# Patient Record
Sex: FEMALE | Race: BLACK OR AFRICAN AMERICAN | Employment: OTHER
[De-identification: names, ages, dates, MRNs, and addresses within clinical notes are randomized per-mention and may not be internally consistent; named-entity substitution may affect disease eponyms.]

---

## 2017-01-01 ENCOUNTER — HOME CARE VISIT (OUTPATIENT)
Dept: SCHEDULING | Facility: HOME HEALTH | Age: 77
End: 2017-01-01
Payer: MEDICARE

## 2017-01-01 ENCOUNTER — HOME CARE VISIT (OUTPATIENT)
Dept: HOSPICE | Facility: HOSPICE | Age: 77
End: 2017-01-01
Payer: MEDICARE

## 2017-01-01 ENCOUNTER — DOCUMENTATION ONLY (OUTPATIENT)
Dept: SURGERY | Age: 77
End: 2017-01-01

## 2017-01-01 ENCOUNTER — PATIENT OUTREACH (OUTPATIENT)
Dept: CASE MANAGEMENT | Age: 77
End: 2017-01-01

## 2017-01-01 ENCOUNTER — APPOINTMENT (OUTPATIENT)
Dept: INFUSION THERAPY | Age: 77
End: 2017-01-01

## 2017-01-01 ENCOUNTER — TELEPHONE (OUTPATIENT)
Dept: ONCOLOGY | Age: 77
End: 2017-01-01

## 2017-01-01 ENCOUNTER — HOME CARE VISIT (OUTPATIENT)
Dept: HOME HEALTH SERVICES | Facility: HOME HEALTH | Age: 77
End: 2017-01-01

## 2017-01-01 ENCOUNTER — HOSPITAL ENCOUNTER (OUTPATIENT)
Dept: INFUSION THERAPY | Age: 77
Discharge: HOME | End: 2017-10-12
Payer: MEDICARE

## 2017-01-01 ENCOUNTER — ANESTHESIA EVENT (OUTPATIENT)
Dept: SURGERY | Age: 77
DRG: 335 | End: 2017-01-01
Payer: MEDICARE

## 2017-01-01 ENCOUNTER — DOCUMENTATION ONLY (OUTPATIENT)
Dept: ONCOLOGY | Age: 77
End: 2017-01-01

## 2017-01-01 ENCOUNTER — APPOINTMENT (OUTPATIENT)
Dept: CT IMAGING | Age: 77
End: 2017-01-01
Attending: EMERGENCY MEDICINE
Payer: MEDICARE

## 2017-01-01 ENCOUNTER — HOSPITAL ENCOUNTER (INPATIENT)
Age: 77
LOS: 2 days | Discharge: HOME HEALTH | DRG: 065 | End: 2017-05-31
Attending: EMERGENCY MEDICINE | Admitting: INTERNAL MEDICINE
Payer: MEDICARE

## 2017-01-01 ENCOUNTER — OFFICE VISIT (OUTPATIENT)
Dept: ENDOCRINOLOGY | Age: 77
End: 2017-01-01

## 2017-01-01 ENCOUNTER — HOSPITAL ENCOUNTER (OUTPATIENT)
Dept: INFUSION THERAPY | Age: 77
Discharge: HOME | End: 2017-08-24
Payer: MEDICARE

## 2017-01-01 ENCOUNTER — TELEPHONE (OUTPATIENT)
Dept: ENDOCRINOLOGY | Age: 77
End: 2017-01-01

## 2017-01-01 ENCOUNTER — HOSPITAL ENCOUNTER (INPATIENT)
Age: 77
LOS: 14 days | Discharge: NURSING, REHAB OR LONG-TERM CARE | DRG: 335 | End: 2017-09-27
Attending: INTERNAL MEDICINE | Admitting: INTERNAL MEDICINE
Payer: MEDICARE

## 2017-01-01 ENCOUNTER — APPOINTMENT (OUTPATIENT)
Dept: CT IMAGING | Age: 77
DRG: 683 | End: 2017-01-01
Attending: EMERGENCY MEDICINE
Payer: MEDICARE

## 2017-01-01 ENCOUNTER — HOME CARE VISIT (OUTPATIENT)
Dept: HOME HEALTH SERVICES | Facility: HOME HEALTH | Age: 77
End: 2017-01-01
Payer: MEDICARE

## 2017-01-01 ENCOUNTER — APPOINTMENT (OUTPATIENT)
Dept: GENERAL RADIOLOGY | Age: 77
DRG: 335 | End: 2017-01-01
Attending: SURGERY
Payer: MEDICARE

## 2017-01-01 ENCOUNTER — OFFICE VISIT (OUTPATIENT)
Dept: ONCOLOGY | Age: 77
End: 2017-01-01

## 2017-01-01 ENCOUNTER — HOSPITAL ENCOUNTER (EMERGENCY)
Age: 77
Discharge: HOME | End: 2017-07-02
Attending: EMERGENCY MEDICINE
Payer: MEDICARE

## 2017-01-01 ENCOUNTER — APPOINTMENT (OUTPATIENT)
Dept: INFUSION THERAPY | Age: 77
End: 2017-01-01
Payer: MEDICARE

## 2017-01-01 ENCOUNTER — HOSPITAL ENCOUNTER (OUTPATIENT)
Dept: LAB | Age: 77
Discharge: HOME | End: 2017-07-07
Payer: MEDICARE

## 2017-01-01 ENCOUNTER — HOSPITAL ENCOUNTER (INPATIENT)
Age: 77
LOS: 2 days | Discharge: HOSPICE | DRG: 683 | End: 2017-11-01
Attending: EMERGENCY MEDICINE | Admitting: INTERNAL MEDICINE
Payer: MEDICARE

## 2017-01-01 ENCOUNTER — HOSPITAL ENCOUNTER (OUTPATIENT)
Dept: INFUSION THERAPY | Age: 77
End: 2017-01-01
Payer: MEDICARE

## 2017-01-01 ENCOUNTER — APPOINTMENT (OUTPATIENT)
Dept: GENERAL RADIOLOGY | Age: 77
End: 2017-01-01
Attending: EMERGENCY MEDICINE
Payer: MEDICARE

## 2017-01-01 ENCOUNTER — HOSPITAL ENCOUNTER (OUTPATIENT)
Dept: INFUSION THERAPY | Age: 77
Discharge: HOME | End: 2017-09-01
Payer: MEDICARE

## 2017-01-01 ENCOUNTER — ANESTHESIA (OUTPATIENT)
Dept: SURGERY | Age: 77
DRG: 335 | End: 2017-01-01
Payer: MEDICARE

## 2017-01-01 ENCOUNTER — HOSPITAL ENCOUNTER (EMERGENCY)
Age: 77
Discharge: HOME | End: 2017-06-10
Attending: EMERGENCY MEDICINE
Payer: MEDICARE

## 2017-01-01 ENCOUNTER — APPOINTMENT (OUTPATIENT)
Dept: GENERAL RADIOLOGY | Age: 77
DRG: 683 | End: 2017-01-01
Attending: EMERGENCY MEDICINE
Payer: MEDICARE

## 2017-01-01 ENCOUNTER — OFFICE VISIT (OUTPATIENT)
Dept: SURGERY | Age: 77
End: 2017-01-01

## 2017-01-01 ENCOUNTER — HOSPITAL ENCOUNTER (INPATIENT)
Age: 77
LOS: 5 days | Discharge: HOSPICE | End: 2017-12-11
Attending: INTERNAL MEDICINE | Admitting: INTERNAL MEDICINE

## 2017-01-01 ENCOUNTER — APPOINTMENT (OUTPATIENT)
Dept: GENERAL RADIOLOGY | Age: 77
DRG: 335 | End: 2017-01-01
Attending: NURSE PRACTITIONER
Payer: MEDICARE

## 2017-01-01 ENCOUNTER — HOSPITAL ENCOUNTER (OUTPATIENT)
Dept: INFUSION THERAPY | Age: 77
Discharge: HOME | End: 2017-08-23
Payer: MEDICARE

## 2017-01-01 ENCOUNTER — HOSPITAL ENCOUNTER (OUTPATIENT)
Dept: INTERVENTIONAL RADIOLOGY/VASCULAR | Age: 77
Discharge: HOME | End: 2017-08-22
Attending: INTERNAL MEDICINE
Payer: MEDICARE

## 2017-01-01 ENCOUNTER — HOME HEALTH ADMISSION (OUTPATIENT)
Dept: HOME HEALTH SERVICES | Facility: HOME HEALTH | Age: 77
End: 2017-01-01
Payer: MEDICARE

## 2017-01-01 ENCOUNTER — APPOINTMENT (OUTPATIENT)
Dept: CT IMAGING | Age: 77
DRG: 335 | End: 2017-01-01
Attending: INTERNAL MEDICINE
Payer: MEDICARE

## 2017-01-01 ENCOUNTER — HOSPITAL ENCOUNTER (OUTPATIENT)
Dept: INFUSION THERAPY | Age: 77
Discharge: HOME | End: 2017-08-30
Payer: MEDICARE

## 2017-01-01 ENCOUNTER — APPOINTMENT (OUTPATIENT)
Dept: GENERAL RADIOLOGY | Age: 77
DRG: 335 | End: 2017-01-01
Attending: HOSPITALIST
Payer: MEDICARE

## 2017-01-01 ENCOUNTER — APPOINTMENT (OUTPATIENT)
Dept: CT IMAGING | Age: 77
DRG: 065 | End: 2017-01-01
Attending: EMERGENCY MEDICINE
Payer: MEDICARE

## 2017-01-01 ENCOUNTER — APPOINTMENT (OUTPATIENT)
Dept: GENERAL RADIOLOGY | Age: 77
DRG: 065 | End: 2017-01-01
Attending: EMERGENCY MEDICINE
Payer: MEDICARE

## 2017-01-01 ENCOUNTER — HOSPICE ADMISSION (OUTPATIENT)
Dept: HOSPICE | Facility: HOSPICE | Age: 77
End: 2017-01-01
Payer: MEDICARE

## 2017-01-01 ENCOUNTER — NURSE NAVIGATOR (OUTPATIENT)
Dept: PALLATIVE CARE | Age: 77
End: 2017-01-01

## 2017-01-01 ENCOUNTER — APPOINTMENT (OUTPATIENT)
Dept: CT IMAGING | Age: 77
DRG: 065 | End: 2017-01-01
Attending: INTERNAL MEDICINE
Payer: MEDICARE

## 2017-01-01 ENCOUNTER — TELEPHONE (OUTPATIENT)
Dept: SURGERY | Age: 77
End: 2017-01-01

## 2017-01-01 ENCOUNTER — HOSPITAL ENCOUNTER (OUTPATIENT)
Dept: NUCLEAR MEDICINE | Age: 77
Discharge: HOME | End: 2017-07-13
Attending: INTERNAL MEDICINE
Payer: MEDICARE

## 2017-01-01 ENCOUNTER — HOSPITAL ENCOUNTER (OUTPATIENT)
Dept: CT IMAGING | Age: 77
Discharge: HOME | End: 2017-07-17
Attending: INTERNAL MEDICINE
Payer: MEDICARE

## 2017-01-01 ENCOUNTER — HOSPITAL ENCOUNTER (OUTPATIENT)
Dept: INFUSION THERAPY | Age: 77
Discharge: HOME | End: 2017-08-25
Payer: MEDICARE

## 2017-01-01 ENCOUNTER — HOSPITAL ENCOUNTER (OUTPATIENT)
Dept: CT IMAGING | Age: 77
Discharge: HOME | End: 2017-03-14
Attending: INTERNAL MEDICINE
Payer: MEDICARE

## 2017-01-01 ENCOUNTER — HOSPITAL ENCOUNTER (EMERGENCY)
Age: 77
Discharge: HOME | End: 2017-01-26
Attending: EMERGENCY MEDICINE
Payer: MEDICARE

## 2017-01-01 ENCOUNTER — APPOINTMENT (OUTPATIENT)
Dept: GENERAL RADIOLOGY | Age: 77
DRG: 065 | End: 2017-01-01
Attending: INTERNAL MEDICINE
Payer: MEDICARE

## 2017-01-01 VITALS — DIASTOLIC BLOOD PRESSURE: 70 MMHG | RESPIRATION RATE: 16 BRPM | HEART RATE: 76 BPM | SYSTOLIC BLOOD PRESSURE: 110 MMHG

## 2017-01-01 VITALS
RESPIRATION RATE: 20 BRPM | DIASTOLIC BLOOD PRESSURE: 58 MMHG | OXYGEN SATURATION: 99 % | HEART RATE: 63 BPM | SYSTOLIC BLOOD PRESSURE: 110 MMHG

## 2017-01-01 VITALS
RESPIRATION RATE: 20 BRPM | HEART RATE: 64 BPM | SYSTOLIC BLOOD PRESSURE: 138 MMHG | DIASTOLIC BLOOD PRESSURE: 62 MMHG | OXYGEN SATURATION: 99 %

## 2017-01-01 VITALS
SYSTOLIC BLOOD PRESSURE: 140 MMHG | OXYGEN SATURATION: 98 % | RESPIRATION RATE: 18 BRPM | TEMPERATURE: 98.2 F | DIASTOLIC BLOOD PRESSURE: 80 MMHG | HEART RATE: 86 BPM

## 2017-01-01 VITALS
OXYGEN SATURATION: 98 % | RESPIRATION RATE: 16 BRPM | HEART RATE: 67 BPM | TEMPERATURE: 98.6 F | DIASTOLIC BLOOD PRESSURE: 65 MMHG | SYSTOLIC BLOOD PRESSURE: 126 MMHG

## 2017-01-01 VITALS — RESPIRATION RATE: 16 BRPM | HEART RATE: 78 BPM | SYSTOLIC BLOOD PRESSURE: 102 MMHG | DIASTOLIC BLOOD PRESSURE: 50 MMHG

## 2017-01-01 VITALS
DIASTOLIC BLOOD PRESSURE: 67 MMHG | SYSTOLIC BLOOD PRESSURE: 173 MMHG | OXYGEN SATURATION: 99 % | BODY MASS INDEX: 31.86 KG/M2 | WEIGHT: 203 LBS | RESPIRATION RATE: 16 BRPM | TEMPERATURE: 98.2 F | HEIGHT: 67 IN | HEART RATE: 77 BPM

## 2017-01-01 VITALS
SYSTOLIC BLOOD PRESSURE: 128 MMHG | TEMPERATURE: 97.8 F | HEART RATE: 61 BPM | OXYGEN SATURATION: 95 % | DIASTOLIC BLOOD PRESSURE: 63 MMHG | RESPIRATION RATE: 16 BRPM

## 2017-01-01 VITALS
TEMPERATURE: 97.7 F | HEART RATE: 85 BPM | DIASTOLIC BLOOD PRESSURE: 76 MMHG | RESPIRATION RATE: 18 BRPM | SYSTOLIC BLOOD PRESSURE: 135 MMHG

## 2017-01-01 VITALS
SYSTOLIC BLOOD PRESSURE: 119 MMHG | HEART RATE: 93 BPM | TEMPERATURE: 97.9 F | DIASTOLIC BLOOD PRESSURE: 61 MMHG | RESPIRATION RATE: 18 BRPM | OXYGEN SATURATION: 97 %

## 2017-01-01 VITALS
OXYGEN SATURATION: 97 % | HEART RATE: 65 BPM | TEMPERATURE: 98.5 F | DIASTOLIC BLOOD PRESSURE: 74 MMHG | RESPIRATION RATE: 16 BRPM | SYSTOLIC BLOOD PRESSURE: 142 MMHG

## 2017-01-01 VITALS
SYSTOLIC BLOOD PRESSURE: 131 MMHG | RESPIRATION RATE: 16 BRPM | OXYGEN SATURATION: 98 % | DIASTOLIC BLOOD PRESSURE: 72 MMHG | TEMPERATURE: 98.5 F | HEART RATE: 76 BPM

## 2017-01-01 VITALS
OXYGEN SATURATION: 100 % | BODY MASS INDEX: 29.87 KG/M2 | TEMPERATURE: 98.2 F | SYSTOLIC BLOOD PRESSURE: 153 MMHG | HEIGHT: 66 IN | OXYGEN SATURATION: 97 % | RESPIRATION RATE: 18 BRPM | DIASTOLIC BLOOD PRESSURE: 80 MMHG | SYSTOLIC BLOOD PRESSURE: 134 MMHG | BODY MASS INDEX: 31.98 KG/M2 | WEIGHT: 199 LBS | HEART RATE: 92 BPM | DIASTOLIC BLOOD PRESSURE: 70 MMHG | HEART RATE: 60 BPM | TEMPERATURE: 98.8 F | RESPIRATION RATE: 18 BRPM | HEIGHT: 66 IN

## 2017-01-01 VITALS
OXYGEN SATURATION: 98 % | WEIGHT: 216 LBS | HEART RATE: 65 BPM | BODY MASS INDEX: 33.9 KG/M2 | RESPIRATION RATE: 18 BRPM | HEIGHT: 67 IN | DIASTOLIC BLOOD PRESSURE: 88 MMHG | TEMPERATURE: 98.9 F | SYSTOLIC BLOOD PRESSURE: 164 MMHG

## 2017-01-01 VITALS
HEART RATE: 75 BPM | DIASTOLIC BLOOD PRESSURE: 78 MMHG | HEIGHT: 67 IN | SYSTOLIC BLOOD PRESSURE: 146 MMHG | BODY MASS INDEX: 33.18 KG/M2 | WEIGHT: 211.4 LBS

## 2017-01-01 VITALS
HEIGHT: 71 IN | HEART RATE: 62 BPM | WEIGHT: 188 LBS | SYSTOLIC BLOOD PRESSURE: 98 MMHG | OXYGEN SATURATION: 97 % | RESPIRATION RATE: 12 BRPM | DIASTOLIC BLOOD PRESSURE: 64 MMHG | BODY MASS INDEX: 26.32 KG/M2

## 2017-01-01 VITALS
DIASTOLIC BLOOD PRESSURE: 83 MMHG | TEMPERATURE: 98.6 F | BODY MASS INDEX: 29.03 KG/M2 | SYSTOLIC BLOOD PRESSURE: 154 MMHG | HEIGHT: 67 IN | HEART RATE: 95 BPM | OXYGEN SATURATION: 93 % | WEIGHT: 185 LBS | RESPIRATION RATE: 18 BRPM

## 2017-01-01 VITALS
TEMPERATURE: 98.6 F | RESPIRATION RATE: 16 BRPM | OXYGEN SATURATION: 98 % | HEART RATE: 60 BPM | SYSTOLIC BLOOD PRESSURE: 131 MMHG | DIASTOLIC BLOOD PRESSURE: 68 MMHG

## 2017-01-01 VITALS
HEIGHT: 67 IN | DIASTOLIC BLOOD PRESSURE: 89 MMHG | OXYGEN SATURATION: 95 % | HEART RATE: 68 BPM | TEMPERATURE: 98.2 F | SYSTOLIC BLOOD PRESSURE: 168 MMHG | BODY MASS INDEX: 31.55 KG/M2 | RESPIRATION RATE: 18 BRPM | WEIGHT: 201 LBS

## 2017-01-01 VITALS
RESPIRATION RATE: 20 BRPM | DIASTOLIC BLOOD PRESSURE: 66 MMHG | WEIGHT: 194.44 LBS | BODY MASS INDEX: 30.52 KG/M2 | HEART RATE: 60 BPM | OXYGEN SATURATION: 100 % | TEMPERATURE: 98.2 F | SYSTOLIC BLOOD PRESSURE: 136 MMHG | HEIGHT: 67 IN

## 2017-01-01 VITALS — RESPIRATION RATE: 18 BRPM | DIASTOLIC BLOOD PRESSURE: 60 MMHG | SYSTOLIC BLOOD PRESSURE: 116 MMHG | HEART RATE: 76 BPM

## 2017-01-01 VITALS
WEIGHT: 211 LBS | SYSTOLIC BLOOD PRESSURE: 148 MMHG | DIASTOLIC BLOOD PRESSURE: 59 MMHG | BODY MASS INDEX: 33.12 KG/M2 | HEIGHT: 67 IN | HEART RATE: 78 BPM

## 2017-01-01 VITALS
DIASTOLIC BLOOD PRESSURE: 74 MMHG | RESPIRATION RATE: 18 BRPM | OXYGEN SATURATION: 95 % | SYSTOLIC BLOOD PRESSURE: 130 MMHG | TEMPERATURE: 98.3 F | HEART RATE: 70 BPM

## 2017-01-01 VITALS
OXYGEN SATURATION: 92 % | RESPIRATION RATE: 18 BRPM | HEART RATE: 88 BPM | DIASTOLIC BLOOD PRESSURE: 60 MMHG | SYSTOLIC BLOOD PRESSURE: 114 MMHG

## 2017-01-01 VITALS
SYSTOLIC BLOOD PRESSURE: 108 MMHG | BODY MASS INDEX: 30.82 KG/M2 | WEIGHT: 191.8 LBS | OXYGEN SATURATION: 96 % | TEMPERATURE: 98.4 F | DIASTOLIC BLOOD PRESSURE: 54 MMHG | HEIGHT: 66 IN | HEART RATE: 72 BPM | RESPIRATION RATE: 16 BRPM

## 2017-01-01 VITALS — HEART RATE: 68 BPM | SYSTOLIC BLOOD PRESSURE: 100 MMHG | RESPIRATION RATE: 18 BRPM | DIASTOLIC BLOOD PRESSURE: 58 MMHG

## 2017-01-01 VITALS
HEIGHT: 67 IN | RESPIRATION RATE: 18 BRPM | SYSTOLIC BLOOD PRESSURE: 139 MMHG | BODY MASS INDEX: 33.77 KG/M2 | OXYGEN SATURATION: 99 % | TEMPERATURE: 99.2 F | HEART RATE: 69 BPM | DIASTOLIC BLOOD PRESSURE: 66 MMHG | WEIGHT: 215.17 LBS

## 2017-01-01 VITALS
HEART RATE: 73 BPM | OXYGEN SATURATION: 98 % | RESPIRATION RATE: 14 BRPM | SYSTOLIC BLOOD PRESSURE: 136 MMHG | DIASTOLIC BLOOD PRESSURE: 77 MMHG

## 2017-01-01 VITALS
SYSTOLIC BLOOD PRESSURE: 141 MMHG | OXYGEN SATURATION: 98 % | RESPIRATION RATE: 16 BRPM | HEART RATE: 78 BPM | TEMPERATURE: 98.6 F | DIASTOLIC BLOOD PRESSURE: 78 MMHG

## 2017-01-01 VITALS
OXYGEN SATURATION: 100 % | WEIGHT: 203.71 LBS | HEIGHT: 67 IN | RESPIRATION RATE: 16 BRPM | TEMPERATURE: 98.3 F | HEART RATE: 60 BPM | SYSTOLIC BLOOD PRESSURE: 121 MMHG | BODY MASS INDEX: 31.97 KG/M2 | DIASTOLIC BLOOD PRESSURE: 64 MMHG

## 2017-01-01 VITALS
SYSTOLIC BLOOD PRESSURE: 125 MMHG | HEART RATE: 68 BPM | DIASTOLIC BLOOD PRESSURE: 84 MMHG | OXYGEN SATURATION: 99 % | WEIGHT: 191 LBS | HEIGHT: 66 IN | RESPIRATION RATE: 20 BRPM | BODY MASS INDEX: 30.7 KG/M2

## 2017-01-01 VITALS
TEMPERATURE: 97.6 F | RESPIRATION RATE: 16 BRPM | DIASTOLIC BLOOD PRESSURE: 68 MMHG | HEART RATE: 60 BPM | SYSTOLIC BLOOD PRESSURE: 144 MMHG | OXYGEN SATURATION: 93 %

## 2017-01-01 VITALS
TEMPERATURE: 98.3 F | OXYGEN SATURATION: 95 % | HEART RATE: 66 BPM | HEIGHT: 71 IN | BODY MASS INDEX: 26.32 KG/M2 | DIASTOLIC BLOOD PRESSURE: 64 MMHG | SYSTOLIC BLOOD PRESSURE: 118 MMHG | RESPIRATION RATE: 18 BRPM | WEIGHT: 188 LBS

## 2017-01-01 VITALS
HEART RATE: 88 BPM | DIASTOLIC BLOOD PRESSURE: 64 MMHG | SYSTOLIC BLOOD PRESSURE: 110 MMHG | TEMPERATURE: 98.7 F | RESPIRATION RATE: 18 BRPM

## 2017-01-01 VITALS
HEART RATE: 66 BPM | RESPIRATION RATE: 18 BRPM | DIASTOLIC BLOOD PRESSURE: 60 MMHG | SYSTOLIC BLOOD PRESSURE: 110 MMHG | OXYGEN SATURATION: 99 %

## 2017-01-01 VITALS
OXYGEN SATURATION: 98 % | RESPIRATION RATE: 16 BRPM | HEART RATE: 65 BPM | SYSTOLIC BLOOD PRESSURE: 130 MMHG | DIASTOLIC BLOOD PRESSURE: 64 MMHG

## 2017-01-01 VITALS
OXYGEN SATURATION: 96 % | RESPIRATION RATE: 16 BRPM | TEMPERATURE: 98.8 F | DIASTOLIC BLOOD PRESSURE: 58 MMHG | HEART RATE: 64 BPM | SYSTOLIC BLOOD PRESSURE: 110 MMHG

## 2017-01-01 VITALS
OXYGEN SATURATION: 97 % | RESPIRATION RATE: 18 BRPM | SYSTOLIC BLOOD PRESSURE: 120 MMHG | HEART RATE: 87 BPM | TEMPERATURE: 97.2 F | DIASTOLIC BLOOD PRESSURE: 70 MMHG

## 2017-01-01 VITALS
OXYGEN SATURATION: 98 % | DIASTOLIC BLOOD PRESSURE: 50 MMHG | HEART RATE: 84 BPM | SYSTOLIC BLOOD PRESSURE: 90 MMHG | RESPIRATION RATE: 12 BRPM

## 2017-01-01 VITALS
WEIGHT: 204 LBS | TEMPERATURE: 99 F | SYSTOLIC BLOOD PRESSURE: 187 MMHG | RESPIRATION RATE: 18 BRPM | DIASTOLIC BLOOD PRESSURE: 75 MMHG | HEIGHT: 67 IN | HEART RATE: 70 BPM | OXYGEN SATURATION: 96 % | BODY MASS INDEX: 32.02 KG/M2

## 2017-01-01 VITALS
RESPIRATION RATE: 16 BRPM | DIASTOLIC BLOOD PRESSURE: 78 MMHG | OXYGEN SATURATION: 95 % | SYSTOLIC BLOOD PRESSURE: 138 MMHG | HEART RATE: 78 BPM | TEMPERATURE: 98.4 F

## 2017-01-01 VITALS
TEMPERATURE: 98.5 F | SYSTOLIC BLOOD PRESSURE: 128 MMHG | OXYGEN SATURATION: 92 % | HEART RATE: 85 BPM | DIASTOLIC BLOOD PRESSURE: 73 MMHG | RESPIRATION RATE: 16 BRPM

## 2017-01-01 VITALS
TEMPERATURE: 97.9 F | HEIGHT: 66 IN | HEART RATE: 89 BPM | SYSTOLIC BLOOD PRESSURE: 105 MMHG | BODY MASS INDEX: 30.22 KG/M2 | OXYGEN SATURATION: 97 % | DIASTOLIC BLOOD PRESSURE: 69 MMHG | WEIGHT: 188 LBS

## 2017-01-01 VITALS
SYSTOLIC BLOOD PRESSURE: 135 MMHG | HEART RATE: 76 BPM | TEMPERATURE: 98.5 F | OXYGEN SATURATION: 97 % | DIASTOLIC BLOOD PRESSURE: 68 MMHG | RESPIRATION RATE: 18 BRPM

## 2017-01-01 VITALS
SYSTOLIC BLOOD PRESSURE: 160 MMHG | TEMPERATURE: 98 F | RESPIRATION RATE: 18 BRPM | WEIGHT: 188.4 LBS | HEIGHT: 66 IN | BODY MASS INDEX: 30.28 KG/M2 | HEART RATE: 67 BPM | OXYGEN SATURATION: 100 % | DIASTOLIC BLOOD PRESSURE: 81 MMHG

## 2017-01-01 VITALS
RESPIRATION RATE: 16 BRPM | TEMPERATURE: 98.2 F | HEART RATE: 73 BPM | OXYGEN SATURATION: 98 % | SYSTOLIC BLOOD PRESSURE: 138 MMHG | DIASTOLIC BLOOD PRESSURE: 65 MMHG

## 2017-01-01 VITALS
SYSTOLIC BLOOD PRESSURE: 137 MMHG | DIASTOLIC BLOOD PRESSURE: 68 MMHG | OXYGEN SATURATION: 96 % | HEART RATE: 60 BPM | RESPIRATION RATE: 16 BRPM | TEMPERATURE: 99.6 F

## 2017-01-01 VITALS
OXYGEN SATURATION: 99 % | DIASTOLIC BLOOD PRESSURE: 75 MMHG | SYSTOLIC BLOOD PRESSURE: 147 MMHG | TEMPERATURE: 99.3 F | HEART RATE: 60 BPM | RESPIRATION RATE: 20 BRPM

## 2017-01-01 VITALS — SYSTOLIC BLOOD PRESSURE: 110 MMHG | DIASTOLIC BLOOD PRESSURE: 74 MMHG | HEART RATE: 68 BPM | RESPIRATION RATE: 18 BRPM

## 2017-01-01 VITALS
SYSTOLIC BLOOD PRESSURE: 130 MMHG | DIASTOLIC BLOOD PRESSURE: 73 MMHG | HEIGHT: 67 IN | TEMPERATURE: 99 F | RESPIRATION RATE: 18 BRPM | WEIGHT: 185.6 LBS | HEART RATE: 81 BPM | BODY MASS INDEX: 29.13 KG/M2

## 2017-01-01 VITALS — DIASTOLIC BLOOD PRESSURE: 58 MMHG | SYSTOLIC BLOOD PRESSURE: 120 MMHG | HEART RATE: 76 BPM | RESPIRATION RATE: 18 BRPM

## 2017-01-01 VITALS
OXYGEN SATURATION: 98 % | TEMPERATURE: 98.4 F | HEART RATE: 88 BPM | DIASTOLIC BLOOD PRESSURE: 74 MMHG | RESPIRATION RATE: 20 BRPM | SYSTOLIC BLOOD PRESSURE: 124 MMHG

## 2017-01-01 VITALS
SYSTOLIC BLOOD PRESSURE: 142 MMHG | TEMPERATURE: 98.9 F | DIASTOLIC BLOOD PRESSURE: 77 MMHG | OXYGEN SATURATION: 96 % | RESPIRATION RATE: 20 BRPM | HEART RATE: 60 BPM

## 2017-01-01 VITALS
HEART RATE: 86 BPM | TEMPERATURE: 98.7 F | OXYGEN SATURATION: 98 % | WEIGHT: 201 LBS | SYSTOLIC BLOOD PRESSURE: 148 MMHG | RESPIRATION RATE: 16 BRPM | DIASTOLIC BLOOD PRESSURE: 68 MMHG | BODY MASS INDEX: 31.48 KG/M2

## 2017-01-01 VITALS
RESPIRATION RATE: 20 BRPM | SYSTOLIC BLOOD PRESSURE: 103 MMHG | DIASTOLIC BLOOD PRESSURE: 58 MMHG | OXYGEN SATURATION: 96 % | TEMPERATURE: 98.7 F | HEART RATE: 60 BPM

## 2017-01-01 VITALS
RESPIRATION RATE: 18 BRPM | TEMPERATURE: 97.6 F | SYSTOLIC BLOOD PRESSURE: 165 MMHG | OXYGEN SATURATION: 99 % | HEART RATE: 60 BPM | DIASTOLIC BLOOD PRESSURE: 85 MMHG

## 2017-01-01 VITALS
SYSTOLIC BLOOD PRESSURE: 136 MMHG | OXYGEN SATURATION: 95 % | RESPIRATION RATE: 20 BRPM | HEART RATE: 55 BPM | DIASTOLIC BLOOD PRESSURE: 68 MMHG

## 2017-01-01 VITALS
OXYGEN SATURATION: 97 % | TEMPERATURE: 97.9 F | BODY MASS INDEX: 30.23 KG/M2 | SYSTOLIC BLOOD PRESSURE: 140 MMHG | RESPIRATION RATE: 18 BRPM | HEART RATE: 80 BPM | WEIGHT: 193 LBS | DIASTOLIC BLOOD PRESSURE: 88 MMHG

## 2017-01-01 VITALS
DIASTOLIC BLOOD PRESSURE: 62 MMHG | HEART RATE: 95 BPM | OXYGEN SATURATION: 96 % | RESPIRATION RATE: 16 BRPM | TEMPERATURE: 97.6 F | SYSTOLIC BLOOD PRESSURE: 110 MMHG

## 2017-01-01 VITALS
HEART RATE: 69 BPM | WEIGHT: 181 LBS | BODY MASS INDEX: 28.41 KG/M2 | OXYGEN SATURATION: 98 % | TEMPERATURE: 97.7 F | HEIGHT: 67 IN | SYSTOLIC BLOOD PRESSURE: 116 MMHG | RESPIRATION RATE: 20 BRPM | DIASTOLIC BLOOD PRESSURE: 64 MMHG

## 2017-01-01 VITALS
HEART RATE: 89 BPM | RESPIRATION RATE: 18 BRPM | DIASTOLIC BLOOD PRESSURE: 69 MMHG | OXYGEN SATURATION: 100 % | SYSTOLIC BLOOD PRESSURE: 129 MMHG | TEMPERATURE: 98.5 F

## 2017-01-01 VITALS
TEMPERATURE: 98.5 F | DIASTOLIC BLOOD PRESSURE: 75 MMHG | HEART RATE: 74 BPM | RESPIRATION RATE: 12 BRPM | BODY MASS INDEX: 34.84 KG/M2 | SYSTOLIC BLOOD PRESSURE: 158 MMHG | HEIGHT: 67 IN | WEIGHT: 222 LBS | OXYGEN SATURATION: 98 %

## 2017-01-01 VITALS
TEMPERATURE: 97.9 F | HEART RATE: 68 BPM | RESPIRATION RATE: 7 BRPM | SYSTOLIC BLOOD PRESSURE: 118 MMHG | OXYGEN SATURATION: 93 % | DIASTOLIC BLOOD PRESSURE: 45 MMHG

## 2017-01-01 VITALS
DIASTOLIC BLOOD PRESSURE: 62 MMHG | TEMPERATURE: 97.1 F | HEIGHT: 67 IN | SYSTOLIC BLOOD PRESSURE: 119 MMHG | WEIGHT: 185 LBS | HEART RATE: 88 BPM | RESPIRATION RATE: 18 BRPM | BODY MASS INDEX: 29.03 KG/M2 | OXYGEN SATURATION: 98 %

## 2017-01-01 VITALS
RESPIRATION RATE: 16 BRPM | DIASTOLIC BLOOD PRESSURE: 62 MMHG | OXYGEN SATURATION: 96 % | HEART RATE: 60 BPM | TEMPERATURE: 98.7 F | SYSTOLIC BLOOD PRESSURE: 136 MMHG

## 2017-01-01 VITALS
SYSTOLIC BLOOD PRESSURE: 159 MMHG | DIASTOLIC BLOOD PRESSURE: 88 MMHG | HEART RATE: 79 BPM | RESPIRATION RATE: 14 BRPM | OXYGEN SATURATION: 97 %

## 2017-01-01 DIAGNOSIS — R10.84 GENERALIZED ABDOMINAL PAIN: ICD-10-CM

## 2017-01-01 DIAGNOSIS — C50.919 METASTATIC BREAST CANCER (HCC): Primary | ICD-10-CM

## 2017-01-01 DIAGNOSIS — Z79.4 TYPE 2 DIABETES MELLITUS WITH DIABETIC POLYNEUROPATHY, WITH LONG-TERM CURRENT USE OF INSULIN (HCC): ICD-10-CM

## 2017-01-01 DIAGNOSIS — C90.00 MULTIPLE MYELOMA, REMISSION STATUS UNSPECIFIED (HCC): ICD-10-CM

## 2017-01-01 DIAGNOSIS — E55.9 VITAMIN D DEFICIENCY: ICD-10-CM

## 2017-01-01 DIAGNOSIS — I10 ESSENTIAL HYPERTENSION: ICD-10-CM

## 2017-01-01 DIAGNOSIS — E04.2 MULTINODULAR GOITER (NONTOXIC): ICD-10-CM

## 2017-01-01 DIAGNOSIS — C50.919 MALIGNANT NEOPLASM OF FEMALE BREAST, UNSPECIFIED LATERALITY, UNSPECIFIED SITE OF BREAST: Primary | ICD-10-CM

## 2017-01-01 DIAGNOSIS — R10.10 PAIN OF UPPER ABDOMEN: ICD-10-CM

## 2017-01-01 DIAGNOSIS — R53.1 LEFT-SIDED WEAKNESS: ICD-10-CM

## 2017-01-01 DIAGNOSIS — R63.0 DECREASED APPETITE: ICD-10-CM

## 2017-01-01 DIAGNOSIS — D64.9 ANEMIA, UNSPECIFIED TYPE: ICD-10-CM

## 2017-01-01 DIAGNOSIS — Z79.4 TYPE 2 DIABETES MELLITUS WITH DIABETIC POLYNEUROPATHY, WITH LONG-TERM CURRENT USE OF INSULIN (HCC): Primary | ICD-10-CM

## 2017-01-01 DIAGNOSIS — I65.23 STENOSIS OF BOTH INTERNAL CAROTID ARTERIES: ICD-10-CM

## 2017-01-01 DIAGNOSIS — R63.4 WEIGHT LOSS: ICD-10-CM

## 2017-01-01 DIAGNOSIS — C50.211 MALIGNANT NEOPLASM OF UPPER-INNER QUADRANT OF RIGHT FEMALE BREAST (HCC): ICD-10-CM

## 2017-01-01 DIAGNOSIS — D64.9 ANEMIA, NORMOCYTIC NORMOCHROMIC: ICD-10-CM

## 2017-01-01 DIAGNOSIS — R10.84 ABDOMINAL PAIN, GENERALIZED: Primary | ICD-10-CM

## 2017-01-01 DIAGNOSIS — R10.9 ABDOMINAL PAIN: ICD-10-CM

## 2017-01-01 DIAGNOSIS — G89.3 PAIN DUE TO MALIGNANT NEOPLASM METASTATIC TO BONE (HCC): ICD-10-CM

## 2017-01-01 DIAGNOSIS — M89.8X9 MALIGNANT BONE PAIN: ICD-10-CM

## 2017-01-01 DIAGNOSIS — D61.9 ANEMIA DUE TO BONE MARROW FAILURE, UNSPECIFIED BONE MARROW FAILURE TYPE (HCC): ICD-10-CM

## 2017-01-01 DIAGNOSIS — G89.3 CANCER RELATED PAIN: ICD-10-CM

## 2017-01-01 DIAGNOSIS — K59.00 CONSTIPATION, UNSPECIFIED CONSTIPATION TYPE: ICD-10-CM

## 2017-01-01 DIAGNOSIS — E46 PROTEIN CALORIE MALNUTRITION (HCC): ICD-10-CM

## 2017-01-01 DIAGNOSIS — C90.00 MULTIPLE MYELOMA, REMISSION STATUS UNSPECIFIED (HCC): Primary | ICD-10-CM

## 2017-01-01 DIAGNOSIS — G89.3 MALIGNANT BONE PAIN: ICD-10-CM

## 2017-01-01 DIAGNOSIS — Z95.0 PACEMAKER: ICD-10-CM

## 2017-01-01 DIAGNOSIS — E11.42 TYPE 2 DIABETES MELLITUS WITH DIABETIC POLYNEUROPATHY, WITH LONG-TERM CURRENT USE OF INSULIN (HCC): Primary | ICD-10-CM

## 2017-01-01 DIAGNOSIS — E89.89 LYMPHEDEMA OF UPPER EXTREMITY FOLLOWING LYMPHADENECTOMY: ICD-10-CM

## 2017-01-01 DIAGNOSIS — G45.1 TRANSIENT ISCHEMIC ATTACK INVOLVING RIGHT INTERNAL CAROTID ARTERY: ICD-10-CM

## 2017-01-01 DIAGNOSIS — E87.1 HYPOSMOLALITY AND/OR HYPONATREMIA: ICD-10-CM

## 2017-01-01 DIAGNOSIS — R51.9 NONINTRACTABLE HEADACHE, UNSPECIFIED CHRONICITY PATTERN, UNSPECIFIED HEADACHE TYPE: Primary | ICD-10-CM

## 2017-01-01 DIAGNOSIS — I89.0 LYMPHEDEMA OF UPPER EXTREMITY FOLLOWING LYMPHADENECTOMY: ICD-10-CM

## 2017-01-01 DIAGNOSIS — K57.90 DIVERTICULOSIS OF INTESTINE WITHOUT BLEEDING, UNSPECIFIED INTESTINAL TRACT LOCATION: ICD-10-CM

## 2017-01-01 DIAGNOSIS — R60.0 LOCALIZED EDEMA: ICD-10-CM

## 2017-01-01 DIAGNOSIS — Z09 POSTOPERATIVE EXAMINATION: Primary | ICD-10-CM

## 2017-01-01 DIAGNOSIS — C79.51 PAIN DUE TO MALIGNANT NEOPLASM METASTATIC TO BONE (HCC): ICD-10-CM

## 2017-01-01 DIAGNOSIS — G43.A0 CYCLICAL VOMITING WITH NAUSEA, INTRACTABILITY OF VOMITING NOT SPECIFIED: ICD-10-CM

## 2017-01-01 DIAGNOSIS — Z90.11 S/P RIGHT MASTECTOMY: ICD-10-CM

## 2017-01-01 DIAGNOSIS — E78.00 HYPERCHOLESTEROLEMIA: ICD-10-CM

## 2017-01-01 DIAGNOSIS — R42 DIZZINESS: ICD-10-CM

## 2017-01-01 DIAGNOSIS — C50.919 METASTATIC BREAST CANCER (HCC): ICD-10-CM

## 2017-01-01 DIAGNOSIS — N28.9 ACUTE RENAL INSUFFICIENCY: ICD-10-CM

## 2017-01-01 DIAGNOSIS — D64.9 CHRONIC ANEMIA: ICD-10-CM

## 2017-01-01 DIAGNOSIS — C50.211 MALIGNANT NEOPLASM OF UPPER-INNER QUADRANT OF RIGHT FEMALE BREAST (HCC): Primary | ICD-10-CM

## 2017-01-01 DIAGNOSIS — N17.9 AKI (ACUTE KIDNEY INJURY) (HCC): Primary | ICD-10-CM

## 2017-01-01 DIAGNOSIS — R07.9 CHEST PAIN, UNSPECIFIED TYPE: ICD-10-CM

## 2017-01-01 DIAGNOSIS — R53.0 NEOPLASTIC MALIGNANT RELATED FATIGUE: ICD-10-CM

## 2017-01-01 DIAGNOSIS — J06.9 ACUTE UPPER RESPIRATORY INFECTION: Primary | ICD-10-CM

## 2017-01-01 DIAGNOSIS — E11.42 TYPE 2 DIABETES MELLITUS WITH DIABETIC POLYNEUROPATHY, WITH LONG-TERM CURRENT USE OF INSULIN (HCC): ICD-10-CM

## 2017-01-01 DIAGNOSIS — E86.0 DEHYDRATION: ICD-10-CM

## 2017-01-01 DIAGNOSIS — I63.9 CEREBROVASCULAR ACCIDENT (CVA), UNSPECIFIED MECHANISM (HCC): ICD-10-CM

## 2017-01-01 LAB
ABO + RH BLD: NORMAL
ALBUMIN 24H MFR UR ELPH: 77.1 %
ALBUMIN SERPL BCP-MCNC: 2.2 G/DL (ref 3.5–5)
ALBUMIN SERPL BCP-MCNC: 2.6 G/DL (ref 3.5–5)
ALBUMIN SERPL BCP-MCNC: 2.6 G/DL (ref 3.5–5)
ALBUMIN SERPL BCP-MCNC: 3 G/DL (ref 3.5–5)
ALBUMIN SERPL ELPH-MCNC: 2.4 G/DL (ref 2.9–4.4)
ALBUMIN SERPL-MCNC: 1.7 G/DL (ref 3.5–5)
ALBUMIN SERPL-MCNC: 1.8 G/DL (ref 3.5–5)
ALBUMIN SERPL-MCNC: 1.9 G/DL (ref 3.5–5)
ALBUMIN SERPL-MCNC: 2 G/DL (ref 3.5–5)
ALBUMIN SERPL-MCNC: 2 G/DL (ref 3.5–5)
ALBUMIN SERPL-MCNC: 2.1 G/DL (ref 3.5–5)
ALBUMIN SERPL-MCNC: 2.2 G/DL (ref 3.5–5)
ALBUMIN SERPL-MCNC: 2.2 G/DL (ref 3.5–5)
ALBUMIN SERPL-MCNC: 2.5 G/DL (ref 3.5–5)
ALBUMIN SERPL-MCNC: 2.7 G/DL (ref 3.5–5)
ALBUMIN SERPL-MCNC: 3.1 G/DL (ref 3.5–5)
ALBUMIN/CREAT UR: 334.9 MG/G CREAT (ref 0–30)
ALBUMIN/GLOB SERPL: 0.3 {RATIO} (ref 1.1–2.2)
ALBUMIN/GLOB SERPL: 0.4 {RATIO} (ref 1.1–2.2)
ALBUMIN/GLOB SERPL: 0.5 {RATIO} (ref 1.1–2.2)
ALBUMIN/GLOB SERPL: 0.6 {RATIO} (ref 0.7–1.7)
ALBUMIN/GLOB SERPL: 0.6 {RATIO} (ref 1.1–2.2)
ALBUMIN/GLOB SERPL: 0.7 {RATIO} (ref 1.1–2.2)
ALP SERPL-CCNC: 120 U/L (ref 45–117)
ALP SERPL-CCNC: 150 U/L (ref 45–117)
ALP SERPL-CCNC: 167 U/L (ref 45–117)
ALP SERPL-CCNC: 178 U/L (ref 45–117)
ALP SERPL-CCNC: 182 U/L (ref 45–117)
ALP SERPL-CCNC: 201 U/L (ref 45–117)
ALP SERPL-CCNC: 216 U/L (ref 45–117)
ALP SERPL-CCNC: 251 U/L (ref 45–117)
ALP SERPL-CCNC: 264 U/L (ref 45–117)
ALP SERPL-CCNC: 293 U/L (ref 45–117)
ALP SERPL-CCNC: 414 U/L (ref 45–117)
ALP SERPL-CCNC: 438 U/L (ref 45–117)
ALP SERPL-CCNC: 509 U/L (ref 45–117)
ALPHA1 GLOB 24H MFR UR ELPH: 0.8 %
ALPHA1 GLOB SERPL ELPH-MCNC: 0.4 G/DL (ref 0–0.4)
ALPHA2 GLOB 24H MFR UR ELPH: 7.6 %
ALPHA2 GLOB SERPL ELPH-MCNC: 1 G/DL (ref 0.4–1)
ALT SERPL-CCNC: 11 U/L (ref 12–78)
ALT SERPL-CCNC: 12 U/L (ref 12–78)
ALT SERPL-CCNC: 12 U/L (ref 12–78)
ALT SERPL-CCNC: 14 U/L (ref 12–78)
ALT SERPL-CCNC: 16 U/L (ref 12–78)
ALT SERPL-CCNC: 17 U/L (ref 12–78)
ALT SERPL-CCNC: 17 U/L (ref 12–78)
ALT SERPL-CCNC: 18 U/L (ref 12–78)
ALT SERPL-CCNC: 18 U/L (ref 12–78)
ALT SERPL-CCNC: 19 U/L (ref 12–78)
ALT SERPL-CCNC: 25 U/L (ref 12–78)
ALT SERPL-CCNC: 7 U/L (ref 12–78)
ALT SERPL-CCNC: 7 U/L (ref 12–78)
AMORPH CRY URNS QL MICRO: ABNORMAL
AMORPH CRY URNS QL MICRO: ABNORMAL
ANION GAP BLD CALC-SCNC: 11 MMOL/L (ref 5–15)
ANION GAP BLD CALC-SCNC: 5 MMOL/L (ref 5–15)
ANION GAP BLD CALC-SCNC: 7 MMOL/L (ref 5–15)
ANION GAP BLD CALC-SCNC: 8 MMOL/L (ref 5–15)
ANION GAP BLD CALC-SCNC: 8 MMOL/L (ref 5–15)
ANION GAP SERPL CALC-SCNC: 10 MMOL/L (ref 5–15)
ANION GAP SERPL CALC-SCNC: 11 MMOL/L (ref 5–15)
ANION GAP SERPL CALC-SCNC: 12 MMOL/L (ref 5–15)
ANION GAP SERPL CALC-SCNC: 13 MMOL/L (ref 5–15)
ANION GAP SERPL CALC-SCNC: 8 MMOL/L (ref 5–15)
ANION GAP SERPL CALC-SCNC: 9 MMOL/L (ref 5–15)
APPEARANCE UR: ABNORMAL
APPEARANCE UR: CLEAR
AST SERPL W P-5'-P-CCNC: 22 U/L (ref 15–37)
AST SERPL W P-5'-P-CCNC: 23 U/L (ref 15–37)
AST SERPL W P-5'-P-CCNC: 32 U/L (ref 15–37)
AST SERPL W P-5'-P-CCNC: 40 U/L (ref 15–37)
AST SERPL-CCNC: 11 U/L (ref 15–37)
AST SERPL-CCNC: 11 U/L (ref 15–37)
AST SERPL-CCNC: 152 U/L (ref 15–37)
AST SERPL-CCNC: 156 U/L (ref 15–37)
AST SERPL-CCNC: 17 U/L (ref 15–37)
AST SERPL-CCNC: 217 U/L (ref 15–37)
AST SERPL-CCNC: 22 U/L (ref 15–37)
AST SERPL-CCNC: 23 U/L (ref 15–37)
AST SERPL-CCNC: 30 U/L (ref 15–37)
ATRIAL RATE: 60 BPM
ATRIAL RATE: 68 BPM
ATRIAL RATE: 68 BPM
ATRIAL RATE: 87 BPM
B-GLOBULIN MFR UR ELPH: 8.1 %
B-GLOBULIN SERPL ELPH-MCNC: 1.1 G/DL (ref 0.7–1.3)
B2 MICROGLOB SERPL-MCNC: 3.3 MG/L (ref 0.6–2.4)
BACTERIA SPEC CULT: NORMAL
BACTERIA URNS QL MICRO: ABNORMAL /HPF
BACTERIA URNS QL MICRO: NEGATIVE /HPF
BASO+EOS+MONOS # BLD AUTO: 0.5 K/UL (ref 0.2–1.2)
BASO+EOS+MONOS # BLD AUTO: 7 % (ref 3.2–16.9)
BASOPHILS # BLD AUTO: 0 K/UL (ref 0–0.1)
BASOPHILS # BLD: 0 % (ref 0–1)
BASOPHILS # BLD: 0 K/UL
BASOPHILS # BLD: 0 K/UL (ref 0–0.1)
BASOPHILS # BLD: 0.1 K/UL
BASOPHILS NFR BLD: 0 %
BASOPHILS NFR BLD: 0 % (ref 0–1)
BASOPHILS NFR BLD: 1 %
BILIRUB DIRECT SERPL-MCNC: 0.3 MG/DL (ref 0–0.2)
BILIRUB SERPL-MCNC: 0.2 MG/DL (ref 0.2–1)
BILIRUB SERPL-MCNC: 0.2 MG/DL (ref 0.2–1)
BILIRUB SERPL-MCNC: 0.3 MG/DL (ref 0.2–1)
BILIRUB SERPL-MCNC: 0.4 MG/DL (ref 0.2–1)
BILIRUB SERPL-MCNC: 0.5 MG/DL (ref 0.2–1)
BILIRUB SERPL-MCNC: 0.5 MG/DL (ref 0.2–1)
BILIRUB SERPL-MCNC: 0.6 MG/DL (ref 0.2–1)
BILIRUB SERPL-MCNC: 0.6 MG/DL (ref 0.2–1)
BILIRUB SERPL-MCNC: 1 MG/DL (ref 0.2–1)
BILIRUB UR QL CFM: NEGATIVE
BILIRUB UR QL CFM: NEGATIVE
BILIRUB UR QL: NEGATIVE
BLD PROD TYP BPU: NORMAL
BLOOD GROUP ANTIBODIES SERPL: NORMAL
BNP SERPL-MCNC: 209 PG/ML (ref 0–450)
BPU ID: NORMAL
BUN SERPL-MCNC: 11 MG/DL (ref 6–20)
BUN SERPL-MCNC: 12 MG/DL (ref 6–20)
BUN SERPL-MCNC: 14 MG/DL (ref 6–20)
BUN SERPL-MCNC: 15 MG/DL (ref 6–20)
BUN SERPL-MCNC: 16 MG/DL (ref 6–20)
BUN SERPL-MCNC: 16 MG/DL (ref 6–20)
BUN SERPL-MCNC: 17 MG/DL (ref 6–20)
BUN SERPL-MCNC: 19 MG/DL (ref 6–20)
BUN SERPL-MCNC: 21 MG/DL (ref 6–20)
BUN SERPL-MCNC: 21 MG/DL (ref 6–20)
BUN SERPL-MCNC: 23 MG/DL (ref 6–20)
BUN SERPL-MCNC: 23 MG/DL (ref 6–20)
BUN SERPL-MCNC: 25 MG/DL (ref 6–20)
BUN SERPL-MCNC: 32 MG/DL (ref 6–20)
BUN SERPL-MCNC: 36 MG/DL (ref 6–20)
BUN SERPL-MCNC: 36 MG/DL (ref 6–20)
BUN SERPL-MCNC: 37 MG/DL (ref 6–20)
BUN SERPL-MCNC: 40 MG/DL (ref 6–20)
BUN SERPL-MCNC: 43 MG/DL (ref 6–20)
BUN SERPL-MCNC: 45 MG/DL (ref 6–20)
BUN SERPL-MCNC: 46 MG/DL (ref 6–20)
BUN SERPL-MCNC: 48 MG/DL (ref 6–20)
BUN SERPL-MCNC: 52 MG/DL (ref 6–20)
BUN SERPL-MCNC: 52 MG/DL (ref 6–20)
BUN SERPL-MCNC: 54 MG/DL (ref 6–20)
BUN SERPL-MCNC: 56 MG/DL (ref 6–20)
BUN SERPL-MCNC: 57 MG/DL (ref 6–20)
BUN SERPL-MCNC: 58 MG/DL (ref 6–20)
BUN SERPL-MCNC: 60 MG/DL (ref 6–20)
BUN SERPL-MCNC: 62 MG/DL (ref 6–20)
BUN/CREAT SERPL: 10 (ref 12–20)
BUN/CREAT SERPL: 12 (ref 12–20)
BUN/CREAT SERPL: 13 (ref 12–20)
BUN/CREAT SERPL: 14 (ref 12–20)
BUN/CREAT SERPL: 14 (ref 12–20)
BUN/CREAT SERPL: 15 (ref 12–20)
BUN/CREAT SERPL: 16 (ref 12–20)
BUN/CREAT SERPL: 17 (ref 12–20)
BUN/CREAT SERPL: 18 (ref 12–20)
BUN/CREAT SERPL: 18 (ref 12–20)
BUN/CREAT SERPL: 19 (ref 12–20)
BUN/CREAT SERPL: 20 (ref 12–20)
BUN/CREAT SERPL: 22 (ref 12–20)
BUN/CREAT SERPL: 23 (ref 12–20)
BUN/CREAT SERPL: 24 (ref 12–20)
BUN/CREAT SERPL: 25 (ref 12–20)
C DIFF TOX GENS STL QL NAA+PROBE: NEGATIVE
C JEJUNI+C COLI AG STL QL: NEGATIVE
CALCIUM SERPL-MCNC: 6.2 MG/DL (ref 8.5–10.1)
CALCIUM SERPL-MCNC: 6.6 MG/DL (ref 8.5–10.1)
CALCIUM SERPL-MCNC: 6.7 MG/DL (ref 8.5–10.1)
CALCIUM SERPL-MCNC: 7 MG/DL (ref 8.5–10.1)
CALCIUM SERPL-MCNC: 7.2 MG/DL (ref 8.5–10.1)
CALCIUM SERPL-MCNC: 7.3 MG/DL (ref 8.5–10.1)
CALCIUM SERPL-MCNC: 7.4 MG/DL (ref 8.5–10.1)
CALCIUM SERPL-MCNC: 7.5 MG/DL (ref 8.5–10.1)
CALCIUM SERPL-MCNC: 7.6 MG/DL (ref 8.5–10.1)
CALCIUM SERPL-MCNC: 7.6 MG/DL (ref 8.5–10.1)
CALCIUM SERPL-MCNC: 7.7 MG/DL (ref 8.5–10.1)
CALCIUM SERPL-MCNC: 7.7 MG/DL (ref 8.5–10.1)
CALCIUM SERPL-MCNC: 8 MG/DL (ref 8.5–10.1)
CALCIUM SERPL-MCNC: 8 MG/DL (ref 8.5–10.1)
CALCIUM SERPL-MCNC: 8.1 MG/DL (ref 8.5–10.1)
CALCIUM SERPL-MCNC: 8.3 MG/DL (ref 8.5–10.1)
CALCIUM SERPL-MCNC: 8.3 MG/DL (ref 8.5–10.1)
CALCIUM SERPL-MCNC: 8.4 MG/DL (ref 8.5–10.1)
CALCIUM SERPL-MCNC: 8.5 MG/DL (ref 8.5–10.1)
CALCIUM SERPL-MCNC: 8.5 MG/DL (ref 8.5–10.1)
CALCIUM SERPL-MCNC: 8.6 MG/DL (ref 8.5–10.1)
CALCIUM SERPL-MCNC: 8.7 MG/DL (ref 8.5–10.1)
CALCIUM SERPL-MCNC: 8.7 MG/DL (ref 8.5–10.1)
CALCIUM SERPL-MCNC: 9 MG/DL (ref 8.5–10.1)
CALCIUM SERPL-MCNC: 9.1 MG/DL (ref 8.5–10.1)
CALCIUM SERPL-MCNC: 9.1 MG/DL (ref 8.5–10.1)
CALCIUM SERPL-MCNC: 9.2 MG/DL (ref 8.5–10.1)
CALCULATED P AXIS, ECG09: 31 DEGREES
CALCULATED P AXIS, ECG09: 77 DEGREES
CALCULATED P AXIS, ECG09: 80 DEGREES
CALCULATED R AXIS, ECG10: -61 DEGREES
CALCULATED R AXIS, ECG10: -61 DEGREES
CALCULATED R AXIS, ECG10: -67 DEGREES
CALCULATED R AXIS, ECG10: -70 DEGREES
CALCULATED T AXIS, ECG11: 105 DEGREES
CALCULATED T AXIS, ECG11: 108 DEGREES
CC UR VC: NORMAL
CHLORIDE SERPL-SCNC: 100 MMOL/L (ref 97–108)
CHLORIDE SERPL-SCNC: 101 MMOL/L (ref 97–108)
CHLORIDE SERPL-SCNC: 102 MMOL/L (ref 97–108)
CHLORIDE SERPL-SCNC: 103 MMOL/L (ref 97–108)
CHLORIDE SERPL-SCNC: 104 MMOL/L (ref 97–108)
CHLORIDE SERPL-SCNC: 105 MMOL/L (ref 97–108)
CHLORIDE SERPL-SCNC: 106 MMOL/L (ref 97–108)
CHLORIDE SERPL-SCNC: 106 MMOL/L (ref 97–108)
CHLORIDE SERPL-SCNC: 107 MMOL/L (ref 97–108)
CHLORIDE SERPL-SCNC: 107 MMOL/L (ref 97–108)
CHLORIDE SERPL-SCNC: 111 MMOL/L (ref 97–108)
CHLORIDE SERPL-SCNC: 114 MMOL/L (ref 97–108)
CHLORIDE SERPL-SCNC: 91 MMOL/L (ref 97–108)
CHLORIDE SERPL-SCNC: 96 MMOL/L (ref 97–108)
CHLORIDE SERPL-SCNC: 98 MMOL/L (ref 97–108)
CHLORIDE SERPL-SCNC: 98 MMOL/L (ref 97–108)
CHLORIDE SERPL-SCNC: 99 MMOL/L (ref 97–108)
CHOLEST SERPL-MCNC: 157 MG/DL
CK MB CFR SERPL CALC: 1.4 % (ref 0–2.5)
CK MB CFR SERPL CALC: 2.2 % (ref 0–2.5)
CK MB SERPL-MCNC: 1.1 NG/ML (ref 5–25)
CK MB SERPL-MCNC: 2.3 NG/ML (ref 5–25)
CK SERPL-CCNC: 168 U/L (ref 26–192)
CK SERPL-CCNC: 50 U/L (ref 26–192)
CK SERPL-CCNC: 70 U/L (ref 26–192)
CO2 SERPL-SCNC: 14 MMOL/L (ref 21–32)
CO2 SERPL-SCNC: 16 MMOL/L (ref 21–32)
CO2 SERPL-SCNC: 17 MMOL/L (ref 21–32)
CO2 SERPL-SCNC: 18 MMOL/L (ref 21–32)
CO2 SERPL-SCNC: 20 MMOL/L (ref 21–32)
CO2 SERPL-SCNC: 21 MMOL/L (ref 21–32)
CO2 SERPL-SCNC: 22 MMOL/L (ref 21–32)
CO2 SERPL-SCNC: 23 MMOL/L (ref 21–32)
CO2 SERPL-SCNC: 24 MMOL/L (ref 21–32)
CO2 SERPL-SCNC: 24 MMOL/L (ref 21–32)
CO2 SERPL-SCNC: 25 MMOL/L (ref 21–32)
CO2 SERPL-SCNC: 26 MMOL/L (ref 21–32)
CO2 SERPL-SCNC: 26 MMOL/L (ref 21–32)
CO2 SERPL-SCNC: 27 MMOL/L (ref 21–32)
CO2 SERPL-SCNC: 28 MMOL/L (ref 21–32)
CO2 SERPL-SCNC: 28 MMOL/L (ref 21–32)
CO2 SERPL-SCNC: 29 MMOL/L (ref 21–32)
COLOR UR: ABNORMAL
CREAT SERPL-MCNC: 0.85 MG/DL (ref 0.55–1.02)
CREAT SERPL-MCNC: 0.94 MG/DL (ref 0.55–1.02)
CREAT SERPL-MCNC: 0.97 MG/DL (ref 0.55–1.02)
CREAT SERPL-MCNC: 0.98 MG/DL (ref 0.55–1.02)
CREAT SERPL-MCNC: 1.09 MG/DL (ref 0.55–1.02)
CREAT SERPL-MCNC: 1.13 MG/DL (ref 0.55–1.02)
CREAT SERPL-MCNC: 1.17 MG/DL (ref 0.55–1.02)
CREAT SERPL-MCNC: 1.26 MG/DL (ref 0.55–1.02)
CREAT SERPL-MCNC: 1.31 MG/DL (ref 0.55–1.02)
CREAT SERPL-MCNC: 1.41 MG/DL (ref 0.55–1.02)
CREAT SERPL-MCNC: 1.46 MG/DL (ref 0.55–1.02)
CREAT SERPL-MCNC: 1.47 MG/DL (ref 0.55–1.02)
CREAT SERPL-MCNC: 1.47 MG/DL (ref 0.55–1.02)
CREAT SERPL-MCNC: 1.48 MG/DL (ref 0.55–1.02)
CREAT SERPL-MCNC: 1.49 MG/DL (ref 0.55–1.02)
CREAT SERPL-MCNC: 1.52 MG/DL (ref 0.55–1.02)
CREAT SERPL-MCNC: 1.57 MG/DL (ref 0.55–1.02)
CREAT SERPL-MCNC: 1.79 MG/DL (ref 0.55–1.02)
CREAT SERPL-MCNC: 1.79 MG/DL (ref 0.55–1.02)
CREAT SERPL-MCNC: 1.9 MG/DL (ref 0.55–1.02)
CREAT SERPL-MCNC: 2.29 MG/DL (ref 0.55–1.02)
CREAT SERPL-MCNC: 2.48 MG/DL (ref 0.55–1.02)
CREAT SERPL-MCNC: 2.5 MG/DL (ref 0.55–1.02)
CREAT SERPL-MCNC: 2.57 MG/DL (ref 0.55–1.02)
CREAT SERPL-MCNC: 2.57 MG/DL (ref 0.55–1.02)
CREAT SERPL-MCNC: 2.61 MG/DL (ref 0.55–1.02)
CREAT SERPL-MCNC: 2.68 MG/DL (ref 0.55–1.02)
CREAT SERPL-MCNC: 2.69 MG/DL (ref 0.55–1.02)
CREAT SERPL-MCNC: 2.9 MG/DL (ref 0.55–1.02)
CREAT SERPL-MCNC: 3 MG/DL (ref 0.55–1.02)
CREAT UR-MCNC: 46.9 MG/DL
CREAT UR-MCNC: 61.3 MG/DL
CREAT UR-MCNC: 73.6 MG/DL
CROSSMATCH RESULT,%XM: NORMAL
DIAGNOSIS, 93000: NORMAL
DIFFERENTIAL METHOD BLD: ABNORMAL
E COLI SXT1+2 STL IA: NEGATIVE
EOSINOPHIL # BLD: 0 K/UL
EOSINOPHIL # BLD: 0 K/UL (ref 0–0.4)
EOSINOPHIL # BLD: 0.1 K/UL
EOSINOPHIL # BLD: 0.1 K/UL (ref 0–0.4)
EOSINOPHIL NFR BLD: 0 %
EOSINOPHIL NFR BLD: 0 % (ref 0–7)
EOSINOPHIL NFR BLD: 1 %
EOSINOPHIL NFR BLD: 1 % (ref 0–7)
EOSINOPHIL NFR BLD: 2 %
EOSINOPHIL NFR BLD: 2 % (ref 0–7)
EOSINOPHIL NFR BLD: 2 % (ref 0–7)
EOSINOPHIL NFR BLD: 3 % (ref 0–7)
EPITH CASTS URNS QL MICRO: ABNORMAL /LPF
ERYTHROCYTE [DISTWIDTH] IN BLOOD BY AUTOMATED COUNT: 15.2 % (ref 11.5–14.5)
ERYTHROCYTE [DISTWIDTH] IN BLOOD BY AUTOMATED COUNT: 15.5 % (ref 11.5–14.5)
ERYTHROCYTE [DISTWIDTH] IN BLOOD BY AUTOMATED COUNT: 15.5 % (ref 11.5–14.5)
ERYTHROCYTE [DISTWIDTH] IN BLOOD BY AUTOMATED COUNT: 16.6 % (ref 11.5–14.5)
ERYTHROCYTE [DISTWIDTH] IN BLOOD BY AUTOMATED COUNT: 17 % (ref 11.5–14.5)
ERYTHROCYTE [DISTWIDTH] IN BLOOD BY AUTOMATED COUNT: 17.2 % (ref 11.5–14.5)
ERYTHROCYTE [DISTWIDTH] IN BLOOD BY AUTOMATED COUNT: 17.2 % (ref 11.5–14.5)
ERYTHROCYTE [DISTWIDTH] IN BLOOD BY AUTOMATED COUNT: 18.6 % (ref 11.5–14.5)
ERYTHROCYTE [DISTWIDTH] IN BLOOD BY AUTOMATED COUNT: 18.9 % (ref 11.5–14.5)
ERYTHROCYTE [DISTWIDTH] IN BLOOD BY AUTOMATED COUNT: 18.9 % (ref 11.5–14.5)
ERYTHROCYTE [DISTWIDTH] IN BLOOD BY AUTOMATED COUNT: 19.7 % (ref 11.5–14.5)
ERYTHROCYTE [DISTWIDTH] IN BLOOD BY AUTOMATED COUNT: 19.9 % (ref 11.8–15.8)
ERYTHROCYTE [DISTWIDTH] IN BLOOD BY AUTOMATED COUNT: 20.3 % (ref 11.5–14.5)
ERYTHROCYTE [DISTWIDTH] IN BLOOD BY AUTOMATED COUNT: 20.3 % (ref 11.5–14.5)
ERYTHROCYTE [DISTWIDTH] IN BLOOD BY AUTOMATED COUNT: 20.6 % (ref 11.5–14.5)
ERYTHROCYTE [DISTWIDTH] IN BLOOD BY AUTOMATED COUNT: 20.7 % (ref 11.5–14.5)
ERYTHROCYTE [DISTWIDTH] IN BLOOD BY AUTOMATED COUNT: 21.1 % (ref 11.5–14.5)
ERYTHROCYTE [DISTWIDTH] IN BLOOD BY AUTOMATED COUNT: 21.3 % (ref 11.5–14.5)
ERYTHROCYTE [DISTWIDTH] IN BLOOD BY AUTOMATED COUNT: 21.4 % (ref 11.5–14.5)
ERYTHROCYTE [DISTWIDTH] IN BLOOD BY AUTOMATED COUNT: 21.4 % (ref 11.5–14.5)
ERYTHROCYTE [DISTWIDTH] IN BLOOD BY AUTOMATED COUNT: 21.6 % (ref 11.5–14.5)
ERYTHROCYTE [DISTWIDTH] IN BLOOD BY AUTOMATED COUNT: 21.6 % (ref 11.5–14.5)
EST. AVERAGE GLUCOSE BLD GHB EST-MCNC: 177 MG/DL
GAMMA GLOB 24H MFR UR ELPH: 6.3 %
GAMMA GLOB SERPL ELPH-MCNC: 1.1 G/DL (ref 0.4–1.8)
GLOBULIN SER CALC-MCNC: 3.7 G/DL (ref 2.2–3.9)
GLOBULIN SER CALC-MCNC: 3.7 G/DL (ref 2–4)
GLOBULIN SER CALC-MCNC: 4.3 G/DL (ref 2–4)
GLOBULIN SER CALC-MCNC: 4.4 G/DL (ref 2–4)
GLOBULIN SER CALC-MCNC: 4.6 G/DL (ref 2–4)
GLOBULIN SER CALC-MCNC: 4.9 G/DL (ref 2–4)
GLOBULIN SER CALC-MCNC: 5.1 G/DL (ref 2–4)
GLOBULIN SER CALC-MCNC: 5.1 G/DL (ref 2–4)
GLOBULIN SER CALC-MCNC: 5.3 G/DL (ref 2–4)
GLOBULIN SER CALC-MCNC: 5.3 G/DL (ref 2–4)
GLOBULIN SER CALC-MCNC: 5.5 G/DL (ref 2–4)
GLUCOSE BLD STRIP.AUTO-MCNC: 100 MG/DL (ref 65–100)
GLUCOSE BLD STRIP.AUTO-MCNC: 104 MG/DL (ref 65–100)
GLUCOSE BLD STRIP.AUTO-MCNC: 104 MG/DL (ref 65–100)
GLUCOSE BLD STRIP.AUTO-MCNC: 106 MG/DL (ref 65–100)
GLUCOSE BLD STRIP.AUTO-MCNC: 108 MG/DL (ref 65–100)
GLUCOSE BLD STRIP.AUTO-MCNC: 110 MG/DL (ref 65–100)
GLUCOSE BLD STRIP.AUTO-MCNC: 110 MG/DL (ref 65–100)
GLUCOSE BLD STRIP.AUTO-MCNC: 112 MG/DL (ref 65–100)
GLUCOSE BLD STRIP.AUTO-MCNC: 112 MG/DL (ref 65–100)
GLUCOSE BLD STRIP.AUTO-MCNC: 113 MG/DL (ref 65–100)
GLUCOSE BLD STRIP.AUTO-MCNC: 114 MG/DL (ref 65–100)
GLUCOSE BLD STRIP.AUTO-MCNC: 114 MG/DL (ref 65–100)
GLUCOSE BLD STRIP.AUTO-MCNC: 115 MG/DL (ref 65–100)
GLUCOSE BLD STRIP.AUTO-MCNC: 116 MG/DL (ref 65–100)
GLUCOSE BLD STRIP.AUTO-MCNC: 118 MG/DL (ref 65–100)
GLUCOSE BLD STRIP.AUTO-MCNC: 119 MG/DL (ref 65–100)
GLUCOSE BLD STRIP.AUTO-MCNC: 121 MG/DL (ref 65–100)
GLUCOSE BLD STRIP.AUTO-MCNC: 121 MG/DL (ref 65–100)
GLUCOSE BLD STRIP.AUTO-MCNC: 122 MG/DL (ref 65–100)
GLUCOSE BLD STRIP.AUTO-MCNC: 123 MG/DL (ref 65–100)
GLUCOSE BLD STRIP.AUTO-MCNC: 125 MG/DL (ref 65–100)
GLUCOSE BLD STRIP.AUTO-MCNC: 126 MG/DL (ref 65–100)
GLUCOSE BLD STRIP.AUTO-MCNC: 128 MG/DL (ref 65–100)
GLUCOSE BLD STRIP.AUTO-MCNC: 129 MG/DL (ref 65–100)
GLUCOSE BLD STRIP.AUTO-MCNC: 131 MG/DL (ref 65–100)
GLUCOSE BLD STRIP.AUTO-MCNC: 135 MG/DL (ref 65–100)
GLUCOSE BLD STRIP.AUTO-MCNC: 137 MG/DL (ref 65–100)
GLUCOSE BLD STRIP.AUTO-MCNC: 138 MG/DL (ref 65–100)
GLUCOSE BLD STRIP.AUTO-MCNC: 140 MG/DL (ref 65–100)
GLUCOSE BLD STRIP.AUTO-MCNC: 144 MG/DL (ref 65–100)
GLUCOSE BLD STRIP.AUTO-MCNC: 146 MG/DL (ref 65–100)
GLUCOSE BLD STRIP.AUTO-MCNC: 150 MG/DL (ref 65–100)
GLUCOSE BLD STRIP.AUTO-MCNC: 152 MG/DL (ref 65–100)
GLUCOSE BLD STRIP.AUTO-MCNC: 154 MG/DL (ref 65–100)
GLUCOSE BLD STRIP.AUTO-MCNC: 172 MG/DL (ref 65–100)
GLUCOSE BLD STRIP.AUTO-MCNC: 191 MG/DL (ref 65–100)
GLUCOSE BLD STRIP.AUTO-MCNC: 192 MG/DL (ref 65–100)
GLUCOSE BLD STRIP.AUTO-MCNC: 242 MG/DL (ref 65–100)
GLUCOSE BLD STRIP.AUTO-MCNC: 244 MG/DL (ref 65–100)
GLUCOSE BLD STRIP.AUTO-MCNC: 56 MG/DL (ref 65–100)
GLUCOSE BLD STRIP.AUTO-MCNC: 66 MG/DL (ref 65–100)
GLUCOSE BLD STRIP.AUTO-MCNC: 72 MG/DL (ref 65–100)
GLUCOSE BLD STRIP.AUTO-MCNC: 75 MG/DL (ref 65–100)
GLUCOSE BLD STRIP.AUTO-MCNC: 77 MG/DL (ref 65–100)
GLUCOSE BLD STRIP.AUTO-MCNC: 77 MG/DL (ref 65–100)
GLUCOSE BLD STRIP.AUTO-MCNC: 78 MG/DL (ref 65–100)
GLUCOSE BLD STRIP.AUTO-MCNC: 78 MG/DL (ref 65–100)
GLUCOSE BLD STRIP.AUTO-MCNC: 79 MG/DL (ref 65–100)
GLUCOSE BLD STRIP.AUTO-MCNC: 81 MG/DL (ref 65–100)
GLUCOSE BLD STRIP.AUTO-MCNC: 82 MG/DL (ref 65–100)
GLUCOSE BLD STRIP.AUTO-MCNC: 84 MG/DL (ref 65–100)
GLUCOSE BLD STRIP.AUTO-MCNC: 84 MG/DL (ref 65–100)
GLUCOSE BLD STRIP.AUTO-MCNC: 85 MG/DL (ref 65–100)
GLUCOSE BLD STRIP.AUTO-MCNC: 86 MG/DL (ref 65–100)
GLUCOSE BLD STRIP.AUTO-MCNC: 87 MG/DL (ref 65–100)
GLUCOSE BLD STRIP.AUTO-MCNC: 88 MG/DL (ref 65–100)
GLUCOSE BLD STRIP.AUTO-MCNC: 90 MG/DL (ref 65–100)
GLUCOSE BLD STRIP.AUTO-MCNC: 92 MG/DL (ref 65–100)
GLUCOSE BLD STRIP.AUTO-MCNC: 93 MG/DL (ref 65–100)
GLUCOSE BLD STRIP.AUTO-MCNC: 93 MG/DL (ref 65–100)
GLUCOSE BLD STRIP.AUTO-MCNC: 94 MG/DL (ref 65–100)
GLUCOSE BLD STRIP.AUTO-MCNC: 95 MG/DL (ref 65–100)
GLUCOSE BLD STRIP.AUTO-MCNC: 96 MG/DL (ref 65–100)
GLUCOSE BLD STRIP.AUTO-MCNC: 97 MG/DL (ref 65–100)
GLUCOSE BLD STRIP.AUTO-MCNC: 98 MG/DL (ref 65–100)
GLUCOSE BLD STRIP.AUTO-MCNC: 99 MG/DL (ref 65–100)
GLUCOSE SERPL-MCNC: 102 MG/DL (ref 65–100)
GLUCOSE SERPL-MCNC: 103 MG/DL (ref 65–100)
GLUCOSE SERPL-MCNC: 107 MG/DL (ref 65–100)
GLUCOSE SERPL-MCNC: 107 MG/DL (ref 65–100)
GLUCOSE SERPL-MCNC: 109 MG/DL (ref 65–100)
GLUCOSE SERPL-MCNC: 110 MG/DL (ref 65–100)
GLUCOSE SERPL-MCNC: 111 MG/DL (ref 65–100)
GLUCOSE SERPL-MCNC: 125 MG/DL (ref 65–100)
GLUCOSE SERPL-MCNC: 128 MG/DL (ref 65–100)
GLUCOSE SERPL-MCNC: 132 MG/DL (ref 65–100)
GLUCOSE SERPL-MCNC: 133 MG/DL (ref 65–100)
GLUCOSE SERPL-MCNC: 135 MG/DL (ref 65–100)
GLUCOSE SERPL-MCNC: 136 MG/DL (ref 65–100)
GLUCOSE SERPL-MCNC: 140 MG/DL (ref 65–100)
GLUCOSE SERPL-MCNC: 141 MG/DL (ref 65–100)
GLUCOSE SERPL-MCNC: 142 MG/DL (ref 65–100)
GLUCOSE SERPL-MCNC: 158 MG/DL (ref 65–100)
GLUCOSE SERPL-MCNC: 158 MG/DL (ref 65–100)
GLUCOSE SERPL-MCNC: 159 MG/DL (ref 65–100)
GLUCOSE SERPL-MCNC: 170 MG/DL (ref 65–100)
GLUCOSE SERPL-MCNC: 71 MG/DL (ref 65–100)
GLUCOSE SERPL-MCNC: 72 MG/DL (ref 65–100)
GLUCOSE SERPL-MCNC: 73 MG/DL (ref 65–100)
GLUCOSE SERPL-MCNC: 78 MG/DL (ref 65–100)
GLUCOSE SERPL-MCNC: 78 MG/DL (ref 65–100)
GLUCOSE SERPL-MCNC: 82 MG/DL (ref 65–100)
GLUCOSE SERPL-MCNC: 83 MG/DL (ref 65–100)
GLUCOSE SERPL-MCNC: 86 MG/DL (ref 65–100)
GLUCOSE SERPL-MCNC: 87 MG/DL (ref 65–100)
GLUCOSE SERPL-MCNC: 95 MG/DL (ref 65–100)
GLUCOSE UR STRIP.AUTO-MCNC: NEGATIVE MG/DL
HBA1C MFR BLD HPLC: 7.3 %
HBA1C MFR BLD HPLC: 7.3 %
HBA1C MFR BLD: 7.8 % (ref 4.2–6.3)
HCT VFR BLD AUTO: 19.5 % (ref 35–47)
HCT VFR BLD AUTO: 20.3 % (ref 35–47)
HCT VFR BLD AUTO: 20.9 % (ref 35–47)
HCT VFR BLD AUTO: 20.9 % (ref 35–47)
HCT VFR BLD AUTO: 21.4 % (ref 35–47)
HCT VFR BLD AUTO: 21.5 % (ref 35–47)
HCT VFR BLD AUTO: 21.5 % (ref 35–47)
HCT VFR BLD AUTO: 21.9 % (ref 35–47)
HCT VFR BLD AUTO: 22.3 % (ref 35–47)
HCT VFR BLD AUTO: 22.6 % (ref 35–47)
HCT VFR BLD AUTO: 23.2 % (ref 35–47)
HCT VFR BLD AUTO: 23.2 % (ref 35–47)
HCT VFR BLD AUTO: 23.3 % (ref 35–47)
HCT VFR BLD AUTO: 23.5 % (ref 35–47)
HCT VFR BLD AUTO: 23.7 % (ref 35–47)
HCT VFR BLD AUTO: 24.1 % (ref 35–47)
HCT VFR BLD AUTO: 24.3 % (ref 35–47)
HCT VFR BLD AUTO: 24.3 % (ref 35–47)
HCT VFR BLD AUTO: 26 % (ref 35–47)
HCT VFR BLD AUTO: 27.3 % (ref 35–47)
HCT VFR BLD AUTO: 27.7 % (ref 35–47)
HCT VFR BLD AUTO: 28.3 % (ref 35–47)
HCT VFR BLD AUTO: 28.8 % (ref 35–47)
HDLC SERPL-MCNC: 42 MG/DL
HDLC SERPL: 3.7 {RATIO} (ref 0–5)
HGB BLD-MCNC: 6.7 G/DL (ref 11.5–16)
HGB BLD-MCNC: 6.8 G/DL (ref 11.5–16)
HGB BLD-MCNC: 6.8 G/DL (ref 11.5–16)
HGB BLD-MCNC: 7 G/DL (ref 11.5–16)
HGB BLD-MCNC: 7.1 G/DL (ref 11.5–16)
HGB BLD-MCNC: 7.3 G/DL (ref 11.5–16)
HGB BLD-MCNC: 7.6 G/DL (ref 11.5–16)
HGB BLD-MCNC: 7.6 G/DL (ref 11.5–16)
HGB BLD-MCNC: 7.7 G/DL (ref 11.5–16)
HGB BLD-MCNC: 7.9 G/DL (ref 11.5–16)
HGB BLD-MCNC: 8 G/DL (ref 11.5–16)
HGB BLD-MCNC: 8.1 G/DL (ref 11.5–16)
HGB BLD-MCNC: 8.1 G/DL (ref 11.5–16)
HGB BLD-MCNC: 8.2 G/DL (ref 11.5–16)
HGB BLD-MCNC: 8.7 G/DL (ref 11.5–16)
HGB BLD-MCNC: 9.5 G/DL (ref 11.5–16)
HGB BLD-MCNC: 9.5 G/DL (ref 11.5–16)
HGB BLD-MCNC: 9.7 G/DL (ref 11.5–16)
HGB BLD-MCNC: 9.9 G/DL (ref 11.5–16)
HGB UR QL STRIP: ABNORMAL
HGB UR QL STRIP: ABNORMAL
HGB UR QL STRIP: NEGATIVE
HYALINE CASTS URNS QL MICRO: ABNORMAL /LPF (ref 0–5)
IGA SERPL-MCNC: 188 MG/DL (ref 64–422)
IGG SERPL-MCNC: 1093 MG/DL (ref 700–1600)
IGM SERPL-MCNC: 126 MG/DL (ref 26–217)
INR PPP: 1.1 (ref 0.9–1.1)
INTERPRETATION UR IFE-IMP: NORMAL
KAPPA LC FREE SER-MCNC: 42.7 MG/L (ref 3.3–19.4)
KAPPA LC FREE/LAMBDA FREE SER: 1.33 {RATIO} (ref 0.26–1.65)
KETONES UR QL STRIP.AUTO: ABNORMAL MG/DL
KETONES UR QL STRIP.AUTO: NEGATIVE MG/DL
LACTATE SERPL-SCNC: 1 MMOL/L (ref 0.4–2)
LACTATE SERPL-SCNC: 1.3 MMOL/L (ref 0.4–2)
LACTATE SERPL-SCNC: 1.6 MMOL/L (ref 0.4–2)
LACTATE SERPL-SCNC: 2.3 MMOL/L (ref 0.4–2)
LAMBDA LC FREE SERPL-MCNC: 32.2 MG/L (ref 5.7–26.3)
LDLC SERPL CALC-MCNC: 90.6 MG/DL (ref 0–100)
LEUKOCYTE ESTERASE UR QL STRIP.AUTO: ABNORMAL
LEUKOCYTE ESTERASE UR QL STRIP.AUTO: ABNORMAL
LEUKOCYTE ESTERASE UR QL STRIP.AUTO: NEGATIVE
LIPASE SERPL-CCNC: 101 U/L (ref 73–393)
LIPASE SERPL-CCNC: 109 U/L (ref 73–393)
LIPASE SERPL-CCNC: 110 U/L (ref 73–393)
LIPASE SERPL-CCNC: 142 U/L (ref 73–393)
LIPASE SERPL-CCNC: 59 U/L (ref 73–393)
LIPASE SERPL-CCNC: 94 U/L (ref 73–393)
LIPID PROFILE,FLP: NORMAL
LYMPHOCYTES # BLD AUTO: 13 % (ref 12–49)
LYMPHOCYTES # BLD AUTO: 19 % (ref 12–49)
LYMPHOCYTES # BLD AUTO: 20 % (ref 12–49)
LYMPHOCYTES # BLD AUTO: 20 % (ref 12–49)
LYMPHOCYTES # BLD AUTO: 23 % (ref 12–49)
LYMPHOCYTES # BLD: 0.3 K/UL
LYMPHOCYTES # BLD: 0.5 K/UL
LYMPHOCYTES # BLD: 0.5 K/UL
LYMPHOCYTES # BLD: 0.6 K/UL
LYMPHOCYTES # BLD: 0.6 K/UL (ref 0.8–3.5)
LYMPHOCYTES # BLD: 0.6 K/UL (ref 0.8–3.5)
LYMPHOCYTES # BLD: 0.7 K/UL
LYMPHOCYTES # BLD: 0.7 K/UL (ref 0.8–3.5)
LYMPHOCYTES # BLD: 0.8 K/UL
LYMPHOCYTES # BLD: 0.8 K/UL (ref 0.8–3.5)
LYMPHOCYTES # BLD: 0.9 K/UL
LYMPHOCYTES # BLD: 0.9 K/UL
LYMPHOCYTES # BLD: 0.9 K/UL (ref 0.8–3.5)
LYMPHOCYTES # BLD: 1 K/UL
LYMPHOCYTES # BLD: 1 K/UL
LYMPHOCYTES # BLD: 1 K/UL (ref 0.8–3.5)
LYMPHOCYTES # BLD: 1 K/UL (ref 0.8–3.5)
LYMPHOCYTES # BLD: 1.2 K/UL
LYMPHOCYTES NFR BLD: 11 %
LYMPHOCYTES NFR BLD: 12 %
LYMPHOCYTES NFR BLD: 13 %
LYMPHOCYTES NFR BLD: 14 %
LYMPHOCYTES NFR BLD: 16 %
LYMPHOCYTES NFR BLD: 16 % (ref 12–49)
LYMPHOCYTES NFR BLD: 19 % (ref 12–49)
LYMPHOCYTES NFR BLD: 20 %
LYMPHOCYTES NFR BLD: 22 %
LYMPHOCYTES NFR BLD: 24 % (ref 12–49)
LYMPHOCYTES NFR BLD: 32 %
LYMPHOCYTES NFR BLD: 38 %
LYMPHOCYTES NFR BLD: 6 %
LYMPHOCYTES NFR BLD: 8 %
LYMPHOCYTES NFR BLD: 9 % (ref 12–49)
M PROTEIN 24H MFR UR ELPH: NORMAL %
M PROTEIN SERPL ELPH-MCNC: ABNORMAL G/DL
MAGNESIUM SERPL-MCNC: 1.3 MG/DL (ref 1.6–2.4)
MAGNESIUM SERPL-MCNC: 1.6 MG/DL (ref 1.6–2.4)
MAGNESIUM SERPL-MCNC: 1.9 MG/DL (ref 1.6–2.4)
MAGNESIUM SERPL-MCNC: 2.1 MG/DL (ref 1.6–2.4)
MCH RBC QN AUTO: 24.2 PG (ref 26–34)
MCH RBC QN AUTO: 24.8 PG (ref 26–34)
MCH RBC QN AUTO: 25 PG (ref 26–34)
MCH RBC QN AUTO: 25.8 PG (ref 26–34)
MCH RBC QN AUTO: 25.9 PG (ref 26–34)
MCH RBC QN AUTO: 25.9 PG (ref 26–34)
MCH RBC QN AUTO: 26 PG (ref 26–34)
MCH RBC QN AUTO: 26 PG (ref 26–34)
MCH RBC QN AUTO: 26.1 PG (ref 26–34)
MCH RBC QN AUTO: 26.1 PG (ref 26–34)
MCH RBC QN AUTO: 26.2 PG (ref 26–34)
MCH RBC QN AUTO: 26.2 PG (ref 26–34)
MCH RBC QN AUTO: 26.3 PG (ref 26–34)
MCH RBC QN AUTO: 26.3 PG (ref 26–34)
MCH RBC QN AUTO: 26.4 PG (ref 26–34)
MCH RBC QN AUTO: 26.9 PG (ref 26–34)
MCH RBC QN AUTO: 27 PG (ref 26–34)
MCH RBC QN AUTO: 27.7 PG (ref 26–34)
MCH RBC QN AUTO: 28.1 PG (ref 26–34)
MCHC RBC AUTO-ENTMCNC: 32.5 G/DL (ref 30–36.5)
MCHC RBC AUTO-ENTMCNC: 32.6 G/DL (ref 30–36.5)
MCHC RBC AUTO-ENTMCNC: 33 G/DL (ref 30–36.5)
MCHC RBC AUTO-ENTMCNC: 33.2 G/DL (ref 30–36.5)
MCHC RBC AUTO-ENTMCNC: 33.3 G/DL (ref 30–36.5)
MCHC RBC AUTO-ENTMCNC: 33.5 G/DL (ref 30–36.5)
MCHC RBC AUTO-ENTMCNC: 33.5 G/DL (ref 30–36.5)
MCHC RBC AUTO-ENTMCNC: 33.6 G/DL (ref 30–36.5)
MCHC RBC AUTO-ENTMCNC: 33.6 G/DL (ref 30–36.5)
MCHC RBC AUTO-ENTMCNC: 34 G/DL (ref 30–36.5)
MCHC RBC AUTO-ENTMCNC: 34 G/DL (ref 30–36.5)
MCHC RBC AUTO-ENTMCNC: 34.3 G/DL (ref 30–36.5)
MCHC RBC AUTO-ENTMCNC: 34.3 G/DL (ref 30–36.5)
MCHC RBC AUTO-ENTMCNC: 34.4 G/DL (ref 30–36.5)
MCHC RBC AUTO-ENTMCNC: 34.4 G/DL (ref 30–36.5)
MCHC RBC AUTO-ENTMCNC: 34.5 G/DL (ref 30–36.5)
MCHC RBC AUTO-ENTMCNC: 34.8 G/DL (ref 30–36.5)
MCHC RBC AUTO-ENTMCNC: 35.3 G/DL (ref 30–36.5)
MCHC RBC AUTO-ENTMCNC: 36 G/DL (ref 30–36.5)
MCV RBC AUTO: 72.5 FL (ref 80–99)
MCV RBC AUTO: 72.8 FL (ref 80–99)
MCV RBC AUTO: 73.1 FL (ref 80–99)
MCV RBC AUTO: 73.3 FL (ref 80–99)
MCV RBC AUTO: 74.2 FL (ref 80–99)
MCV RBC AUTO: 74.6 FL (ref 80–99)
MCV RBC AUTO: 75.2 FL (ref 80–99)
MCV RBC AUTO: 75.3 FL (ref 80–99)
MCV RBC AUTO: 76.1 FL (ref 80–99)
MCV RBC AUTO: 77.5 FL (ref 80–99)
MCV RBC AUTO: 77.6 FL (ref 80–99)
MCV RBC AUTO: 78 FL (ref 80–99)
MCV RBC AUTO: 78.2 FL (ref 80–99)
MCV RBC AUTO: 78.3 FL (ref 80–99)
MCV RBC AUTO: 78.6 FL (ref 80–99)
MCV RBC AUTO: 78.9 FL (ref 80–99)
MCV RBC AUTO: 79.3 FL (ref 80–99)
MCV RBC AUTO: 79.5 FL (ref 80–99)
MCV RBC AUTO: 79.9 FL (ref 80–99)
MCV RBC AUTO: 80.6 FL (ref 80–99)
MCV RBC AUTO: 80.8 FL (ref 80–99)
MCV RBC AUTO: 82.6 FL (ref 80–99)
METAMYELOCYTES NFR BLD MANUAL: 1 %
METAMYELOCYTES NFR BLD MANUAL: 2 %
MICROALBUMIN UR-MCNC: 246.5 UG/ML
MONOCYTES # BLD: 0.1 K/UL
MONOCYTES # BLD: 0.2 K/UL (ref 0–1)
MONOCYTES # BLD: 0.2 K/UL (ref 0–1)
MONOCYTES # BLD: 0.3 K/UL
MONOCYTES # BLD: 0.3 K/UL
MONOCYTES # BLD: 0.3 K/UL (ref 0–1)
MONOCYTES # BLD: 0.3 K/UL (ref 0–1)
MONOCYTES # BLD: 0.4 K/UL
MONOCYTES # BLD: 0.4 K/UL
MONOCYTES # BLD: 0.4 K/UL (ref 0–1)
MONOCYTES # BLD: 0.5 K/UL
MONOCYTES # BLD: 0.5 K/UL
MONOCYTES # BLD: 0.5 K/UL (ref 0–1)
MONOCYTES # BLD: 0.6 K/UL
MONOCYTES # BLD: 0.6 K/UL (ref 0–1)
MONOCYTES # BLD: 0.6 K/UL (ref 0–1)
MONOCYTES # BLD: 0.7 K/UL
MONOCYTES # BLD: 0.7 K/UL
MONOCYTES # BLD: 0.8 K/UL
MONOCYTES NFR BLD AUTO: 10 % (ref 5–13)
MONOCYTES NFR BLD AUTO: 11 % (ref 5–13)
MONOCYTES NFR BLD AUTO: 4 % (ref 5–13)
MONOCYTES NFR BLD AUTO: 6 % (ref 5–13)
MONOCYTES NFR BLD AUTO: 7 % (ref 5–13)
MONOCYTES NFR BLD: 10 %
MONOCYTES NFR BLD: 11 %
MONOCYTES NFR BLD: 11 % (ref 5–13)
MONOCYTES NFR BLD: 13 %
MONOCYTES NFR BLD: 14 % (ref 5–13)
MONOCYTES NFR BLD: 15 %
MONOCYTES NFR BLD: 4 %
MONOCYTES NFR BLD: 5 %
MONOCYTES NFR BLD: 5 %
MONOCYTES NFR BLD: 7 %
MONOCYTES NFR BLD: 8 %
MONOCYTES NFR BLD: 8 % (ref 5–13)
MUCOUS THREADS URNS QL MICRO: ABNORMAL /LPF
MYELOCYTES NFR BLD MANUAL: 1 %
MYELOCYTES NFR BLD MANUAL: 2 %
NEUTS BAND NFR BLD MANUAL: 1 %
NEUTS BAND NFR BLD MANUAL: 1 %
NEUTS BAND NFR BLD MANUAL: 3 %
NEUTS BAND NFR BLD MANUAL: 3 %
NEUTS BAND NFR BLD MANUAL: 4 %
NEUTS BAND NFR BLD MANUAL: 5 %
NEUTS BAND NFR BLD MANUAL: 6 %
NEUTS BAND NFR BLD MANUAL: 8 %
NEUTS BAND NFR BLD MANUAL: 9 %
NEUTS SEG # BLD: 1.1 K/UL
NEUTS SEG # BLD: 1.4 K/UL
NEUTS SEG # BLD: 2.2 K/UL (ref 1.8–8)
NEUTS SEG # BLD: 2.7 K/UL (ref 1.8–8)
NEUTS SEG # BLD: 3 K/UL (ref 1.8–8)
NEUTS SEG # BLD: 3 K/UL (ref 1.8–8)
NEUTS SEG # BLD: 3.3 K/UL
NEUTS SEG # BLD: 3.3 K/UL (ref 1.8–8)
NEUTS SEG # BLD: 3.4 K/UL
NEUTS SEG # BLD: 3.4 K/UL (ref 1.8–8)
NEUTS SEG # BLD: 3.6 K/UL
NEUTS SEG # BLD: 3.9 K/UL (ref 1.8–8)
NEUTS SEG # BLD: 3.9 K/UL (ref 1.8–8)
NEUTS SEG # BLD: 4 K/UL
NEUTS SEG # BLD: 4.3 K/UL
NEUTS SEG # BLD: 4.3 K/UL
NEUTS SEG # BLD: 4.4 K/UL
NEUTS SEG # BLD: 4.4 K/UL
NEUTS SEG # BLD: 4.6 K/UL
NEUTS SEG # BLD: 4.9 K/UL
NEUTS SEG # BLD: 4.9 K/UL
NEUTS SEG # BLD: 5.8 K/UL (ref 1.8–8)
NEUTS SEG NFR BLD AUTO: 65 % (ref 32–75)
NEUTS SEG NFR BLD AUTO: 67 % (ref 32–75)
NEUTS SEG NFR BLD AUTO: 71 % (ref 32–75)
NEUTS SEG NFR BLD AUTO: 73 % (ref 32–75)
NEUTS SEG NFR BLD AUTO: 83 % (ref 32–75)
NEUTS SEG NFR BLD: 43 %
NEUTS SEG NFR BLD: 58 %
NEUTS SEG NFR BLD: 62 %
NEUTS SEG NFR BLD: 64 %
NEUTS SEG NFR BLD: 66 %
NEUTS SEG NFR BLD: 66 % (ref 32–75)
NEUTS SEG NFR BLD: 67 % (ref 32–75)
NEUTS SEG NFR BLD: 68 %
NEUTS SEG NFR BLD: 68 %
NEUTS SEG NFR BLD: 72 % (ref 32–75)
NEUTS SEG NFR BLD: 73 %
NEUTS SEG NFR BLD: 73 %
NEUTS SEG NFR BLD: 79 %
NEUTS SEG NFR BLD: 79 %
NEUTS SEG NFR BLD: 81 %
NEUTS SEG NFR BLD: 82 %
NEUTS SEG NFR BLD: 84 % (ref 32–75)
NITRITE UR QL STRIP.AUTO: NEGATIVE
NOTE, 149533: NORMAL
OSMOLALITY UR: 406 MOSM/KG H2O
OTHER,OTHU: ABNORMAL
P-R INTERVAL, ECG05: 182 MS
P-R INTERVAL, ECG05: 190 MS
P-R INTERVAL, ECG05: 192 MS
P-R INTERVAL, ECG05: 198 MS
PH UR STRIP: 5 [PH] (ref 5–8)
PH UR STRIP: 6.5 [PH] (ref 5–8)
PHOSPHATE SERPL-MCNC: 2.3 MG/DL (ref 2.6–4.7)
PHOSPHATE SERPL-MCNC: 2.5 MG/DL (ref 2.6–4.7)
PHOSPHATE SERPL-MCNC: 2.5 MG/DL (ref 2.6–4.7)
PHOSPHATE SERPL-MCNC: 2.7 MG/DL (ref 2.6–4.7)
PHOSPHATE SERPL-MCNC: 2.9 MG/DL (ref 2.6–4.7)
PHOSPHATE SERPL-MCNC: 4.2 MG/DL (ref 2.6–4.7)
PHOSPHATE SERPL-MCNC: 4.4 MG/DL (ref 2.6–4.7)
PHOSPHATE SERPL-MCNC: 4.8 MG/DL (ref 2.6–4.7)
PHOSPHATE SERPL-MCNC: 5.6 MG/DL (ref 2.6–4.7)
PLATELET # BLD AUTO: 123 K/UL (ref 150–400)
PLATELET # BLD AUTO: 135 K/UL (ref 150–400)
PLATELET # BLD AUTO: 147 K/UL (ref 150–400)
PLATELET # BLD AUTO: 148 K/UL (ref 150–400)
PLATELET # BLD AUTO: 150 K/UL (ref 150–400)
PLATELET # BLD AUTO: 151 K/UL (ref 150–400)
PLATELET # BLD AUTO: 157 K/UL (ref 150–400)
PLATELET # BLD AUTO: 158 K/UL (ref 150–400)
PLATELET # BLD AUTO: 162 K/UL (ref 150–400)
PLATELET # BLD AUTO: 162 K/UL (ref 150–400)
PLATELET # BLD AUTO: 175 K/UL (ref 150–400)
PLATELET # BLD AUTO: 177 K/UL (ref 150–400)
PLATELET # BLD AUTO: 183 K/UL (ref 150–400)
PLATELET # BLD AUTO: 186 K/UL (ref 150–400)
PLATELET # BLD AUTO: 205 K/UL (ref 150–400)
PLATELET # BLD AUTO: 214 K/UL (ref 150–400)
PLATELET # BLD AUTO: 218 K/UL (ref 150–400)
PLATELET # BLD AUTO: 222 K/UL (ref 150–400)
PLATELET # BLD AUTO: 233 K/UL (ref 150–400)
PLATELET # BLD AUTO: 240 K/UL (ref 150–400)
PLATELET # BLD AUTO: 259 K/UL (ref 150–400)
PLATELET # BLD AUTO: 95 K/UL (ref 150–400)
PLATELET COMMENTS,PCOM: ABNORMAL
PLEASE NOTE, 011150: ABNORMAL
POTASSIUM SERPL-SCNC: 3.5 MMOL/L (ref 3.5–5.1)
POTASSIUM SERPL-SCNC: 3.6 MMOL/L (ref 3.5–5.1)
POTASSIUM SERPL-SCNC: 3.7 MMOL/L (ref 3.5–5.1)
POTASSIUM SERPL-SCNC: 3.8 MMOL/L (ref 3.5–5.1)
POTASSIUM SERPL-SCNC: 3.9 MMOL/L (ref 3.5–5.1)
POTASSIUM SERPL-SCNC: 3.9 MMOL/L (ref 3.5–5.1)
POTASSIUM SERPL-SCNC: 4 MMOL/L (ref 3.5–5.1)
POTASSIUM SERPL-SCNC: 4.1 MMOL/L (ref 3.5–5.1)
POTASSIUM SERPL-SCNC: 4.2 MMOL/L (ref 3.5–5.1)
POTASSIUM SERPL-SCNC: 4.2 MMOL/L (ref 3.5–5.1)
POTASSIUM SERPL-SCNC: 4.3 MMOL/L (ref 3.5–5.1)
POTASSIUM SERPL-SCNC: 4.3 MMOL/L (ref 3.5–5.1)
POTASSIUM SERPL-SCNC: 4.4 MMOL/L (ref 3.5–5.1)
POTASSIUM SERPL-SCNC: 4.5 MMOL/L (ref 3.5–5.1)
POTASSIUM SERPL-SCNC: 4.7 MMOL/L (ref 3.5–5.1)
POTASSIUM SERPL-SCNC: 4.7 MMOL/L (ref 3.5–5.1)
POTASSIUM SERPL-SCNC: 4.9 MMOL/L (ref 3.5–5.1)
POTASSIUM SERPL-SCNC: 4.9 MMOL/L (ref 3.5–5.1)
POTASSIUM SERPL-SCNC: 5 MMOL/L (ref 3.5–5.1)
POTASSIUM SERPL-SCNC: 5.1 MMOL/L (ref 3.5–5.1)
POTASSIUM SERPL-SCNC: 5.1 MMOL/L (ref 3.5–5.1)
PROT PATTERN SERPL IFE-IMP: NORMAL
PROT SERPL-MCNC: 6.1 G/DL (ref 6.4–8.2)
PROT SERPL-MCNC: 6.1 G/DL (ref 6–8.5)
PROT SERPL-MCNC: 6.2 G/DL (ref 6.4–8.2)
PROT SERPL-MCNC: 6.5 G/DL (ref 6.4–8.2)
PROT SERPL-MCNC: 6.6 G/DL (ref 6.4–8.2)
PROT SERPL-MCNC: 6.9 G/DL (ref 6.4–8.2)
PROT SERPL-MCNC: 7 G/DL (ref 6.4–8.2)
PROT SERPL-MCNC: 7.1 G/DL (ref 6.4–8.2)
PROT SERPL-MCNC: 7.1 G/DL (ref 6.4–8.2)
PROT SERPL-MCNC: 7.3 G/DL (ref 6.4–8.2)
PROT SERPL-MCNC: 7.4 G/DL (ref 6.4–8.2)
PROT SERPL-MCNC: 7.4 G/DL (ref 6.4–8.2)
PROT SERPL-MCNC: 7.9 G/DL (ref 6.4–8.2)
PROT SERPL-MCNC: 7.9 G/DL (ref 6.4–8.2)
PROT UR STRIP-MCNC: 100 MG/DL
PROT UR STRIP-MCNC: 100 MG/DL
PROT UR STRIP-MCNC: ABNORMAL MG/DL
PROT UR STRIP-MCNC: NEGATIVE MG/DL
PROT UR-MCNC: 36.3 MG/DL
PROTHROMBIN TIME: 10.8 SEC (ref 9–11.1)
Q-T INTERVAL, ECG07: 418 MS
Q-T INTERVAL, ECG07: 424 MS
Q-T INTERVAL, ECG07: 444 MS
Q-T INTERVAL, ECG07: 470 MS
QRS DURATION, ECG06: 184 MS
QRS DURATION, ECG06: 192 MS
QRS DURATION, ECG06: 192 MS
QRS DURATION, ECG06: 196 MS
QTC CALCULATION (BEZET), ECG08: 444 MS
QTC CALCULATION (BEZET), ECG08: 450 MS
QTC CALCULATION (BEZET), ECG08: 499 MS
QTC CALCULATION (BEZET), ECG08: 502 MS
RBC # BLD AUTO: 2.52 M/UL (ref 3.8–5.2)
RBC # BLD AUTO: 2.53 M/UL (ref 3.8–5.2)
RBC # BLD AUTO: 2.63 M/UL (ref 3.8–5.2)
RBC # BLD AUTO: 2.68 M/UL (ref 3.8–5.2)
RBC # BLD AUTO: 2.71 M/UL (ref 3.8–5.2)
RBC # BLD AUTO: 2.8 M/UL (ref 3.8–5.2)
RBC # BLD AUTO: 2.83 M/UL (ref 3.8–5.2)
RBC # BLD AUTO: 2.94 M/UL (ref 3.8–5.2)
RBC # BLD AUTO: 2.95 M/UL (ref 3.8–5.2)
RBC # BLD AUTO: 3 M/UL (ref 3.8–5.2)
RBC # BLD AUTO: 3.04 M/UL (ref 3.8–5.2)
RBC # BLD AUTO: 3.05 M/UL (ref 3.8–5.2)
RBC # BLD AUTO: 3.08 M/UL (ref 3.8–5.2)
RBC # BLD AUTO: 3.09 M/UL (ref 3.8–5.2)
RBC # BLD AUTO: 3.11 M/UL (ref 3.8–5.2)
RBC # BLD AUTO: 3.11 M/UL (ref 3.8–5.2)
RBC # BLD AUTO: 3.18 M/UL (ref 3.8–5.2)
RBC # BLD AUTO: 3.35 M/UL (ref 3.8–5.2)
RBC # BLD AUTO: 3.43 M/UL (ref 3.8–5.2)
RBC # BLD AUTO: 3.68 M/UL (ref 3.8–5.2)
RBC # BLD AUTO: 3.76 M/UL (ref 3.8–5.2)
RBC # BLD AUTO: 3.83 M/UL (ref 3.8–5.2)
RBC #/AREA URNS HPF: ABNORMAL /HPF (ref 0–5)
RBC MORPH BLD: ABNORMAL
SERVICE CMNT-IMP: ABNORMAL
SERVICE CMNT-IMP: NORMAL
SODIUM SERPL-SCNC: 124 MMOL/L (ref 136–145)
SODIUM SERPL-SCNC: 129 MMOL/L (ref 136–145)
SODIUM SERPL-SCNC: 129 MMOL/L (ref 136–145)
SODIUM SERPL-SCNC: 131 MMOL/L (ref 136–145)
SODIUM SERPL-SCNC: 132 MMOL/L (ref 136–145)
SODIUM SERPL-SCNC: 132 MMOL/L (ref 136–145)
SODIUM SERPL-SCNC: 133 MMOL/L (ref 136–145)
SODIUM SERPL-SCNC: 133 MMOL/L (ref 136–145)
SODIUM SERPL-SCNC: 134 MMOL/L (ref 136–145)
SODIUM SERPL-SCNC: 135 MMOL/L (ref 136–145)
SODIUM SERPL-SCNC: 135 MMOL/L (ref 136–145)
SODIUM SERPL-SCNC: 136 MMOL/L (ref 136–145)
SODIUM SERPL-SCNC: 137 MMOL/L (ref 136–145)
SODIUM SERPL-SCNC: 138 MMOL/L (ref 136–145)
SODIUM SERPL-SCNC: 139 MMOL/L (ref 136–145)
SODIUM SERPL-SCNC: 141 MMOL/L (ref 136–145)
SODIUM SERPL-SCNC: 142 MMOL/L (ref 136–145)
SODIUM SERPL-SCNC: 142 MMOL/L (ref 136–145)
SODIUM SERPL-SCNC: 143 MMOL/L (ref 136–145)
SODIUM UR-SCNC: 27 MMOL/L
SODIUM UR-SCNC: 58 MMOL/L
SP GR UR REFRACTOMETRY: 1.01 (ref 1–1.03)
SP GR UR REFRACTOMETRY: 1.02 (ref 1–1.03)
SPECIMEN EXP DATE BLD: NORMAL
STATUS OF UNIT,%ST: NORMAL
TOTAL CELLS COUNTED BLD: 50
TRIGL SERPL-MCNC: 122 MG/DL (ref ?–150)
TROPONIN I SERPL-MCNC: <0.04 NG/ML
TSH SERPL DL<=0.05 MIU/L-ACNC: 1.03 UIU/ML (ref 0.36–3.74)
UA: UC IF INDICATED,UAUC: ABNORMAL
UNIT DIVISION, %UDIV: 0
UROBILINOGEN UR QL STRIP.AUTO: 0.2 EU/DL (ref 0.2–1)
UROBILINOGEN UR QL STRIP.AUTO: 1 EU/DL (ref 0.2–1)
VENTRICULAR RATE, ECG03: 60 BPM
VENTRICULAR RATE, ECG03: 68 BPM
VENTRICULAR RATE, ECG03: 68 BPM
VENTRICULAR RATE, ECG03: 87 BPM
VLDLC SERPL CALC-MCNC: 24.4 MG/DL
WBC # BLD AUTO: 2 K/UL (ref 3.6–11)
WBC # BLD AUTO: 2.8 K/UL (ref 3.6–11)
WBC # BLD AUTO: 3.1 K/UL (ref 3.6–11)
WBC # BLD AUTO: 4.2 K/UL (ref 3.6–11)
WBC # BLD AUTO: 4.4 K/UL (ref 3.6–11)
WBC # BLD AUTO: 4.5 K/UL (ref 3.6–11)
WBC # BLD AUTO: 4.7 K/UL (ref 3.6–11)
WBC # BLD AUTO: 4.8 K/UL (ref 3.6–11)
WBC # BLD AUTO: 5 K/UL (ref 3.6–11)
WBC # BLD AUTO: 5.2 K/UL (ref 3.6–11)
WBC # BLD AUTO: 5.3 K/UL (ref 3.6–11)
WBC # BLD AUTO: 5.5 K/UL (ref 3.6–11)
WBC # BLD AUTO: 5.6 K/UL (ref 3.6–11)
WBC # BLD AUTO: 5.7 K/UL (ref 3.6–11)
WBC # BLD AUTO: 5.8 K/UL (ref 3.6–11)
WBC # BLD AUTO: 6 K/UL (ref 3.6–11)
WBC # BLD AUTO: 6.2 K/UL (ref 3.6–11)
WBC # BLD AUTO: 6.9 K/UL (ref 3.6–11)
WBC MORPH BLD: ABNORMAL
WBC URNS QL MICRO: ABNORMAL /HPF (ref 0–4)

## 2017-01-01 PROCEDURE — 71020 XR CHEST PA LAT: CPT

## 2017-01-01 PROCEDURE — G0152 HHCP-SERV OF OT,EA 15 MIN: HCPCS

## 2017-01-01 PROCEDURE — 74011000250 HC RX REV CODE- 250: Performed by: INTERNAL MEDICINE

## 2017-01-01 PROCEDURE — 77030002933 HC SUT MCRYL J&J -A: Performed by: SURGERY

## 2017-01-01 PROCEDURE — 3331090001 HH PPS REVENUE CREDIT

## 2017-01-01 PROCEDURE — 87086 URINE CULTURE/COLONY COUNT: CPT | Performed by: INTERNAL MEDICINE

## 2017-01-01 PROCEDURE — HOSPICE MEDICATION HC HH HOSPICE MEDICATION

## 2017-01-01 PROCEDURE — 3331090002 HH PPS REVENUE DEBIT

## 2017-01-01 PROCEDURE — 80069 RENAL FUNCTION PANEL: CPT | Performed by: SURGERY

## 2017-01-01 PROCEDURE — 87493 C DIFF AMPLIFIED PROBE: CPT | Performed by: SURGERY

## 2017-01-01 PROCEDURE — 77030012965 HC NDL HUBR BBMI -A

## 2017-01-01 PROCEDURE — 74011250636 HC RX REV CODE- 250/636: Performed by: INTERNAL MEDICINE

## 2017-01-01 PROCEDURE — 0651 HSPC ROUTINE HOME CARE

## 2017-01-01 PROCEDURE — 74011000258 HC RX REV CODE- 258

## 2017-01-01 PROCEDURE — 74011250637 HC RX REV CODE- 250/637: Performed by: NURSE PRACTITIONER

## 2017-01-01 PROCEDURE — P9016 RBC LEUKOCYTES REDUCED: HCPCS | Performed by: INTERNAL MEDICINE

## 2017-01-01 PROCEDURE — 0655 HSPC INPATIENT RESPITE

## 2017-01-01 PROCEDURE — 85025 COMPLETE CBC W/AUTO DIFF WBC: CPT | Performed by: INTERNAL MEDICINE

## 2017-01-01 PROCEDURE — G0156 HHCP-SVS OF AIDE,EA 15 MIN: HCPCS

## 2017-01-01 PROCEDURE — 85025 COMPLETE CBC W/AUTO DIFF WBC: CPT | Performed by: SURGERY

## 2017-01-01 PROCEDURE — 74011250636 HC RX REV CODE- 250/636: Performed by: NURSE PRACTITIONER

## 2017-01-01 PROCEDURE — 84300 ASSAY OF URINE SODIUM: CPT | Performed by: INTERNAL MEDICINE

## 2017-01-01 PROCEDURE — 77030002966 HC SUT PDS J&J -A: Performed by: SURGERY

## 2017-01-01 PROCEDURE — G0300 HHS/HOSPICE OF LPN EA 15 MIN: HCPCS

## 2017-01-01 PROCEDURE — 65270000029 HC RM PRIVATE

## 2017-01-01 PROCEDURE — 83735 ASSAY OF MAGNESIUM: CPT | Performed by: NURSE PRACTITIONER

## 2017-01-01 PROCEDURE — 97535 SELF CARE MNGMENT TRAINING: CPT

## 2017-01-01 PROCEDURE — 74011250636 HC RX REV CODE- 250/636: Performed by: SURGERY

## 2017-01-01 PROCEDURE — 74011000250 HC RX REV CODE- 250

## 2017-01-01 PROCEDURE — 93005 ELECTROCARDIOGRAM TRACING: CPT

## 2017-01-01 PROCEDURE — 80053 COMPREHEN METABOLIC PANEL: CPT | Performed by: INTERNAL MEDICINE

## 2017-01-01 PROCEDURE — 36430 TRANSFUSION BLD/BLD COMPNT: CPT

## 2017-01-01 PROCEDURE — 74011250636 HC RX REV CODE- 250/636: Performed by: RADIOLOGY

## 2017-01-01 PROCEDURE — 96374 THER/PROPH/DIAG INJ IV PUSH: CPT

## 2017-01-01 PROCEDURE — 36415 COLL VENOUS BLD VENIPUNCTURE: CPT | Performed by: RADIOLOGY

## 2017-01-01 PROCEDURE — 84484 ASSAY OF TROPONIN QUANT: CPT | Performed by: EMERGENCY MEDICINE

## 2017-01-01 PROCEDURE — 87045 FECES CULTURE AEROBIC BACT: CPT | Performed by: INTERNAL MEDICINE

## 2017-01-01 PROCEDURE — P9045 ALBUMIN (HUMAN), 5%, 250 ML: HCPCS

## 2017-01-01 PROCEDURE — 96375 TX/PRO/DX INJ NEW DRUG ADDON: CPT

## 2017-01-01 PROCEDURE — 36415 COLL VENOUS BLD VENIPUNCTURE: CPT | Performed by: INTERNAL MEDICINE

## 2017-01-01 PROCEDURE — T4543 ADULT DISP BRIEF/DIAP ABV XL: HCPCS

## 2017-01-01 PROCEDURE — A4927 NON-STERILE GLOVES: HCPCS

## 2017-01-01 PROCEDURE — 36415 COLL VENOUS BLD VENIPUNCTURE: CPT | Performed by: SURGERY

## 2017-01-01 PROCEDURE — 88311 DECALCIFY TISSUE: CPT | Performed by: RADIOLOGY

## 2017-01-01 PROCEDURE — 74011636320 HC RX REV CODE- 636/320: Performed by: INTERNAL MEDICINE

## 2017-01-01 PROCEDURE — 80048 BASIC METABOLIC PNL TOTAL CA: CPT | Performed by: INTERNAL MEDICINE

## 2017-01-01 PROCEDURE — 93306 TTE W/DOPPLER COMPLETE: CPT

## 2017-01-01 PROCEDURE — 74020 XR ABD FLAT/ ERECT: CPT

## 2017-01-01 PROCEDURE — G0153 HHCP-SVS OF S/L PATH,EA 15MN: HCPCS

## 2017-01-01 PROCEDURE — 74011000250 HC RX REV CODE- 250: Performed by: HOSPITALIST

## 2017-01-01 PROCEDURE — 65270000015 HC RM PRIVATE ONCOLOGY

## 2017-01-01 PROCEDURE — 74011250636 HC RX REV CODE- 250/636

## 2017-01-01 PROCEDURE — 81001 URINALYSIS AUTO W/SCOPE: CPT | Performed by: EMERGENCY MEDICINE

## 2017-01-01 PROCEDURE — 77030018836 HC SOL IRR NACL ICUM -A: Performed by: SURGERY

## 2017-01-01 PROCEDURE — 51798 US URINE CAPACITY MEASURE: CPT

## 2017-01-01 PROCEDURE — 36591 DRAW BLOOD OFF VENOUS DEVICE: CPT

## 2017-01-01 PROCEDURE — G0151 HHCP-SERV OF PT,EA 15 MIN: HCPCS

## 2017-01-01 PROCEDURE — 36561 INSERT TUNNELED CV CATH: CPT

## 2017-01-01 PROCEDURE — HHS10554 SHAMPOO/BODY WASH 8 OZ ALOE VESTA

## 2017-01-01 PROCEDURE — 97116 GAIT TRAINING THERAPY: CPT

## 2017-01-01 PROCEDURE — 82962 GLUCOSE BLOOD TEST: CPT

## 2017-01-01 PROCEDURE — 83735 ASSAY OF MAGNESIUM: CPT | Performed by: EMERGENCY MEDICINE

## 2017-01-01 PROCEDURE — 76450000000

## 2017-01-01 PROCEDURE — 83935 ASSAY OF URINE OSMOLALITY: CPT | Performed by: INTERNAL MEDICINE

## 2017-01-01 PROCEDURE — 77030026438 HC STYL ET INTUB CARD -A: Performed by: NURSE ANESTHETIST, CERTIFIED REGISTERED

## 2017-01-01 PROCEDURE — 97166 OT EVAL MOD COMPLEX 45 MIN: CPT

## 2017-01-01 PROCEDURE — 80053 COMPREHEN METABOLIC PANEL: CPT | Performed by: EMERGENCY MEDICINE

## 2017-01-01 PROCEDURE — 74011250636 HC RX REV CODE- 250/636: Performed by: EMERGENCY MEDICINE

## 2017-01-01 PROCEDURE — T4541 LARGE DISPOSABLE UNDERPAD: HCPCS

## 2017-01-01 PROCEDURE — 36415 COLL VENOUS BLD VENIPUNCTURE: CPT | Performed by: EMERGENCY MEDICINE

## 2017-01-01 PROCEDURE — 74011000250 HC RX REV CODE- 250: Performed by: NURSE PRACTITIONER

## 2017-01-01 PROCEDURE — 96413 CHEMO IV INFUSION 1 HR: CPT

## 2017-01-01 PROCEDURE — 83605 ASSAY OF LACTIC ACID: CPT | Performed by: EMERGENCY MEDICINE

## 2017-01-01 PROCEDURE — 74011250636 HC RX REV CODE- 250/636: Performed by: ANESTHESIOLOGY

## 2017-01-01 PROCEDURE — A4520 INCONTINENCE GARMENT ANYTYPE: HCPCS

## 2017-01-01 PROCEDURE — 0D9670Z DRAINAGE OF STOMACH WITH DRAINAGE DEVICE, VIA NATURAL OR ARTIFICIAL OPENING: ICD-10-PCS | Performed by: SURGERY

## 2017-01-01 PROCEDURE — 77030008467 HC STPLR SKN COVD -B: Performed by: SURGERY

## 2017-01-01 PROCEDURE — 74011000250 HC RX REV CODE- 250: Performed by: SURGERY

## 2017-01-01 PROCEDURE — G8979 MOBILITY GOAL STATUS: HCPCS | Performed by: PHYSICAL THERAPIST

## 2017-01-01 PROCEDURE — 83690 ASSAY OF LIPASE: CPT | Performed by: EMERGENCY MEDICINE

## 2017-01-01 PROCEDURE — 74011000255 HC RX REV CODE- 255: Performed by: INTERNAL MEDICINE

## 2017-01-01 PROCEDURE — 92610 EVALUATE SWALLOWING FUNCTION: CPT

## 2017-01-01 PROCEDURE — 77030003029 HC SUT VCRL J&J -B: Performed by: SURGERY

## 2017-01-01 PROCEDURE — 81001 URINALYSIS AUTO W/SCOPE: CPT | Performed by: HOSPITALIST

## 2017-01-01 PROCEDURE — 74011636637 HC RX REV CODE- 636/637: Performed by: SURGERY

## 2017-01-01 PROCEDURE — G0299 HHS/HOSPICE OF RN EA 15 MIN: HCPCS

## 2017-01-01 PROCEDURE — 70450 CT HEAD/BRAIN W/O DYE: CPT

## 2017-01-01 PROCEDURE — 74011250637 HC RX REV CODE- 250/637: Performed by: SURGERY

## 2017-01-01 PROCEDURE — 74011636637 HC RX REV CODE- 636/637: Performed by: INTERNAL MEDICINE

## 2017-01-01 PROCEDURE — 85025 COMPLETE CBC W/AUTO DIFF WBC: CPT | Performed by: NURSE PRACTITIONER

## 2017-01-01 PROCEDURE — 84165 PROTEIN E-PHORESIS SERUM: CPT

## 2017-01-01 PROCEDURE — 84443 ASSAY THYROID STIM HORMONE: CPT | Performed by: INTERNAL MEDICINE

## 2017-01-01 PROCEDURE — 80048 BASIC METABOLIC PNL TOTAL CA: CPT | Performed by: EMERGENCY MEDICINE

## 2017-01-01 PROCEDURE — 83883 ASSAY NEPHELOMETRY NOT SPEC: CPT

## 2017-01-01 PROCEDURE — 86900 BLOOD TYPING SEROLOGIC ABO: CPT | Performed by: INTERNAL MEDICINE

## 2017-01-01 PROCEDURE — 97162 PT EVAL MOD COMPLEX 30 MIN: CPT

## 2017-01-01 PROCEDURE — G8979 MOBILITY GOAL STATUS: HCPCS

## 2017-01-01 PROCEDURE — 96360 HYDRATION IV INFUSION INIT: CPT

## 2017-01-01 PROCEDURE — 86920 COMPATIBILITY TEST SPIN: CPT | Performed by: INTERNAL MEDICINE

## 2017-01-01 PROCEDURE — 74177 CT ABD & PELVIS W/CONTRAST: CPT

## 2017-01-01 PROCEDURE — 74011636320 HC RX REV CODE- 636/320: Performed by: EMERGENCY MEDICINE

## 2017-01-01 PROCEDURE — 77030013079 HC BLNKT BAIR HGGR 3M -A: Performed by: ANESTHESIOLOGY

## 2017-01-01 PROCEDURE — 71010 XR CHEST PORT: CPT

## 2017-01-01 PROCEDURE — 85025 COMPLETE CBC W/AUTO DIFF WBC: CPT | Performed by: EMERGENCY MEDICINE

## 2017-01-01 PROCEDURE — 86923 COMPATIBILITY TEST ELECTRIC: CPT | Performed by: EMERGENCY MEDICINE

## 2017-01-01 PROCEDURE — 74176 CT ABD & PELVIS W/O CONTRAST: CPT

## 2017-01-01 PROCEDURE — 83735 ASSAY OF MAGNESIUM: CPT | Performed by: SURGERY

## 2017-01-01 PROCEDURE — 77030011267 HC ELECTRD BLD COVD -A: Performed by: SURGERY

## 2017-01-01 PROCEDURE — 80053 COMPREHEN METABOLIC PANEL: CPT | Performed by: HOSPITALIST

## 2017-01-01 PROCEDURE — 97530 THERAPEUTIC ACTIVITIES: CPT | Performed by: PHYSICAL THERAPIST

## 2017-01-01 PROCEDURE — 30233N1 TRANSFUSION OF NONAUTOLOGOUS RED BLOOD CELLS INTO PERIPHERAL VEIN, PERCUTANEOUS APPROACH: ICD-10-PCS | Performed by: EMERGENCY MEDICINE

## 2017-01-01 PROCEDURE — 81001 URINALYSIS AUTO W/SCOPE: CPT | Performed by: INTERNAL MEDICINE

## 2017-01-01 PROCEDURE — 74011250636 HC RX REV CODE- 250/636: Performed by: HOSPITALIST

## 2017-01-01 PROCEDURE — 86900 BLOOD TYPING SEROLOGIC ABO: CPT | Performed by: NURSE PRACTITIONER

## 2017-01-01 PROCEDURE — 74020 XR ABD (AP AND ERECT OR DECUB): CPT

## 2017-01-01 PROCEDURE — 96361 HYDRATE IV INFUSION ADD-ON: CPT

## 2017-01-01 PROCEDURE — 77030013169 SET IV BLD ICUM -A

## 2017-01-01 PROCEDURE — 88305 TISSUE EXAM BY PATHOLOGIST: CPT | Performed by: RADIOLOGY

## 2017-01-01 PROCEDURE — 85610 PROTHROMBIN TIME: CPT | Performed by: EMERGENCY MEDICINE

## 2017-01-01 PROCEDURE — 88360 TUMOR IMMUNOHISTOCHEM/MANUAL: CPT | Performed by: RADIOLOGY

## 2017-01-01 PROCEDURE — 83880 ASSAY OF NATRIURETIC PEPTIDE: CPT | Performed by: EMERGENCY MEDICINE

## 2017-01-01 PROCEDURE — 80061 LIPID PANEL: CPT | Performed by: EMERGENCY MEDICINE

## 2017-01-01 PROCEDURE — 82784 ASSAY IGA/IGD/IGG/IGM EACH: CPT

## 2017-01-01 PROCEDURE — 80076 HEPATIC FUNCTION PANEL: CPT | Performed by: INTERNAL MEDICINE

## 2017-01-01 PROCEDURE — G0155 HHCP-SVS OF CSW,EA 15 MIN: HCPCS

## 2017-01-01 PROCEDURE — 80053 COMPREHEN METABOLIC PANEL: CPT | Performed by: NURSE PRACTITIONER

## 2017-01-01 PROCEDURE — 36415 COLL VENOUS BLD VENIPUNCTURE: CPT | Performed by: NURSE PRACTITIONER

## 2017-01-01 PROCEDURE — 74011250637 HC RX REV CODE- 250/637: Performed by: INTERNAL MEDICINE

## 2017-01-01 PROCEDURE — 51701 INSERT BLADDER CATHETER: CPT

## 2017-01-01 PROCEDURE — 77030034850: Performed by: SURGERY

## 2017-01-01 PROCEDURE — 83690 ASSAY OF LIPASE: CPT | Performed by: HOSPITALIST

## 2017-01-01 PROCEDURE — 77030013140 HC MSK NEB VYRM -A

## 2017-01-01 PROCEDURE — P9016 RBC LEUKOCYTES REDUCED: HCPCS | Performed by: EMERGENCY MEDICINE

## 2017-01-01 PROCEDURE — 77030031139 HC SUT VCRL2 J&J -A: Performed by: SURGERY

## 2017-01-01 PROCEDURE — 30233N1 TRANSFUSION OF NONAUTOLOGOUS RED BLOOD CELLS INTO PERIPHERAL VEIN, PERCUTANEOUS APPROACH: ICD-10-PCS | Performed by: SURGERY

## 2017-01-01 PROCEDURE — 99284 EMERGENCY DEPT VISIT MOD MDM: CPT

## 2017-01-01 PROCEDURE — 77030008771 HC TU NG SALEM SUMP -A: Performed by: SURGERY

## 2017-01-01 PROCEDURE — 3331090003 HH PPS REVENUE ADJ

## 2017-01-01 PROCEDURE — 74000 XR ABD PORT  1 V: CPT

## 2017-01-01 PROCEDURE — 83605 ASSAY OF LACTIC ACID: CPT | Performed by: HOSPITALIST

## 2017-01-01 PROCEDURE — 74011250637 HC RX REV CODE- 250/637: Performed by: PSYCHIATRY & NEUROLOGY

## 2017-01-01 PROCEDURE — 77030002996 HC SUT SLK J&J -A: Performed by: SURGERY

## 2017-01-01 PROCEDURE — 82550 ASSAY OF CK (CPK): CPT | Performed by: EMERGENCY MEDICINE

## 2017-01-01 PROCEDURE — 97161 PT EVAL LOW COMPLEX 20 MIN: CPT | Performed by: PHYSICAL THERAPIST

## 2017-01-01 PROCEDURE — 82232 ASSAY OF BETA-2 PROTEIN: CPT

## 2017-01-01 PROCEDURE — 77030020061 HC IV BLD WRMR ADMIN SET 3M -B: Performed by: ANESTHESIOLOGY

## 2017-01-01 PROCEDURE — 87077 CULTURE AEROBIC IDENTIFY: CPT | Performed by: INTERNAL MEDICINE

## 2017-01-01 PROCEDURE — 77030018836 HC SOL IRR NACL ICUM -A

## 2017-01-01 PROCEDURE — 74011000258 HC RX REV CODE- 258: Performed by: INTERNAL MEDICINE

## 2017-01-01 PROCEDURE — 74011250637 HC RX REV CODE- 250/637: Performed by: HOSPITALIST

## 2017-01-01 PROCEDURE — 65660000000 HC RM CCU STEPDOWN

## 2017-01-01 PROCEDURE — 77030032490 HC SLV COMPR SCD KNE COVD -B: Performed by: SURGERY

## 2017-01-01 PROCEDURE — 74011000250 HC RX REV CODE- 250: Performed by: RADIOLOGY

## 2017-01-01 PROCEDURE — 74011250637 HC RX REV CODE- 250/637: Performed by: EMERGENCY MEDICINE

## 2017-01-01 PROCEDURE — 77030003028 HC SUT VCRL J&J -A: Performed by: SURGERY

## 2017-01-01 PROCEDURE — 74011000258 HC RX REV CODE- 258: Performed by: SURGERY

## 2017-01-01 PROCEDURE — 97530 THERAPEUTIC ACTIVITIES: CPT

## 2017-01-01 PROCEDURE — 97110 THERAPEUTIC EXERCISES: CPT

## 2017-01-01 PROCEDURE — G8978 MOBILITY CURRENT STATUS: HCPCS

## 2017-01-01 PROCEDURE — 88365 INSITU HYBRIDIZATION (FISH): CPT | Performed by: RADIOLOGY

## 2017-01-01 PROCEDURE — 77030034628 HC LIGASURE LAP SEAL DIV MD COVD -F: Performed by: SURGERY

## 2017-01-01 PROCEDURE — 88342 IMHCHEM/IMCYTCHM 1ST ANTB: CPT | Performed by: RADIOLOGY

## 2017-01-01 PROCEDURE — 96376 TX/PRO/DX INJ SAME DRUG ADON: CPT

## 2017-01-01 PROCEDURE — 99285 EMERGENCY DEPT VISIT HI MDM: CPT

## 2017-01-01 PROCEDURE — T4528 ADULT SIZE PULL-ON XL: HCPCS

## 2017-01-01 PROCEDURE — 36415 COLL VENOUS BLD VENIPUNCTURE: CPT | Performed by: HOSPITALIST

## 2017-01-01 PROCEDURE — 0DNL0ZZ RELEASE TRANSVERSE COLON, OPEN APPROACH: ICD-10-PCS | Performed by: SURGERY

## 2017-01-01 PROCEDURE — 74011000250 HC RX REV CODE- 250: Performed by: EMERGENCY MEDICINE

## 2017-01-01 PROCEDURE — 38221 DX BONE MARROW BIOPSIES: CPT

## 2017-01-01 PROCEDURE — 76010000131 HC OR TIME 2 TO 2.5 HR: Performed by: SURGERY

## 2017-01-01 PROCEDURE — C1892 INTRO/SHEATH,FIXED,PEEL-AWAY: HCPCS

## 2017-01-01 PROCEDURE — 92521 EVALUATION OF SPEECH FLUENCY: CPT

## 2017-01-01 PROCEDURE — 76060000035 HC ANESTHESIA 2 TO 2.5 HR: Performed by: SURGERY

## 2017-01-01 PROCEDURE — 94640 AIRWAY INHALATION TREATMENT: CPT

## 2017-01-01 PROCEDURE — 85018 HEMOGLOBIN: CPT | Performed by: SURGERY

## 2017-01-01 PROCEDURE — 86900 BLOOD TYPING SEROLOGIC ABO: CPT | Performed by: EMERGENCY MEDICINE

## 2017-01-01 PROCEDURE — 77030010507 HC ADH SKN DERMBND J&J -B

## 2017-01-01 PROCEDURE — G8978 MOBILITY CURRENT STATUS: HCPCS | Performed by: PHYSICAL THERAPIST

## 2017-01-01 PROCEDURE — 78264 GASTRIC EMPTYING IMG STUDY: CPT

## 2017-01-01 PROCEDURE — 82570 ASSAY OF URINE CREATININE: CPT | Performed by: INTERNAL MEDICINE

## 2017-01-01 PROCEDURE — 76210000006 HC OR PH I REC 0.5 TO 1 HR: Performed by: SURGERY

## 2017-01-01 PROCEDURE — C1788 PORT, INDWELLING, IMP: HCPCS

## 2017-01-01 PROCEDURE — 77030018781

## 2017-01-01 PROCEDURE — 400013 HH SOC

## 2017-01-01 PROCEDURE — 3336500001 HSPC ELECTION

## 2017-01-01 PROCEDURE — 86923 COMPATIBILITY TEST ELECTRIC: CPT | Performed by: INTERNAL MEDICINE

## 2017-01-01 PROCEDURE — 84156 ASSAY OF PROTEIN URINE: CPT

## 2017-01-01 PROCEDURE — 96417 CHEMO IV INFUS EACH ADDL SEQ: CPT

## 2017-01-01 PROCEDURE — 94761 N-INVAS EAR/PLS OXIMETRY MLT: CPT

## 2017-01-01 PROCEDURE — 97165 OT EVAL LOW COMPLEX 30 MIN: CPT

## 2017-01-01 PROCEDURE — A4245 ALCOHOL WIPES PER BOX: HCPCS

## 2017-01-01 PROCEDURE — 82550 ASSAY OF CK (CPK): CPT | Performed by: INTERNAL MEDICINE

## 2017-01-01 PROCEDURE — 97167 OT EVAL HIGH COMPLEX 60 MIN: CPT

## 2017-01-01 PROCEDURE — 77030011943

## 2017-01-01 PROCEDURE — 87086 URINE CULTURE/COLONY COUNT: CPT | Performed by: HOSPITALIST

## 2017-01-01 PROCEDURE — 85097 BONE MARROW INTERPRETATION: CPT | Performed by: RADIOLOGY

## 2017-01-01 PROCEDURE — 97116 GAIT TRAINING THERAPY: CPT | Performed by: PHYSICAL THERAPIST

## 2017-01-01 PROCEDURE — 88237 TISSUE CULTURE BONE MARROW: CPT | Performed by: RADIOLOGY

## 2017-01-01 PROCEDURE — 99283 EMERGENCY DEPT VISIT LOW MDM: CPT

## 2017-01-01 PROCEDURE — 77030008771 HC TU NG SALEM SUMP -A

## 2017-01-01 PROCEDURE — 80069 RENAL FUNCTION PANEL: CPT | Performed by: INTERNAL MEDICINE

## 2017-01-01 PROCEDURE — 96365 THER/PROPH/DIAG IV INF INIT: CPT

## 2017-01-01 PROCEDURE — 85025 COMPLETE CBC W/AUTO DIFF WBC: CPT | Performed by: HOSPITALIST

## 2017-01-01 PROCEDURE — 85025 COMPLETE CBC W/AUTO DIFF WBC: CPT | Performed by: RADIOLOGY

## 2017-01-01 PROCEDURE — G8980 MOBILITY D/C STATUS: HCPCS | Performed by: PHYSICAL THERAPIST

## 2017-01-01 PROCEDURE — 77030019702 HC WRP THER MENM -C: Performed by: SURGERY

## 2017-01-01 PROCEDURE — 97535 SELF CARE MNGMENT TRAINING: CPT | Performed by: OCCUPATIONAL THERAPIST

## 2017-01-01 PROCEDURE — 77030011640 HC PAD GRND REM COVD -A: Performed by: SURGERY

## 2017-01-01 PROCEDURE — 97165 OT EVAL LOW COMPLEX 30 MIN: CPT | Performed by: OCCUPATIONAL THERAPIST

## 2017-01-01 PROCEDURE — 77030031139 HC SUT VCRL2 J&J -A

## 2017-01-01 PROCEDURE — 93880 EXTRACRANIAL BILAT STUDY: CPT

## 2017-01-01 PROCEDURE — 77030029131 HC ADMN ST IV BLD N DEHP ICUM -B

## 2017-01-01 PROCEDURE — 77030008684 HC TU ET CUF COVD -B: Performed by: NURSE ANESTHETIST, CERTIFIED REGISTERED

## 2017-01-01 PROCEDURE — 88341 IMHCHEM/IMCYTCHM EA ADD ANTB: CPT | Performed by: RADIOLOGY

## 2017-01-01 PROCEDURE — C1765 ADHESION BARRIER: HCPCS | Performed by: SURGERY

## 2017-01-01 PROCEDURE — 88264 CHROMOSOME ANALYSIS 20-25: CPT | Performed by: RADIOLOGY

## 2017-01-01 PROCEDURE — 88313 SPECIAL STAINS GROUP 2: CPT | Performed by: RADIOLOGY

## 2017-01-01 PROCEDURE — 36415 COLL VENOUS BLD VENIPUNCTURE: CPT

## 2017-01-01 PROCEDURE — 77030028872 HC BN BIOP NDL ON CNTRL TY TELE -C

## 2017-01-01 PROCEDURE — 0DN80ZZ RELEASE SMALL INTESTINE, OPEN APPROACH: ICD-10-PCS | Performed by: SURGERY

## 2017-01-01 PROCEDURE — 74011000258 HC RX REV CODE- 258: Performed by: EMERGENCY MEDICINE

## 2017-01-01 PROCEDURE — 83036 HEMOGLOBIN GLYCOSYLATED A1C: CPT | Performed by: INTERNAL MEDICINE

## 2017-01-01 PROCEDURE — 77010033678 HC OXYGEN DAILY

## 2017-01-01 PROCEDURE — 97161 PT EVAL LOW COMPLEX 20 MIN: CPT

## 2017-01-01 PROCEDURE — 74011250636 HC RX REV CODE- 250/636: Performed by: FAMILY MEDICINE

## 2017-01-01 RX ORDER — METOCLOPRAMIDE HYDROCHLORIDE 5 MG/ML
5 INJECTION INTRAMUSCULAR; INTRAVENOUS EVERY 6 HOURS
Status: DISCONTINUED | OUTPATIENT
Start: 2017-01-01 | End: 2017-01-01

## 2017-01-01 RX ORDER — INSULIN GLARGINE 100 [IU]/ML
INJECTION, SOLUTION SUBCUTANEOUS
Qty: 1 VIAL | Refills: 0 | Status: SHIPPED | COMMUNITY
Start: 2017-01-01 | End: 2017-01-01 | Stop reason: SDUPTHER

## 2017-01-01 RX ORDER — SODIUM,POTASSIUM PHOSPHATES 280-250MG
2 POWDER IN PACKET (EA) ORAL 4 TIMES DAILY
Status: COMPLETED | OUTPATIENT
Start: 2017-01-01 | End: 2017-01-01

## 2017-01-01 RX ORDER — MORPHINE SULFATE 15 MG/1
15 TABLET, FILM COATED, EXTENDED RELEASE ORAL EVERY 12 HOURS
Qty: 60 TAB | Refills: 0 | Status: SHIPPED | OUTPATIENT
Start: 2017-01-01 | End: 2017-01-01 | Stop reason: SDUPTHER

## 2017-01-01 RX ORDER — SODIUM CHLORIDE 0.9 % (FLUSH) 0.9 %
5-10 SYRINGE (ML) INJECTION AS NEEDED
Status: DISCONTINUED | OUTPATIENT
Start: 2017-01-01 | End: 2017-01-01 | Stop reason: HOSPADM

## 2017-01-01 RX ORDER — ASPIRIN 325 MG
325 TABLET ORAL DAILY
Status: DISCONTINUED | OUTPATIENT
Start: 2017-01-01 | End: 2017-01-01 | Stop reason: HOSPADM

## 2017-01-01 RX ORDER — ONDANSETRON 2 MG/ML
4 INJECTION INTRAMUSCULAR; INTRAVENOUS
Status: DISCONTINUED | OUTPATIENT
Start: 2017-01-01 | End: 2017-01-01 | Stop reason: HOSPADM

## 2017-01-01 RX ORDER — SODIUM CHLORIDE 0.9 % (FLUSH) 0.9 %
10 SYRINGE (ML) INJECTION
Status: COMPLETED | OUTPATIENT
Start: 2017-01-01 | End: 2017-01-01

## 2017-01-01 RX ORDER — POLYETHYLENE GLYCOL 3350 17 G/17G
17 POWDER, FOR SOLUTION ORAL DAILY
Status: DISCONTINUED | OUTPATIENT
Start: 2017-01-01 | End: 2017-01-01

## 2017-01-01 RX ORDER — PANTOPRAZOLE SODIUM 40 MG/1
40 TABLET, DELAYED RELEASE ORAL EVERY 12 HOURS
Status: DISCONTINUED | OUTPATIENT
Start: 2017-01-01 | End: 2017-01-01

## 2017-01-01 RX ORDER — HEPARIN SODIUM 200 [USP'U]/100ML
200 INJECTION, SOLUTION INTRAVENOUS ONCE
Status: DISPENSED | OUTPATIENT
Start: 2017-01-01 | End: 2017-01-01

## 2017-01-01 RX ORDER — HYDROMORPHONE HYDROCHLORIDE 2 MG/ML
0.2 INJECTION, SOLUTION INTRAMUSCULAR; INTRAVENOUS; SUBCUTANEOUS
Status: DISCONTINUED | OUTPATIENT
Start: 2017-01-01 | End: 2017-01-01

## 2017-01-01 RX ORDER — ATORVASTATIN CALCIUM 40 MG/1
80 TABLET, FILM COATED ORAL DAILY
Status: DISCONTINUED | OUTPATIENT
Start: 2017-01-01 | End: 2017-01-01 | Stop reason: HOSPADM

## 2017-01-01 RX ORDER — SODIUM CHLORIDE 0.9 % (FLUSH) 0.9 %
10-40 SYRINGE (ML) INJECTION AS NEEDED
Status: DISCONTINUED | OUTPATIENT
Start: 2017-01-01 | End: 2017-01-01 | Stop reason: HOSPADM

## 2017-01-01 RX ORDER — HEPARIN 100 UNIT/ML
500 SYRINGE INTRAVENOUS AS NEEDED
Status: ACTIVE | OUTPATIENT
Start: 2017-01-01 | End: 2017-01-01

## 2017-01-01 RX ORDER — HYDROCODONE BITARTRATE AND ACETAMINOPHEN 5; 325 MG/1; MG/1
1 TABLET ORAL
Status: DISCONTINUED | OUTPATIENT
Start: 2017-01-01 | End: 2017-01-01 | Stop reason: HOSPADM

## 2017-01-01 RX ORDER — ENOXAPARIN SODIUM 100 MG/ML
40 INJECTION SUBCUTANEOUS DAILY
Status: DISCONTINUED | OUTPATIENT
Start: 2017-01-01 | End: 2017-01-01

## 2017-01-01 RX ORDER — ONDANSETRON 2 MG/ML
4 INJECTION INTRAMUSCULAR; INTRAVENOUS
Status: DISCONTINUED | OUTPATIENT
Start: 2017-01-01 | End: 2017-01-01 | Stop reason: SDUPTHER

## 2017-01-01 RX ORDER — HEPARIN 100 UNIT/ML
500 SYRINGE INTRAVENOUS ONCE
Status: COMPLETED | OUTPATIENT
Start: 2017-01-01 | End: 2017-01-01

## 2017-01-01 RX ORDER — SUCRALFATE 1 G/1
1 TABLET ORAL 4 TIMES DAILY
COMMUNITY
End: 2017-01-01

## 2017-01-01 RX ORDER — BUPIVACAINE HYDROCHLORIDE AND EPINEPHRINE 5; 5 MG/ML; UG/ML
INJECTION, SOLUTION EPIDURAL; INTRACAUDAL; PERINEURAL AS NEEDED
Status: DISCONTINUED | OUTPATIENT
Start: 2017-01-01 | End: 2017-01-01 | Stop reason: HOSPADM

## 2017-01-01 RX ORDER — SYRINGE AND NEEDLE,INSULIN,1ML 31GX15/64"
1 SYRINGE, EMPTY DISPOSABLE MISCELLANEOUS 2 TIMES DAILY
Qty: 100 PEN NEEDLE | Refills: 5 | Status: SHIPPED | OUTPATIENT
Start: 2017-01-01 | End: 2017-01-01

## 2017-01-01 RX ORDER — NALOXONE HYDROCHLORIDE 0.4 MG/ML
0.4 INJECTION, SOLUTION INTRAMUSCULAR; INTRAVENOUS; SUBCUTANEOUS AS NEEDED
Status: DISCONTINUED | OUTPATIENT
Start: 2017-01-01 | End: 2017-01-01 | Stop reason: HOSPADM

## 2017-01-01 RX ORDER — DICYCLOMINE HYDROCHLORIDE 20 MG/1
20 TABLET ORAL
Qty: 20 TAB | Refills: 0 | Status: SHIPPED | OUTPATIENT
Start: 2017-01-01 | End: 2017-01-01

## 2017-01-01 RX ORDER — DICYCLOMINE HYDROCHLORIDE 20 MG/1
20 TABLET ORAL
Status: DISCONTINUED | OUTPATIENT
Start: 2017-01-01 | End: 2017-01-01

## 2017-01-01 RX ORDER — SODIUM CHLORIDE 9 MG/ML
10 INJECTION INTRAMUSCULAR; INTRAVENOUS; SUBCUTANEOUS AS NEEDED
Status: ACTIVE | OUTPATIENT
Start: 2017-01-01 | End: 2017-01-01

## 2017-01-01 RX ORDER — HYDROXYZINE 25 MG/1
25 TABLET, FILM COATED ORAL
Status: DISCONTINUED | OUTPATIENT
Start: 2017-01-01 | End: 2017-01-01

## 2017-01-01 RX ORDER — FAMOTIDINE 20 MG/1
20 TABLET, FILM COATED ORAL DAILY
Status: DISCONTINUED | OUTPATIENT
Start: 2017-01-01 | End: 2017-01-01 | Stop reason: HOSPADM

## 2017-01-01 RX ORDER — SODIUM CHLORIDE 9 MG/ML
INJECTION INTRAMUSCULAR; INTRAVENOUS; SUBCUTANEOUS
Status: COMPLETED
Start: 2017-01-01 | End: 2017-01-01

## 2017-01-01 RX ORDER — HYDROMORPHONE HYDROCHLORIDE 1 MG/ML
0.5 INJECTION, SOLUTION INTRAMUSCULAR; INTRAVENOUS; SUBCUTANEOUS
Status: COMPLETED | OUTPATIENT
Start: 2017-01-01 | End: 2017-01-01

## 2017-01-01 RX ORDER — ACETAMINOPHEN 325 MG/1
650 TABLET ORAL
Status: COMPLETED | OUTPATIENT
Start: 2017-01-01 | End: 2017-01-01

## 2017-01-01 RX ORDER — AMLODIPINE BESYLATE 5 MG/1
10 TABLET ORAL DAILY
Status: DISCONTINUED | OUTPATIENT
Start: 2017-01-01 | End: 2017-01-01

## 2017-01-01 RX ORDER — ISOSORBIDE MONONITRATE 30 MG/1
60 TABLET, EXTENDED RELEASE ORAL DAILY
Status: DISCONTINUED | OUTPATIENT
Start: 2017-01-01 | End: 2017-01-01 | Stop reason: HOSPADM

## 2017-01-01 RX ORDER — PHENYLEPHRINE HCL IN 0.9% NACL 0.4MG/10ML
SYRINGE (ML) INTRAVENOUS AS NEEDED
Status: DISCONTINUED | OUTPATIENT
Start: 2017-01-01 | End: 2017-01-01 | Stop reason: HOSPADM

## 2017-01-01 RX ORDER — ALBUMIN HUMAN 50 G/1000ML
SOLUTION INTRAVENOUS AS NEEDED
Status: DISCONTINUED | OUTPATIENT
Start: 2017-01-01 | End: 2017-01-01 | Stop reason: HOSPADM

## 2017-01-01 RX ORDER — ONDANSETRON 4 MG/1
4 TABLET, ORALLY DISINTEGRATING ORAL
Status: DISCONTINUED | OUTPATIENT
Start: 2017-01-01 | End: 2017-01-01

## 2017-01-01 RX ORDER — SODIUM CHLORIDE 0.9 % (FLUSH) 0.9 %
10-40 SYRINGE (ML) INJECTION AS NEEDED
Status: ACTIVE | OUTPATIENT
Start: 2017-01-01 | End: 2017-01-01

## 2017-01-01 RX ORDER — CEFAZOLIN SODIUM IN 0.9 % NACL 2 G/100 ML
PLASTIC BAG, INJECTION (ML) INTRAVENOUS AS NEEDED
Status: DISCONTINUED | OUTPATIENT
Start: 2017-01-01 | End: 2017-01-01 | Stop reason: HOSPADM

## 2017-01-01 RX ORDER — ONDANSETRON 2 MG/ML
8 INJECTION INTRAMUSCULAR; INTRAVENOUS ONCE
Status: COMPLETED | OUTPATIENT
Start: 2017-01-01 | End: 2017-01-01

## 2017-01-01 RX ORDER — METRONIDAZOLE 500 MG/100ML
INJECTION, SOLUTION INTRAVENOUS AS NEEDED
Status: DISCONTINUED | OUTPATIENT
Start: 2017-01-01 | End: 2017-01-01 | Stop reason: HOSPADM

## 2017-01-01 RX ORDER — ENOXAPARIN SODIUM 100 MG/ML
30 INJECTION SUBCUTANEOUS DAILY
Status: DISCONTINUED | OUTPATIENT
Start: 2017-01-01 | End: 2017-01-01 | Stop reason: HOSPADM

## 2017-01-01 RX ORDER — INSULIN GLARGINE 100 [IU]/ML
5 INJECTION, SOLUTION SUBCUTANEOUS
Status: DISCONTINUED | OUTPATIENT
Start: 2017-01-01 | End: 2017-01-01

## 2017-01-01 RX ORDER — HEPARIN 100 UNIT/ML
SYRINGE INTRAVENOUS
Status: DISCONTINUED
Start: 2017-01-01 | End: 2017-01-01 | Stop reason: HOSPADM

## 2017-01-01 RX ORDER — MORPHINE SULFATE 20 MG/ML
20 SOLUTION ORAL
Status: DISCONTINUED | OUTPATIENT
Start: 2017-01-01 | End: 2017-01-01 | Stop reason: HOSPADM

## 2017-01-01 RX ORDER — FENTANYL CITRATE 50 UG/ML
25 INJECTION, SOLUTION INTRAMUSCULAR; INTRAVENOUS
Status: DISCONTINUED | OUTPATIENT
Start: 2017-01-01 | End: 2017-01-01 | Stop reason: HOSPADM

## 2017-01-01 RX ORDER — DEXAMETHASONE SODIUM PHOSPHATE 4 MG/ML
4 INJECTION, SOLUTION INTRA-ARTICULAR; INTRALESIONAL; INTRAMUSCULAR; INTRAVENOUS; SOFT TISSUE ONCE
Status: COMPLETED | OUTPATIENT
Start: 2017-01-01 | End: 2017-01-01

## 2017-01-01 RX ORDER — POLYETHYLENE GLYCOL 3350 17 G/17G
17 POWDER, FOR SOLUTION ORAL DAILY
Status: DISCONTINUED | OUTPATIENT
Start: 2017-01-01 | End: 2017-01-01 | Stop reason: HOSPADM

## 2017-01-01 RX ORDER — SODIUM CHLORIDE 0.9 % (FLUSH) 0.9 %
SYRINGE (ML) INJECTION
Status: COMPLETED
Start: 2017-01-01 | End: 2017-01-01

## 2017-01-01 RX ORDER — ACETAMINOPHEN 10 MG/ML
INJECTION, SOLUTION INTRAVENOUS AS NEEDED
Status: DISCONTINUED | OUTPATIENT
Start: 2017-01-01 | End: 2017-01-01 | Stop reason: HOSPADM

## 2017-01-01 RX ORDER — MORPHINE SULFATE 2 MG/ML
4 INJECTION, SOLUTION INTRAMUSCULAR; INTRAVENOUS
Status: DISCONTINUED | OUTPATIENT
Start: 2017-01-01 | End: 2017-01-01

## 2017-01-01 RX ORDER — GLIPIZIDE 5 MG/1
2.5 TABLET ORAL 2 TIMES DAILY
Qty: 30 TAB | Refills: 3 | Status: SHIPPED | OUTPATIENT
Start: 2017-01-01 | End: 2017-01-01

## 2017-01-01 RX ORDER — SODIUM CHLORIDE 9 MG/ML
250 INJECTION, SOLUTION INTRAVENOUS AS NEEDED
Status: DISCONTINUED | OUTPATIENT
Start: 2017-01-01 | End: 2017-01-01 | Stop reason: HOSPADM

## 2017-01-01 RX ORDER — POLYETHYLENE GLYCOL 3350 17 G/17G
17 POWDER, FOR SOLUTION ORAL EVERY 12 HOURS
Status: DISCONTINUED | OUTPATIENT
Start: 2017-01-01 | End: 2017-01-01

## 2017-01-01 RX ORDER — MAGNESIUM SULFATE 100 %
4 CRYSTALS MISCELLANEOUS AS NEEDED
Status: DISCONTINUED | OUTPATIENT
Start: 2017-01-01 | End: 2017-01-01

## 2017-01-01 RX ORDER — MORPHINE SULFATE 4 MG/ML
4 INJECTION, SOLUTION INTRAMUSCULAR; INTRAVENOUS
Status: COMPLETED | OUTPATIENT
Start: 2017-01-01 | End: 2017-01-01

## 2017-01-01 RX ORDER — POLYETHYLENE GLYCOL 3350 17 G/17G
17 POWDER, FOR SOLUTION ORAL 2 TIMES DAILY
Status: DISCONTINUED | OUTPATIENT
Start: 2017-01-01 | End: 2017-01-01 | Stop reason: HOSPADM

## 2017-01-01 RX ORDER — INSULIN GLARGINE 100 [IU]/ML
18 INJECTION, SOLUTION SUBCUTANEOUS
Status: DISCONTINUED | OUTPATIENT
Start: 2017-01-01 | End: 2017-01-01

## 2017-01-01 RX ORDER — BUMETANIDE 2 MG/1
2 TABLET ORAL DAILY
Status: DISCONTINUED | OUTPATIENT
Start: 2017-01-01 | End: 2017-01-01

## 2017-01-01 RX ORDER — INSULIN GLARGINE 100 [IU]/ML
18 INJECTION, SOLUTION SUBCUTANEOUS 2 TIMES DAILY
Status: DISCONTINUED | OUTPATIENT
Start: 2017-01-01 | End: 2017-01-01

## 2017-01-01 RX ORDER — LORAZEPAM 2 MG/ML
0.5 CONCENTRATE ORAL
Status: DISCONTINUED | OUTPATIENT
Start: 2017-01-01 | End: 2017-01-01

## 2017-01-01 RX ORDER — SODIUM CHLORIDE 9 MG/ML
250 INJECTION, SOLUTION INTRAVENOUS AS NEEDED
Status: DISCONTINUED | OUTPATIENT
Start: 2017-01-01 | End: 2017-01-01

## 2017-01-01 RX ORDER — ENOXAPARIN SODIUM 100 MG/ML
40 INJECTION SUBCUTANEOUS DAILY
Status: DISCONTINUED | OUTPATIENT
Start: 2017-01-01 | End: 2017-01-01 | Stop reason: HOSPADM

## 2017-01-01 RX ORDER — SODIUM CHLORIDE 9 MG/ML
25 INJECTION, SOLUTION INTRAVENOUS CONTINUOUS
Status: DISCONTINUED | OUTPATIENT
Start: 2017-01-01 | End: 2017-01-01 | Stop reason: HOSPADM

## 2017-01-01 RX ORDER — BARIUM SULFATE 20 MG/ML
900 SUSPENSION ORAL
Status: COMPLETED | OUTPATIENT
Start: 2017-01-01 | End: 2017-01-01

## 2017-01-01 RX ORDER — ENOXAPARIN SODIUM 100 MG/ML
30 INJECTION SUBCUTANEOUS EVERY 24 HOURS
Status: DISCONTINUED | OUTPATIENT
Start: 2017-01-01 | End: 2017-01-01 | Stop reason: CLARIF

## 2017-01-01 RX ORDER — LORAZEPAM 2 MG/ML
1 CONCENTRATE ORAL
Status: DISCONTINUED | OUTPATIENT
Start: 2017-01-01 | End: 2017-01-01 | Stop reason: HOSPADM

## 2017-01-01 RX ORDER — MORPHINE SULFATE 15 MG/1
15 TABLET, FILM COATED, EXTENDED RELEASE ORAL EVERY 12 HOURS
Qty: 14 TAB | Refills: 0 | Status: SHIPPED | OUTPATIENT
Start: 2017-01-01

## 2017-01-01 RX ORDER — MORPHINE SULFATE 2 MG/ML
6 INJECTION, SOLUTION INTRAMUSCULAR; INTRAVENOUS
Status: COMPLETED | OUTPATIENT
Start: 2017-01-01 | End: 2017-01-01

## 2017-01-01 RX ORDER — MAGNESIUM SULFATE 100 %
4 CRYSTALS MISCELLANEOUS AS NEEDED
Status: DISCONTINUED | OUTPATIENT
Start: 2017-01-01 | End: 2017-01-01 | Stop reason: HOSPADM

## 2017-01-01 RX ORDER — PANTOPRAZOLE SODIUM 40 MG/1
40 TABLET, DELAYED RELEASE ORAL
Status: DISCONTINUED | OUTPATIENT
Start: 2017-01-01 | End: 2017-01-01 | Stop reason: HOSPADM

## 2017-01-01 RX ORDER — HYDROMORPHONE HYDROCHLORIDE 1 MG/ML
0.2 INJECTION, SOLUTION INTRAMUSCULAR; INTRAVENOUS; SUBCUTANEOUS
Status: DISCONTINUED | OUTPATIENT
Start: 2017-01-01 | End: 2017-01-01 | Stop reason: HOSPADM

## 2017-01-01 RX ORDER — INSULIN LISPRO 100 [IU]/ML
INJECTION, SOLUTION INTRAVENOUS; SUBCUTANEOUS
Status: DISCONTINUED | OUTPATIENT
Start: 2017-01-01 | End: 2017-01-01

## 2017-01-01 RX ORDER — IPRATROPIUM BROMIDE AND ALBUTEROL SULFATE 2.5; .5 MG/3ML; MG/3ML
3 SOLUTION RESPIRATORY (INHALATION) ONCE
Status: COMPLETED | OUTPATIENT
Start: 2017-01-01 | End: 2017-01-01

## 2017-01-01 RX ORDER — METRONIDAZOLE 500 MG/100ML
500 INJECTION, SOLUTION INTRAVENOUS EVERY 8 HOURS
Status: DISCONTINUED | OUTPATIENT
Start: 2017-01-01 | End: 2017-01-01

## 2017-01-01 RX ORDER — HYDROXYZINE 25 MG/1
25 TABLET, FILM COATED ORAL
COMMUNITY
End: 2017-01-01

## 2017-01-01 RX ORDER — GUAIFENESIN 100 MG/5ML
81 LIQUID (ML) ORAL DAILY
Status: DISCONTINUED | OUTPATIENT
Start: 2017-01-01 | End: 2017-01-01

## 2017-01-01 RX ORDER — HYDROMORPHONE HYDROCHLORIDE 2 MG/ML
0.5 INJECTION, SOLUTION INTRAMUSCULAR; INTRAVENOUS; SUBCUTANEOUS
Status: DISCONTINUED | OUTPATIENT
Start: 2017-01-01 | End: 2017-01-01

## 2017-01-01 RX ORDER — SODIUM CHLORIDE 0.9 % (FLUSH) 0.9 %
5-10 SYRINGE (ML) INJECTION EVERY 8 HOURS
Status: DISCONTINUED | OUTPATIENT
Start: 2017-01-01 | End: 2017-01-01 | Stop reason: HOSPADM

## 2017-01-01 RX ORDER — PROCHLORPERAZINE MALEATE 5 MG
10 TABLET ORAL
Status: DISCONTINUED | OUTPATIENT
Start: 2017-01-01 | End: 2017-01-01

## 2017-01-01 RX ORDER — GLYCOPYRROLATE 0.2 MG/ML
INJECTION INTRAMUSCULAR; INTRAVENOUS AS NEEDED
Status: DISCONTINUED | OUTPATIENT
Start: 2017-01-01 | End: 2017-01-01 | Stop reason: HOSPADM

## 2017-01-01 RX ORDER — BENAZEPRIL HYDROCHLORIDE 10 MG/1
40 TABLET ORAL DAILY
Status: DISCONTINUED | OUTPATIENT
Start: 2017-01-01 | End: 2017-01-01 | Stop reason: HOSPADM

## 2017-01-01 RX ORDER — ACETAMINOPHEN 10 MG/ML
1000 INJECTION, SOLUTION INTRAVENOUS EVERY 6 HOURS
Status: COMPLETED | OUTPATIENT
Start: 2017-01-01 | End: 2017-01-01

## 2017-01-01 RX ORDER — ONDANSETRON 4 MG/1
4 TABLET, FILM COATED ORAL
Qty: 20 TAB | Refills: 0 | Status: SHIPPED | OUTPATIENT
Start: 2017-01-01

## 2017-01-01 RX ORDER — MORPHINE SULFATE 20 MG/ML
15 SOLUTION ORAL EVERY 4 HOURS
Status: DISCONTINUED | OUTPATIENT
Start: 2017-01-01 | End: 2017-01-01

## 2017-01-01 RX ORDER — MORPHINE SULFATE 15 MG/1
15 TABLET ORAL
Qty: 60 TAB | Refills: 0 | Status: SHIPPED | OUTPATIENT
Start: 2017-01-01 | End: 2017-01-01

## 2017-01-01 RX ORDER — LEVOFLOXACIN 500 MG/1
500 TABLET, FILM COATED ORAL DAILY
Qty: 7 TAB | Refills: 0 | Status: SHIPPED | OUTPATIENT
Start: 2017-01-01 | End: 2017-01-01 | Stop reason: ALTCHOICE

## 2017-01-01 RX ORDER — MORPHINE SULFATE 20 MG/ML
10 SOLUTION ORAL
Status: DISCONTINUED | OUTPATIENT
Start: 2017-01-01 | End: 2017-01-01 | Stop reason: HOSPADM

## 2017-01-01 RX ORDER — FAMOTIDINE 10 MG/ML
20 INJECTION INTRAVENOUS DAILY
Status: DISCONTINUED | OUTPATIENT
Start: 2017-01-01 | End: 2017-01-01 | Stop reason: DRUGHIGH

## 2017-01-01 RX ORDER — DEXTROSE 50 % IN WATER (D50W) INTRAVENOUS SYRINGE
12.5-25 AS NEEDED
Status: DISCONTINUED | OUTPATIENT
Start: 2017-01-01 | End: 2017-01-01 | Stop reason: HOSPADM

## 2017-01-01 RX ORDER — HALOPERIDOL 2 MG/ML
0.5 SOLUTION ORAL
Status: DISCONTINUED | OUTPATIENT
Start: 2017-01-01 | End: 2017-01-01 | Stop reason: HOSPADM

## 2017-01-01 RX ORDER — HYDROMORPHONE HYDROCHLORIDE 2 MG/ML
0.5 INJECTION, SOLUTION INTRAMUSCULAR; INTRAVENOUS; SUBCUTANEOUS EVERY 4 HOURS
Status: DISCONTINUED | OUTPATIENT
Start: 2017-01-01 | End: 2017-01-01

## 2017-01-01 RX ORDER — SODIUM CHLORIDE 9 MG/ML
15 INJECTION, SOLUTION INTRAVENOUS CONTINUOUS
Status: DISCONTINUED | OUTPATIENT
Start: 2017-01-01 | End: 2017-01-01

## 2017-01-01 RX ORDER — LIDOCAINE HYDROCHLORIDE 20 MG/ML
INJECTION, SOLUTION EPIDURAL; INFILTRATION; INTRACAUDAL; PERINEURAL AS NEEDED
Status: DISCONTINUED | OUTPATIENT
Start: 2017-01-01 | End: 2017-01-01 | Stop reason: HOSPADM

## 2017-01-01 RX ORDER — SODIUM CHLORIDE 9 MG/ML
50 INJECTION, SOLUTION INTRAVENOUS
Status: COMPLETED | OUTPATIENT
Start: 2017-01-01 | End: 2017-01-01

## 2017-01-01 RX ORDER — MORPHINE SULFATE 15 MG/1
15 TABLET ORAL
Status: DISCONTINUED | OUTPATIENT
Start: 2017-01-01 | End: 2017-01-01

## 2017-01-01 RX ORDER — ROCURONIUM BROMIDE 10 MG/ML
INJECTION, SOLUTION INTRAVENOUS AS NEEDED
Status: DISCONTINUED | OUTPATIENT
Start: 2017-01-01 | End: 2017-01-01 | Stop reason: HOSPADM

## 2017-01-01 RX ORDER — LANOLIN ALCOHOL/MO/W.PET/CERES
500 CREAM (GRAM) TOPICAL DAILY
Status: DISCONTINUED | OUTPATIENT
Start: 2017-01-01 | End: 2017-01-01 | Stop reason: HOSPADM

## 2017-01-01 RX ORDER — METOCLOPRAMIDE HYDROCHLORIDE 5 MG/ML
5 INJECTION INTRAMUSCULAR; INTRAVENOUS
Status: DISCONTINUED | OUTPATIENT
Start: 2017-01-01 | End: 2017-01-01

## 2017-01-01 RX ORDER — MORPHINE SULFATE 2 MG/ML
2 INJECTION, SOLUTION INTRAMUSCULAR; INTRAVENOUS
Status: DISCONTINUED | OUTPATIENT
Start: 2017-01-01 | End: 2017-01-01

## 2017-01-01 RX ORDER — ONDANSETRON 2 MG/ML
INJECTION INTRAMUSCULAR; INTRAVENOUS AS NEEDED
Status: DISCONTINUED | OUTPATIENT
Start: 2017-01-01 | End: 2017-01-01 | Stop reason: HOSPADM

## 2017-01-01 RX ORDER — GLIPIZIDE 5 MG/1
2.5 TABLET ORAL 2 TIMES DAILY
Status: DISCONTINUED | OUTPATIENT
Start: 2017-01-01 | End: 2017-01-01

## 2017-01-01 RX ORDER — ACETAMINOPHEN 325 MG/1
650 TABLET ORAL
Status: DISCONTINUED | OUTPATIENT
Start: 2017-01-01 | End: 2017-01-01 | Stop reason: HOSPADM

## 2017-01-01 RX ORDER — HYDROCODONE BITARTRATE AND ACETAMINOPHEN 5; 325 MG/1; MG/1
1-2 TABLET ORAL
Status: DISCONTINUED | OUTPATIENT
Start: 2017-01-01 | End: 2017-01-01

## 2017-01-01 RX ORDER — ONDANSETRON 2 MG/ML
4 INJECTION INTRAMUSCULAR; INTRAVENOUS
Status: COMPLETED | OUTPATIENT
Start: 2017-01-01 | End: 2017-01-01

## 2017-01-01 RX ORDER — HYDRALAZINE HYDROCHLORIDE 20 MG/ML
10 INJECTION INTRAMUSCULAR; INTRAVENOUS
Status: DISCONTINUED | OUTPATIENT
Start: 2017-01-01 | End: 2017-01-01 | Stop reason: HOSPADM

## 2017-01-01 RX ORDER — MORPHINE SULFATE 15 MG/1
15 TABLET, FILM COATED, EXTENDED RELEASE ORAL EVERY 12 HOURS
Qty: 60 TAB | Refills: 0 | Status: SHIPPED | OUTPATIENT
Start: 2017-01-01 | End: 2017-01-01

## 2017-01-01 RX ORDER — SODIUM CHLORIDE 0.9 % (FLUSH) 0.9 %
5-10 SYRINGE (ML) INJECTION AS NEEDED
Status: ACTIVE | OUTPATIENT
Start: 2017-01-01 | End: 2017-01-01

## 2017-01-01 RX ORDER — DEXTROSE 50 % IN WATER (D50W) INTRAVENOUS SYRINGE
12.5-25 AS NEEDED
Status: DISCONTINUED | OUTPATIENT
Start: 2017-01-01 | End: 2017-01-01

## 2017-01-01 RX ORDER — ATORVASTATIN CALCIUM 40 MG/1
40 TABLET, FILM COATED ORAL DAILY
Status: DISCONTINUED | OUTPATIENT
Start: 2017-01-01 | End: 2017-01-01

## 2017-01-01 RX ORDER — IPRATROPIUM BROMIDE 42 UG/1
SPRAY, METERED NASAL
Refills: 0 | COMMUNITY
Start: 2017-01-01 | End: 2017-01-01

## 2017-01-01 RX ORDER — SODIUM CHLORIDE 9 MG/ML
125 INJECTION, SOLUTION INTRAVENOUS CONTINUOUS
Status: DISCONTINUED | OUTPATIENT
Start: 2017-01-01 | End: 2017-01-01 | Stop reason: HOSPADM

## 2017-01-01 RX ORDER — MORPHINE SULFATE 15 MG/1
15 TABLET, FILM COATED, EXTENDED RELEASE ORAL EVERY 12 HOURS
Status: DISCONTINUED | OUTPATIENT
Start: 2017-01-01 | End: 2017-01-01 | Stop reason: HOSPADM

## 2017-01-01 RX ORDER — MEGESTROL ACETATE 40 MG/ML
400 SUSPENSION ORAL DAILY
Status: DISCONTINUED | OUTPATIENT
Start: 2017-01-01 | End: 2017-01-01

## 2017-01-01 RX ORDER — AMLODIPINE BESYLATE 5 MG/1
10 TABLET ORAL DAILY
Status: DISCONTINUED | OUTPATIENT
Start: 2017-01-01 | End: 2017-01-01 | Stop reason: HOSPADM

## 2017-01-01 RX ORDER — ATORVASTATIN CALCIUM 40 MG/1
80 TABLET, FILM COATED ORAL DAILY
Status: DISCONTINUED | OUTPATIENT
Start: 2017-01-01 | End: 2017-01-01

## 2017-01-01 RX ORDER — BUMETANIDE 1 MG/1
2 TABLET ORAL DAILY
Status: DISCONTINUED | OUTPATIENT
Start: 2017-01-01 | End: 2017-01-01 | Stop reason: SDUPTHER

## 2017-01-01 RX ORDER — ACETAMINOPHEN 500 MG
1000 TABLET ORAL
Status: DISCONTINUED | OUTPATIENT
Start: 2017-01-01 | End: 2017-01-01

## 2017-01-01 RX ORDER — INSULIN LISPRO 100 [IU]/ML
INJECTION, SOLUTION INTRAVENOUS; SUBCUTANEOUS
Status: DISCONTINUED | OUTPATIENT
Start: 2017-01-01 | End: 2017-01-01 | Stop reason: HOSPADM

## 2017-01-01 RX ORDER — MIDAZOLAM HYDROCHLORIDE 1 MG/ML
5 INJECTION, SOLUTION INTRAMUSCULAR; INTRAVENOUS
Status: DISCONTINUED | OUTPATIENT
Start: 2017-01-01 | End: 2017-01-01 | Stop reason: HOSPADM

## 2017-01-01 RX ORDER — OMEPRAZOLE 20 MG/1
20 CAPSULE, DELAYED RELEASE ORAL DAILY
Status: DISCONTINUED | OUTPATIENT
Start: 2017-01-01 | End: 2017-01-01

## 2017-01-01 RX ORDER — MEGESTROL ACETATE 40 MG/ML
200 SUSPENSION ORAL DAILY
Status: CANCELLED | OUTPATIENT
Start: 2017-01-01

## 2017-01-01 RX ORDER — ERGOCALCIFEROL 1.25 MG/1
CAPSULE ORAL
Qty: 3 CAP | Refills: 2 | Status: SHIPPED | OUTPATIENT
Start: 2017-01-01 | End: 2017-01-01

## 2017-01-01 RX ORDER — LIDOCAINE HYDROCHLORIDE 20 MG/ML
20 INJECTION, SOLUTION INFILTRATION; PERINEURAL ONCE
Status: COMPLETED | OUTPATIENT
Start: 2017-01-01 | End: 2017-01-01

## 2017-01-01 RX ORDER — METOCLOPRAMIDE 10 MG/1
10 TABLET ORAL
Status: DISCONTINUED | OUTPATIENT
Start: 2017-01-01 | End: 2017-01-01

## 2017-01-01 RX ORDER — ASPIRIN 325 MG
325 TABLET ORAL ONCE
Status: COMPLETED | OUTPATIENT
Start: 2017-01-01 | End: 2017-01-01

## 2017-01-01 RX ORDER — HYDRALAZINE HYDROCHLORIDE 20 MG/ML
20 INJECTION INTRAMUSCULAR; INTRAVENOUS
Status: DISCONTINUED | OUTPATIENT
Start: 2017-01-01 | End: 2017-01-01 | Stop reason: HOSPADM

## 2017-01-01 RX ORDER — AMOXICILLIN 250 MG
2 CAPSULE ORAL
Status: DISCONTINUED | OUTPATIENT
Start: 2017-01-01 | End: 2017-01-01

## 2017-01-01 RX ORDER — LEVOFLOXACIN 5 MG/ML
750 INJECTION, SOLUTION INTRAVENOUS
Status: DISCONTINUED | OUTPATIENT
Start: 2017-01-01 | End: 2017-01-01

## 2017-01-01 RX ORDER — FENTANYL CITRATE 50 UG/ML
100 INJECTION, SOLUTION INTRAMUSCULAR; INTRAVENOUS
Status: DISCONTINUED | OUTPATIENT
Start: 2017-01-01 | End: 2017-01-01 | Stop reason: HOSPADM

## 2017-01-01 RX ORDER — BUMETANIDE 1 MG/1
1 TABLET ORAL DAILY
Status: CANCELLED | OUTPATIENT
Start: 2017-01-01

## 2017-01-01 RX ORDER — HYDROMORPHONE HCL IN 0.9% NACL 15 MG/30ML
PATIENT CONTROLLED ANALGESIA VIAL INTRAVENOUS CONTINUOUS
Status: DISCONTINUED | OUTPATIENT
Start: 2017-01-01 | End: 2017-01-01

## 2017-01-01 RX ORDER — MEGESTROL ACETATE 40 MG/ML
400 SUSPENSION ORAL DAILY
Status: DISCONTINUED | OUTPATIENT
Start: 2017-01-01 | End: 2017-01-01 | Stop reason: HOSPADM

## 2017-01-01 RX ORDER — DIPHENHYDRAMINE HYDROCHLORIDE 50 MG/ML
12.5 INJECTION, SOLUTION INTRAMUSCULAR; INTRAVENOUS AS NEEDED
Status: ACTIVE | OUTPATIENT
Start: 2017-01-01 | End: 2017-01-01

## 2017-01-01 RX ORDER — DEXTROSE MONOHYDRATE 100 MG/ML
125-250 INJECTION, SOLUTION INTRAVENOUS AS NEEDED
Status: DISCONTINUED | OUTPATIENT
Start: 2017-01-01 | End: 2017-01-01 | Stop reason: HOSPADM

## 2017-01-01 RX ORDER — ATORVASTATIN CALCIUM 40 MG/1
80 TABLET, FILM COATED ORAL
Status: DISCONTINUED | OUTPATIENT
Start: 2017-01-01 | End: 2017-01-01 | Stop reason: HOSPADM

## 2017-01-01 RX ORDER — SODIUM CHLORIDE 9 MG/ML
250 INJECTION, SOLUTION INTRAVENOUS AS NEEDED
Status: DISCONTINUED | OUTPATIENT
Start: 2017-01-01 | End: 2017-01-01 | Stop reason: ALTCHOICE

## 2017-01-01 RX ORDER — FENTANYL CITRATE 50 UG/ML
INJECTION, SOLUTION INTRAMUSCULAR; INTRAVENOUS AS NEEDED
Status: DISCONTINUED | OUTPATIENT
Start: 2017-01-01 | End: 2017-01-01 | Stop reason: HOSPADM

## 2017-01-01 RX ORDER — ACETAMINOPHEN 650 MG/1
650 SUPPOSITORY RECTAL
Status: DISCONTINUED | OUTPATIENT
Start: 2017-01-01 | End: 2017-01-01 | Stop reason: HOSPADM

## 2017-01-01 RX ORDER — BENZONATATE 200 MG/1
200 CAPSULE ORAL
COMMUNITY
End: 2017-01-01 | Stop reason: ALTCHOICE

## 2017-01-01 RX ORDER — MORPHINE SULFATE 15 MG/1
15 TABLET ORAL
Qty: 30 TAB | Refills: 0 | Status: SHIPPED | OUTPATIENT
Start: 2017-01-01

## 2017-01-01 RX ORDER — BUTALBITAL, ACETAMINOPHEN AND CAFFEINE 50; 325; 40 MG/1; MG/1; MG/1
1 TABLET ORAL
Status: DISCONTINUED | OUTPATIENT
Start: 2017-01-01 | End: 2017-01-01 | Stop reason: HOSPADM

## 2017-01-01 RX ORDER — SODIUM CHLORIDE 9 MG/ML
100 INJECTION, SOLUTION INTRAVENOUS CONTINUOUS
Status: DISCONTINUED | OUTPATIENT
Start: 2017-01-01 | End: 2017-01-01

## 2017-01-01 RX ORDER — SUCCINYLCHOLINE CHLORIDE 20 MG/ML
INJECTION INTRAMUSCULAR; INTRAVENOUS AS NEEDED
Status: DISCONTINUED | OUTPATIENT
Start: 2017-01-01 | End: 2017-01-01 | Stop reason: HOSPADM

## 2017-01-01 RX ORDER — ALBUTEROL SULFATE 0.83 MG/ML
2.5 SOLUTION RESPIRATORY (INHALATION)
Status: DISCONTINUED | OUTPATIENT
Start: 2017-01-01 | End: 2017-01-01 | Stop reason: HOSPADM

## 2017-01-01 RX ORDER — METOCLOPRAMIDE 5 MG/1
5 TABLET ORAL
COMMUNITY
End: 2017-01-01

## 2017-01-01 RX ORDER — HYDROMORPHONE HYDROCHLORIDE 2 MG/ML
0.2 INJECTION, SOLUTION INTRAMUSCULAR; INTRAVENOUS; SUBCUTANEOUS
Status: DISCONTINUED | OUTPATIENT
Start: 2017-01-01 | End: 2017-01-01 | Stop reason: SDUPTHER

## 2017-01-01 RX ORDER — ASPIRIN 81 MG/1
81 TABLET ORAL DAILY
Status: DISCONTINUED | OUTPATIENT
Start: 2017-01-01 | End: 2017-01-01 | Stop reason: HOSPADM

## 2017-01-01 RX ORDER — LOPERAMIDE HYDROCHLORIDE 2 MG/1
4 CAPSULE ORAL
Status: DISCONTINUED | OUTPATIENT
Start: 2017-01-01 | End: 2017-01-01 | Stop reason: HOSPADM

## 2017-01-01 RX ORDER — SODIUM CHLORIDE 0.9 % (FLUSH) 0.9 %
5 SYRINGE (ML) INJECTION EVERY 8 HOURS
Status: DISCONTINUED | OUTPATIENT
Start: 2017-01-01 | End: 2017-01-01 | Stop reason: HOSPADM

## 2017-01-01 RX ORDER — PROPOFOL 10 MG/ML
INJECTION, EMULSION INTRAVENOUS AS NEEDED
Status: DISCONTINUED | OUTPATIENT
Start: 2017-01-01 | End: 2017-01-01 | Stop reason: HOSPADM

## 2017-01-01 RX ORDER — BUMETANIDE 1 MG/1
1 TABLET ORAL DAILY
COMMUNITY
End: 2017-01-01

## 2017-01-01 RX ORDER — POLYETHYLENE GLYCOL 3350 17 G/17G
17 POWDER, FOR SOLUTION ORAL
COMMUNITY
Start: 2017-01-01 | End: 2017-01-01

## 2017-01-01 RX ORDER — SODIUM CHLORIDE, SODIUM LACTATE, POTASSIUM CHLORIDE, CALCIUM CHLORIDE 600; 310; 30; 20 MG/100ML; MG/100ML; MG/100ML; MG/100ML
25 INJECTION, SOLUTION INTRAVENOUS CONTINUOUS
Status: DISCONTINUED | OUTPATIENT
Start: 2017-01-01 | End: 2017-01-01 | Stop reason: HOSPADM

## 2017-01-01 RX ORDER — SODIUM CHLORIDE 9 MG/ML
10 INJECTION INTRAMUSCULAR; INTRAVENOUS; SUBCUTANEOUS AS NEEDED
Status: DISPENSED | OUTPATIENT
Start: 2017-01-01 | End: 2017-01-01

## 2017-01-01 RX ORDER — DEXTROSE 50 % IN WATER (D50W) INTRAVENOUS SYRINGE
12.5-25 AS NEEDED
Status: DISCONTINUED | OUTPATIENT
Start: 2017-01-01 | End: 2017-01-01 | Stop reason: SDUPTHER

## 2017-01-01 RX ORDER — HYDROXYZINE 25 MG/1
25 TABLET, FILM COATED ORAL
Status: DISCONTINUED | OUTPATIENT
Start: 2017-01-01 | End: 2017-01-01 | Stop reason: HOSPADM

## 2017-01-01 RX ORDER — LANOLIN ALCOHOL/MO/W.PET/CERES
500 CREAM (GRAM) TOPICAL DAILY
COMMUNITY
End: 2017-01-01

## 2017-01-01 RX ORDER — DIPHENOXYLATE HYDROCHLORIDE AND ATROPINE SULFATE 2.5; .025 MG/1; MG/1
1 TABLET ORAL
Status: DISCONTINUED | OUTPATIENT
Start: 2017-01-01 | End: 2017-01-01 | Stop reason: HOSPADM

## 2017-01-01 RX ORDER — SODIUM CHLORIDE 9 MG/ML
25 INJECTION, SOLUTION INTRAVENOUS CONTINUOUS
Status: DISPENSED | OUTPATIENT
Start: 2017-01-01 | End: 2017-01-01

## 2017-01-01 RX ORDER — ISOSORBIDE MONONITRATE 30 MG/1
60 TABLET, EXTENDED RELEASE ORAL
Status: DISCONTINUED | OUTPATIENT
Start: 2017-01-01 | End: 2017-01-01

## 2017-01-01 RX ORDER — HEPARIN SODIUM 5000 [USP'U]/ML
5000 INJECTION, SOLUTION INTRAVENOUS; SUBCUTANEOUS EVERY 8 HOURS
Status: DISCONTINUED | OUTPATIENT
Start: 2017-01-01 | End: 2017-01-01 | Stop reason: HOSPADM

## 2017-01-01 RX ORDER — ATORVASTATIN CALCIUM 80 MG/1
80 TABLET, FILM COATED ORAL DAILY
Qty: 30 TAB | Refills: 0 | Status: SHIPPED | OUTPATIENT
Start: 2017-01-01 | End: 2017-01-01

## 2017-01-01 RX ORDER — LIDOCAINE AND PRILOCAINE 25; 25 MG/G; MG/G
CREAM TOPICAL AS NEEDED
Qty: 30 G | Refills: 0 | Status: SHIPPED | OUTPATIENT
Start: 2017-01-01 | End: 2017-01-01

## 2017-01-01 RX ORDER — DIPHENHYDRAMINE HCL 25 MG
25 CAPSULE ORAL ONCE
Status: COMPLETED | OUTPATIENT
Start: 2017-01-01 | End: 2017-01-01

## 2017-01-01 RX ORDER — LEVOFLOXACIN 500 MG/1
500 TABLET, FILM COATED ORAL
Status: COMPLETED | OUTPATIENT
Start: 2017-01-01 | End: 2017-01-01

## 2017-01-01 RX ORDER — HYDROMORPHONE HYDROCHLORIDE 1 MG/ML
1 INJECTION, SOLUTION INTRAMUSCULAR; INTRAVENOUS; SUBCUTANEOUS
Status: DISCONTINUED | OUTPATIENT
Start: 2017-01-01 | End: 2017-01-01 | Stop reason: HOSPADM

## 2017-01-01 RX ORDER — NITROGLYCERIN 0.4 MG/1
0.4 TABLET SUBLINGUAL
Status: DISCONTINUED | OUTPATIENT
Start: 2017-01-01 | End: 2017-01-01 | Stop reason: HOSPADM

## 2017-01-01 RX ORDER — MORPHINE SULFATE 15 MG/1
30 TABLET, FILM COATED, EXTENDED RELEASE ORAL EVERY 12 HOURS
Status: DISCONTINUED | OUTPATIENT
Start: 2017-01-01 | End: 2017-01-01

## 2017-01-01 RX ORDER — MECLIZINE HCL 12.5 MG 12.5 MG/1
12.5 TABLET ORAL
Status: DISCONTINUED | OUTPATIENT
Start: 2017-01-01 | End: 2017-01-01 | Stop reason: HOSPADM

## 2017-01-01 RX ORDER — METOCLOPRAMIDE HYDROCHLORIDE 5 MG/ML
10 INJECTION INTRAMUSCULAR; INTRAVENOUS EVERY 6 HOURS
Status: DISCONTINUED | OUTPATIENT
Start: 2017-01-01 | End: 2017-01-01

## 2017-01-01 RX ORDER — MECLIZINE HCL 12.5 MG 12.5 MG/1
25 TABLET ORAL
Status: COMPLETED | OUTPATIENT
Start: 2017-01-01 | End: 2017-01-01

## 2017-01-01 RX ORDER — ALBUTEROL SULFATE 0.83 MG/ML
2.5 SOLUTION RESPIRATORY (INHALATION)
Status: DISCONTINUED | OUTPATIENT
Start: 2017-01-01 | End: 2017-01-01

## 2017-01-01 RX ORDER — DEXTROSE MONOHYDRATE 100 MG/ML
INJECTION, SOLUTION INTRAVENOUS
Status: COMPLETED
Start: 2017-01-01 | End: 2017-01-01

## 2017-01-01 RX ORDER — INSULIN GLARGINE 100 [IU]/ML
10 INJECTION, SOLUTION SUBCUTANEOUS
Status: DISCONTINUED | OUTPATIENT
Start: 2017-01-01 | End: 2017-01-01 | Stop reason: HOSPADM

## 2017-01-01 RX ORDER — POLYETHYLENE GLYCOL 3350 17 G/17G
17 POWDER, FOR SOLUTION ORAL 2 TIMES DAILY
Qty: 225 G | Refills: 2 | Status: SHIPPED | OUTPATIENT
Start: 2017-01-01 | End: 2017-01-01

## 2017-01-01 RX ORDER — HYDROMORPHONE HYDROCHLORIDE 2 MG/ML
INJECTION, SOLUTION INTRAMUSCULAR; INTRAVENOUS; SUBCUTANEOUS AS NEEDED
Status: DISCONTINUED | OUTPATIENT
Start: 2017-01-01 | End: 2017-01-01 | Stop reason: HOSPADM

## 2017-01-01 RX ORDER — MORPHINE SULFATE 15 MG/1
7.5 TABLET ORAL
Status: DISCONTINUED | OUTPATIENT
Start: 2017-01-01 | End: 2017-01-01

## 2017-01-01 RX ORDER — METOCLOPRAMIDE 10 MG/1
5 TABLET ORAL
Status: DISCONTINUED | OUTPATIENT
Start: 2017-01-01 | End: 2017-01-01 | Stop reason: HOSPADM

## 2017-01-01 RX ORDER — INSULIN GLARGINE 100 [IU]/ML
10 INJECTION, SOLUTION SUBCUTANEOUS
Status: DISCONTINUED | OUTPATIENT
Start: 2017-01-01 | End: 2017-01-01

## 2017-01-01 RX ORDER — ALBUTEROL SULFATE 90 UG/1
2 AEROSOL, METERED RESPIRATORY (INHALATION)
Status: DISCONTINUED | OUTPATIENT
Start: 2017-01-01 | End: 2017-01-01

## 2017-01-01 RX ORDER — HYDRALAZINE HYDROCHLORIDE 25 MG/1
25 TABLET, FILM COATED ORAL 3 TIMES DAILY
COMMUNITY
End: 2017-01-01

## 2017-01-01 RX ORDER — INSULIN GLARGINE 100 [IU]/ML
8 INJECTION, SOLUTION SUBCUTANEOUS
Status: DISCONTINUED | OUTPATIENT
Start: 2017-01-01 | End: 2017-01-01

## 2017-01-01 RX ORDER — ASPIRIN 325 MG
325 TABLET ORAL DAILY
Status: DISCONTINUED | OUTPATIENT
Start: 2017-01-01 | End: 2017-01-01

## 2017-01-01 RX ORDER — MORPHINE SULFATE 15 MG/1
15 TABLET ORAL
Qty: 40 TAB | Refills: 0 | Status: SHIPPED | OUTPATIENT
Start: 2017-01-01 | End: 2017-01-01

## 2017-01-01 RX ORDER — FACIAL-BODY WIPES
10 EACH TOPICAL DAILY PRN
Status: DISCONTINUED | OUTPATIENT
Start: 2017-01-01 | End: 2017-01-01 | Stop reason: HOSPADM

## 2017-01-01 RX ORDER — MORPHINE SULFATE 15 MG/1
15 TABLET, FILM COATED, EXTENDED RELEASE ORAL EVERY 12 HOURS
Qty: 14 TAB | Refills: 0 | Status: ON HOLD | OUTPATIENT
Start: 2017-01-01 | End: 2017-01-01

## 2017-01-01 RX ORDER — ACETAMINOPHEN 325 MG/1
650 TABLET ORAL ONCE
Status: COMPLETED | OUTPATIENT
Start: 2017-01-01 | End: 2017-01-01

## 2017-01-01 RX ORDER — DICLOFENAC SODIUM 10 MG/G
2 GEL TOPICAL
Status: DISCONTINUED | OUTPATIENT
Start: 2017-01-01 | End: 2017-01-01

## 2017-01-01 RX ORDER — HYDROCODONE BITARTRATE AND ACETAMINOPHEN 5; 325 MG/1; MG/1
1 TABLET ORAL
COMMUNITY
End: 2017-01-01

## 2017-01-01 RX ORDER — HEPARIN 100 UNIT/ML
500 SYRINGE INTRAVENOUS AS NEEDED
Status: DISCONTINUED | OUTPATIENT
Start: 2017-01-01 | End: 2017-01-01 | Stop reason: HOSPADM

## 2017-01-01 RX ORDER — MORPHINE SULFATE 15 MG/1
15 TABLET ORAL
Qty: 120 TAB | Refills: 0 | Status: ON HOLD | OUTPATIENT
Start: 2017-01-01 | End: 2017-01-01

## 2017-01-01 RX ORDER — MEGESTROL ACETATE 40 MG/ML
400 SUSPENSION ORAL DAILY
Qty: 300 ML | Refills: 2 | Status: SHIPPED | OUTPATIENT
Start: 2017-01-01

## 2017-01-01 RX ORDER — ALBUTEROL SULFATE 90 UG/1
2 AEROSOL, METERED RESPIRATORY (INHALATION)
COMMUNITY
Start: 2017-01-01 | End: 2017-01-01

## 2017-01-01 RX ORDER — INSULIN LISPRO 100 [IU]/ML
INJECTION, SOLUTION INTRAVENOUS; SUBCUTANEOUS
Status: DISCONTINUED | OUTPATIENT
Start: 2017-01-01 | End: 2017-01-01 | Stop reason: SDUPTHER

## 2017-01-01 RX ORDER — INSULIN GLARGINE 100 [IU]/ML
INJECTION, SOLUTION SUBCUTANEOUS
Qty: 20 ML | Refills: 6 | Status: SHIPPED | OUTPATIENT
Start: 2017-01-01 | End: 2017-01-01

## 2017-01-01 RX ORDER — HYDRALAZINE HYDROCHLORIDE 20 MG/ML
10 INJECTION INTRAMUSCULAR; INTRAVENOUS
Status: DISCONTINUED | OUTPATIENT
Start: 2017-01-01 | End: 2017-01-01

## 2017-01-01 RX ORDER — SCOLOPAMINE TRANSDERMAL SYSTEM 1 MG/1
1.5 PATCH, EXTENDED RELEASE TRANSDERMAL
Status: DISCONTINUED | OUTPATIENT
Start: 2017-01-01 | End: 2017-01-01 | Stop reason: HOSPADM

## 2017-01-01 RX ORDER — ACETAMINOPHEN 500 MG
1000 TABLET ORAL
COMMUNITY
Start: 2017-01-01 | End: 2017-01-01

## 2017-01-01 RX ORDER — BENZONATATE 200 MG/1
200 CAPSULE ORAL
Qty: 20 CAP | Refills: 0 | Status: SHIPPED | OUTPATIENT
Start: 2017-01-01 | End: 2017-01-01

## 2017-01-01 RX ORDER — ASPIRIN 300 MG/1
300 SUPPOSITORY RECTAL DAILY
Status: DISCONTINUED | OUTPATIENT
Start: 2017-01-01 | End: 2017-01-01

## 2017-01-01 RX ORDER — OXYCODONE AND ACETAMINOPHEN 5; 325 MG/1; MG/1
1 TABLET ORAL
Qty: 20 TAB | Refills: 0 | Status: SHIPPED | OUTPATIENT
Start: 2017-01-01 | End: 2017-01-01 | Stop reason: ALTCHOICE

## 2017-01-01 RX ORDER — HYDROCODONE BITARTRATE AND ACETAMINOPHEN 5; 325 MG/1; MG/1
1 TABLET ORAL
Qty: 12 TAB | Refills: 0 | Status: SHIPPED | OUTPATIENT
Start: 2017-01-01 | End: 2017-01-01 | Stop reason: ALTCHOICE

## 2017-01-01 RX ORDER — ASPIRIN 325 MG
325 TABLET ORAL DAILY
Qty: 30 TAB | Refills: 0 | Status: SHIPPED | OUTPATIENT
Start: 2017-01-01 | End: 2017-01-01

## 2017-01-01 RX ORDER — CEFAZOLIN SODIUM IN 0.9 % NACL 2 G/100 ML
2 PLASTIC BAG, INJECTION (ML) INTRAVENOUS ONCE
Status: COMPLETED | OUTPATIENT
Start: 2017-01-01 | End: 2017-01-01

## 2017-01-01 RX ORDER — NEOSTIGMINE METHYLSULFATE 1 MG/ML
INJECTION INTRAVENOUS AS NEEDED
Status: DISCONTINUED | OUTPATIENT
Start: 2017-01-01 | End: 2017-01-01 | Stop reason: HOSPADM

## 2017-01-01 RX ORDER — LIDOCAINE HYDROCHLORIDE AND EPINEPHRINE 10; 10 MG/ML; UG/ML
10 INJECTION, SOLUTION INFILTRATION; PERINEURAL ONCE
Status: COMPLETED | OUTPATIENT
Start: 2017-01-01 | End: 2017-01-01

## 2017-01-01 RX ORDER — MORPHINE SULFATE 15 MG/1
30 TABLET ORAL
Status: DISCONTINUED | OUTPATIENT
Start: 2017-01-01 | End: 2017-01-01 | Stop reason: HOSPADM

## 2017-01-01 RX ORDER — LIDOCAINE HYDROCHLORIDE 10 MG/ML
0.1 INJECTION, SOLUTION EPIDURAL; INFILTRATION; INTRACAUDAL; PERINEURAL AS NEEDED
Status: DISCONTINUED | OUTPATIENT
Start: 2017-01-01 | End: 2017-01-01 | Stop reason: HOSPADM

## 2017-01-01 RX ORDER — INSULIN LISPRO 100 [IU]/ML
INJECTION, SOLUTION INTRAVENOUS; SUBCUTANEOUS EVERY 6 HOURS
Status: DISCONTINUED | OUTPATIENT
Start: 2017-01-01 | End: 2017-01-01

## 2017-01-01 RX ORDER — HYDROCODONE BITARTRATE AND ACETAMINOPHEN 5; 325 MG/1; MG/1
1-2 TABLET ORAL
Qty: 30 TAB | Refills: 0 | Status: SHIPPED | OUTPATIENT
Start: 2017-01-01 | End: 2017-01-01 | Stop reason: CLARIF

## 2017-01-01 RX ADMIN — FAMOTIDINE 20 MG: 10 INJECTION, SOLUTION INTRAVENOUS at 10:42

## 2017-01-01 RX ADMIN — SODIUM CHLORIDE 125 ML/HR: 900 INJECTION, SOLUTION INTRAVENOUS at 16:21

## 2017-01-01 RX ADMIN — SODIUM CHLORIDE 10 MG: 9 INJECTION INTRAMUSCULAR; INTRAVENOUS; SUBCUTANEOUS at 04:54

## 2017-01-01 RX ADMIN — Medication 10 ML: at 15:49

## 2017-01-01 RX ADMIN — BISACODYL 10 MG: 10 SUPPOSITORY RECTAL at 22:35

## 2017-01-01 RX ADMIN — HYDROMORPHONE HYDROCHLORIDE 0.5 MG: 2 INJECTION, SOLUTION INTRAMUSCULAR; INTRAVENOUS; SUBCUTANEOUS at 09:52

## 2017-01-01 RX ADMIN — WATER: 1000 INJECTION, SOLUTION INTRAVENOUS at 13:42

## 2017-01-01 RX ADMIN — SODIUM BICARBONATE: 84 INJECTION, SOLUTION INTRAVENOUS at 05:00

## 2017-01-01 RX ADMIN — Medication 5 ML: at 18:39

## 2017-01-01 RX ADMIN — ENOXAPARIN SODIUM 40 MG: 40 INJECTION SUBCUTANEOUS at 10:39

## 2017-01-01 RX ADMIN — INSULIN LISPRO 2 UNITS: 100 INJECTION, SOLUTION INTRAVENOUS; SUBCUTANEOUS at 12:30

## 2017-01-01 RX ADMIN — HYDROMORPHONE HYDROCHLORIDE 0.5 MG: 2 INJECTION, SOLUTION INTRAMUSCULAR; INTRAVENOUS; SUBCUTANEOUS at 02:50

## 2017-01-01 RX ADMIN — SODIUM CHLORIDE 1000 ML: 900 INJECTION, SOLUTION INTRAVENOUS at 15:23

## 2017-01-01 RX ADMIN — MORPHINE SULFATE 7.5 MG: 15 TABLET ORAL at 20:04

## 2017-01-01 RX ADMIN — LIDOCAINE HYDROCHLORIDE 400 MG: 20 INJECTION, SOLUTION INFILTRATION; PERINEURAL at 10:22

## 2017-01-01 RX ADMIN — ATORVASTATIN CALCIUM 80 MG: 40 TABLET, FILM COATED ORAL at 08:30

## 2017-01-01 RX ADMIN — ENOXAPARIN SODIUM 40 MG: 40 INJECTION SUBCUTANEOUS at 08:31

## 2017-01-01 RX ADMIN — ENOXAPARIN SODIUM 30 MG: 30 INJECTION SUBCUTANEOUS at 09:40

## 2017-01-01 RX ADMIN — Medication 10 ML: at 14:29

## 2017-01-01 RX ADMIN — LORAZEPAM 0.5 MG: 2 SOLUTION, CONCENTRATE ORAL at 00:02

## 2017-01-01 RX ADMIN — SODIUM CHLORIDE 1000 ML: 900 INJECTION, SOLUTION INTRAVENOUS at 15:38

## 2017-01-01 RX ADMIN — Medication 80 MCG: at 21:52

## 2017-01-01 RX ADMIN — MORPHINE SULFATE 10 MG: 20 SOLUTION ORAL at 18:27

## 2017-01-01 RX ADMIN — ACETAMINOPHEN 1000 MG: 10 INJECTION, SOLUTION INTRAVENOUS at 09:27

## 2017-01-01 RX ADMIN — MEGESTROL ACETATE 400 MG: 40 SUSPENSION ORAL at 09:29

## 2017-01-01 RX ADMIN — Medication 20 ML: at 14:40

## 2017-01-01 RX ADMIN — SODIUM BICARBONATE: 84 INJECTION, SOLUTION INTRAVENOUS at 14:08

## 2017-01-01 RX ADMIN — MORPHINE SULFATE 2 MG: 2 INJECTION, SOLUTION INTRAMUSCULAR; INTRAVENOUS at 15:07

## 2017-01-01 RX ADMIN — MORPHINE SULFATE 7.5 MG: 15 TABLET ORAL at 18:39

## 2017-01-01 RX ADMIN — Medication 200 MCG: at 20:58

## 2017-01-01 RX ADMIN — ASPIRIN 81 MG: 81 TABLET, COATED ORAL at 09:16

## 2017-01-01 RX ADMIN — HEPARIN SODIUM 5000 UNITS: 5000 INJECTION, SOLUTION INTRAVENOUS; SUBCUTANEOUS at 06:49

## 2017-01-01 RX ADMIN — METOCLOPRAMIDE 5 MG: 10 TABLET ORAL at 10:37

## 2017-01-01 RX ADMIN — ONDANSETRON 8 MG: 2 INJECTION INTRAMUSCULAR; INTRAVENOUS at 13:24

## 2017-01-01 RX ADMIN — DEXTROSE MONOHYDRATE 125 ML: 10 INJECTION, SOLUTION INTRAVENOUS at 09:08

## 2017-01-01 RX ADMIN — MEGESTROL ACETATE 400 MG: 40 SUSPENSION ORAL at 09:17

## 2017-01-01 RX ADMIN — AMLODIPINE BESYLATE 10 MG: 5 TABLET ORAL at 08:30

## 2017-01-01 RX ADMIN — MORPHINE SULFATE 10 MG: 20 SOLUTION ORAL at 22:35

## 2017-01-01 RX ADMIN — ENOXAPARIN SODIUM 30 MG: 30 INJECTION SUBCUTANEOUS at 08:56

## 2017-01-01 RX ADMIN — METOCLOPRAMIDE 5 MG: 10 TABLET ORAL at 11:50

## 2017-01-01 RX ADMIN — GLYCOPYRROLATE 0.5 MG: 0.2 INJECTION INTRAMUSCULAR; INTRAVENOUS at 22:26

## 2017-01-01 RX ADMIN — MORPHINE SULFATE 15 MG: 20 SOLUTION ORAL at 04:18

## 2017-01-01 RX ADMIN — Medication 10 ML: at 09:20

## 2017-01-01 RX ADMIN — DIPHENHYDRAMINE HYDROCHLORIDE 25 MG: 25 CAPSULE ORAL at 08:34

## 2017-01-01 RX ADMIN — HYDROMORPHONE HYDROCHLORIDE 0.2 MG: 1 INJECTION, SOLUTION INTRAMUSCULAR; INTRAVENOUS; SUBCUTANEOUS at 23:15

## 2017-01-01 RX ADMIN — HYDROMORPHONE HYDROCHLORIDE 0.2 MG: 1 INJECTION, SOLUTION INTRAMUSCULAR; INTRAVENOUS; SUBCUTANEOUS at 23:30

## 2017-01-01 RX ADMIN — AMLODIPINE BESYLATE 10 MG: 5 TABLET ORAL at 09:39

## 2017-01-01 RX ADMIN — SODIUM CHLORIDE 250 ML: 900 INJECTION, SOLUTION INTRAVENOUS at 08:40

## 2017-01-01 RX ADMIN — MORPHINE SULFATE 10 MG: 20 SOLUTION ORAL at 14:28

## 2017-01-01 RX ADMIN — PACLITAXEL 200 MG: 100 INJECTION, POWDER, LYOPHILIZED, FOR SUSPENSION INTRAVENOUS at 13:10

## 2017-01-01 RX ADMIN — ENOXAPARIN SODIUM 30 MG: 30 INJECTION SUBCUTANEOUS at 09:57

## 2017-01-01 RX ADMIN — SODIUM PHOSPHATE, DIBASIC AND SODIUM PHOSPHATE, MONOBASIC 118 ML: 7; 19 ENEMA RECTAL at 15:12

## 2017-01-01 RX ADMIN — Medication 10 ML: at 14:03

## 2017-01-01 RX ADMIN — PROPOFOL 50 MG: 10 INJECTION, EMULSION INTRAVENOUS at 20:30

## 2017-01-01 RX ADMIN — HYDROMORPHONE HYDROCHLORIDE 0.5 MG: 2 INJECTION, SOLUTION INTRAMUSCULAR; INTRAVENOUS; SUBCUTANEOUS at 00:08

## 2017-01-01 RX ADMIN — PANTOPRAZOLE SODIUM 40 MG: 40 TABLET, DELAYED RELEASE ORAL at 11:30

## 2017-01-01 RX ADMIN — LOPERAMIDE HYDROCHLORIDE 4 MG: 2 CAPSULE ORAL at 20:35

## 2017-01-01 RX ADMIN — METOCLOPRAMIDE 10 MG: 5 INJECTION, SOLUTION INTRAMUSCULAR; INTRAVENOUS at 06:01

## 2017-01-01 RX ADMIN — GLUCAGON HYDROCHLORIDE 1 MG: 1 INJECTION, POWDER, FOR SOLUTION INTRAMUSCULAR; INTRAVENOUS; SUBCUTANEOUS at 18:05

## 2017-01-01 RX ADMIN — ENOXAPARIN SODIUM 30 MG: 30 INJECTION SUBCUTANEOUS at 09:17

## 2017-01-01 RX ADMIN — MORPHINE SULFATE 10 MG: 20 SOLUTION ORAL at 09:11

## 2017-01-01 RX ADMIN — Medication 6 MG: at 15:31

## 2017-01-01 RX ADMIN — Medication 500 UNITS: at 13:31

## 2017-01-01 RX ADMIN — ENOXAPARIN SODIUM 30 MG: 30 INJECTION SUBCUTANEOUS at 09:29

## 2017-01-01 RX ADMIN — MORPHINE SULFATE 10 MG: 20 SOLUTION ORAL at 18:05

## 2017-01-01 RX ADMIN — METOCLOPRAMIDE 5 MG: 10 TABLET ORAL at 17:05

## 2017-01-01 RX ADMIN — Medication 10 ML: at 06:49

## 2017-01-01 RX ADMIN — HYDROMORPHONE HYDROCHLORIDE 0.5 MG: 1 INJECTION, SOLUTION INTRAMUSCULAR; INTRAVENOUS; SUBCUTANEOUS at 17:17

## 2017-01-01 RX ADMIN — INSULIN GLARGINE 10 UNITS: 100 INJECTION, SOLUTION SUBCUTANEOUS at 08:28

## 2017-01-01 RX ADMIN — Medication 10 ML: at 08:40

## 2017-01-01 RX ADMIN — SODIUM CHLORIDE 10 MG: 9 INJECTION INTRAMUSCULAR; INTRAVENOUS; SUBCUTANEOUS at 05:54

## 2017-01-01 RX ADMIN — FAMOTIDINE 20 MG: 20 TABLET ORAL at 09:29

## 2017-01-01 RX ADMIN — HYDROMORPHONE HYDROCHLORIDE 0.5 MG: 2 INJECTION, SOLUTION INTRAMUSCULAR; INTRAVENOUS; SUBCUTANEOUS at 15:05

## 2017-01-01 RX ADMIN — ISOSORBIDE MONONITRATE 60 MG: 30 TABLET, EXTENDED RELEASE ORAL at 11:40

## 2017-01-01 RX ADMIN — Medication 10 ML: at 13:30

## 2017-01-01 RX ADMIN — METOCLOPRAMIDE HYDROCHLORIDE 10 MG: 10 TABLET ORAL at 21:26

## 2017-01-01 RX ADMIN — Medication 5 ML: at 06:46

## 2017-01-01 RX ADMIN — MORPHINE SULFATE 15 MG: 20 SOLUTION ORAL at 04:12

## 2017-01-01 RX ADMIN — ISOSORBIDE MONONITRATE 60 MG: 30 TABLET, EXTENDED RELEASE ORAL at 09:39

## 2017-01-01 RX ADMIN — NEOSTIGMINE METHYLSULFATE 3 MG: 1 INJECTION INTRAVENOUS at 22:26

## 2017-01-01 RX ADMIN — LORAZEPAM 0.5 MG: 2 SOLUTION, CONCENTRATE ORAL at 05:23

## 2017-01-01 RX ADMIN — FENTANYL CITRATE 50 MCG: 50 INJECTION, SOLUTION INTRAMUSCULAR; INTRAVENOUS at 12:02

## 2017-01-01 RX ADMIN — AMLODIPINE BESYLATE 10 MG: 5 TABLET ORAL at 09:15

## 2017-01-01 RX ADMIN — HYDROMORPHONE HYDROCHLORIDE 0.5 MG: 2 INJECTION, SOLUTION INTRAMUSCULAR; INTRAVENOUS; SUBCUTANEOUS at 21:04

## 2017-01-01 RX ADMIN — FAMOTIDINE 20 MG: 20 TABLET ORAL at 09:15

## 2017-01-01 RX ADMIN — MORPHINE SULFATE 15 MG: 15 TABLET, FILM COATED, EXTENDED RELEASE ORAL at 21:00

## 2017-01-01 RX ADMIN — ASPIRIN 81 MG: 81 TABLET, COATED ORAL at 09:51

## 2017-01-01 RX ADMIN — METOCLOPRAMIDE 5 MG: 5 INJECTION, SOLUTION INTRAMUSCULAR; INTRAVENOUS at 17:13

## 2017-01-01 RX ADMIN — LORAZEPAM 0.5 MG: 2 SOLUTION, CONCENTRATE ORAL at 07:54

## 2017-01-01 RX ADMIN — HYDROMORPHONE HYDROCHLORIDE 0.5 MG: 2 INJECTION, SOLUTION INTRAMUSCULAR; INTRAVENOUS; SUBCUTANEOUS at 02:14

## 2017-01-01 RX ADMIN — SODIUM CHLORIDE 100 ML: 900 INJECTION, SOLUTION INTRAVENOUS at 19:45

## 2017-01-01 RX ADMIN — Medication 5 ML: at 05:45

## 2017-01-01 RX ADMIN — MORPHINE SULFATE 15 MG: 15 TABLET, FILM COATED, EXTENDED RELEASE ORAL at 20:21

## 2017-01-01 RX ADMIN — MORPHINE SULFATE 20 MG: 20 SOLUTION ORAL at 15:29

## 2017-01-01 RX ADMIN — ISOSORBIDE MONONITRATE 60 MG: 30 TABLET, EXTENDED RELEASE ORAL at 10:45

## 2017-01-01 RX ADMIN — FAMOTIDINE 20 MG: 10 INJECTION, SOLUTION INTRAVENOUS at 09:06

## 2017-01-01 RX ADMIN — ONDANSETRON 4 MG: 2 INJECTION INTRAMUSCULAR; INTRAVENOUS at 10:00

## 2017-01-01 RX ADMIN — FAMOTIDINE 20 MG: 10 INJECTION, SOLUTION INTRAVENOUS at 08:58

## 2017-01-01 RX ADMIN — SODIUM CHLORIDE 10 MG: 9 INJECTION INTRAMUSCULAR; INTRAVENOUS; SUBCUTANEOUS at 22:00

## 2017-01-01 RX ADMIN — ACETAMINOPHEN 650 MG: 325 TABLET, FILM COATED ORAL at 19:03

## 2017-01-01 RX ADMIN — MORPHINE SULFATE 10 MG: 20 SOLUTION ORAL at 06:04

## 2017-01-01 RX ADMIN — ACETAMINOPHEN 1000 MG: 10 INJECTION, SOLUTION INTRAVENOUS at 16:58

## 2017-01-01 RX ADMIN — ONDANSETRON 4 MG: 2 INJECTION INTRAMUSCULAR; INTRAVENOUS at 22:22

## 2017-01-01 RX ADMIN — INSULIN LISPRO 2 UNITS: 100 INJECTION, SOLUTION INTRAVENOUS; SUBCUTANEOUS at 17:03

## 2017-01-01 RX ADMIN — LORAZEPAM 0.5 MG: 2 SOLUTION, CONCENTRATE ORAL at 06:04

## 2017-01-01 RX ADMIN — ONDANSETRON 4 MG: 2 INJECTION INTRAMUSCULAR; INTRAVENOUS at 14:08

## 2017-01-01 RX ADMIN — Medication 1 CAPSULE: at 17:14

## 2017-01-01 RX ADMIN — AMLODIPINE BESYLATE 10 MG: 5 TABLET ORAL at 09:49

## 2017-01-01 RX ADMIN — MORPHINE SULFATE 10 MG: 20 SOLUTION ORAL at 00:31

## 2017-01-01 RX ADMIN — SODIUM CHLORIDE, PRESERVATIVE FREE 300 UNITS: 5 INJECTION INTRAVENOUS at 10:22

## 2017-01-01 RX ADMIN — MORPHINE SULFATE 15 MG: 20 SOLUTION ORAL at 20:28

## 2017-01-01 RX ADMIN — PROCHLORPERAZINE MALEATE 10 MG: 5 TABLET, FILM COATED ORAL at 11:31

## 2017-01-01 RX ADMIN — GLIPIZIDE 2.5 MG: 5 TABLET ORAL at 11:30

## 2017-01-01 RX ADMIN — HYDROMORPHONE HYDROCHLORIDE 0.5 MG: 2 INJECTION, SOLUTION INTRAMUSCULAR; INTRAVENOUS; SUBCUTANEOUS at 22:13

## 2017-01-01 RX ADMIN — ONDANSETRON 4 MG: 2 INJECTION INTRAMUSCULAR; INTRAVENOUS at 03:14

## 2017-01-01 RX ADMIN — SODIUM CHLORIDE 50 ML/HR: 900 INJECTION, SOLUTION INTRAVENOUS at 23:10

## 2017-01-01 RX ADMIN — PANTOPRAZOLE SODIUM 40 MG: 40 TABLET, DELAYED RELEASE ORAL at 10:24

## 2017-01-01 RX ADMIN — METOCLOPRAMIDE 5 MG: 5 INJECTION, SOLUTION INTRAMUSCULAR; INTRAVENOUS at 12:56

## 2017-01-01 RX ADMIN — ASPIRIN 300 MG: 300 SUPPOSITORY RECTAL at 08:52

## 2017-01-01 RX ADMIN — CEFOXITIN 2 G: 2 INJECTION, POWDER, FOR SOLUTION INTRAVENOUS at 02:02

## 2017-01-01 RX ADMIN — ONDANSETRON 4 MG: 2 INJECTION INTRAMUSCULAR; INTRAVENOUS at 19:45

## 2017-01-01 RX ADMIN — HYDROMORPHONE HYDROCHLORIDE 0.5 MG: 2 INJECTION, SOLUTION INTRAMUSCULAR; INTRAVENOUS; SUBCUTANEOUS at 21:29

## 2017-01-01 RX ADMIN — METOCLOPRAMIDE 5 MG: 5 INJECTION, SOLUTION INTRAMUSCULAR; INTRAVENOUS at 00:08

## 2017-01-01 RX ADMIN — DICYCLOMINE HYDROCHLORIDE 20 MG: 20 TABLET ORAL at 15:41

## 2017-01-01 RX ADMIN — ONDANSETRON 4 MG: 2 INJECTION INTRAMUSCULAR; INTRAVENOUS at 22:32

## 2017-01-01 RX ADMIN — BENAZEPRIL HYDROCHLORIDE 40 MG: 10 TABLET, FILM COATED ORAL at 11:40

## 2017-01-01 RX ADMIN — ASPIRIN 325 MG ORAL TABLET 325 MG: 325 PILL ORAL at 11:30

## 2017-01-01 RX ADMIN — OMEPRAZOLE 20 MG: 20 CAPSULE, DELAYED RELEASE ORAL at 07:58

## 2017-01-01 RX ADMIN — WATER: 1000 INJECTION, SOLUTION INTRAVENOUS at 16:38

## 2017-01-01 RX ADMIN — MORPHINE SULFATE 10 MG: 20 SOLUTION ORAL at 05:23

## 2017-01-01 RX ADMIN — Medication 5 ML: at 21:18

## 2017-01-01 RX ADMIN — HYDROMORPHONE HYDROCHLORIDE 0.5 MG: 2 INJECTION, SOLUTION INTRAMUSCULAR; INTRAVENOUS; SUBCUTANEOUS at 04:00

## 2017-01-01 RX ADMIN — ONDANSETRON 4 MG: 2 INJECTION INTRAMUSCULAR; INTRAVENOUS at 13:17

## 2017-01-01 RX ADMIN — SODIUM CHLORIDE 1000 ML: 900 INJECTION, SOLUTION INTRAVENOUS at 15:10

## 2017-01-01 RX ADMIN — ONDANSETRON 4 MG: 2 INJECTION INTRAMUSCULAR; INTRAVENOUS at 20:22

## 2017-01-01 RX ADMIN — SODIUM CHLORIDE 50 ML/HR: 900 INJECTION, SOLUTION INTRAVENOUS at 23:31

## 2017-01-01 RX ADMIN — ENOXAPARIN SODIUM 30 MG: 30 INJECTION SUBCUTANEOUS at 11:29

## 2017-01-01 RX ADMIN — Medication 10 ML: at 14:21

## 2017-01-01 RX ADMIN — METOCLOPRAMIDE HYDROCHLORIDE 10 MG: 10 TABLET ORAL at 15:44

## 2017-01-01 RX ADMIN — BARIUM SULFATE 900 ML: 20 SUSPENSION ORAL at 10:10

## 2017-01-01 RX ADMIN — ROCURONIUM BROMIDE 10 MG: 10 INJECTION, SOLUTION INTRAVENOUS at 20:59

## 2017-01-01 RX ADMIN — POTASSIUM PHOSPHATE, MONOBASIC AND POTASSIUM PHOSPHATE, DIBASIC: 224; 236 INJECTION, SOLUTION INTRAVENOUS at 16:30

## 2017-01-01 RX ADMIN — Medication 40 MCG: at 20:53

## 2017-01-01 RX ADMIN — METOCLOPRAMIDE 10 MG: 5 INJECTION, SOLUTION INTRAMUSCULAR; INTRAVENOUS at 02:14

## 2017-01-01 RX ADMIN — FAMOTIDINE 20 MG: 10 INJECTION, SOLUTION INTRAVENOUS at 09:40

## 2017-01-01 RX ADMIN — HYDROMORPHONE HYDROCHLORIDE 0.5 MG: 2 INJECTION, SOLUTION INTRAMUSCULAR; INTRAVENOUS; SUBCUTANEOUS at 10:03

## 2017-01-01 RX ADMIN — HEPARIN SODIUM 5000 UNITS: 5000 INJECTION, SOLUTION INTRAVENOUS; SUBCUTANEOUS at 14:46

## 2017-01-01 RX ADMIN — ASPIRIN 81 MG CHEWABLE TABLET 81 MG: 81 TABLET CHEWABLE at 10:22

## 2017-01-01 RX ADMIN — HEPARIN SODIUM 5000 UNITS: 5000 INJECTION, SOLUTION INTRAVENOUS; SUBCUTANEOUS at 21:34

## 2017-01-01 RX ADMIN — LORAZEPAM 0.5 MG: 2 SOLUTION, CONCENTRATE ORAL at 18:32

## 2017-01-01 RX ADMIN — MORPHINE SULFATE 10 MG: 20 SOLUTION ORAL at 22:50

## 2017-01-01 RX ADMIN — HYDROMORPHONE HYDROCHLORIDE 0.5 MG: 2 INJECTION, SOLUTION INTRAMUSCULAR; INTRAVENOUS; SUBCUTANEOUS at 11:57

## 2017-01-01 RX ADMIN — AMLODIPINE BESYLATE 10 MG: 5 TABLET ORAL at 08:56

## 2017-01-01 RX ADMIN — LEVOFLOXACIN 750 MG: 5 INJECTION, SOLUTION INTRAVENOUS at 01:45

## 2017-01-01 RX ADMIN — ONDANSETRON HYDROCHLORIDE 4 MG: 2 INJECTION, SOLUTION INTRAMUSCULAR; INTRAVENOUS at 20:28

## 2017-01-01 RX ADMIN — Medication 30 ML: at 04:12

## 2017-01-01 RX ADMIN — ISOSORBIDE MONONITRATE 60 MG: 30 TABLET, EXTENDED RELEASE ORAL at 09:16

## 2017-01-01 RX ADMIN — MEGESTROL ACETATE 400 MG: 40 SUSPENSION ORAL at 08:49

## 2017-01-01 RX ADMIN — SODIUM CHLORIDE 125 ML/HR: 900 INJECTION, SOLUTION INTRAVENOUS at 10:46

## 2017-01-01 RX ADMIN — METOCLOPRAMIDE 5 MG: 10 TABLET ORAL at 05:12

## 2017-01-01 RX ADMIN — INSULIN LISPRO 2 UNITS: 100 INJECTION, SOLUTION INTRAVENOUS; SUBCUTANEOUS at 12:47

## 2017-01-01 RX ADMIN — ONDANSETRON 4 MG: 2 INJECTION INTRAMUSCULAR; INTRAVENOUS at 01:30

## 2017-01-01 RX ADMIN — PANTOPRAZOLE SODIUM 40 MG: 40 TABLET, DELAYED RELEASE ORAL at 17:05

## 2017-01-01 RX ADMIN — FAMOTIDINE 20 MG: 10 INJECTION, SOLUTION INTRAVENOUS at 08:52

## 2017-01-01 RX ADMIN — CEFOXITIN 2 G: 2 INJECTION, POWDER, FOR SOLUTION INTRAVENOUS at 12:12

## 2017-01-01 RX ADMIN — Medication 10 ML: at 11:30

## 2017-01-01 RX ADMIN — MORPHINE SULFATE 4 MG: 2 INJECTION, SOLUTION INTRAMUSCULAR; INTRAVENOUS at 09:23

## 2017-01-01 RX ADMIN — ENOXAPARIN SODIUM 30 MG: 30 INJECTION SUBCUTANEOUS at 10:42

## 2017-01-01 RX ADMIN — ONDANSETRON HYDROCHLORIDE 4 MG: 2 INJECTION, SOLUTION INTRAMUSCULAR; INTRAVENOUS at 15:31

## 2017-01-01 RX ADMIN — INSULIN LISPRO 2 UNITS: 100 INJECTION, SOLUTION INTRAVENOUS; SUBCUTANEOUS at 13:43

## 2017-01-01 RX ADMIN — ONDANSETRON 4 MG: 2 INJECTION INTRAMUSCULAR; INTRAVENOUS at 10:06

## 2017-01-01 RX ADMIN — MORPHINE SULFATE 15 MG: 20 SOLUTION ORAL at 15:39

## 2017-01-01 RX ADMIN — AMLODIPINE BESYLATE 10 MG: 5 TABLET ORAL at 10:45

## 2017-01-01 RX ADMIN — Medication 5 ML: at 15:02

## 2017-01-01 RX ADMIN — POTASSIUM & SODIUM PHOSPHATES POWDER PACK 280-160-250 MG 2 PACKET: 280-160-250 PACK at 10:42

## 2017-01-01 RX ADMIN — METOCLOPRAMIDE HYDROCHLORIDE 10 MG: 10 TABLET ORAL at 05:45

## 2017-01-01 RX ADMIN — Medication 10 ML: at 11:59

## 2017-01-01 RX ADMIN — ONDANSETRON 4 MG: 2 INJECTION INTRAMUSCULAR; INTRAVENOUS at 08:55

## 2017-01-01 RX ADMIN — MECLIZINE 25 MG: 12.5 TABLET ORAL at 19:02

## 2017-01-01 RX ADMIN — AMLODIPINE BESYLATE 10 MG: 5 TABLET ORAL at 08:49

## 2017-01-01 RX ADMIN — FENTANYL CITRATE 25 MCG: 50 INJECTION, SOLUTION INTRAMUSCULAR; INTRAVENOUS at 10:30

## 2017-01-01 RX ADMIN — HYDROMORPHONE HYDROCHLORIDE 0.5 MG: 2 INJECTION, SOLUTION INTRAMUSCULAR; INTRAVENOUS; SUBCUTANEOUS at 17:11

## 2017-01-01 RX ADMIN — ASPIRIN 325 MG: 325 TABLET ORAL at 08:49

## 2017-01-01 RX ADMIN — Medication 500 UNITS: at 15:49

## 2017-01-01 RX ADMIN — MORPHINE SULFATE 15 MG: 20 SOLUTION ORAL at 07:36

## 2017-01-01 RX ADMIN — ONDANSETRON 4 MG: 2 INJECTION INTRAMUSCULAR; INTRAVENOUS at 09:40

## 2017-01-01 RX ADMIN — LORAZEPAM 0.5 MG: 2 SOLUTION, CONCENTRATE ORAL at 13:10

## 2017-01-01 RX ADMIN — MORPHINE SULFATE 20 MG: 20 SOLUTION ORAL at 11:20

## 2017-01-01 RX ADMIN — MORPHINE SULFATE 4 MG: 2 INJECTION, SOLUTION INTRAMUSCULAR; INTRAVENOUS at 21:39

## 2017-01-01 RX ADMIN — Medication: at 07:46

## 2017-01-01 RX ADMIN — MORPHINE SULFATE 15 MG: 20 SOLUTION ORAL at 07:54

## 2017-01-01 RX ADMIN — MORPHINE SULFATE 4 MG: 2 INJECTION, SOLUTION INTRAMUSCULAR; INTRAVENOUS at 00:40

## 2017-01-01 RX ADMIN — ENOXAPARIN SODIUM 30 MG: 30 INJECTION SUBCUTANEOUS at 09:39

## 2017-01-01 RX ADMIN — SODIUM CHLORIDE 10 MG: 9 INJECTION INTRAMUSCULAR; INTRAVENOUS; SUBCUTANEOUS at 12:03

## 2017-01-01 RX ADMIN — Medication 5 ML: at 13:43

## 2017-01-01 RX ADMIN — SODIUM CHLORIDE 100 ML/HR: 900 INJECTION, SOLUTION INTRAVENOUS at 19:19

## 2017-01-01 RX ADMIN — MEGESTROL ACETATE 400 MG: 40 SUSPENSION ORAL at 11:12

## 2017-01-01 RX ADMIN — Medication 20 ML: at 11:30

## 2017-01-01 RX ADMIN — SODIUM CHLORIDE 10 ML: 9 INJECTION INTRAMUSCULAR; INTRAVENOUS; SUBCUTANEOUS at 08:52

## 2017-01-01 RX ADMIN — DIATRIZOATE MEGLUMINE AND DIATRIZOATE SODIUM 30 ML: 600; 100 SOLUTION ORAL; RECTAL at 17:17

## 2017-01-01 RX ADMIN — MORPHINE SULFATE 10 MG: 20 SOLUTION ORAL at 18:32

## 2017-01-01 RX ADMIN — LORAZEPAM 0.5 MG: 2 SOLUTION, CONCENTRATE ORAL at 23:37

## 2017-01-01 RX ADMIN — DEXAMETHASONE SODIUM PHOSPHATE 4 MG: 4 INJECTION, SOLUTION INTRAMUSCULAR; INTRAVENOUS at 12:36

## 2017-01-01 RX ADMIN — FAMOTIDINE 20 MG: 10 INJECTION, SOLUTION INTRAVENOUS at 09:51

## 2017-01-01 RX ADMIN — POLYETHYLENE GLYCOL 3350 17 G: 17 POWDER, FOR SOLUTION ORAL at 08:31

## 2017-01-01 RX ADMIN — MEGESTROL ACETATE 400 MG: 40 SUSPENSION ORAL at 09:30

## 2017-01-01 RX ADMIN — SODIUM CHLORIDE 50 ML/HR: 900 INJECTION, SOLUTION INTRAVENOUS at 14:03

## 2017-01-01 RX ADMIN — MORPHINE SULFATE 15 MG: 20 SOLUTION ORAL at 16:04

## 2017-01-01 RX ADMIN — MORPHINE SULFATE 15 MG: 15 TABLET, FILM COATED, EXTENDED RELEASE ORAL at 08:49

## 2017-01-01 RX ADMIN — Medication 5 ML: at 06:55

## 2017-01-01 RX ADMIN — LORAZEPAM 0.5 MG: 2 SOLUTION, CONCENTRATE ORAL at 00:05

## 2017-01-01 RX ADMIN — Medication: at 10:59

## 2017-01-01 RX ADMIN — Medication: at 17:00

## 2017-01-01 RX ADMIN — IOPAMIDOL 100 ML: 755 INJECTION, SOLUTION INTRAVENOUS at 14:03

## 2017-01-01 RX ADMIN — ONDANSETRON 4 MG: 2 INJECTION INTRAMUSCULAR; INTRAVENOUS at 01:00

## 2017-01-01 RX ADMIN — ROCURONIUM BROMIDE 10 MG: 10 INJECTION, SOLUTION INTRAVENOUS at 21:11

## 2017-01-01 RX ADMIN — FAMOTIDINE 20 MG: 10 INJECTION, SOLUTION INTRAVENOUS at 09:25

## 2017-01-01 RX ADMIN — SODIUM CHLORIDE 25 ML/HR: 900 INJECTION, SOLUTION INTRAVENOUS at 10:30

## 2017-01-01 RX ADMIN — DEXTROSE MONOHYDRATE 250 ML: 10 INJECTION, SOLUTION INTRAVENOUS at 23:07

## 2017-01-01 RX ADMIN — MORPHINE SULFATE 7.5 MG: 15 TABLET ORAL at 18:47

## 2017-01-01 RX ADMIN — Medication 2 G: at 20:57

## 2017-01-01 RX ADMIN — MORPHINE SULFATE 10 MG: 20 SOLUTION ORAL at 21:39

## 2017-01-01 RX ADMIN — ISOSORBIDE MONONITRATE 60 MG: 30 TABLET, EXTENDED RELEASE ORAL at 11:30

## 2017-01-01 RX ADMIN — LORAZEPAM 0.5 MG: 2 SOLUTION, CONCENTRATE ORAL at 08:29

## 2017-01-01 RX ADMIN — Medication: at 18:46

## 2017-01-01 RX ADMIN — ASPIRIN 325 MG ORAL TABLET 325 MG: 325 PILL ORAL at 08:29

## 2017-01-01 RX ADMIN — Medication 500 UNITS: at 16:22

## 2017-01-01 RX ADMIN — Medication 80 MCG: at 21:56

## 2017-01-01 RX ADMIN — METOCLOPRAMIDE 5 MG: 5 INJECTION, SOLUTION INTRAMUSCULAR; INTRAVENOUS at 11:57

## 2017-01-01 RX ADMIN — FAMOTIDINE 20 MG: 10 INJECTION, SOLUTION INTRAVENOUS at 09:26

## 2017-01-01 RX ADMIN — Medication 5 ML: at 20:36

## 2017-01-01 RX ADMIN — HYDROMORPHONE HYDROCHLORIDE 0.5 MG: 2 INJECTION, SOLUTION INTRAMUSCULAR; INTRAVENOUS; SUBCUTANEOUS at 22:36

## 2017-01-01 RX ADMIN — DIPHENOXYLATE HYDROCHLORIDE AND ATROPINE SULFATE 1 TABLET: 2.5; .025 TABLET ORAL at 00:55

## 2017-01-01 RX ADMIN — Medication 10 ML: at 21:23

## 2017-01-01 RX ADMIN — SODIUM BICARBONATE: 84 INJECTION, SOLUTION INTRAVENOUS at 16:25

## 2017-01-01 RX ADMIN — FAMOTIDINE 20 MG: 10 INJECTION, SOLUTION INTRAVENOUS at 09:39

## 2017-01-01 RX ADMIN — ONDANSETRON HYDROCHLORIDE 4 MG: 2 INJECTION, SOLUTION INTRAMUSCULAR; INTRAVENOUS at 17:17

## 2017-01-01 RX ADMIN — ROCURONIUM BROMIDE 10 MG: 10 INJECTION, SOLUTION INTRAVENOUS at 21:40

## 2017-01-01 RX ADMIN — IPRATROPIUM BROMIDE AND ALBUTEROL SULFATE 3 ML: .5; 3 SOLUTION RESPIRATORY (INHALATION) at 13:29

## 2017-01-01 RX ADMIN — ASPIRIN 81 MG: 81 TABLET, COATED ORAL at 10:45

## 2017-01-01 RX ADMIN — MORPHINE SULFATE 7.5 MG: 15 TABLET ORAL at 19:58

## 2017-01-01 RX ADMIN — ACETAMINOPHEN 1000 MG: 10 INJECTION, SOLUTION INTRAVENOUS at 20:46

## 2017-01-01 RX ADMIN — BENAZEPRIL HYDROCHLORIDE 40 MG: 10 TABLET, FILM COATED ORAL at 08:29

## 2017-01-01 RX ADMIN — MORPHINE SULFATE 7.5 MG: 15 TABLET ORAL at 22:49

## 2017-01-01 RX ADMIN — HYDROMORPHONE HYDROCHLORIDE 0.5 MG: 2 INJECTION, SOLUTION INTRAMUSCULAR; INTRAVENOUS; SUBCUTANEOUS at 06:22

## 2017-01-01 RX ADMIN — Medication 5 ML: at 21:14

## 2017-01-01 RX ADMIN — DEXTROSE MONOHYDRATE 125 ML: 10 INJECTION, SOLUTION INTRAVENOUS at 12:01

## 2017-01-01 RX ADMIN — CYANOCOBALAMIN TAB 500 MCG 500 MCG: 500 TAB at 08:49

## 2017-01-01 RX ADMIN — ATORVASTATIN CALCIUM 80 MG: 40 TABLET, FILM COATED ORAL at 22:50

## 2017-01-01 RX ADMIN — Medication 10 ML: at 06:19

## 2017-01-01 RX ADMIN — MORPHINE SULFATE 10 MG: 20 SOLUTION ORAL at 09:03

## 2017-01-01 RX ADMIN — MORPHINE SULFATE 7.5 MG: 15 TABLET ORAL at 03:43

## 2017-01-01 RX ADMIN — SODIUM CHLORIDE 10 ML: 9 INJECTION INTRAMUSCULAR; INTRAVENOUS; SUBCUTANEOUS at 14:21

## 2017-01-01 RX ADMIN — HYDROMORPHONE HYDROCHLORIDE 0.5 MG: 2 INJECTION, SOLUTION INTRAMUSCULAR; INTRAVENOUS; SUBCUTANEOUS at 16:35

## 2017-01-01 RX ADMIN — HYDROCODONE BITARTRATE AND ACETAMINOPHEN 1 TABLET: 5; 325 TABLET ORAL at 15:14

## 2017-01-01 RX ADMIN — ISOSORBIDE MONONITRATE 60 MG: 30 TABLET, EXTENDED RELEASE ORAL at 08:56

## 2017-01-01 RX ADMIN — HYDROMORPHONE HYDROCHLORIDE 0.5 MG: 1 INJECTION, SOLUTION INTRAMUSCULAR; INTRAVENOUS; SUBCUTANEOUS at 20:29

## 2017-01-01 RX ADMIN — MORPHINE SULFATE 10 MG: 20 SOLUTION ORAL at 21:20

## 2017-01-01 RX ADMIN — ENOXAPARIN SODIUM 30 MG: 30 INJECTION SUBCUTANEOUS at 09:16

## 2017-01-01 RX ADMIN — SODIUM CHLORIDE 1000 ML: 900 INJECTION, SOLUTION INTRAVENOUS at 11:10

## 2017-01-01 RX ADMIN — SODIUM CHLORIDE 100 ML/HR: 900 INJECTION, SOLUTION INTRAVENOUS at 21:42

## 2017-01-01 RX ADMIN — HALOPERIDOL LACTATE 0.5 MG: 2 SOLUTION, CONCENTRATE ORAL at 20:36

## 2017-01-01 RX ADMIN — ISOSORBIDE MONONITRATE 60 MG: 30 TABLET, EXTENDED RELEASE ORAL at 09:29

## 2017-01-01 RX ADMIN — LOPERAMIDE HYDROCHLORIDE 4 MG: 2 CAPSULE ORAL at 17:14

## 2017-01-01 RX ADMIN — PANTOPRAZOLE SODIUM 40 MG: 40 TABLET, DELAYED RELEASE ORAL at 05:12

## 2017-01-01 RX ADMIN — MORPHINE SULFATE 15 MG: 20 SOLUTION ORAL at 00:04

## 2017-01-01 RX ADMIN — MORPHINE SULFATE 10 MG: 20 SOLUTION ORAL at 11:40

## 2017-01-01 RX ADMIN — PACLITAXEL 200 MG: 100 INJECTION, POWDER, LYOPHILIZED, FOR SUSPENSION INTRAVENOUS at 13:52

## 2017-01-01 RX ADMIN — BUMETANIDE 2 MG: 2 TABLET ORAL at 07:57

## 2017-01-01 RX ADMIN — MEGESTROL ACETATE 400 MG: 40 SUSPENSION ORAL at 09:00

## 2017-01-01 RX ADMIN — SODIUM CHLORIDE 25 ML/HR: 900 INJECTION, SOLUTION INTRAVENOUS at 12:29

## 2017-01-01 RX ADMIN — METOCLOPRAMIDE 5 MG: 5 INJECTION, SOLUTION INTRAMUSCULAR; INTRAVENOUS at 17:11

## 2017-01-01 RX ADMIN — INSULIN LISPRO 3 UNITS: 100 INJECTION, SOLUTION INTRAVENOUS; SUBCUTANEOUS at 11:50

## 2017-01-01 RX ADMIN — ISOSORBIDE MONONITRATE 60 MG: 30 TABLET, EXTENDED RELEASE ORAL at 09:49

## 2017-01-01 RX ADMIN — Medication 120 MCG: at 20:55

## 2017-01-01 RX ADMIN — MORPHINE SULFATE 30 MG: 15 TABLET ORAL at 14:46

## 2017-01-01 RX ADMIN — METRONIDAZOLE 500 MG: 500 INJECTION, SOLUTION INTRAVENOUS at 20:57

## 2017-01-01 RX ADMIN — SODIUM CHLORIDE 100 ML/HR: 900 INJECTION, SOLUTION INTRAVENOUS at 04:01

## 2017-01-01 RX ADMIN — DIPHENOXYLATE HYDROCHLORIDE AND ATROPINE SULFATE 1 TABLET: 2.5; .025 TABLET ORAL at 17:14

## 2017-01-01 RX ADMIN — LORAZEPAM 0.5 MG: 2 SOLUTION, CONCENTRATE ORAL at 09:03

## 2017-01-01 RX ADMIN — SODIUM CHLORIDE 1000 ML: 900 INJECTION, SOLUTION INTRAVENOUS at 14:43

## 2017-01-01 RX ADMIN — ONDANSETRON 4 MG: 2 INJECTION INTRAMUSCULAR; INTRAVENOUS at 14:20

## 2017-01-01 RX ADMIN — Medication 10 ML: at 19:45

## 2017-01-01 RX ADMIN — HYDROMORPHONE HYDROCHLORIDE 0.2 MG: 2 INJECTION INTRAMUSCULAR; INTRAVENOUS; SUBCUTANEOUS at 16:21

## 2017-01-01 RX ADMIN — MIDAZOLAM HYDROCHLORIDE 2 MG: 1 INJECTION INTRAMUSCULAR; INTRAVENOUS at 11:58

## 2017-01-01 RX ADMIN — ONDANSETRON 4 MG: 2 INJECTION INTRAMUSCULAR; INTRAVENOUS at 10:43

## 2017-01-01 RX ADMIN — FAMOTIDINE 20 MG: 10 INJECTION, SOLUTION INTRAVENOUS at 09:57

## 2017-01-01 RX ADMIN — HYDROMORPHONE HYDROCHLORIDE 0.5 MG: 2 INJECTION, SOLUTION INTRAMUSCULAR; INTRAVENOUS; SUBCUTANEOUS at 12:56

## 2017-01-01 RX ADMIN — LORAZEPAM 0.5 MG: 2 SOLUTION, CONCENTRATE ORAL at 18:08

## 2017-01-01 RX ADMIN — MORPHINE SULFATE 2 MG: 2 INJECTION, SOLUTION INTRAMUSCULAR; INTRAVENOUS at 11:59

## 2017-01-01 RX ADMIN — MORPHINE SULFATE 15 MG: 20 SOLUTION ORAL at 12:43

## 2017-01-01 RX ADMIN — ISOSORBIDE MONONITRATE 60 MG: 30 TABLET, EXTENDED RELEASE ORAL at 08:30

## 2017-01-01 RX ADMIN — MORPHINE SULFATE 7.5 MG: 15 TABLET ORAL at 05:52

## 2017-01-01 RX ADMIN — MORPHINE SULFATE 15 MG: 20 SOLUTION ORAL at 00:06

## 2017-01-01 RX ADMIN — MEGESTROL ACETATE 400 MG: 40 SUSPENSION ORAL at 07:58

## 2017-01-01 RX ADMIN — Medication 10 ML: at 14:44

## 2017-01-01 RX ADMIN — BUMETANIDE 2 MG: 2 TABLET ORAL at 10:01

## 2017-01-01 RX ADMIN — MORPHINE SULFATE 7.5 MG: 15 TABLET ORAL at 21:14

## 2017-01-01 RX ADMIN — ALBUMIN HUMAN 250 ML: 50 SOLUTION INTRAVENOUS at 21:56

## 2017-01-01 RX ADMIN — MORPHINE SULFATE 10 MG: 20 SOLUTION ORAL at 12:30

## 2017-01-01 RX ADMIN — MORPHINE SULFATE 15 MG: 20 SOLUTION ORAL at 15:54

## 2017-01-01 RX ADMIN — ONDANSETRON 4 MG: 2 INJECTION INTRAMUSCULAR; INTRAVENOUS at 03:08

## 2017-01-01 RX ADMIN — HYDROMORPHONE HYDROCHLORIDE 0.6 MG: 2 INJECTION, SOLUTION INTRAMUSCULAR; INTRAVENOUS; SUBCUTANEOUS at 22:29

## 2017-01-01 RX ADMIN — PANTOPRAZOLE SODIUM 40 MG: 40 TABLET, DELAYED RELEASE ORAL at 08:49

## 2017-01-01 RX ADMIN — MORPHINE SULFATE 4 MG: 2 INJECTION, SOLUTION INTRAMUSCULAR; INTRAVENOUS at 17:34

## 2017-01-01 RX ADMIN — METRONIDAZOLE 500 MG: 500 INJECTION, SOLUTION INTRAVENOUS at 00:17

## 2017-01-01 RX ADMIN — SODIUM CHLORIDE 10 ML: 9 INJECTION INTRAMUSCULAR; INTRAVENOUS; SUBCUTANEOUS at 11:30

## 2017-01-01 RX ADMIN — Medication 5 ML: at 21:30

## 2017-01-01 RX ADMIN — ASPIRIN 300 MG: 300 SUPPOSITORY RECTAL at 11:28

## 2017-01-01 RX ADMIN — INSULIN LISPRO 2 UNITS: 100 INJECTION, SOLUTION INTRAVENOUS; SUBCUTANEOUS at 08:14

## 2017-01-01 RX ADMIN — IOPAMIDOL 100 ML: 755 INJECTION, SOLUTION INTRAVENOUS at 23:31

## 2017-01-01 RX ADMIN — ATORVASTATIN CALCIUM 40 MG: 40 TABLET, FILM COATED ORAL at 10:22

## 2017-01-01 RX ADMIN — ASPIRIN 81 MG: 81 TABLET, COATED ORAL at 09:39

## 2017-01-01 RX ADMIN — MORPHINE SULFATE 15 MG: 15 TABLET, FILM COATED, EXTENDED RELEASE ORAL at 10:25

## 2017-01-01 RX ADMIN — POTASSIUM & SODIUM PHOSPHATES POWDER PACK 280-160-250 MG 2 PACKET: 280-160-250 PACK at 15:07

## 2017-01-01 RX ADMIN — Medication 5 ML: at 22:54

## 2017-01-01 RX ADMIN — ACETAMINOPHEN 1000 MG: 10 INJECTION, SOLUTION INTRAVENOUS at 01:54

## 2017-01-01 RX ADMIN — AMLODIPINE BESYLATE 10 MG: 5 TABLET ORAL at 09:16

## 2017-01-01 RX ADMIN — MORPHINE SULFATE 2 MG: 2 INJECTION, SOLUTION INTRAMUSCULAR; INTRAVENOUS at 22:00

## 2017-01-01 RX ADMIN — Medication 5 ML: at 13:44

## 2017-01-01 RX ADMIN — Medication 20 ML: at 15:23

## 2017-01-01 RX ADMIN — Medication 1 CAPSULE: at 09:16

## 2017-01-01 RX ADMIN — HEPARIN SODIUM 5000 UNITS: 5000 INJECTION, SOLUTION INTRAVENOUS; SUBCUTANEOUS at 14:33

## 2017-01-01 RX ADMIN — Medication 80 MCG: at 21:44

## 2017-01-01 RX ADMIN — ACETAMINOPHEN 1000 MG: 10 INJECTION, SOLUTION INTRAVENOUS at 21:51

## 2017-01-01 RX ADMIN — HYDROMORPHONE HYDROCHLORIDE 0.5 MG: 2 INJECTION, SOLUTION INTRAMUSCULAR; INTRAVENOUS; SUBCUTANEOUS at 09:57

## 2017-01-01 RX ADMIN — ASPIRIN 325 MG ORAL TABLET 325 MG: 325 PILL ORAL at 22:03

## 2017-01-01 RX ADMIN — Medication 500 UNITS: at 14:41

## 2017-01-01 RX ADMIN — INSULIN GLARGINE 10 UNITS: 100 INJECTION, SOLUTION SUBCUTANEOUS at 19:18

## 2017-01-01 RX ADMIN — LEVOFLOXACIN 500 MG: 500 TABLET, FILM COATED ORAL at 15:37

## 2017-01-01 RX ADMIN — SODIUM BICARBONATE: 84 INJECTION, SOLUTION INTRAVENOUS at 02:48

## 2017-01-01 RX ADMIN — ACETAMINOPHEN 650 MG: 325 TABLET ORAL at 08:34

## 2017-01-01 RX ADMIN — Medication 5 ML: at 06:22

## 2017-01-01 RX ADMIN — LORAZEPAM 0.5 MG: 2 SOLUTION, CONCENTRATE ORAL at 07:36

## 2017-01-01 RX ADMIN — ENOXAPARIN SODIUM 30 MG: 30 INJECTION SUBCUTANEOUS at 09:52

## 2017-01-01 RX ADMIN — HYDROMORPHONE HYDROCHLORIDE 0.5 MG: 2 INJECTION, SOLUTION INTRAMUSCULAR; INTRAVENOUS; SUBCUTANEOUS at 05:54

## 2017-01-01 RX ADMIN — Medication 5 ML: at 05:30

## 2017-01-01 RX ADMIN — MIDAZOLAM HYDROCHLORIDE 1 MG: 1 INJECTION INTRAMUSCULAR; INTRAVENOUS at 10:31

## 2017-01-01 RX ADMIN — LORAZEPAM 0.5 MG: 2 SOLUTION, CONCENTRATE ORAL at 03:59

## 2017-01-01 RX ADMIN — MORPHINE SULFATE 4 MG: 2 INJECTION, SOLUTION INTRAMUSCULAR; INTRAVENOUS at 19:18

## 2017-01-01 RX ADMIN — Medication 5 ML: at 21:04

## 2017-01-01 RX ADMIN — LIDOCAINE HYDROCHLORIDE 60 MG: 20 INJECTION, SOLUTION EPIDURAL; INFILTRATION; INTRACAUDAL; PERINEURAL at 20:30

## 2017-01-01 RX ADMIN — HYDROMORPHONE HYDROCHLORIDE 0.5 MG: 2 INJECTION, SOLUTION INTRAMUSCULAR; INTRAVENOUS; SUBCUTANEOUS at 09:40

## 2017-01-01 RX ADMIN — SODIUM CHLORIDE 10 MG: 9 INJECTION INTRAMUSCULAR; INTRAVENOUS; SUBCUTANEOUS at 00:18

## 2017-01-01 RX ADMIN — DIPHENOXYLATE HYDROCHLORIDE AND ATROPINE SULFATE 1 TABLET: 2.5; .025 TABLET ORAL at 22:23

## 2017-01-01 RX ADMIN — SODIUM CHLORIDE, POTASSIUM CHLORIDE, SODIUM LACTATE AND CALCIUM CHLORIDE: 600; 310; 30; 20 INJECTION, SOLUTION INTRAVENOUS at 20:28

## 2017-01-01 RX ADMIN — Medication 10 ML: at 14:34

## 2017-01-01 RX ADMIN — Medication 10 ML: at 23:31

## 2017-01-01 RX ADMIN — MORPHINE SULFATE 15 MG: 20 SOLUTION ORAL at 20:22

## 2017-01-01 RX ADMIN — Medication 40 MCG: at 21:10

## 2017-01-01 RX ADMIN — HEPARIN SODIUM 5000 UNITS: 5000 INJECTION, SOLUTION INTRAVENOUS; SUBCUTANEOUS at 06:18

## 2017-01-01 RX ADMIN — ONDANSETRON 4 MG: 2 INJECTION INTRAMUSCULAR; INTRAVENOUS at 20:31

## 2017-01-01 RX ADMIN — LORAZEPAM 0.5 MG: 2 SOLUTION, CONCENTRATE ORAL at 21:39

## 2017-01-01 RX ADMIN — MORPHINE SULFATE 15 MG: 20 SOLUTION ORAL at 11:09

## 2017-01-01 RX ADMIN — DEXTROSE MONOHYDRATE 125 ML: 10 INJECTION, SOLUTION INTRAVENOUS at 06:53

## 2017-01-01 RX ADMIN — POLYETHYLENE GLYCOL 3350 17 G: 17 POWDER, FOR SOLUTION ORAL at 10:21

## 2017-01-01 RX ADMIN — LORAZEPAM 0.5 MG: 2 SOLUTION, CONCENTRATE ORAL at 22:45

## 2017-01-01 RX ADMIN — SODIUM CHLORIDE 1000 ML: 900 INJECTION, SOLUTION INTRAVENOUS at 18:25

## 2017-01-01 RX ADMIN — AMLODIPINE BESYLATE 10 MG: 5 TABLET ORAL at 09:29

## 2017-01-01 RX ADMIN — Medication 500 UNITS: at 14:21

## 2017-01-01 RX ADMIN — ONDANSETRON 4 MG: 2 INJECTION INTRAMUSCULAR; INTRAVENOUS at 16:56

## 2017-01-01 RX ADMIN — INSULIN LISPRO 3 UNITS: 100 INJECTION, SOLUTION INTRAVENOUS; SUBCUTANEOUS at 12:39

## 2017-01-01 RX ADMIN — ONDANSETRON 4 MG: 2 INJECTION INTRAMUSCULAR; INTRAVENOUS at 06:02

## 2017-01-01 RX ADMIN — ONDANSETRON 4 MG: 2 INJECTION INTRAMUSCULAR; INTRAVENOUS at 02:04

## 2017-01-01 RX ADMIN — MORPHINE SULFATE 15 MG: 20 SOLUTION ORAL at 20:27

## 2017-01-01 RX ADMIN — Medication 10 ML: at 16:22

## 2017-01-01 RX ADMIN — SODIUM CHLORIDE 25 ML/HR: 900 INJECTION, SOLUTION INTRAVENOUS at 13:24

## 2017-01-01 RX ADMIN — FAMOTIDINE 20 MG: 20 TABLET ORAL at 09:16

## 2017-01-01 RX ADMIN — MORPHINE SULFATE 7.5 MG: 15 TABLET ORAL at 09:22

## 2017-01-01 RX ADMIN — Medication 5 ML: at 05:56

## 2017-01-01 RX ADMIN — HYDROCODONE BITARTRATE AND ACETAMINOPHEN 1 TABLET: 5; 325 TABLET ORAL at 14:26

## 2017-01-01 RX ADMIN — ONDANSETRON 4 MG: 2 INJECTION INTRAMUSCULAR; INTRAVENOUS at 16:58

## 2017-01-01 RX ADMIN — MORPHINE SULFATE 30 MG: 15 TABLET, FILM COATED, EXTENDED RELEASE ORAL at 00:38

## 2017-01-01 RX ADMIN — FENTANYL CITRATE 50 MCG: 50 INJECTION, SOLUTION INTRAMUSCULAR; INTRAVENOUS at 21:41

## 2017-01-01 RX ADMIN — HYDROMORPHONE HYDROCHLORIDE 0.5 MG: 2 INJECTION, SOLUTION INTRAMUSCULAR; INTRAVENOUS; SUBCUTANEOUS at 20:21

## 2017-01-01 RX ADMIN — MORPHINE SULFATE 10 MG: 20 SOLUTION ORAL at 07:52

## 2017-01-01 RX ADMIN — Medication 5 ML: at 22:13

## 2017-01-01 RX ADMIN — Medication 10 ML: at 06:32

## 2017-01-01 RX ADMIN — Medication 40 MCG: at 20:49

## 2017-01-01 RX ADMIN — ROCURONIUM BROMIDE 10 MG: 10 INJECTION, SOLUTION INTRAVENOUS at 20:39

## 2017-01-01 RX ADMIN — INSULIN GLARGINE 10 UNITS: 100 INJECTION, SOLUTION SUBCUTANEOUS at 21:35

## 2017-01-01 RX ADMIN — ONDANSETRON 8 MG: 2 INJECTION INTRAMUSCULAR; INTRAVENOUS at 11:45

## 2017-01-01 RX ADMIN — SODIUM CHLORIDE 75 ML/HR: 900 INJECTION, SOLUTION INTRAVENOUS at 22:41

## 2017-01-01 RX ADMIN — POLYETHYLENE GLYCOL 3350 17 G: 17 POWDER, FOR SOLUTION ORAL at 21:24

## 2017-01-01 RX ADMIN — DEXTROSE MONOHYDRATE 125 ML: 10 INJECTION, SOLUTION INTRAVENOUS at 06:36

## 2017-01-01 RX ADMIN — Medication 5 ML: at 15:20

## 2017-01-01 RX ADMIN — Medication 10 ML: at 10:52

## 2017-01-01 RX ADMIN — CEFAZOLIN 2 G: 10 INJECTION, POWDER, FOR SOLUTION INTRAVENOUS; PARENTERAL at 10:41

## 2017-01-01 RX ADMIN — SUCCINYLCHOLINE CHLORIDE 120 MG: 20 INJECTION INTRAMUSCULAR; INTRAVENOUS at 20:30

## 2017-01-01 RX ADMIN — Medication 5 ML: at 21:25

## 2017-01-01 RX ADMIN — ONDANSETRON 4 MG: 2 INJECTION INTRAMUSCULAR; INTRAVENOUS at 05:32

## 2017-01-01 RX ADMIN — ASPIRIN 81 MG: 81 TABLET, COATED ORAL at 08:56

## 2017-01-01 RX ADMIN — Medication 120 MCG: at 21:43

## 2017-01-01 RX ADMIN — ENOXAPARIN SODIUM 30 MG: 30 INJECTION SUBCUTANEOUS at 09:26

## 2017-01-01 RX ADMIN — ISOSORBIDE MONONITRATE 60 MG: 30 TABLET, EXTENDED RELEASE ORAL at 09:15

## 2017-01-01 RX ADMIN — HALOPERIDOL LACTATE 0.5 MG: 2 SOLUTION, CONCENTRATE ORAL at 00:30

## 2017-01-01 RX ADMIN — MORPHINE SULFATE 30 MG: 15 TABLET ORAL at 04:26

## 2017-01-01 RX ADMIN — MORPHINE SULFATE 30 MG: 15 TABLET, FILM COATED, EXTENDED RELEASE ORAL at 21:55

## 2017-01-01 RX ADMIN — FENTANYL CITRATE 50 MCG: 50 INJECTION, SOLUTION INTRAMUSCULAR; INTRAVENOUS at 20:30

## 2017-01-01 RX ADMIN — ROCURONIUM BROMIDE 10 MG: 10 INJECTION, SOLUTION INTRAVENOUS at 20:51

## 2017-01-01 RX ADMIN — ENOXAPARIN SODIUM 40 MG: 40 INJECTION SUBCUTANEOUS at 11:31

## 2017-01-01 RX ADMIN — MORPHINE SULFATE 10 MG: 20 SOLUTION ORAL at 10:19

## 2017-01-01 RX ADMIN — LOPERAMIDE HYDROCHLORIDE 4 MG: 2 CAPSULE ORAL at 00:55

## 2017-01-01 RX ADMIN — FAMOTIDINE 20 MG: 20 TABLET ORAL at 08:56

## 2017-01-01 RX ADMIN — IOPAMIDOL 100 ML: 755 INJECTION, SOLUTION INTRAVENOUS at 19:45

## 2017-01-01 RX ADMIN — Medication 10 ML: at 00:00

## 2017-01-01 RX ADMIN — METOCLOPRAMIDE 10 MG: 5 INJECTION, SOLUTION INTRAMUSCULAR; INTRAVENOUS at 16:55

## 2017-01-01 RX ADMIN — MORPHINE SULFATE 15 MG: 20 SOLUTION ORAL at 04:00

## 2017-01-01 RX ADMIN — MORPHINE SULFATE 15 MG: 20 SOLUTION ORAL at 00:02

## 2017-01-01 RX ADMIN — HYDROMORPHONE HYDROCHLORIDE 0.2 MG: 2 INJECTION, SOLUTION INTRAMUSCULAR; INTRAVENOUS; SUBCUTANEOUS at 04:15

## 2017-01-01 RX ADMIN — Medication 10 ML: at 14:40

## 2017-01-01 RX ADMIN — AMLODIPINE BESYLATE 10 MG: 5 TABLET ORAL at 10:23

## 2017-01-01 RX ADMIN — ATORVASTATIN CALCIUM 80 MG: 40 TABLET, FILM COATED ORAL at 11:30

## 2017-01-01 RX ADMIN — Medication 5 ML: at 06:32

## 2017-01-01 RX ADMIN — MORPHINE SULFATE 7.5 MG: 15 TABLET ORAL at 13:44

## 2017-01-01 RX ADMIN — POLYETHYLENE GLYCOL 3350 17 G: 17 POWDER, FOR SOLUTION ORAL at 11:31

## 2017-01-01 RX ADMIN — LORAZEPAM 0.5 MG: 2 SOLUTION, CONCENTRATE ORAL at 01:13

## 2017-01-01 RX ADMIN — MORPHINE SULFATE 20 MG: 20 SOLUTION ORAL at 13:16

## 2017-01-01 RX ADMIN — METOCLOPRAMIDE 5 MG: 5 INJECTION, SOLUTION INTRAMUSCULAR; INTRAVENOUS at 07:08

## 2017-01-01 RX ADMIN — SODIUM CHLORIDE 500 ML: 900 INJECTION, SOLUTION INTRAVENOUS at 12:59

## 2017-01-01 RX ADMIN — Medication 10 ML: at 04:57

## 2017-01-01 RX ADMIN — ONDANSETRON 4 MG: 2 INJECTION INTRAMUSCULAR; INTRAVENOUS at 15:43

## 2017-01-01 RX ADMIN — Medication 10 ML: at 01:45

## 2017-01-01 RX ADMIN — LIDOCAINE HYDROCHLORIDE AND EPINEPHRINE 100 MG: 10; 10 INJECTION, SOLUTION INFILTRATION; PERINEURAL at 10:22

## 2017-01-01 RX ADMIN — WATER: 1000 INJECTION, SOLUTION INTRAVENOUS at 07:49

## 2017-01-01 RX ADMIN — Medication 4 MG: at 18:25

## 2017-01-01 RX ADMIN — WATER: 1000 INJECTION, SOLUTION INTRAVENOUS at 22:40

## 2017-01-01 RX ADMIN — MORPHINE SULFATE 4 MG: 2 INJECTION, SOLUTION INTRAMUSCULAR; INTRAVENOUS at 04:01

## 2017-01-01 RX ADMIN — SODIUM CHLORIDE 10 MG: 9 INJECTION INTRAMUSCULAR; INTRAVENOUS; SUBCUTANEOUS at 15:41

## 2017-01-01 RX ADMIN — Medication 5 ML: at 23:05

## 2017-01-01 RX ADMIN — MORPHINE SULFATE 2 MG: 2 INJECTION, SOLUTION INTRAMUSCULAR; INTRAVENOUS at 18:29

## 2017-01-01 RX ADMIN — OMEPRAZOLE 20 MG: 20 CAPSULE, DELAYED RELEASE ORAL at 10:01

## 2017-01-01 RX ADMIN — ENOXAPARIN SODIUM 30 MG: 30 INJECTION SUBCUTANEOUS at 08:58

## 2017-01-01 RX ADMIN — DIPHENOXYLATE HYDROCHLORIDE AND ATROPINE SULFATE 1 TABLET: 2.5; .025 TABLET ORAL at 09:29

## 2017-01-01 RX ADMIN — BARIUM SULFATE 900 ML: 21 SUSPENSION ORAL at 14:03

## 2017-01-01 RX ADMIN — ONDANSETRON 4 MG: 2 INJECTION INTRAMUSCULAR; INTRAVENOUS at 15:00

## 2017-01-01 RX ADMIN — MORPHINE SULFATE 4 MG: 4 INJECTION, SOLUTION INTRAMUSCULAR; INTRAVENOUS at 16:49

## 2017-01-01 RX ADMIN — ASPIRIN 81 MG: 81 TABLET, COATED ORAL at 09:29

## 2017-01-01 RX ADMIN — ONDANSETRON 4 MG: 2 INJECTION INTRAMUSCULAR; INTRAVENOUS at 15:11

## 2017-01-01 RX ADMIN — MORPHINE SULFATE 7.5 MG: 15 TABLET ORAL at 14:20

## 2017-01-01 RX ADMIN — ENOXAPARIN SODIUM 30 MG: 30 INJECTION SUBCUTANEOUS at 08:52

## 2017-01-01 RX ADMIN — Medication 500 UNITS: at 14:45

## 2017-01-01 RX ADMIN — ENOXAPARIN SODIUM 30 MG: 30 INJECTION SUBCUTANEOUS at 09:06

## 2017-01-01 RX ADMIN — MORPHINE SULFATE 7.5 MG: 15 TABLET ORAL at 09:40

## 2017-01-09 NOTE — PROGRESS NOTES
Faxed signed evaluation and complete decongestive physiotherapy and measure/fit/provide compression garments from [facility], fax # [phone]. Fax confirmation received. Patient has appt there 1/13/17. Will have this scanned into chart.

## 2017-01-13 NOTE — PROGRESS NOTES
Faxed signed plan of care for lymphedema to [facility], per request. Fax # [phone]. Fax confirmation received.

## 2017-01-26 NOTE — DISCHARGE INSTRUCTIONS
Chest Pain: Care Instructions  Your Care Instructions  There are many things that can cause chest pain. Some are not serious and will get better on their own in a few days. But some kinds of chest pain need more testing and treatment. Your doctor may have recommended a follow-up visit in the next 8 to 12 hours. If you are not getting better, you may need more tests or treatment. Even though your doctor has released you, you still need to watch for any problems. The doctor carefully checked you, but sometimes problems can develop later. If you have new symptoms or if your symptoms do not get better, get medical care right away. If you have worse or different chest pain or pressure that lasts more than 5 minutes or you passed out (lost consciousness), call 911 or seek other emergency help right away. A medical visit is only one step in your treatment. Even if you feel better, you still need to do what your doctor recommends, such as going to all suggested follow-up appointments and taking medicines exactly as directed. This will help you recover and help prevent future problems. How can you care for yourself at home? · Rest until you feel better. · Take your medicine exactly as prescribed. Call your doctor if you think you are having a problem with your medicine. · Do not drive after taking a prescription pain medicine. When should you call for help? Call 911 if:  · You passed out (lost consciousness). · You have severe difficulty breathing. · You have symptoms of a heart attack. These may include:  ¨ Chest pain or pressure, or a strange feeling in your chest.  ¨ Sweating. ¨ Shortness of breath. ¨ Nausea or vomiting. ¨ Pain, pressure, or a strange feeling in your back, neck, jaw, or upper belly or in one or both shoulders or arms. ¨ Lightheadedness or sudden weakness. ¨ A fast or irregular heartbeat.   After you call 911, the  may tell you to chew 1 adult-strength or 2 to 4 low-dose aspirin. Wait for an ambulance. Do not try to drive yourself. Call your doctor today if:  · You have any trouble breathing. · Your chest pain gets worse. · You are dizzy or lightheaded, or you feel like you may faint. · You are not getting better as expected. · You are having new or different chest pain. Where can you learn more? Go to http://sarkis-karina.info/. Enter A120 in the search box to learn more about \"Chest Pain: Care Instructions. \"  Current as of: May 27, 2016  Content Version: 11.1  © 2461-4621 [employer]. Care instructions adapted under license by [employer] (which disclaims liability or warranty for this information). If you have questions about a medical condition or this instruction, always ask your healthcare professional. [address] any warranty or liability for your use of this information. Upper Respiratory Infection (Cold): Care Instructions  Your Care Instructions    An upper respiratory infection, or URI, is an infection of the nose, sinuses, or throat. URIs are spread by coughs, sneezes, and direct contact. The common cold is the most frequent kind of URI. The flu and sinus infections are other kinds of URIs. Almost all URIs are caused by viruses. Antibiotics won't cure them. But you can treat most infections with home care. This may include drinking lots of fluids and taking over-the-counter pain medicine. You will probably feel better in 4 to 10 days. The doctor has checked you carefully, but problems can develop later. If you notice any problems or new symptoms, get medical treatment right away. Follow-up care is a key part of your treatment and safety. Be sure to make and go to all appointments, and call your doctor if you are having problems. It's also a good idea to know your test results and keep a list of the medicines you take. How can you care for yourself at home?   · To prevent dehydration, drink plenty of fluids, enough so that your urine is light yellow or clear like water. Choose water and other caffeine-free clear liquids until you feel better. If you have kidney, heart, or liver disease and have to limit fluids, talk with your doctor before you increase the amount of fluids you drink. · Take an over-the-counter pain medicine, such as acetaminophen (Tylenol), ibuprofen (Advil, Motrin), or naproxen (Aleve). Read and follow all instructions on the label. · Before you use cough and cold medicines, check the label. These medicines may not be safe for young children or for people with certain health problems. · Be careful when taking over-the-counter cold or flu medicines and Tylenol at the same time. Many of these medicines have acetaminophen, which is Tylenol. Read the labels to make sure that you are not taking more than the recommended dose. Too much acetaminophen (Tylenol) can be harmful. · Get plenty of rest.  · Do not smoke or allow others to smoke around you. If you need help quitting, talk to your doctor about stop-smoking programs and medicines. These can increase your chances of quitting for good. When should you call for help? Call 911 anytime you think you may need emergency care. For example, call if:  · You have severe trouble breathing. Call your doctor now or seek immediate medical care if:  · You seem to be getting much sicker. · You have new or worse trouble breathing. · You have a new or higher fever. · You have a new rash. Watch closely for changes in your health, and be sure to contact your doctor if:  · You have a new symptom, such as a sore throat, an earache, or sinus pain. · You cough more deeply or more often, especially if you notice more mucus or a change in the color of your mucus. · You do not get better as expected. Where can you learn more? Go to http://sarkis-karina.info/.   Enter V004 in the search box to learn more about \"Upper Respiratory Infection (Cold): Care Instructions. \"  Current as of: 2016  Content Version: 11.1  © 8811-3739 [employer]. Care instructions adapted under license by [employer] (which disclaims liability or warranty for this information). If you have questions about a medical condition or this instruction, always ask your healthcare professional. [address] any warranty or liability for your use of this information. [facility] Activation    Thank you for requesting access to [facility]. Please follow the instructions below to securely access and download your online medical record. [facility] allows you to send messages to your doctor, view your test results, renew your prescriptions, schedule appointments, and more. How Do I Sign Up? 1. In your internet browser, go to [email or web address]  2. Click on the First Time User? Click Here link in the Sign In box. You will be redirect to the New Member Sign Up page. 3. Enter your [facility] Access Code exactly as it appears below. You will not need to use this code after youve completed the sign-up process. If you do not sign up before the expiration date, you must request a new code. [facility] [facility] Access Code: [email or web address]  Expires: 3/22/2017  2:51 PM (This is the date your [employer] access code will )    4. Enter the last four digits of your Social Security Number (xxxx) and Date of Birth (mm/dd/yyyy) as indicated and click Submit. You will be taken to the next sign-up page. 5. Create a [facility] ID. This will be your [facility] login ID and cannot be changed, so think of one that is secure and easy to remember. 6. Create a [facility] password. You can change your password at any time. 7. Enter your Password Reset Question and Answer. This can be used at a later time if you forget your password. 8. Enter your e-mail address. You will receive e-mail notification when new information is available in [address]   9. Click Sign Up. You can now view and download portions of your medical record. 10. Click the Download Summary menu link to download a portable copy of your medical information. Additional Information    If you have questions, please visit the Frequently Asked Questions section of the [employer] website at [email or web address] Remember, [employer] is NOT to be used for urgent needs. For medical emergencies, dial 911.

## 2017-01-26 NOTE — ED NOTES
The patient was given discharge instructions and questions were answered. Patient is in no apparent distress at time of discharge. Ambulatory with steady gait.

## 2017-01-26 NOTE — ED PROVIDER NOTES
HPI Comments: Del Bishop, 65 y.o. Female with PMHx of bradycardia, DM, GERD, HTN, lumbar DJD, arthritis, stroke, breast cancer, CAD, MI and asthma presents ambulatory to ED [facility] ED with cc of sudden SOB today. Pt also c/o of cough productive of yellow sputum, chest tightness, and bilateral leg weakness. Pt reports fatigue and cold sweats for 1 month. Pt was seen by her PCP 6 days ago and diagnosed with bronchitis, she was given Zithromax and had an allergic reaction to the medication. She was treated at [facility] 3 days ago for the allergic reaction, taken off of the prescribed antibiotic and put on amoxicillin instead. Pt has not had any relief of symptoms with antibiotic usage. Pt reports hospital admission on 10/17/16 for CP. She followed up with cardiology and was told there was nothing remarkable and was told it was chest wall pain. She recently had her pacemaker checked with no abnormal findings. Pt has not been diagnosed with a heart murmur before. Pt reports prior PE. Pt's breast cancer is currently in remission. She denies any fevers, nausea, vomiting, diarrhea, LOC, abdominal pain, CP, ARNOLD or dysuria. PCP: More Zepeda MD  Cardiologist: Bowen Conti MD    Social history significant for: - Tobacco, - EtOH, - Illicit Drug Use  PSHx: appendectomy, cholecystectomy, cataract removal, hysterectomy, cardiac cath, right breast lumpectomy, right knee arthroscopy, left shoulder arthroscopy, pacemaker placement, right mastectomy. There are no other complaints, changes, or physical findings at this time. Written by MYRTLE Crawford, as dictated by Augie Aguillon MD.      The history is provided by the patient. No  was used.         Past Medical History:   Diagnosis Date    Arrhythmia      bradycardia in 30's /pacemaker inserted    Arthritis      RT KNEE    Asthma Dx age 75    Bradycardia 2009     Cardiology saw her during hospital stay; Now off coreg; Sees Dr. Dania Vuong    Breast cancer [facility])      Rt mastectomy 2/19/15    CAD (coronary artery disease)      Dr Juan Deleon Diabetes [facility])      Now seeing Dr. Benoit Martines Diverticulitis     DJD (degenerative joint disease), lumbar     GERD (gastroesophageal reflux disease)     H. pylori infection 2008     Dr. Kisha Huertas attack [facility]) April 2006    Hypercholesterolemia     Hypertension     Morbid obesity ([address]     Neuropathy, arm 2009     Right; [facility] [id number] SEN (obstructive sleep apnea)      uses CPAP    Rectal bleeding 2009     Hospitalized; Had colonoscopy and EGD (ok), gastric emptying study (normal), doppler mesenteric arteries (ok)    Sciatica      Has seen Dr. Newt Gowers    Stroke [facility]) 2009 & 2012     no residual problems       Past Surgical History:   Procedure Laterality Date    Hx appendectomy  1979    Hx cholecystectomy  1979    Hx cataract removal  2007    Hx hysterectomy       fibroids    Hx heart catheterization       angioplasty    Hx breast lumpectomy  12/12/2013     RIGHT BREAST DUCTAL EXCISION performed by Enrico Martin MD at [facility] MAIN OR    Hx colonoscopy      Hx knee arthroscopy  1997     right    Hx shoulder arthroscopy  2004     left    Hx pacemaker      Hx mastectomy Right 2/19/2015     RIGHT BREAST MODIFIED RADICAL MASTECTOMY RIGHT SENTINEL NODE BIOPSY, RIGHT PORT A CATH INSERTION performed by Say Mccord MD at [facility] AMBULATORY OR         Family History:   Problem Relation Age of Onset    Stroke Father     Cancer Sister      breast cancer    Hypertension Sister     Cancer Mother      Unknown type    Cancer Brother      Colon    Hypertension Brother     Anesth Problems Neg Hx        Social History     Social History    Marital status:      Spouse name: N/A    Number of children: N/A    Years of education: N/A     Occupational History    Not on file.      Social History Main Topics    Smoking status: Never Smoker    Smokeless tobacco: Never Used    Alcohol use No    Drug use: No    Sexual activity: No     Other Topics Concern    Not on file     Social History Narrative    Lives in [place] with oldest daughter and great-grandson 8. Occupation retired, worked at [employer] as a , Hobbies, plays with grandkids, likes [employer] and sings in the choir         ALLERGIES: Codeine; Duratuss [phenylephrine-guaifenesin]; Lisinopril-hydrochlorothiazide; Motrin [ibuprofen]; and Tape [adhesive]    Review of Systems   Constitutional: Positive for diaphoresis (cold sweats). Negative for appetite change, chills, fatigue and fever. HENT: Negative. Negative for congestion, rhinorrhea, sinus pressure and sore throat. Eyes: Negative. Respiratory: Positive for cough, chest tightness and shortness of breath. Negative for choking and wheezing. Cardiovascular: Negative. Negative for chest pain, palpitations and leg swelling. Gastrointestinal: Negative for abdominal pain, constipation, diarrhea, nausea and vomiting. Endocrine: Negative. Genitourinary: Negative. Negative for difficulty urinating, dysuria, flank pain and urgency. Musculoskeletal: Negative. Skin: Negative. Neurological: Positive for weakness (bilateral lower extremities ). Negative for dizziness, speech difficulty, light-headedness, numbness and headaches. Psychiatric/Behavioral: Negative. All other systems reviewed and are negative. Patient Vitals for the past 12 hrs:   Temp Pulse Resp BP SpO2   01/26/17 1541 - - 18 139/66 -   01/26/17 1540 - - - - 99 %   01/26/17 1137 99.2 °F (37.3 °C) 69 18 161/83 98 %         Physical Exam   Constitutional: She is oriented to person, place, and time. She appears well-developed and well-nourished. No distress. HENT:   Head: Normocephalic and atraumatic. Nose: Nose normal.   Mouth/Throat: No oropharyngeal exudate.    Eyes: Conjunctivae are normal. Pupils are equal, round, and reactive to light. Right eye exhibits no discharge. Left eye exhibits no discharge. No scleral icterus. Neck: Normal range of motion. Neck supple. No JVD present. No thyromegaly present. Cardiovascular: Normal rate, regular rhythm and intact distal pulses. Exam reveals no gallop and no friction rub. Murmur heard. Pulmonary/Chest: Effort normal. No accessory muscle usage or stridor. No respiratory distress. She has no decreased breath sounds. She has wheezes. She has rhonchi. She has no rales. She exhibits no tenderness. Abdominal: Soft. Normal aorta and bowel sounds are normal. She exhibits no distension, no pulsatile midline mass and no mass. There is no hepatosplenomegaly. There is no tenderness. There is no rebound, no guarding and no CVA tenderness. No hernia. Musculoskeletal: Normal range of motion. She exhibits no edema, tenderness or deformity. Neurological: She is alert and oriented to person, place, and time. Skin: Skin is warm and dry. No rash noted. She is not diaphoretic. No erythema. No pallor. Nursing note and vitals reviewed. MDM  Number of Diagnoses or Management Options  Acute upper respiratory infection:   Chest pain, unspecified type:   Diagnosis management comments: DDX: URI, PNA, bronchitis, coronary syndrome, PE, CHF, chest wall pain, UTI, thyroid insufficiency    IP: Pt presents with URI symptoms x 2 weeks and was recently put on Zithromax which pt thought she had a reaction to and changed to amoxicillin. Also reports CP episodes with sweating. Review of records reveals extensive cardiac w/u that was discussed with cardiology. Suspect ongoing bronchitis with purulent sputum and will change to Levaquin. Will f/u with PCP and cardiology.           Amount and/or Complexity of Data Reviewed  Clinical lab tests: ordered and reviewed  Tests in the radiology section of CPT®: ordered and reviewed  Tests in the medicine section of CPT®: ordered and reviewed  Review and summarize past medical records: yes  Discuss the patient with other providers: yes (Cardiology )  Independent visualization of images, tracings, or specimens: yes    Risk of Complications, Morbidity, and/or Mortality  Presenting problems: moderate  Diagnostic procedures: moderate  Management options: moderate    Patient Progress  Patient progress: stable    ED Course       Procedures    EKG interpretation: (Preliminary) 14:19  Rhythm: atrial-paced rhythm with occasional sinus complexes; and irregular. Rate (approx.): 68; Axis: left axis deviation; AR interval: normal; QRS interval: normal ; ST/T wave: normal; Other findings: left ventricular hypertrophy with QRS widening, no change from previous EKG. Written by MYRTLE Ayalaibe, as dictated by Jj Vogel MD.        CONSULT NOTE:   2:56 PM  Jj Vogel MD spoke with Dr. Bryan Conn,   Specialty: cardiology  Discussed pt's hx, disposition, and available diagnostic and imaging results. Reviewed care plans. Consultant agrees with plans as outlined. Dr. Bryan Conn agreed with plan. He reviewed her medical records and does not think she is a cardiac issue. Written by MYRTLE Ayalaibe, as dictated by Jj Vogel MD.    LABORATORY TESTS:  Recent Results (from the past 12 hour(s))   CBC WITH AUTOMATED DIFF    Collection Time: 01/26/17  1:16 PM   Result Value Ref Range    WBC 4.8 3.6 - 11.0 K/uL    RBC 3.43 (L) 3.80 - 5.20 M/uL    HGB 9.5 (L) 11.5 - 16.0 g/dL    HCT 27.7 (L) 35.0 - 47.0 %    MCV 80.8 80.0 - 99.0 FL    MCH 27.7 26.0 - 34.0 PG    MCHC 34.3 30.0 - 36.5 g/dL    RDW 15.2 (H) 11.5 - 14.5 %    PLATELET 901 641 - 408 K/uL    NEUTROPHILS 71 32 - 75 %    LYMPHOCYTES 20 12 - 49 %    MONOCYTES 6 5 - 13 %    EOSINOPHILS 3 0 - 7 %    BASOPHILS 0 0 - 1 %    ABS. NEUTROPHILS 3.4 1.8 - 8.0 K/UL    ABS. LYMPHOCYTES 1.0 0.8 - 3.5 K/UL    ABS. MONOCYTES 0.3 0.0 - 1.0 K/UL    ABS. EOSINOPHILS 0.1 0.0 - 0.4 K/UL    ABS.  BASOPHILS 0.0 0.0 - 0.1 K/UL   METABOLIC PANEL, COMPREHENSIVE    Collection Time: 01/26/17  1:16 PM   Result Value Ref Range    Sodium 141 136 - 145 mmol/L    Potassium 3.7 3.5 - 5.1 mmol/L    Chloride 104 97 - 108 mmol/L    CO2 29 21 - 32 mmol/L    Anion gap 8 5 - 15 mmol/L    Glucose 111 (H) 65 - 100 mg/dL    BUN 19 6 - 20 MG/DL    Creatinine 1.13 (H) 0.55 - 1.02 MG/DL    BUN/Creatinine ratio 17 12 - 20      GFR est AA 56 (L) >60 ml/min/1.73m2    GFR est non-AA 47 (L) >60 ml/min/1.73m2    Calcium 8.4 (L) 8.5 - 10.1 MG/DL    Bilirubin, total 0.2 0.2 - 1.0 MG/DL    ALT 18 12 - 78 U/L    AST 22 15 - 37 U/L    Alk.  phosphatase 120 (H) 45 - 117 U/L    Protein, total 7.4 6.4 - 8.2 g/dL    Albumin 3.0 (L) 3.5 - 5.0 g/dL    Globulin 4.4 (H) 2.0 - 4.0 g/dL    A-G Ratio 0.7 (L) 1.1 - 2.2     LIPASE    Collection Time: 01/26/17  1:16 PM   Result Value Ref Range    Lipase 110 73 - 393 U/L   PRO-BNP    Collection Time: 01/26/17  1:16 PM   Result Value Ref Range    NT pro- 0 - 450 PG/ML   CK W/ CKMB & INDEX    Collection Time: 01/26/17  1:16 PM   Result Value Ref Range     26 - 192 U/L    CK - MB 2.3 <3.6 NG/ML    CK-MB Index 1.4 0 - 2.5     TROPONIN I    Collection Time: 01/26/17  1:16 PM   Result Value Ref Range    Troponin-I, Qt. <0.04 <0.05 ng/mL   URINALYSIS W/ REFLEX CULTURE    Collection Time: 01/26/17  1:16 PM   Result Value Ref Range    Color YELLOW/STRAW      Appearance CLEAR CLEAR      Specific gravity 1.008 1.003 - 1.030      pH (UA) 5.0 5.0 - 8.0      Protein TRACE (A) NEG mg/dL    Glucose NEGATIVE  NEG mg/dL    Ketone NEGATIVE  NEG mg/dL    Bilirubin NEGATIVE  NEG      Blood TRACE (A) NEG      Urobilinogen 0.2 0.2 - 1.0 EU/dL    Nitrites NEGATIVE  NEG      Leukocyte Esterase NEGATIVE  NEG      WBC 0-4 0 - 4 /hpf    RBC 0-5 0 - 5 /hpf    Epithelial cells MODERATE (A) FEW /lpf    Bacteria NEGATIVE  NEG /hpf    UA:UC IF INDICATED CULTURE NOT INDICATED BY UA RESULT CNI      Hyaline Cast 2-5 0 - 5 /lpf   MAGNESIUM Collection Time: 01/26/17  1:16 PM   Result Value Ref Range    Magnesium 1.6 1.6 - 2.4 mg/dL   EKG, 12 LEAD, INITIAL    Collection Time: 01/26/17  2:19 PM   Result Value Ref Range    Ventricular Rate 68 BPM    Atrial Rate 68 BPM    P-R Interval 190 ms    QRS Duration 196 ms    Q-T Interval 470 ms    QTC Calculation (Bezet) 499 ms    Calculated P Axis 80 degrees    Calculated R Axis -61 degrees    Calculated T Axis 105 degrees    Diagnosis       Atrial-paced rhythm with occasional sinus complexes  Left axis deviation  Left ventricular hypertrophy with QRS widening  Cannot rule out Septal infarct , age undetermined  Possible Lateral infarct , age undetermined  When compared with ECG of 15-SEP-2016 20:02,  Electronic atrial pacemaker has replaced Electronic ventricular pacemaker         IMAGING RESULTS:  XR CHEST PA LAT   Final Result   INDICATION: weakness     COMPARISON: September 20, 2015 and September 16, 2016.     FINDINGS:     Frontal and lateral views of the chest demonstrate a left subclavian pacemaker. Heart size upper limits of normal. The lungs are adequately expanded. There is  no edema, effusion, consolidation, or pneumothorax. The osseous structures are  unremarkable.     IMPRESSION  IMPRESSION:  No acute process       MEDICATIONS GIVEN:  Medications   sodium chloride (NS) flush 5-10 mL (not administered)   sodium chloride (NS) flush 5-10 mL (not administered)   albuterol-ipratropium (DUO-NEB) 2.5 MG-0.5 MG/3 ML (3 mL Nebulization Given 1/26/17 1329)   levoFLOXacin (LEVAQUIN) tablet 500 mg (500 mg Oral Given 1/26/17 1537)       IMPRESSION:  1. Acute upper respiratory infection    2. Chest pain, unspecified type        PLAN:  1. Discharge Medication List as of 1/26/2017  3:00 PM      START taking these medications    Details   levoFLOXacin (LEVAQUIN) 500 mg tablet Take 1 Tab by mouth daily. , Normal, Disp-7 Tab, R-0      benzonatate (TESSALON) 200 mg capsule Take 1 Cap by mouth three (3) times daily as needed for Cough for up to 7 days. , Normal, Disp-20 Cap, R-0         CONTINUE these medications which have NOT CHANGED    Details   ergocalciferol (ERGOCALCIFEROL) 50,000 unit capsule Take 1 Cap by mouth every thirty (30) days. , Normal, Disp-3 Cap, R-2      traMADol (ULTRAM) 50 mg tablet Take 50 mg by mouth every six (6) hours as needed for Pain., Historical Med      glipiZIDE (GLUCOTROL) 5 mg tablet Take 1 Tab by mouth daily as needed. Pt states Dr Nydia Carreon told her to take 1 po if BS >200, Normal, Disp-30 Tab, R-5      isosorbide mononitrate ER (IMDUR) 60 mg CR tablet Take  by mouth every morning., Historical Med, R-0      Blood Sugar Diagnostic, Disc (ASCENSIA BREEZE 2) strp Check your blood sugar twice daily. E11.42, Normal, Disp-100 Strip, R-6      insulin glargine (LANTUS) 100 unit/mL injection 15 units with breakfast and 15 units at bedtime. Indications: DIABETES MELLITUS, Normal, Disp-1 Vial, R-6      insulin syringe-needle U-100 (BD INSULIN SYRINGE ULTRA-FINE) 1/2 mL 31 gauge x 15/64\" syrg 1 Each by SubCUTAneous route two (2) times a day. Two shots daily. E11.42, Normal, Disp-100 Pen Needle, R-3      ondansetron (ZOFRAN ODT) 4 mg disintegrating tablet Take 1 Tab by mouth every eight (8) hours as needed for Nausea., Normal, Disp-60 Tab, R-2      aspirin 81 mg chewable tablet Take 81 mg by mouth daily. , Historical Med      albuterol (PROVENTIL VENTOLIN) 2.5 mg /3 mL (0.083 %) nebulizer solution 3 mL by Nebulization route every four (4) hours as needed for Wheezing., Print, Disp-24 Each, R-0      polyethylene glycol (MIRALAX) 17 gram/dose powder Take 17 g by mouth two (2) times a day. Continue while taking pain medication, Normal, Disp-225 g, R-2      MICROLET LANCET misc Historical Med, R-0      benazepril (LOTENSIN) 40 mg tablet Take 40 mg by mouth every morning., Historical Med      bumetanide (BUMEX) 1 mg tablet Take 1 mg by mouth daily. , Historical Med      atorvastatin (LIPITOR) 40 mg tablet Take 40 mg by mouth every morning. Indications: HYPERCHOLESTEROLEMIA, Historical Med      omeprazole (PRILOSEC) 20 mg capsule Take 40 mg by mouth two (2) times a day., Historical Med      nitroglycerin (NITROSTAT) 0.4 mg SL tablet by SubLINGual route every five (5) minutes as needed for Chest Pain., Historical Med      amlodipine (NORVASC) 10 mg tablet TAKE 1 TABLET BY MOUTH ONCE DAILY, Normal, Disp-30 Tab, R-10           2. Follow-up Information     Follow up With Details Comments [address] MD Evelio Call in 1 day  [place] [address]  [phone]      [facility] EMERGENCY DEPT  If symptoms worsen [address] [facility] [address] Pardeep Solo MD Call in 1 week If symptoms worsen 15[facility] [address] [facility]       [place] [phone]  [phone]          Return to ED if worse     Discharge Note:  4:21 PM  The pt is ready for discharge. The pt's signs, symptoms, diagnosis, and discharge instructions have been discussed and pt has conveyed their understanding. The pt is to follow up as recommended or return to ER should their symptoms worsen. Plan has been discussed and pt is in agreement. This note is prepared by [facility], acting as a Scribe for Meagan Jimenez MD.    Meagan Jimenez MD: The scribe's documentation has been prepared under my direction and personally reviewed by me in its entirety. I confirm that the notes above accurately reflects all work, treatment, procedures, and medical decision making performed by me.

## 2017-02-23 NOTE — TELEPHONE ENCOUNTER
----- Message from Gabriel Borja MD sent at 2/23/2017  1:49 PM EST -----  Regarding: RE: phone call  Yes Ma'am  ----- Message -----     From: Timur Jauregui LPN     Sent: 5/10/3734   1:40 PM       To: Gabriel Borja MD  Subject: FW: phone call                                   Lissa Plascencia to give 1 vial?   ----- Message -----     From: Alison Yu     Sent: 2/23/2017  12:42 PM       To: Timur Jauregui LPN  Subject: phone call                                       Patient called to ask for a vial of Lantus please. She can be reached at:   [phone]. Thanks [facility].

## 2017-03-08 NOTE — MR AVS SNAPSHOT
Visit Information Date & Time Provider Department Dept. Phone Encounter #  
 3/8/2017  9:30 AM America Alexandra MD [address] [facility] Oncology at [facility] [phone] [id number] Your Appointments 3/23/2017 11:10 AM  
Follow Up with Nelle Duverney, MD  
[facility]: f[facility]u    dm          3 month  
 [address] [id number] [address]  
  
    
 6/5/2017  9:30 AM  
Follow Up with Karoline Wallace MD  
[address] at [facility] Appt Note: 6 month follow up / cp $0 ct 7-13-16; 6 month follow up / cp $0 12/5/16 tt  
 [address] [facility] [address] [facility] [address]  
  
   
 [address] Upcoming Health Maintenance Date Due DTaP/Tdap/Td series (1 - Tdap) 1/15/1960 ZOSTER VACCINE AGE 60> 1/15/1999 OSTEOPOROSIS SCREENING (DEXA) 1/15/2004 Pneumococcal 65+ High/Highest Risk (1 of 2 - PCV13) 1/15/2004 MEDICARE YEARLY EXAM 1/15/2004 LIPID PANEL Q1 11/22/2011 INFLUENZA AGE 9 TO ADULT 8/1/2016 MICROALBUMIN Q1 6/20/2017 HEMOGLOBIN A1C Q6M 6/21/2017 EYE EXAM RETINAL OR DILATED Q1 6/24/2017 FOOT EXAM Q1 12/21/2017 GLAUCOMA SCREENING Q2Y 6/24/2018 Allergies as of 3/8/2017  Review Complete On: 3/8/2017 By: Christina Slade NP Severity Noted Reaction Type Reactions Codeine  10/16/2009    Itching Duratuss [Phenylephrine-guaifenesin]  10/16/2009    Nausea and Vomiting Lisinopril-hydrochlorothiazide  10/16/2009    Itching Motrin [Ibuprofen]  10/16/2009    Nausea and Vomiting With 800mg Tape [Adhesive]  12/10/2014    Other (comments) TEARS HER SKIN Current Immunizations  Reviewed on 3/8/2017 Name Date Influenza Vaccine 9/1/2014 Reviewed by Raiza Savage LPN on 0/0/0325 at  8:00 AM  
You Were Diagnosed With   
  
 Codes Comments Malignant neoplasm of upper-inner quadrant of right female breast ([facility] [address]    -  Primary ICD-10-CM: I89.236 ICD-9-CM: 174.2 Pain of upper abdomen     ICD-10-CM: R10.10 ICD-9-CM: 789.09 Vitals BP Pulse Temp Resp Height(growth percentile) Weight(growth percentile) 164/88 65 98.9 °F (37.2 °C) 18 5' 7\" (1.702 m) 216 lb (98 kg) SpO2 BMI OB Status Smoking Status 98% 33.83 kg/m2 Hysterectomy Never Smoker Vitals History BMI and BSA Data Body Mass Index Body Surface Area  
 33.83 kg/m 2 2.15 m 2 Preferred Pharmacy Pharmacy Name Phone [facility] [id number] [facility], [address] [place] [address] Your Updated Medication List  
  
   
This list is accurate as of: 3/8/17 10:15 AM.  Always use your most recent med list.  
  
  
  
  
 albuterol 2.5 mg /3 mL (0.083 %) nebulizer solution Commonly known as:  PROVENTIL VENTOLIN  
3 mL by Nebulization route every four (4) hours as needed for Wheezing. amLODIPine 10 mg tablet Commonly known as:  Reynoso Sin TAKE 1 TABLET BY MOUTH ONCE DAILY  
  
 aspirin 81 mg chewable tablet Take 81 mg by mouth daily. atorvastatin 40 mg tablet Commonly known as:  LIPITOR Take 40 mg by mouth every morning. Indications: HYPERCHOLESTEROLEMIA  
  
 benazepril 40 mg tablet Commonly known as:  LOTENSIN Take 40 mg by mouth every morning. benzonatate 200 mg capsule Commonly known as:  TESSALON Take 200 mg by mouth three (3) times daily as needed for Cough. Blood Sugar Diagnostic, Disc Strp Commonly known as:  Ascensia Breeze 2 Check your blood sugar twice daily. E11.42  
  
 bumetanide 1 mg tablet Commonly known as:  Severiano Bue Take 1 mg by mouth daily. ergocalciferol 50,000 unit capsule Commonly known as:  ERGOCALCIFEROL Take 1 Cap by mouth every thirty (30) days. glipiZIDE 5 mg tablet Commonly known as:  Caralyn Fortis Take 1 Tab by mouth daily as needed. Pt states Dr Anthony Macias told her to take 1 po if BS >200  
  
 hydrALAZINE 25 mg tablet Commonly known as:  APRESOLINE Take 25 mg by mouth three (3) times daily. insulin glargine 100 unit/mL injection Commonly known as:  LANTUS  
15 units with breakfast and 15 units at bedtime. Indications: Diabetes Mellitus  
  
 insulin syringe-needle U-100 1/2 mL 31 gauge x 15/64\" Syrg Commonly known as:  BD INSULIN SYRINGE ULTRA-FINE  
1 Each by SubCUTAneous route two (2) times a day. Two shots daily. E11.42  
  
 isosorbide mononitrate ER 60 mg CR tablet Commonly known as:  IMDUR Take  by mouth every morning. levoFLOXacin 500 mg tablet Commonly known as:  Thersia Bring Take 1 Tab by mouth daily. [facility] Generic drug:  Lancets NITROSTAT 0.4 mg SL tablet Generic drug:  nitroglycerin  
by SubLINGual route every five (5) minutes as needed for Chest Pain. omeprazole 20 mg capsule Commonly known as:  PRILOSEC Take 40 mg by mouth two (2) times a day. ondansetron 4 mg disintegrating tablet Commonly known as:  ZOFRAN ODT Take 1 Tab by mouth every eight (8) hours as needed for Nausea. polyethylene glycol 17 gram/dose powder Commonly known as:  Enid Clause Take 17 g by mouth two (2) times a day. Continue while taking pain medication  
  
 traMADol 50 mg tablet Commonly known as:  ULTRAM  
Take 50 mg by mouth every six (6) hours as needed for Pain. To-Do List   
 03/15/2017 Imaging:  CT ABD PELV W CONT Patient Instructions CT Scan of the Abdomen: About This Test 
What is it? A CT (computed tomography) scan uses X-rays to make detailed pictures of your body and structures inside your body.  A CT scan of the abdomen (belly) can give your doctor information about your liver, pancreas, kidneys, and other structures in your belly. During the test, you will lie on a table that is attached to the [employer]. The CT scanner is a large doughnut-shaped machine. Why is this test done? A CT scan of the belly can help find problems such as kidney stones, infected pouches in the colon (diverticulitis), and appendicitis. It also helps find tumors and abscesses. How can you prepare for the test? 
Talk to your doctor about all your health conditions before the test. For example, tell your doctor if: 
· You are or might be pregnant. · You are allergic to any medicines. · You have diabetes. · You take metformin. · You are breastfeeding. · You get nervous in confined spaces. You may need medicine to help you relax. · You have had an X-ray test using barium contrast material in the past 4 days. You may be asked to not eat any solid foods starting the night before your scan. What happens before the test? 
· You may have to take off jewelry. · You will take off all or most of your clothes and change into a gown. If you do leave some clothes on, make sure you take everything out of your pockets. · You may have contrast material (dye) put into your arm through a tube called an IV. Or, you may drink the contrast material or it may be put through a tube into your bladder or rectum. Contrast material helps doctors see specific organs, blood vessels, and most tumors. What happens during the test? 
· You will lie on a table that is attached to the CT scanner. · The table slides into the round opening of the scanner. The table will move during the scan. The scanner moves inside the doughnut-shaped casing around your body. · You will be asked to hold still during the scan. You may be asked to hold your breath for short periods.  
· You may be alone in the scanning room, but a technologist will be watching you through a window and talking with you during the test. 
What else should you know about the test? 
 · A CT scan does not hurt. · If a dye is used, you may feel a quick sting or pinch when the IV is started. The dye may make you feel warm and flushed and give you a metallic taste in your mouth. Some people feel sick to their stomach or get a headache. · If you breastfeed and are concerned about whether the dye used in this test is safe, talk to your doctor. Most experts believe that very little dye passes into breast milk and even less is passed on to the baby. But if you prefer, you can store some of your breast milk ahead of time and use it for a day or two after the test. 
· The dose of radiation from a CT scanner may be higher than that from other X-ray tests. If you are concerned about the radiation risk, talk to your doctor. How long does the test take? · The test will take about 30 to 60 minutes. Most of this time is spent getting ready for the scan. The actual test only takes a few minutes. What happens after the test? 
· You will probably be able to go home right away. · You can go back to your usual activities right away. · Drink plenty of fluids for 24 hours after the test if dye was used, unless your doctor tells you not to. When should you call for help? Watch closely for changes in your health, and be sure to contact your doctor if you have any problems. Follow-up care is a key part of your treatment and safety. Be sure to make and go to all appointments, and call your doctor if you are having problems. It's also a good idea to keep a list of the medicines you take. Ask your doctor when you can expect to have your test results. Where can you learn more? Go to http://sarkis-karina.info/. Enter M722 in the search box to learn more about \"CT Scan of the Abdomen: About This Test.\" Current as of: February 19, 2016 Content Version: 11.1 © 8247-6326 [employer].  Care instructions adapted under license by [facility] [address] (which disclaims liability or warranty for this information). If you have questions about a medical condition or this instruction, always ask your healthcare professional. [address] any warranty or liability for your use of this information. CT Scan of the Abdomen: About This Test 
What is it? A CT (computed tomography) scan uses X-rays to make detailed pictures of your body and structures inside your body. A CT scan of the abdomen (belly) can give your doctor information about your liver, pancreas, kidneys, and other structures in your belly. During the test, you will lie on a table that is attached to the [employer]. The CT scanner is a large doughnut-shaped machine. Why is this test done? A CT scan of the belly can help find problems such as kidney stones, infected pouches in the colon (diverticulitis), and appendicitis. It also helps find tumors and abscesses. How can you prepare for the test? 
Talk to your doctor about all your health conditions before the test. For example, tell your doctor if: 
· You are or might be pregnant. · You are allergic to any medicines. · You have diabetes. · You take metformin. · You are breastfeeding. · You get nervous in confined spaces. You may need medicine to help you relax. · You have had an X-ray test using barium contrast material in the past 4 days. You may be asked to not eat any solid foods starting the night before your scan. What happens before the test? 
· You may have to take off jewelry. · You will take off all or most of your clothes and change into a gown. If you do leave some clothes on, make sure you take everything out of your pockets. · You may have contrast material (dye) put into your arm through a tube called an IV. Or, you may drink the contrast material or it may be put through a tube into your bladder or rectum.  Contrast material helps doctors see specific organs, blood vessels, and most tumors. What happens during the test? 
· You will lie on a table that is attached to the CT scanner. · The table slides into the round opening of the scanner. The table will move during the scan. The scanner moves inside the doughnut-shaped casing around your body. · You will be asked to hold still during the scan. You may be asked to hold your breath for short periods. · You may be alone in the scanning room, but a technologist will be watching you through a window and talking with you during the test. 
What else should you know about the test? 
· A CT scan does not hurt. · If a dye is used, you may feel a quick sting or pinch when the IV is started. The dye may make you feel warm and flushed and give you a metallic taste in your mouth. Some people feel sick to their stomach or get a headache. · If you breastfeed and are concerned about whether the dye used in this test is safe, talk to your doctor. Most experts believe that very little dye passes into breast milk and even less is passed on to the baby. But if you prefer, you can store some of your breast milk ahead of time and use it for a day or two after the test. 
· The dose of radiation from a CT scanner may be higher than that from other X-ray tests. If you are concerned about the radiation risk, talk to your doctor. How long does the test take? · The test will take about 30 to 60 minutes. Most of this time is spent getting ready for the scan. The actual test only takes a few minutes. What happens after the test? 
· You will probably be able to go home right away. · You can go back to your usual activities right away. · Drink plenty of fluids for 24 hours after the test if dye was used, unless your doctor tells you not to. When should you call for help? Watch closely for changes in your health, and be sure to contact your doctor if you have any problems. Follow-up care is a key part of your treatment and safety. Be sure to make and go to all appointments, and call your doctor if you are having problems. It's also a good idea to keep a list of the medicines you take. Ask your doctor when you can expect to have your test results. Where can you learn more? Go to http://sarkis-karina.info/. Enter X916 in the search box to learn more about \"CT Scan of the Abdomen: About This Test.\" Current as of: February 19, 2016 Content Version: 11.1 © 4290-8730 [employer]. Care instructions adapted under license by [employer] (which disclaims liability or warranty for this information). If you have questions about a medical condition or this instruction, always ask your healthcare professional. [address] any warranty or liability for your use of this information. Introducing [facility]! [employer] introduces Manymoon patient portal. Now you can access parts of your medical record, email your doctor's office, and request medication refills online. 1. In your internet browser, go to [email or web address] 2. Click on the First Time User? Click Here link in the Sign In box. You will see the New Member Sign Up page. 3. Enter your Manymoon Access Code exactly as it appears below. You will not need to use this code after youve completed the sign-up process. If you do not sign up before the expiration date, you must request a new code. · Manymoon Access Code: [email or web address] Expires: 3/22/2017  2:51 PM 
 
4. Enter the last four digits of your Social Security Number (xxxx) and Date of Birth (mm/dd/yyyy) as indicated and click Submit. You will be taken to the next sign-up page. 5. Create a Manymoon ID. This will be your Manymoon login ID and cannot be changed, so think of one that is secure and easy to remember. 6. Create a [employer] password. You can change your password at any time. 7. Enter your Password Reset Question and Answer. This can be used at a later time if you forget your password. 8. Enter your e-mail address. You will receive e-mail notification when new information is available in [address] 9. Click Sign Up. You can now view and download portions of your medical record. 10. Click the Download Summary menu link to download a portable copy of your medical information. If you have questions, please visit the Frequently Asked Questions section of the [employer] website. Remember, Rackup is NOT to be used for urgent needs. For medical emergencies, dial 911. Now available from your iPhone and Android! Please provide this summary of care documentation to your next provider. Your primary care clinician is listed as Rashmi Vieira. If you have any questions after today's visit, please call [phone].

## 2017-03-08 NOTE — PATIENT INSTRUCTIONS
CT Scan of the Abdomen: About This Test  What is it? A CT (computed tomography) scan uses X-rays to make detailed pictures of your body and structures inside your body. A CT scan of the abdomen (belly) can give your doctor information about your liver, pancreas, kidneys, and other structures in your belly. During the test, you will lie on a table that is attached to the [employer]. The CT scanner is a large doughnut-shaped machine. Why is this test done? A CT scan of the belly can help find problems such as kidney stones, infected pouches in the colon (diverticulitis), and appendicitis. It also helps find tumors and abscesses. How can you prepare for the test?  Talk to your doctor about all your health conditions before the test. For example, tell your doctor if:  · You are or might be pregnant. · You are allergic to any medicines. · You have diabetes. · You take metformin. · You are breastfeeding. · You get nervous in confined spaces. You may need medicine to help you relax. · You have had an X-ray test using barium contrast material in the past 4 days. You may be asked to not eat any solid foods starting the night before your scan. What happens before the test?  · You may have to take off jewelry. · You will take off all or most of your clothes and change into a gown. If you do leave some clothes on, make sure you take everything out of your pockets. · You may have contrast material (dye) put into your arm through a tube called an IV. Or, you may drink the contrast material or it may be put through a tube into your bladder or rectum. Contrast material helps doctors see specific organs, blood vessels, and most tumors. What happens during the test?  · You will lie on a table that is attached to the CT scanner. · The table slides into the round opening of the scanner. The table will move during the scan. The scanner moves inside the doughnut-shaped casing around your body.   · You will be asked to hold still during the scan. You may be asked to hold your breath for short periods. · You may be alone in the scanning room, but a technologist will be watching you through a window and talking with you during the test.  What else should you know about the test?  · A CT scan does not hurt. · If a dye is used, you may feel a quick sting or pinch when the IV is started. The dye may make you feel warm and flushed and give you a metallic taste in your mouth. Some people feel sick to their stomach or get a headache. · If you breastfeed and are concerned about whether the dye used in this test is safe, talk to your doctor. Most experts believe that very little dye passes into breast milk and even less is passed on to the baby. But if you prefer, you can store some of your breast milk ahead of time and use it for a day or two after the test.  · The dose of radiation from a CT scanner may be higher than that from other X-ray tests. If you are concerned about the radiation risk, talk to your doctor. How long does the test take? · The test will take about 30 to 60 minutes. Most of this time is spent getting ready for the scan. The actual test only takes a few minutes. What happens after the test?  · You will probably be able to go home right away. · You can go back to your usual activities right away. · Drink plenty of fluids for 24 hours after the test if dye was used, unless your doctor tells you not to. When should you call for help? Watch closely for changes in your health, and be sure to contact your doctor if you have any problems. Follow-up care is a key part of your treatment and safety. Be sure to make and go to all appointments, and call your doctor if you are having problems. It's also a good idea to keep a list of the medicines you take. Ask your doctor when you can expect to have your test results. Where can you learn more? Go to http://sarkis-karina.info/.   Enter A446 in the search box to learn more about \"CT Scan of the Abdomen: About This Test.\"  Current as of: February 19, 2016  Content Version: 11.1  © 2006-2016 "[employer]. Care instructions adapted under license by [employer] (which disclaims liability or warranty for this information). If you have questions about a medical condition or this instruction, always ask your healthcare professional. [address] any warranty or liability for your use of this information. CT Scan of the Abdomen: About This Test  What is it? A CT (computed tomography) scan uses X-rays to make detailed pictures of your body and structures inside your body. A CT scan of the abdomen (belly) can give your doctor information about your liver, pancreas, kidneys, and other structures in your belly. During the test, you will lie on a table that is attached to the [employer]. The CT scanner is a large doughnut-shaped machine. Why is this test done? A CT scan of the belly can help find problems such as kidney stones, infected pouches in the colon (diverticulitis), and appendicitis. It also helps find tumors and abscesses. How can you prepare for the test?  Talk to your doctor about all your health conditions before the test. For example, tell your doctor if:  · You are or might be pregnant. · You are allergic to any medicines. · You have diabetes. · You take metformin. · You are breastfeeding. · You get nervous in confined spaces. You may need medicine to help you relax. · You have had an X-ray test using barium contrast material in the past 4 days. You may be asked to not eat any solid foods starting the night before your scan. What happens before the test?  · You may have to take off jewelry. · You will take off all or most of your clothes and change into a gown. If you do leave some clothes on, make sure you take everything out of your pockets.   · You may have contrast material (dye) put into your arm through a tube called an IV. Or, you may drink the contrast material or it may be put through a tube into your bladder or rectum. Contrast material helps doctors see specific organs, blood vessels, and most tumors. What happens during the test?  · You will lie on a table that is attached to the CT scanner. · The table slides into the round opening of the scanner. The table will move during the scan. The scanner moves inside the doughnut-shaped casing around your body. · You will be asked to hold still during the scan. You may be asked to hold your breath for short periods. · You may be alone in the scanning room, but a technologist will be watching you through a window and talking with you during the test.  What else should you know about the test?  · A CT scan does not hurt. · If a dye is used, you may feel a quick sting or pinch when the IV is started. The dye may make you feel warm and flushed and give you a metallic taste in your mouth. Some people feel sick to their stomach or get a headache. · If you breastfeed and are concerned about whether the dye used in this test is safe, talk to your doctor. Most experts believe that very little dye passes into breast milk and even less is passed on to the baby. But if you prefer, you can store some of your breast milk ahead of time and use it for a day or two after the test.  · The dose of radiation from a CT scanner may be higher than that from other X-ray tests. If you are concerned about the radiation risk, talk to your doctor. How long does the test take? · The test will take about 30 to 60 minutes. Most of this time is spent getting ready for the scan. The actual test only takes a few minutes. What happens after the test?  · You will probably be able to go home right away. · You can go back to your usual activities right away. · Drink plenty of fluids for 24 hours after the test if dye was used, unless your doctor tells you not to.   When should you call for help? Watch closely for changes in your health, and be sure to contact your doctor if you have any problems. Follow-up care is a key part of your treatment and safety. Be sure to make and go to all appointments, and call your doctor if you are having problems. It's also a good idea to keep a list of the medicines you take. Ask your doctor when you can expect to have your test results. Where can you learn more? Go to http://sarkis-karina.info/. Enter F315 in the search box to learn more about \"CT Scan of the Abdomen: About This Test.\"  Current as of: February 19, 2016  Content Version: 11.1  © 4017-2543 [employer]. Care instructions adapted under license by [facility] (which disclaims liability or warranty for this information). If you have questions about a medical condition or this instruction, always ask your healthcare professional. [address] any warranty or liability for your use of this information.

## 2017-03-08 NOTE — TELEPHONE ENCOUNTER
PER VOROLIVIA from Dr Catie Fitch, refill Miralax,  17 grams by mouth twice daily, continue while taking pain meds. Refills x2.

## 2017-03-08 NOTE — PROGRESS NOTES
Follow up Note        Patient: Tashi Rebollar MRN: [id number]  SSN: [id number]    YOB: 1940  Age: 65 y.o. Sex: female        Diagnosis:     1. Right breast carcinoma:  pT3 N3a M0 (Stage IIIC) infiltrating ductal carcinoma, Tumor size 7.5 cm, LN 17/19 +ve with extracapsular extension in 2 nodes, grade 3, ki 67 35%, ER -ve, MN -ve, Her 2 -ve      Treatment:     1. Right mastectomy with axillary LN dissection on 01/19/2015  2. Adjuvant chemotherapy   Taxotere, Cytoxan, s/p 3 Cycles completed 4/29/15      Subjective:      Tashi Rebollar is a 65 y.o. female with a diagnosis of locally advanced right sided breast cancer. She underwent a right mastectomy with axillary LN dissection on 01/19/2015. She received three cycles of adjuvant chemotherapy with TC and stopped therapy due to side effects. She has completed radiation on 08/13/2015. Overall Ms. Lisa Arreola is doing well. She had a recent bone scan and mammogram which were negative. Ms. Lisa Arreola returns today in follow up for her breast cancer. She was hospitalized in January for the flu and continues to have a cough. During this time she did not receive her lymphedema therapy and her right arm started to hurt and swell again. She has started back with lymphedema therapy at [facility]. She is also complaining of asthma and abdominal pain.           Review of Systems:    Constitutional: fatigue  Eyes: negative  Ears, Nose, Mouth, Throat, and Face: negative  Respiratory: cough, asthma  Cardiovascular: negative  Gastrointestinal: abdominal pain, intermittent diarrhea and nausea  Integument/chest wall: negative  Hematologic/Lymphatic: right sleeve in place for lymphadema  Musculoskeletal: back pain and joint pain contributes this to her arthritis  Neurological: negative      Past Medical History:   Diagnosis Date    Arrhythmia     bradycardia in 30's /pacemaker inserted    Arthritis     RT KNEE    Asthma Dx age 75    Bradycardia 2009 Cardiology saw her during hospital stay; Now off coreg;  Sees Dr. Bin Tavarez    Breast cancer [facility])     Rt mastectomy 2/19/15    CAD (coronary artery disease)     Dr Kaveh Siwft Diabetes [facility])     Now seeing Dr. Andreas Moyer Diverticulitis     DJD (degenerative joint disease), lumbar     GERD (gastroesophageal reflux disease)     H. pylori infection 2008    Dr. Darwin Albright attack [facility]) April 2006    Hypercholesterolemia     Hypertension     Morbid obesity ([address]     Neuropathy, arm 2009    Right; Has had MRI and EMG at [address] [place] [address]    SEN (obstructive sleep apnea)     uses CPAP    Rectal bleeding 2009    Hospitalized;  Had colonoscopy and EGD (ok), gastric emptying study (normal), doppler mesenteric arteries (ok)    Sciatica     Has seen Dr. Maddy Bejarano    Stroke [facility]) 2009 & 2012    no residual problems     Past Surgical History:   Procedure Laterality Date    HX APPENDECTOMY  1979    HX BREAST LUMPECTOMY  12/12/2013    RIGHT BREAST DUCTAL EXCISION performed by Gila Wheeler MD at [facility] MAIN OR    HX CATARACT REMOVAL  2007    HX CHOLECYSTECTOMY  1979    HX COLONOSCOPY      HX HEART CATHETERIZATION      angioplasty    HX HYSTERECTOMY      fibroids    HX KNEE ARTHROSCOPY  1997    right    HX MASTECTOMY Right 2/19/2015    RIGHT BREAST MODIFIED RADICAL MASTECTOMY RIGHT SENTINEL NODE BIOPSY, RIGHT PORT A CATH INSERTION performed by Steff Palacios MD at [facility]    HX PACEMAKER      HX SHOULDER ARTHROSCOPY  2004    left      Family History   Problem Relation Age of Onset    Stroke Father     Cancer Sister      breast cancer    Hypertension Sister     Cancer Mother      Unknown type    Cancer Brother      Colon    Hypertension Brother     Anesth Problems Neg Hx      Social History   Substance Use Topics    Smoking status: Never Smoker    Smokeless tobacco: Never Used    Alcohol use No      Prior to Admission medications    Medication Sig Start Date End Date Taking? Authorizing Provider   hydrALAZINE (APRESOLINE) 25 mg tablet Take 25 mg by mouth three (3) times daily. Yes Historical Provider   benzonatate (TESSALON) 200 mg capsule Take 200 mg by mouth three (3) times daily as needed for Cough. Yes Historical Provider   insulin glargine (LANTUS) 100 unit/mL injection 15 units with breakfast and 15 units at bedtime. Indications: Diabetes Mellitus 2/23/17  Yes Michelle Singer MD   levoFLOXacin (LEVAQUIN) 500 mg tablet Take 1 Tab by mouth daily. 1/26/17  Yes Jaguar Grigsby MD   ergocalciferol (ERGOCALCIFEROL) 50,000 unit capsule Take 1 Cap by mouth every thirty (30) days. 11/9/16  Yes Alejandro Mcleod NP   traMADol (ULTRAM) 50 mg tablet Take 50 mg by mouth every six (6) hours as needed for Pain. Yes Historical Provider   glipiZIDE (GLUCOTROL) 5 mg tablet Take 1 Tab by mouth daily as needed. Pt states Dr Claudine Wu told her to take 1 po if BS >200 7/28/16  Yes Michelle Singer MD   isosorbide mononitrate ER (IMDUR) 60 mg CR tablet Take  by mouth every morning. 6/6/16  Yes Historical Provider   Blood Sugar Diagnostic, Disc (ASCENSIA BREEZE 2) strp Check your blood sugar twice daily. E11.42 6/20/16  Yes Michelle Singer MD   insulin syringe-needle U-100 (BD INSULIN SYRINGE ULTRA-FINE) 1/2 mL 31 gauge x 15/64\" syrg 1 Each by SubCUTAneous route two (2) times a day. Two shots daily. E11.42 6/20/16  Yes Michelle Singer MD   ondansetron (ZOFRAN ODT) 4 mg disintegrating tablet Take 1 Tab by mouth every eight (8) hours as needed for Nausea. 6/1/16  Yes Denzel Aragon MD   aspirin 81 mg chewable tablet Take 81 mg by mouth daily. Yes Historical Provider   albuterol (PROVENTIL VENTOLIN) 2.5 mg /3 mL (0.083 %) nebulizer solution 3 mL by Nebulization route every four (4) hours as needed for Wheezing. 9/20/15  Yes Malorie Levine DO   polyethylene glycol (MIRALAX) 17 gram/dose powder Take 17 g by mouth two (2) times a day.  Continue while taking pain medication  Patient taking differently: Take 17 g by mouth daily. Continue while taking pain medication 5/26/15  Yes Harriet Mendosa MD   MICROLET LANCET misc  12/16/14  Yes Historical Provider   benazepril (LOTENSIN) 40 mg tablet Take 40 mg by mouth every morning. Yes Historical Provider   bumetanide (BUMEX) 1 mg tablet Take 1 mg by mouth daily. 12/23/14  Yes Todd Andersen MD   atorvastatin (LIPITOR) 40 mg tablet Take 40 mg by mouth every morning. Indications: HYPERCHOLESTEROLEMIA   Yes Phys MD Ganesh   omeprazole (PRILOSEC) 20 mg capsule Take 40 mg by mouth two (2) times a day. Yes Phys Other, MD   nitroglycerin (NITROSTAT) 0.4 mg SL tablet by SubLINGual route every five (5) minutes as needed for Chest Pain. Yes Todd Andersen, MD   amlodipine (NORVASC) 10 mg tablet TAKE 1 TABLET BY MOUTH ONCE DAILY 4/9/11  Yes Raffaele Pinon MD              Allergies   Allergen Reactions    Codeine Itching    Duratuss [Phenylephrine-Guaifenesin] Nausea and Vomiting    Lisinopril-Hydrochlorothiazide Itching    Motrin [Ibuprofen] Nausea and Vomiting     With 800mg    Tape [Adhesive] Other (comments)     TEARS HER SKIN           Objective:     Vitals:    03/08/17 0940   BP: 164/88   Pulse: 65   Resp: 18   Temp: 98.9 °F (37.2 °C)   SpO2: 98%   Weight: 216 lb (98 kg)   Height: 5' 7\" (1.702 m)          Physical Exam:    GENERAL: alert, cooperative, obese  EYE: negative  LYMPHATIC: negative  THROAT & NECK: normal and no erythema or exudates noted. LUNG: clear to auscultation bilaterally  HEART: regular rate and rhythm  ABDOMEN: soft, non-tender  EXTREMITIES: right arm edema - has sleeve in place  SKIN: negative  NEUROLOGIC: negative              Assessment:     1.  Right breast carcinoma:  pT3 N3a M0 (Stage IIIC) infiltrating ductal carcinoma, Tumor size 7.5 cm, LN 17/19 +ve with extracapsular extension in 2 nodes, grade 3, ki 67 35%, ER -ve, [place] -ve, Her 2 -ve    ECOG PS 0  Intent of therapy - curative       S/P right sided mastectomy with axillary LN dissection  Received adjuvant chemotherapy   Taxotere, Cytoxan, s/p 3 Cycles    Therapy was discontinued due to severe side effects. Completed radiation to the chest wall and axilla  In remission  Lymphedema - followed by lymphedema clinic at [address] [place] [address] (10/13/2016) - normal  Bone scan (10/13/2016) - normal      2. Pulmonary embolism    Patient discontinued Rivaroxaban  Taking aspirin 81 mg daily      3. Vit D deficiency    Continue Vit D monthly      4. Abdominal pain    > CT Abdomen/ pelvis with contrast        Plan:       > Continue Vit D  > mammogram in October 2017  > CT of the abdomen  > F/U in 6 months        Signed by: Freddy Sargent MD                     March 8, 2017           CC. Chitra King MD  CC.  Virginia Morris MD

## 2017-03-23 NOTE — TELEPHONE ENCOUNTER
Is this okay for the patient to come in for an appointment in May or is something sooner needed? Please let me know. Thank you.      Nathaly Zaidi

## 2017-03-23 NOTE — TELEPHONE ENCOUNTER
----- Message from Abena Franz sent at 3/22/2017  3:30 PM EDT -----  Regarding: Dr. Monna Favre  Pt had to reschedule her 3 month follow up set for 3.23.17. The next available appt was 5.5. 17. pt agreed to the new date as long as the doctor is ok with it. If the appointment needs to be sooner than 5.2017, please contact the pt to reschedule.  Her contact number is, [phone]

## 2017-03-24 NOTE — PROGRESS NOTES
Faxed signed plan of care to rehab service/[facility], fax # [phone]. Fax confirmation received.

## 2017-05-05 NOTE — PATIENT INSTRUCTIONS
1) For now continue the Lantus 18 units twice a day. 2) Start taking Glipizide 1/2 pill with breakfast every day. If your blood sugar is 200+ take a whole pill.

## 2017-05-05 NOTE — MR AVS SNAPSHOT
Visit Information Date & Time Provider Department Dept. Phone Encounter #  
 5/5/2017 11:50 AM Maggy Roche, [address] [facility] End[facility]ology [phone] [id number] Follow-up Instructions Return in about 6 months (around 11/5/2017). Your Appointments 5/5/2017 11:50 AM  
Follow Up with Maggy Roche MD  
[facility] Appt Note: f/u    dm          3 month; r/s  
 [address] [facility] [address] [phone] [address] [place] [address]  
  
    
 6/6/2017 10:00 AM  
Follow Up with Sarah Beth Stevenson NP [address] at [facility]) Appt Note: 6 month follow up / cp $0 ct 7-13-16; 6 month follow up / cp $0 12/5/16 tt; 6 month follow up / cp $0 12/5/16 tt / rescheduled and lmg for patient re new date and time ct 4-18-17 [address] [facility] [address] [facility] [address]  
[facility] [phone]  
  
    
 9/8/2017 10:00 AM  
Any with Faustina Rust MD  
[address] [facility]) Appt Note: 6 mth f/u  
 [address] [facility] [address] [id number]  
[address] [facility] 600 [facility] [address] Fabrice Benitez Upcoming Health Maintenance Date Due DTaP/Tdap/Td series (1 - Tdap) 1/15/1960 ZOSTER VACCINE AGE 60> 1/15/1999 OSTEOPOROSIS SCREENING (DEXA) 1/15/2004 Pneumococcal 65+ High/Highest Risk (1 of 2 - PCV13) 1/15/2004 MEDICARE YEARLY EXAM 1/15/2004 MICROALBUMIN Q1 6/20/2017 HEMOGLOBIN A1C Q6M 6/21/2017 EYE EXAM RETINAL OR DILATED Q1 6/24/2017 INFLUENZA AGE 9 TO ADULT 8/1/2017 FOOT EXAM Q1 12/21/2017 LIPID PANEL Q1 5/5/2018 GLAUCOMA SCREENING Q2Y 6/24/2018 Allergies as of 5/5/2017  Review Complete On: 5/5/2017 By: Maggy Roche MD  
  
 Severity Noted Reaction Type Reactions Codeine  10/16/2009    Itching Duratuss [Phenylephrine-guaifenesin]  10/16/2009    Nausea and Vomiting Lisinopril-hydrochlorothiazide  10/16/2009    Itching Motrin [Ibuprofen]  10/16/2009    Nausea and Vomiting With 800mg Tape [Adhesive]  12/10/2014    Other (comments) TEARS HER SKIN Current Immunizations  Reviewed on 3/8/2017 Name Date Influenza Vaccine 9/1/2014 Not reviewed this visit You Were Diagnosed With   
  
 Codes Comments Type 2 diabetes mellitus with diabetic polyneuropathy, with long-term current use of insulin (HCC)    -  Primary ICD-10-CM: E11.42, Z79.4 ICD-9-CM: 250.60, 357.2, V58.67 Multinodular goiter (nontoxic)     ICD-10-CM: B44.0 ICD-9-CM: 241.1 Essential hypertension     ICD-10-CM: I10 
ICD-9-CM: 401.9 Hypercholesterolemia     ICD-10-CM: E78.00 ICD-9-CM: 272.0 Vitals BP Pulse Height(growth percentile) Weight(growth percentile) BMI OB Status 146/78 75 5' 7\" (1.702 m) 211 lb 6.4 oz (95.9 kg) 33.11 kg/m2 Hysterectomy Smoking Status Never Smoker BMI and BSA Data Body Mass Index Body Surface Area  
 33.11 kg/m 2 2.13 m 2 Preferred Pharmacy Pharmacy Name Phone [id number] [facility] [address] [place] [address] Your Updated Medication List  
  
   
This list is accurate as of: 5/5/17 11:33 AM.  Always use your most recent med list.  
  
  
  
  
 albuterol 2.5 mg /3 mL (0.083 %) nebulizer solution Commonly known as:  PROVENTIL VENTOLIN  
3 mL by Nebulization route every four (4) hours as needed for Wheezing. amLODIPine 10 mg tablet Commonly known as:  Claudell Hesselbach TAKE 1 TABLET BY MOUTH ONCE DAILY  
  
 aspirin 81 mg chewable tablet Take 81 mg by mouth daily. atorvastatin 40 mg tablet Commonly known as:  LIPITOR Take 40 mg by mouth every morning. Indications: HYPERCHOLESTEROLEMIA benazepril 40 mg tablet Commonly known as:  LOTENSIN Take 40 mg by mouth every morning. Blood Sugar Diagnostic, Disc Strp Commonly known as:  Ascensia Breeze 2 Check your blood sugar twice daily. E11.42  
  
 bumetanide 1 mg tablet Commonly known as:  Synetta Santamaria Take 1 mg by mouth daily. ergocalciferol 50,000 unit capsule Commonly known as:  ERGOCALCIFEROL Take 1 Cap by mouth every thirty (30) days. glipiZIDE 5 mg tablet Commonly known as:  Cory President Take 0.5 Tabs by mouth two (2) times a day. Take 1/2 every day with breakfast. If your blood sugar is above 200 take a whole tablet. insulin glargine 100 unit/mL injection Commonly known as:  LANTUS  
15 units with breakfast and 15 units at bedtime. Indications: Diabetes Mellitus  
  
 insulin syringe-needle U-100 1/2 mL 31 gauge x 15/64\" Syrg Commonly known as:  BD INSULIN SYRINGE ULTRA-FINE  
1 Each by SubCUTAneous route two (2) times a day. Two shots daily. E11.42  
  
 ipratropium 0.06 % nasal spray Commonly known as:  ATROVENT  
instill 2 sprays into each nostril three times a day - IF NEEDED FOR RUNNING NOSE  
  
 isosorbide mononitrate ER 60 mg CR tablet Commonly known as:  IMDUR Take  by mouth every morning. [facility] Generic drug:  Lancets NITROSTAT 0.4 mg SL tablet Generic drug:  nitroglycerin  
by SubLINGual route every five (5) minutes as needed for Chest Pain. omeprazole 20 mg capsule Commonly known as:  PRILOSEC Take 40 mg by mouth two (2) times a day. ondansetron 4 mg disintegrating tablet Commonly known as:  ZOFRAN ODT Take 1 Tab by mouth every eight (8) hours as needed for Nausea. polyethylene glycol 17 gram/dose powder Commonly known as:  Seabron Cunning Take 17 g by mouth two (2) times a day. Continue while taking pain medication  
  
 traMADol 50 mg tablet Commonly known as:  ULTRAM  
Take 50 mg by mouth every six (6) hours as needed for Pain. Prescriptions Sent to Pharmacy Refills  
 glipiZIDE (GLUCOTROL) 5 mg tablet 3 Sig: Take 0.5 Tabs by mouth two (2) times a day. Take 1/2 every day with breakfast. If your blood sugar is above 200 take a whole tablet. Class: Normal  
 [facility]: [facility] [address] [place], [address] [facility] #: [phone] Route: Oral  
  
We Performed the Following AMB POC HEMOGLOBIN A1C [23627 CPT(R)] HM DIABETES FOOT EXAM [7 Custom] NM COLLECTION VENOUS BLOOD,VENIPUNCTURE [id number] CPT(R)] Follow-up Instructions Return in about 6 months (around 11/5/2017). To-Do List   
 06/19/2017 10:00 AM  
  Appointment with [facility] 1 at [facility] CT ([phone]) CONTRAST STUDY: 1. The patient should not eat solid food four hours before the appointment but should be encouraged to drink clear liquids. 2.  If you have to drink oral contrast, please pick it up any weekday prior to your appointment, if you cannot please check in 2 hrs before appt time. 3.  The patient will require IV access for contrast administration. 4.  The patient should not take Ibuprofen (Advil, Motrin, etc.) and Naproxen Sodium (Aleve, etc.)  on the day of the exam. Stopping non-steroidal anti-inflammatory agents (NSAIDs) like Ibuprofen decreases the risk of kidney damage from the x-ray contrast (dye). 5.  Bring any non [facility] facility films/images pertaining to the area of interest with you on the day of appointment. 6.  Bring current lab work if available (within last 90 days [facility]) ***If scheduled at [facility], iSTAT is not available, labs will need to be done before appointment*** 7. Check in at registration at least 30 minutes before appt time unless you were instructed to do otherwise. Patient Instructions 1) For now continue the Lantus 18 units twice a day. 2) Start taking Glipizide 1/2 pill with breakfast every day. If your blood sugar is 200+ take a whole pill. Introducing [facility]! Stephani Guillen introduces [employer] [employer] patient portal. Now you can access parts of your medical record, email your doctor's office, and request medication refills online. 1. In your internet browser, go to [email or web address] 2. Click on the First Time User? Click Here link in the Sign In box. You will see the New Member Sign Up page. 3. Enter your [facility] Access Code exactly as it appears below. You will not need to use this code after youve completed the sign-up process. If you do not sign up before the expiration date, you must request a new code. · [facility] Access Code: [email or web address] Expires: 8/3/2017 11:33 AM 
 
4. Enter the last four digits of your Social Security Number (xxxx) and Date of Birth (mm/dd/yyyy) as indicated and click Submit. You will be taken to the next sign-up page. 5. Create a [facility] ID. This will be your [facility] login ID and cannot be changed, so think of one that is secure and easy to remember. 6. Create a [facility] password. You can change your password at any time. 7. Enter your Password Reset Question and Answer. This can be used at a later time if you forget your password. 8. Enter your e-mail address. You will receive e-mail notification when new information is available in [address] 9. Click Sign Up. You can now view and download portions of your medical record. 10. Click the Download Summary menu link to download a portable copy of your medical information. If you have questions, please visit the Frequently Asked Questions section of the [employer] website. Remember, [employer] is NOT to be used for urgent needs. For medical emergencies, dial 911. Now available from your iPhone and Android! Please provide this summary of care documentation to your next provider. Your primary care clinician is listed as Cruzito Reaves. If you have any questions after today's visit, please call [phone].

## 2017-05-05 NOTE — PROGRESS NOTES
Chief Complaint   Patient presents with    Diabetes     pcp and pharmacy verified. Eye exam UTD   Records since last visit reviewed. History of Present Illness: Dakota Collins is a 65 y.o. female here for follow up of diabetes and thyroid nodules. At her last appointment in December 2016 her BGs were doing very well and her A1C was 6.8% on Lantus 18 units BID and PRN Glipizide for BG >200. Her A1C today was 7.3%. She brought her BG logs with her today. Her FBGs have been in the 's range and her HS BGs have been in the 160-180's. Pt is still taking Lantus 18 units BID and PRN Glipizide for BG >200. Pt notes she had flu in January 2017 and it stuck with her till early April. She was on prednisone and this caused her BGs to increase. She was having BGs in the 200's. She has started seeing a new pulmonologist at [facility] and he is adjusting her asthma medications. Pt has continued to have issues of lymphedema from her mastectomy and has begun to follow the Lymphedema clinic at [facility].  Her most recent mammogram and bone scan showed no evidence of recurrence or metastasis. She continues to follow with Dr. Shon Hinojosa for breast cancer. Pt eats two meals per day. She has breakfast around 9-11AM, yesterday she had an egg, two slices of turkey patton, apple sauce, two slices of toast and coffee (cream). This is her daily breakfast.    She has dinner around 7PM last night she did had a turkey sandwich with lettuce and tomato and water. She will have a banana or an apple in the afternoon if she does get hungry. Pt has a hx of CAD and CVA. Pt has a hx of retinopathy, and neuropathy, but no nephropathy. Last eye exam December 2016, no retinopathy, will request these records. She is followed by Dr. Torie Guzman of podiatry.     At her hospitalization in May 2015 she had a CTA that showed the PE, but it also showed a left thyroid nodule 1.3x1.7 cm. review of prior CT chest imaging shows that this is not a new finding but pt has never had a work up for a thyroid nodule in the past. Pt denies issues of dysphagia, dysphonia, or chocking sensations. We decided in July to wait till she had completed her therapy for breast cancer before starting the work up for the thyroid nodule. She had a thyroid US in December 2015, which showed multiple nodules, the two dominate nodules were 1.8cm and 2.6 cm. In March of 2016 she had FNA of both dominate nodules and they were benign. Her last thyroid US was in December 2016 and it was unchanged from the previous [address]. She denies issues of dysphagia, dysphonia or chocking sensations. Current Outpatient Prescriptions   Medication Sig    glipiZIDE (GLUCOTROL) 5 mg tablet Take 0.5 Tabs by mouth two (2) times a day. Take 1/2 every day with breakfast. If your blood sugar is above 200 take a whole tablet.  polyethylene glycol (MIRALAX) 17 gram/dose powder Take 17 g by mouth two (2) times a day. Continue while taking pain medication    insulin glargine (LANTUS) 100 unit/mL injection 15 units with breakfast and 15 units at bedtime. Indications: Diabetes Mellitus (Patient taking differently: 18 units with breakfast and 18 units at bedtime. Indications: Diabetes Mellitus)    ergocalciferol (ERGOCALCIFEROL) 50,000 unit capsule Take 1 Cap by mouth every thirty (30) days.  traMADol (ULTRAM) 50 mg tablet Take 50 mg by mouth every six (6) hours as needed for Pain.  isosorbide mononitrate ER (IMDUR) 60 mg CR tablet Take  by mouth every morning.  Blood Sugar Diagnostic, Disc (ASCENSIA BREEZE 2) strp Check your blood sugar twice daily. E11.42    insulin syringe-needle U-100 (BD INSULIN SYRINGE ULTRA-FINE) 1/2 mL 31 gauge x 15/64\" syrg 1 Each by SubCUTAneous route two (2) times a day. Two shots daily. E11.42    ondansetron (ZOFRAN ODT) 4 mg disintegrating tablet Take 1 Tab by mouth every eight (8) hours as needed for Nausea.     aspirin 81 mg chewable tablet Take 81 mg by mouth daily.  MICROLET LANCET misc     benazepril (LOTENSIN) 40 mg tablet Take 40 mg by mouth every morning.  bumetanide (BUMEX) 1 mg tablet Take 1 mg by mouth daily.  atorvastatin (LIPITOR) 40 mg tablet Take 40 mg by mouth every morning. Indications: HYPERCHOLESTEROLEMIA    omeprazole (PRILOSEC) 20 mg capsule Take 40 mg by mouth two (2) times a day.  nitroglycerin (NITROSTAT) 0.4 mg SL tablet by SubLINGual route every five (5) minutes as needed for Chest Pain.  amlodipine (NORVASC) 10 mg tablet TAKE 1 TABLET BY MOUTH ONCE DAILY    ipratropium (ATROVENT) 0.06 % nasal spray instill 2 sprays into each nostril three times a day - IF NEEDED FOR RUNNING NOSE    albuterol (PROVENTIL VENTOLIN) 2.5 mg /3 mL (0.083 %) nebulizer solution 3 mL by Nebulization route every four (4) hours as needed for Wheezing. No current facility-administered medications for this visit. Allergies   Allergen Reactions    Codeine Itching    Duratuss [Phenylephrine-Guaifenesin] Nausea and Vomiting    Lisinopril-Hydrochlorothiazide Itching    Motrin [Ibuprofen] Nausea and Vomiting     With 800mg    Tape [Adhesive] Other (comments)     TEARS HER SKIN     Review of Systems:  - Eyes: no blurry vision or double vision  - Cardiovascular: no chest pain  - Respiratory: no shortness of breath  - Musculoskeletal: no myalgias  - Neurological: no numbness/tingling in extremities    Physical Examination:  Blood pressure 146/78, pulse 75, height 5' 7\" (1.702 m), weight 211 lb 6.4 oz (95.9 kg).   - General: pleasant, no distress, good eye contact   - Neck: no carotid bruits  - Cardiovascular: regular, normal rate, nl s1 and s2, no m/r/g, 2+ DP pulses   - Respiratory: clear bilaterally        -     Abd: Non-tender, NABS  - Integumentary: 1+ edema, no foot ulcers, sensation to monofilament and vibration intact bilaterally  - Psychiatric: normal mood and affect    Data Reviewed:   Her A1C today was 7. 3%.    Assessment/Plan:   1) DM > Her A1C today was 7.3%. Pt has been on steroids, but her HS BGs have been high off the steroids as well. Sill continue the Lantus 18 units BID and have pt start Glipizide 2.5mg with breakfast (5mg if +). Pt to check her BG twice per day and mail her BG log to me in 3 weeks. With pt's hx of neuropathy she needs to have diabetic shoes and shoe inserts. 2) Thyroid nodule > Pt has no new or worsening symptoms of dysphagia. Her last Thyroid US in December 2016 was unchanged. 3) HTN > Her SBP was a little high today, pt is on an ACEI for renal protection. Will continue to monitor. 4) HLD > Pt is taking Atorvastatin 40mg daily. Pt voices understanding and agreement with the plan. Follow-up Disposition:  Return in about 3 months (around 8/5/2017).     Copy sent to:  Dr. Raya Valenzuela

## 2017-05-29 PROBLEM — R10.32 LLQ PAIN: Status: ACTIVE | Noted: 2017-01-01

## 2017-05-29 PROBLEM — R53.1 LEFT-SIDED WEAKNESS: Status: ACTIVE | Noted: 2017-01-01

## 2017-05-29 PROBLEM — I50.32 DIASTOLIC CHF, CHRONIC (HCC): Status: ACTIVE | Noted: 2017-01-01

## 2017-05-29 PROBLEM — Z95.0 PACEMAKER: Status: ACTIVE | Noted: 2017-01-01

## 2017-05-29 NOTE — IP AVS SNAPSHOT
[address] [facility] 
[phone] Patient: Dakota Collins MRN: [id number]  You are allergic to the following Allergen Reactions Codeine Itching Duratuss (Phenylephrine-Guaifenesin) Nausea and Vomiting Lisinopril-Hydrochlorothiazide Itching Motrin (Ibuprofen) Nausea and Vomiting With 800mg Tape (Adhesive) Other (comments) TEARS HER SKIN Recent Documentation Height Weight BMI OB Status Smoking Status 1.702 m 92.4 kg 31.9 kg/m2 Hysterectomy Never Smoker Emergency Contacts Name Discharge Info Relation Home Work Mobile Deedee Zavala DISCHARGE CAREGIVER [3] Daughter [21] [phone] Brian Felipe DISCHARGE CAREGIVER [3] Daughter [21] [phone] Deedee Weston  Child [2] [phone] About your hospitalization You were admitted on:  May 29, 2017 You last received care in the:  [facility] 3 NEUROSCIENCE TELEMETRY You were discharged on:  May 31, 2017 Unit phone number:  [phone] Why you were hospitalized Your primary diagnosis was:  Not on File Your diagnoses also included:  Left-Sided Weakness, Llq Pain, Type 2 Diabetes Mellitus With Diabetic Polyneuropathy, With Long-Term Current Use Of Insulin (Hcc), S/P Right Mastectomy, Gerd (Gastroesophageal Reflux Disease), Essential Hypertension, Cad (Coronary Artery Disease), Pacemaker, Diastolic Chf, Chronic (Hcc), Transient Ischemic Attack Involving Right Internal Carotid Artery, Stenosis Of Both Internal Carotid Arteries Providers Seen During Your Hospitalizations Provider Role Specialty Primary office phone Stanley Rodríguez MD Attending Provider Emergency Medicine [phone] Duke Garcia MD Attending Provider Internal Medicine [phone] Your Primary Care Physician (PCP) Primary Care Physician Office Phone Office Fax Patricia Collins [phone] Follow-up Information Follow up With Details Comments Contact Info Yevgeniy Velázquez MD On 6/8/2017 8:00am [address] [facility] [address] 
[phone] 656 [facility]  PT and OT services  [address] 
1st Floor [place] [address] 
[phone] Your Appointments Tuesday June 06, 2017 10:00 AM EDT Follow Up with Cher Razo NP [address] at "[employer] [facility] [address] [facility]) [address] [facility] [address]  
[phone] Monday June 19, 2017 10:00 AM EDT  
CT ABD W CONT with [address] 1 MRM RAD CT ([address]) [address] [facility]  
[phone] CONTRAST STUDY: 1. The patient should not eat solid food four hours before the appointment but should be encouraged to drink clear liquids. 2.  If you have to drink oral contrast, please pick it up any weekday prior to your appointment, if you cannot please check in 2 hrs before appt time. 3.  The patient will require IV access for contrast administration. 4.  The patient should not take Ibuprofen (Advil, Motrin, etc.) and Naproxen Sodium (Aleve, etc.)  on the day of the exam. Stopping non-steroidal anti-inflammatory agents (NSAIDs) like Ibuprofen decreases the risk of kidney damage from the x-ray contrast (dye). 5.  Bring any non [facility] facility films/images pertaining to the area of interest with you on the day of appointment. 6.  Bring current lab work if available (within last 90 days [facility]) ***If scheduled at [facility], iSTAT is not available, labs will need to be done before appointment*** 7. Check in at registration at least 30 minutes before appt time unless you were instructed to do otherwise.   
  
    
Patient should report to outpatient registration ([facility]) 30 minutes prior to the appointment time unless instructed otherwise. Current Discharge Medication List  
  
START taking these medications Dose & Instructions Dispensing Information Comments Morning Noon Evening Bedtime  
 aspirin 325 mg tablet Commonly known as:  ASPIRIN Replaces:  aspirin 81 mg chewable tablet Your last dose was: Your next dose is:    
   
   
 Dose:  325 mg Take 1 Tab by mouth daily. Quantity:  30 Tab Refills:  0 CONTINUE these medications which have CHANGED Dose & Instructions Dispensing Information Comments Morning Noon Evening Bedtime  
 atorvastatin 80 mg tablet Commonly known as:  LIPITOR What changed:   
- medication strength 
- how much to take - when to take this Your last dose was: Your next dose is:    
   
   
 Dose:  80 mg Take 1 Tab by mouth daily. Indications: hypercholesterolemia Quantity:  30 Tab Refills:  0  
     
   
   
   
  
 insulin glargine 100 unit/mL injection Commonly known as:  LANTUS What changed:   
- how much to take - when to take this 
- additional instructions Your last dose was: Your next dose is:    
   
   
 15 units with breakfast and 15 units at bedtime. Indications: Diabetes Mellitus Quantity:  1 Vial  
Refills:  0 CONTINUE these medications which have NOT CHANGED Dose & Instructions Dispensing Information Comments Morning Noon Evening Bedtime  
 albuterol 2.5 mg /3 mL (0.083 %) nebulizer solution Commonly known as:  PROVENTIL VENTOLIN Your last dose was: Your next dose is:    
   
   
 Dose:  2.5 mg  
3 mL by Nebulization route every four (4) hours as needed for Wheezing. Quantity:  24 Each Refills:  0  
     
   
   
   
  
 amLODIPine 10 mg tablet Commonly known as:  Jenni Arriola Your last dose was: Your next dose is: TAKE 1 TABLET BY MOUTH ONCE DAILY Quantity:  30 Tab Refills:  10 benazepril 40 mg tablet Commonly known as:  LOTENSIN Your last dose was: Your next dose is:    
   
   
 Dose:  40 mg Take 40 mg by mouth every morning. Refills:  0 Blood Sugar Diagnostic, Disc Strp Commonly known as:  Ascensia Breeze 2 Your last dose was: Your next dose is:    
   
   
 Check your blood sugar twice daily. E11.42 Quantity:  100 Strip Refills:  6  
     
   
   
   
  
 bumetanide 1 mg tablet Commonly known as:  Jovanna Kong Your last dose was: Your next dose is:    
   
   
 Dose:  1 mg Take 1 mg by mouth daily. Refills:  0  
     
   
   
   
  
 ergocalciferol 50,000 unit capsule Commonly known as:  ERGOCALCIFEROL Your last dose was: Your next dose is:    
   
   
 Dose:  02563 Units Take 1 Cap by mouth every thirty (30) days. Quantity:  3 Cap Refills:  2  
     
   
   
   
  
 glipiZIDE 5 mg tablet Commonly known as:  Alysia River Your last dose was: Your next dose is:    
   
   
 Dose:  2.5 mg Take 0.5 Tabs by mouth two (2) times a day. Take 1/2 every day with breakfast. If your blood sugar is above 200 take a whole tablet. Quantity:  30 Tab Refills:  3 HYDROcodone-acetaminophen 5-325 mg per tablet Commonly known as:  Kamla Mandril Your last dose was: Your next dose is:    
   
   
 Dose:  1 Tab Take 1 Tab by mouth every six (6) hours as needed for Pain. Refills:  0  
     
   
   
   
  
 hydrOXYzine HCl 25 mg tablet Commonly known as:  ATARAX Your last dose was: Your next dose is:    
   
   
 Dose:  25 mg Take 25 mg by mouth every eight (8) hours as needed for Itching. Refills:  0  
     
   
   
   
  
 insulin syringe-needle U-100 1/2 mL 31 gauge x 15/64\" Syrg Commonly known as:  BD INSULIN SYRINGE ULTRA-FINE  
   
 Your last dose was: Your next dose is:    
   
   
 Dose:  1 Each  
1 Each by SubCUTAneous route two (2) times a day. Two shots daily. E11.42 Quantity:  100 Pen Needle Refills:  3  
     
   
   
   
  
 ipratropium 0.06 % nasal spray Commonly known as:  ATROVENT Your last dose was: Your next dose is:    
   
   
 instill 2 sprays into each nostril three times a day - IF NEEDED FOR RUNNING NOSE Refills:  0  
     
   
   
   
  
 isosorbide mononitrate ER 60 mg CR tablet Commonly known as:  IMDUR Your last dose was: Your next dose is: Take  by mouth every morning. Refills:  0  
     
   
   
   
  
 metoclopramide HCl 5 mg tablet Commonly known as:  REGLAN Your last dose was: Your next dose is:    
   
   
 Dose:  5 mg Take 5 mg by mouth Before breakfast, lunch, and dinner. Refills:  0 [place] Generic drug:  Lancets Your last dose was: Your next dose is:    
   
   
  Refills:  0  
     
   
   
   
  
 NITROSTAT 0.4 mg SL tablet Generic drug:  nitroglycerin Your last dose was: Your next dose is:    
   
   
 by SubLINGual route every five (5) minutes as needed for Chest Pain. Refills:  0  
     
   
   
   
  
 omeprazole 20 mg capsule Commonly known as:  PRILOSEC Your last dose was: Your next dose is:    
   
   
 Dose:  40 mg Take 40 mg by mouth two (2) times a day. Refills:  0  
     
   
   
   
  
 ondansetron 4 mg disintegrating tablet Commonly known as:  ZOFRAN ODT Your last dose was: Your next dose is:    
   
   
 Dose:  4 mg Take 1 Tab by mouth every eight (8) hours as needed for Nausea. Quantity:  60 Tab Refills:  2  
     
   
   
   
  
 polyethylene glycol 17 gram/dose powder Commonly known as:  Yrn Khanna Your last dose was: Your next dose is:    
   
   
 Dose:  17 g Take 17 g by mouth two (2) times a day. Continue while taking pain medication Quantity:  225 g Refills:  2 STOP taking these medications   
 aspirin 81 mg chewable tablet Replaced by:  aspirin 325 mg tablet Where to Get Your Medications Information on where to get these meds will be given to you by the nurse or doctor. ! Ask your nurse or doctor about these medications  
  aspirin 325 mg tablet  
 atorvastatin 80 mg tablet Discharge Instructions None Discharge Orders None Introducing [facility] SERVICES! Cricket Yost introduces [employer] patient portal. Now you can access parts of your medical record, email your doctor's office, and request medication refills online. 1. In your internet browser, go to [email or web address] 2. Click on the First Time User? Click Here link in the Sign In box. You will see the New Member Sign Up page. 3. Enter your [employer] Access Code exactly as it appears below. You will not need to use this code after youve completed the sign-up process. If you do not sign up before the expiration date, you must request a new code. · [employer] Access Code: [email or web address] Expires: 8/3/2017 11:33 AM 
 
4. Enter the last four digits of your Social Security Number (xxxx) and Date of Birth (mm/dd/yyyy) as indicated and click Submit. You will be taken to the next sign-up page. 5. Create a [employer] ID. This will be your [employer] login ID and cannot be changed, so think of one that is secure and easy to remember. 6. Create a [employer] password. You can change your password at any time. 7. Enter your Password Reset Question and Answer. This can be used at a later time if you forget your password. 8. Enter your e-mail address. You will receive e-mail notification when new information is available in [address] 9. Click Sign Up. You can now view and download portions of your medical record. 10. Click the Download Summary menu link to download a portable copy of your medical information. If you have questions, please visit the Frequently Asked Questions section of the [employer] website. Remember, MyChart is NOT to be used for urgent needs. For medical emergencies, dial 911. Now available from your iPhone and Android! General Information Please provide this summary of care documentation to your next provider. Patient Signature:  ____________________________________________________________ Date:  ____________________________________________________________  
  
[facility] Provider Signature:  ____________________________________________________________ Date:  ____________________________________________________________

## 2017-05-29 NOTE — ED TRIAGE NOTES
Patient arrives to ED with daughter. Pt. states she had been resting and got up and went downstairs and got some water and became dizzy. She complains of right sided headache, abdominal pain, and chest pain.

## 2017-05-29 NOTE — ED NOTES
Bedside and Verbal shift change report given to Eugenio Reeves RN (oncoming nurse) by Chastity Issa RN (offgoing nurse). Report included the following information SBAR, Kardex, ED Summary, MAR and Accordion.

## 2017-05-29 NOTE — ED NOTES
Assisted patient to bedside commode. Urine sample collected at this time. Patient back in bed, on monitor x2, call bell within reach.

## 2017-05-29 NOTE — IP AVS SNAPSHOT
Current Discharge Medication List  
  
START taking these medications Dose & Instructions Dispensing Information Comments Morning Noon Evening Bedtime  
 aspirin 325 mg tablet Commonly known as:  ASPIRIN Replaces:  aspirin 81 mg chewable tablet Your last dose was: Your next dose is:    
   
   
 Dose:  325 mg Take 1 Tab by mouth daily. Quantity:  30 Tab Refills:  0 CONTINUE these medications which have CHANGED Dose & Instructions Dispensing Information Comments Morning Noon Evening Bedtime  
 atorvastatin 80 mg tablet Commonly known as:  LIPITOR What changed:   
- medication strength 
- how much to take - when to take this Your last dose was: Your next dose is:    
   
   
 Dose:  80 mg Take 1 Tab by mouth daily. Indications: hypercholesterolemia Quantity:  30 Tab Refills:  0  
     
   
   
   
  
 insulin glargine 100 unit/mL injection Commonly known as:  LANTUS What changed:   
- how much to take - when to take this 
- additional instructions Your last dose was: Your next dose is:    
   
   
 15 units with breakfast and 15 units at bedtime. Indications: Diabetes Mellitus Quantity:  1 Vial  
Refills:  0 CONTINUE these medications which have NOT CHANGED Dose & Instructions Dispensing Information Comments Morning Noon Evening Bedtime  
 albuterol 2.5 mg /3 mL (0.083 %) nebulizer solution Commonly known as:  PROVENTIL VENTOLIN Your last dose was: Your next dose is:    
   
   
 Dose:  2.5 mg  
3 mL by Nebulization route every four (4) hours as needed for Wheezing. Quantity:  24 Each Refills:  0  
     
   
   
   
  
 amLODIPine 10 mg tablet Commonly known as:  Edna Castillo Your last dose was: Your next dose is: TAKE 1 TABLET BY MOUTH ONCE DAILY Quantity:  30 Tab Refills:  10  
     
   
   
   
  
 benazepril 40 mg tablet Commonly known as:  LOTENSIN Your last dose was: Your next dose is:    
   
   
 Dose:  40 mg Take 40 mg by mouth every morning. Refills:  0 Blood Sugar Diagnostic, Disc Strp Commonly known as:  Silvestre Purvise 2 Your last dose was: Your next dose is:    
   
   
 Check your blood sugar twice daily. E11.42 Quantity:  100 Strip Refills:  6  
     
   
   
   
  
 bumetanide 1 mg tablet Commonly known as:  Tennis Pancake Your last dose was: Your next dose is:    
   
   
 Dose:  1 mg Take 1 mg by mouth daily. Refills:  0  
     
   
   
   
  
 ergocalciferol 50,000 unit capsule Commonly known as:  ERGOCALCIFEROL Your last dose was: Your next dose is:    
   
   
 Dose:  77886 Units Take 1 Cap by mouth every thirty (30) days. Quantity:  3 Cap Refills:  2  
     
   
   
   
  
 glipiZIDE 5 mg tablet Commonly known as:  Bon Cruz Your last dose was: Your next dose is:    
   
   
 Dose:  2.5 mg Take 0.5 Tabs by mouth two (2) times a day. Take 1/2 every day with breakfast. If your blood sugar is above 200 take a whole tablet. Quantity:  30 Tab Refills:  3 HYDROcodone-acetaminophen 5-325 mg per tablet Commonly known as:  Jerl Ards Your last dose was: Your next dose is:    
   
   
 Dose:  1 Tab Take 1 Tab by mouth every six (6) hours as needed for Pain. Refills:  0  
     
   
   
   
  
 hydrOXYzine HCl 25 mg tablet Commonly known as:  ATARAX Your last dose was: Your next dose is:    
   
   
 Dose:  25 mg Take 25 mg by mouth every eight (8) hours as needed for Itching. Refills:  0  
     
   
   
   
  
 insulin syringe-needle U-100 1/2 mL 31 gauge x 15/64\" Syrg Commonly known as:  BD INSULIN SYRINGE ULTRA-FINE Your last dose was: Your next dose is:    
   
   
 Dose:  1 Each 1 Each by SubCUTAneous route two (2) times a day. Two shots daily. E11.42 Quantity:  100 Pen Needle Refills:  3  
     
   
   
   
  
 ipratropium 0.06 % nasal spray Commonly known as:  ATROVENT Your last dose was: Your next dose is:    
   
   
 instill 2 sprays into each nostril three times a day - IF NEEDED FOR RUNNING NOSE Refills:  0  
     
   
   
   
  
 isosorbide mononitrate ER 60 mg CR tablet Commonly known as:  IMDUR Your last dose was: Your next dose is: Take  by mouth every morning. Refills:  0  
     
   
   
   
  
 metoclopramide HCl 5 mg tablet Commonly known as:  REGLAN Your last dose was: Your next dose is:    
   
   
 Dose:  5 mg Take 5 mg by mouth Before breakfast, lunch, and dinner. Refills:  0 [place] Generic drug:  Lancets Your last dose was: Your next dose is:    
   
   
  Refills:  0  
     
   
   
   
  
 NITROSTAT 0.4 mg SL tablet Generic drug:  nitroglycerin Your last dose was: Your next dose is:    
   
   
 by SubLINGual route every five (5) minutes as needed for Chest Pain. Refills:  0  
     
   
   
   
  
 omeprazole 20 mg capsule Commonly known as:  PRILOSEC Your last dose was: Your next dose is:    
   
   
 Dose:  40 mg Take 40 mg by mouth two (2) times a day. Refills:  0  
     
   
   
   
  
 ondansetron 4 mg disintegrating tablet Commonly known as:  ZOFRAN ODT Your last dose was: Your next dose is:    
   
   
 Dose:  4 mg Take 1 Tab by mouth every eight (8) hours as needed for Nausea. Quantity:  60 Tab Refills:  2  
     
   
   
   
  
 polyethylene glycol 17 gram/dose powder Commonly known as:  Marco Antonio Sharp Your last dose was: Your next dose is:    
   
   
 Dose:  17 g Take 17 g by mouth two (2) times a day. Continue while taking pain medication Quantity:  225 g Refills:  2 STOP taking these medications   
 aspirin 81 mg chewable tablet Replaced by:  aspirin 325 mg tablet Where to Get Your Medications Information on where to get these meds will be given to you by the nurse or doctor. ! Ask your nurse or doctor about these medications  
  aspirin 325 mg tablet  
 atorvastatin 80 mg tablet

## 2017-05-30 NOTE — PROGRESS NOTES
Pt is a 65 y.o  [place] female admitted with Left Sided Weakness. Pt was alert, oriented and in no distress resting in bed with her daughter and niece at the bedside. Demographic information verified and all is correct. Pt lives with her daughter Patricia Steele in a 2 story home with 3-4 steps to the entrance. Pt uses a cane at times. Prior to admission, pt was independent with her ADL's and IADL's. Pt doesn't drive and her family assists with transporting her. Pt had home health in the past after a mastectomy. Preferred pharmacy is [facility] [address]. Pt recently visited her PCP Dr. Bushra Smith and also saw him 3 mths ago. Pt's daughter can transport her home at discharge. CM will continue to follow pt for discharge planning needs. Care Management Interventions  PCP Verified by CM: Yes (Dr. Bushra Smith - saw PCP Friday)  Mode of Transport at Discharge: Other (see comment) (pt's daughters can transport by car)  Transition of Care Consult (CM Consult): Discharge Planning  Discharge Durable Medical Equipment: No (pt has a cane)  Physical Therapy Consult: Yes  Occupational Therapy Consult: Yes  Speech Therapy Consult: Yes  Current Support Network:  Other, Own Home (lives with her daughter in a 2 story home with 3-4 steps to the entrance)  Confirm Follow Up Transport: Family  Discharge Location  [facility] Pl[facility] [employer] [facility] [address] [place]

## 2017-05-30 NOTE — ED NOTES
Bedside shift change report given to [address] (oncoming nurse) by Roman Burrows RN (offgoing nurse). Report included the following information SBAR, Kardex, ED Summary, Intake/Output, MAR and Recent Results.

## 2017-05-30 NOTE — PROGRESS NOTES
Stroke Education provided to patient and the following topics were discussed    1. Patients personal risk factors for stroke are hypertension, hyperlipidemia, diabetes mellitus, HgA1C, obesity, sleep apnea screen and prior stroke    2. Warning signs of Stroke:        * Sudden numbness or weakness of the face, arm or leg, especially on one side of          The body            * Sudden confusion, trouble speaking or understanding        * Sudden trouble seeing in one or both eyes        * Sudden trouble walking, dizziness, loss of balance or coordination        * Sudden severe headache with no known cause      3. Importance of activation Emergency Medical Services ( 9-1-1 ) immediately if experience any warning signs of stroke. 4. Be sure and schedule a follow-up appointment with your primary care doctor or any specialists as instructed. 5. You must take medicine every day to treat your risk factors for stroke. Be sure to take your medicines exactly as your doctor tells you: no more, no less. Know what your medicines are for , what they do. Anti-thrombotics /anticoagulants can help prevent strokes. You are taking the following medicine(s): lovenox    6. Smoking and second-hand smoke greatly increase your risk of stroke, cardiovascular disease and death. Smoking history never    7. Information provided was Verbal Education    8. Documentation of teaching completed in Patient Education Activity and on Care Plan with teaching response noted?   yes

## 2017-05-30 NOTE — CONSULTS
NEUROLOGY CONSULTATION NOTE       DATE OF CONSULTATION: 5/30/2017    CONSULTED BY: Nolberto Daniel MD    Reason for Consult  I have been asked to see the patient in neurological consultation to render advice and opinion regarding stroke    HISTORY OF PRESENT ILLNESS  Pawan Mendoza is a 65 y.o. female who presents to the hospital because Yesterday morning, she woke up and went downstairs. She had her breakfast and vacuumed her room and then had sudden onset dizziness which she states was like she was falling backwards. After that she did have some slurred speech along with left upper extremity numbness. She did have a headache which was 8/10 in severity, throbbing and holocephalic. She states that her symptoms have improved but she still continues to have numbness in the left upper extremity. She has had strokes in 2009 and 2013. During those strokes as well, she had numbness on the left side of the face and left side of the body. She recovered quite well from that. She does have a pacemaker and hence we cannot get MRI of the brain.     ROS  A ten system review of constitutional, cardiovascular, respiratory, musculoskeletal, endocrine, skin, SHEENT, genitourinary, psychiatric and neurologic systems was obtained and is unremarkable except as stated in HPI     PMH  Past Medical History:   Diagnosis Date    Arrhythmia     bradycardia in 30's /pacemaker inserted    Arthritis     RT KNEE    Asthma Dx age 75    Bradycardia 2009    Cardiology saw her during hospital stay; Now off coreg;  Sees Dr. Scout Joshua    Breast cancer [facility])     Rt mastectomy 2/19/15    CAD (coronary artery disease)     Dr Blayne Tyler Diabetes [facility])     Now seeing Dr. Rosalia Breaux Diverticulitis     DJD (degenerative joint disease), lumbar     GERD (gastroesophageal reflux disease)     H. pylori infection 2008    Dr. Rishi Mercado attack [facility]) April 2006    Hypercholesterolemia     Hypertension     [facility]bid [facility] [address] [address]     Neuropathy, arm 2009    Right; Has had MRI and EMG at [id number] [place] [address]    SEN (obstructive sleep apnea)     uses CPAP    Rectal bleeding 2009    Hospitalized; Had colonoscopy and EGD (ok), gastric emptying study (normal), doppler mesenteric arteries (ok)    Sciatica     Has seen Dr. Hitesh Monae    Stroke [facility]) 2009 & 2012    no residual problems       FH  Family History   Problem Relation Age of Onset    Stroke Father     Cancer Sister      breast cancer    Hypertension Sister     Cancer Mother      Unknown type    Cancer Brother      Colon    Hypertension Brother     Anesth Problems Neg Hx        SH  Social History     Social History    Marital status:      Spouse name: N/A    Number of children: N/A    Years of education: N/A     Social History Main Topics    Smoking status: Never Smoker    Smokeless tobacco: Never Used    Alcohol use No    Drug use: No    Sexual activity: No     Other Topics Concern    None     Social History Narrative    Lives in [place] with oldest daughter and great-grandson 8.  Occupation retired, worked at [employer] as a , [employer], plays with [employer], likes [employer] and sings in the choir       ALLERGIES  Allergies   Allergen Reactions    Codeine Itching    Duratuss [Phenylephrine-Guaifenesin] Nausea and Vomiting    Lisinopril-Hydrochlorothiazide Itching    Motrin [Ibuprofen] Nausea and Vomiting     With 800mg    Tape [Adhesive] Other (comments)     TEARS HER SKIN       PHYSICAL EXAM  EXAMINATION:   Patient Vitals for the past 24 hrs:   Temp Pulse Resp BP SpO2   05/30/17 1414 98.5 °F (36.9 °C) 63 18 163/79 99 %   05/30/17 1247 - 77 - 157/66 97 %   05/30/17 1018 98 °F (36.7 °C) 75 - 165/68 100 %   05/30/17 0803 - 61 - 156/67 99 %   05/30/17 0700 - 65 - 158/79 98 %   05/30/17 0600 - 65 - 137/66 99 %   05/30/17 0511 - - - 143/55 -   05/30/17 0500 - 60 13 143/52 99 %   05/30/17 0448 - 71 18 152/70 100 %   05/30/17 0400 - 79 - 110/67 98 %   05/30/17 0245 - 65 - 153/68 100 %   05/30/17 0200 - 64 - (!) 135/94 96 %   05/30/17 0100 - 63 - 160/65 98 %   05/29/17 2300 - - - 160/65 98 %   05/29/17 2204 - - - 152/79 99 %   05/29/17 2054 - - - 165/79 100 %   05/29/17 2000 - - - 153/70 100 %   05/29/17 1915 - - - 132/86 100 %   05/29/17 1900 - - - 155/64 -   05/29/17 1735 - - - - 99 %   05/29/17 1734 - - - 147/70 -   05/29/17 1708 98.6 °F (37 °C) 90 14 (!) 165/101 99 %        General:   General appearance: Pt is in no acute distress   Distal pulses are preserved  Fundoscopic exam: attempted    Neurological Examination:   Mental Status:  AAO x3. Speech is fluent. Follows commands, has normal fund of knowledge, attention, short term recall, comprehension and insight. Cranial Nerves: Visual fields are full. PERRL, Extraocular movements are full. Facial sensation decreased on the left. Facial movement intact, symmetric. Hearing intact to conversation. Palate elevates symmetrically. Shoulder shrug symmetric. Tongue midline. Motor: Strength is 5/5 in all 4 ext. Normal tone. No atrophy. Sensation: Decreased pinprick sensation in the left upper extremity    Reflexes: DTRs 2+ throughout. Coordination/Cerebellar: Intact to finger-nose-finger     Gait: Deferred    Skin: No significant bruising or lacerations. LAB DATA REVIEWED:    Results for orders placed or performed during the hospital encounter of 05/29/17   CBC WITH AUTOMATED DIFF   Result Value Ref Range    WBC 4.2 3.6 - 11.0 K/uL    RBC 3.35 (L) 3.80 - 5.20 M/uL    HGB 8.7 (L) 11.5 - 16.0 g/dL    HCT 26.0 (L) 35.0 - 47.0 %    MCV 77.6 (L) 80.0 - 99.0 FL    MCH 26.0 26.0 - 34.0 PG    MCHC 33.5 30.0 - 36.5 g/dL    RDW 15.5 (H) 11.5 - 14.5 %    PLATELET 421 180 - 119 K/uL    NEUTROPHILS 65 32 - 75 %    LYMPHOCYTES 23 12 - 49 %    MONOCYTES 10 5 - 13 %    EOSINOPHILS 2 0 - 7 %    BASOPHILS 0 0 - 1 %    ABS. NEUTROPHILS 2.7 1.8 - 8.0 K/UL    ABS. LYMPHOCYTES 1.0 0.8 - 3.5 K/UL    ABS.  MONOCYTES 0.4 0.0 - 1.0 K/UL    ABS. EOSINOPHILS 0.1 0.0 - 0.4 K/UL    ABS. BASOPHILS 0.0 0.0 - 0.1 K/UL   METABOLIC PANEL, COMPREHENSIVE   Result Value Ref Range    Sodium 142 136 - 145 mmol/L    Potassium 4.0 3.5 - 5.1 mmol/L    Chloride 106 97 - 108 mmol/L    CO2 25 21 - 32 mmol/L    Anion gap 11 5 - 15 mmol/L    Glucose 125 (H) 65 - 100 mg/dL    BUN 16 6 - 20 MG/DL    Creatinine 1.09 (H) 0.55 - 1.02 MG/DL    BUN/Creatinine ratio 15 12 - 20      GFR est AA 59 (L) >60 ml/min/1.73m2    GFR est non-AA 49 (L) >60 ml/min/1.73m2    Calcium 9.1 8.5 - 10.1 MG/DL    Bilirubin, total 0.2 0.2 - 1.0 MG/DL    ALT (SGPT) 19 12 - 78 U/L    AST (SGOT) 32 15 - 37 U/L    Alk.  phosphatase 150 (H) 45 - 117 U/L    Protein, total 7.9 6.4 - 8.2 g/dL    Albumin 2.6 (L) 3.5 - 5.0 g/dL    Globulin 5.3 (H) 2.0 - 4.0 g/dL    A-G Ratio 0.5 (L) 1.1 - 2.2     TROPONIN I   Result Value Ref Range    Troponin-I, Qt. <0.04 <0.05 ng/mL   LIPASE   Result Value Ref Range    Lipase 101 73 - 393 U/L   LACTIC ACID, PLASMA   Result Value Ref Range    Lactic acid 1.3 0.4 - 2.0 MMOL/L   URINALYSIS W/ REFLEX CULTURE   Result Value Ref Range    Color YELLOW/STRAW      Appearance CLEAR CLEAR      Specific gravity 1.010 1.003 - 1.030      pH (UA) 5.0 5.0 - 8.0      Protein TRACE (A) NEG mg/dL    Glucose NEGATIVE  NEG mg/dL    Ketone NEGATIVE  NEG mg/dL    Bilirubin NEGATIVE  NEG      Blood SMALL (A) NEG      Urobilinogen 0.2 0.2 - 1.0 EU/dL    Nitrites NEGATIVE  NEG      Leukocyte Esterase NEGATIVE  NEG      WBC 0-4 0 - 4 /hpf    RBC 5-10 0 - 5 /hpf    Epithelial cells FEW FEW /lpf    Bacteria NEGATIVE  NEG /hpf    UA:UC IF INDICATED CULTURE NOT INDICATED BY UA RESULT CNI      Hyaline cast 2-5 0 - 5 /lpf   HEMOGLOBIN A1C WITH EAG   Result Value Ref Range    Hemoglobin A1c 7.8 (H) 4.2 - 6.3 %    Est. average glucose 647 mg/dL   METABOLIC PANEL, BASIC   Result Value Ref Range    Sodium 138 136 - 145 mmol/L    Potassium 3.8 3.5 - 5.1 mmol/L    Chloride 104 97 - 108 mmol/L CO2 27 21 - 32 mmol/L    Anion gap 7 5 - 15 mmol/L    Glucose 78 65 - 100 mg/dL    BUN 17 6 - 20 MG/DL    Creatinine 0.98 0.55 - 1.02 MG/DL    BUN/Creatinine ratio 17 12 - 20      GFR est AA >60 >60 ml/min/1.73m2    GFR est non-AA 55 (L) >60 ml/min/1.73m2    Calcium 8.6 8.5 - 26.5 MG/DL   METABOLIC PANEL, BASIC   Result Value Ref Range    Sodium 139 136 - 145 mmol/L    Potassium 3.7 3.5 - 5.1 mmol/L    Chloride 105 97 - 108 mmol/L    CO2 26 21 - 32 mmol/L    Anion gap 8 5 - 15 mmol/L    Glucose 142 (H) 65 - 100 mg/dL    BUN 14 6 - 20 MG/DL    Creatinine 0.97 0.55 - 1.02 MG/DL    BUN/Creatinine ratio 14 12 - 20      GFR est AA >60 >60 ml/min/1.73m2    GFR est non-AA 56 (L) >60 ml/min/1.73m2    Calcium 8.5 8.5 - 10.1 MG/DL   LIPID PANEL   Result Value Ref Range    LIPID PROFILE          Cholesterol, total 157 <200 MG/DL    Triglyceride 122 <150 MG/DL    HDL Cholesterol 42 MG/DL    LDL, calculated 90.6 0 - 100 MG/DL    VLDL, calculated 24.4 MG/DL    CHOL/HDL Ratio 3.7 0 - 5.0     GLUCOSE, POC   Result Value Ref Range    Glucose (POC) 129 (H) 65 - 100 mg/dL    Performed by Timi Martin    GLUCOSE, POC   Result Value Ref Range    Glucose (POC) 244 (H) 65 - 100 mg/dL    Performed by Maliha Ivory (PCT)    EKG, 12 LEAD, INITIAL   Result Value Ref Range    Ventricular Rate 87 BPM    Atrial Rate 87 BPM    P-R Interval 198 ms    QRS Duration 192 ms    Q-T Interval 418 ms    QTC Calculation (Bezet) 502 ms    Calculated P Axis 77 degrees    Calculated R Axis -61 degrees    Calculated T Axis 108 degrees    Diagnosis       Normal sinus rhythm  Left axis deviation  Electronic demand pacing  Confirmed by Nancie Bonner ([id number]) on 5/30/2017 8:24:56 AM          Imaging review:  See below.     Stroke workup  5/29/2017   Carotid ultrasound  Less than 50% stenosis of ICA    Transthoracic echo  Ejection fraction 55-60%    5/30/2017  LDL 90.6 and HbA1c 7.8    Stroke labs:  HgBA1c    Lab Results   Component Value Date/Time Hemoglobin A1c 7.8 05/30/2017 03:53 AM     LDL   Lab Results   Component Value Date/Time    LDL, calculated 90.6 05/30/2017 03:53 AM       HOME MEDS  Prior to Admission Medications   Prescriptions Last Dose Informant Patient Reported? Taking? Blood Sugar Diagnostic, Disc (ASCENSIA BREEZE 2) strp 5/29/2017 at Unknown time  No Yes   Sig: Check your blood sugar twice daily. E11.42   HYDROcodone-acetaminophen (NORCO) 5-325 mg per tablet 5/29/2017 at Unknown time  Yes Yes   Sig: Take 1 Tab by mouth every six (6) hours as needed for Pain. MICROLET LANCET misc 5/29/2017 at Unknown time  Yes Yes   albuterol (PROVENTIL VENTOLIN) 2.5 mg /3 mL (0.083 %) nebulizer solution Unknown at Unknown time  No No   Sig: 3 mL by Nebulization route every four (4) hours as needed for Wheezing. amlodipine (NORVASC) 10 mg tablet 5/29/2017 at Unknown time  No Yes   Sig: TAKE 1 TABLET BY MOUTH ONCE DAILY   aspirin 81 mg chewable tablet 5/29/2017 at Unknown time  Yes Yes   Sig: Take 81 mg by mouth daily. atorvastatin (LIPITOR) 40 mg tablet 5/29/2017 at Unknown time  Yes Yes   Sig: Take 40 mg by mouth every morning. Indications: HYPERCHOLESTEROLEMIA   benazepril (LOTENSIN) 40 mg tablet 5/29/2017 at Unknown time  Yes Yes   Sig: Take 40 mg by mouth every morning. bumetanide (BUMEX) 1 mg tablet 5/29/2017 at Unknown time  Yes Yes   Sig: Take 1 mg by mouth daily. ergocalciferol (ERGOCALCIFEROL) 50,000 unit capsule 4/29/2017 at Unknown time  No Yes   Sig: Take 1 Cap by mouth every thirty (30) days. glipiZIDE (GLUCOTROL) 5 mg tablet 5/29/2017 at Unknown time  No Yes   Sig: Take 0.5 Tabs by mouth two (2) times a day. Take 1/2 every day with breakfast. If your blood sugar is above 200 take a whole tablet. hydrOXYzine HCl (ATARAX) 25 mg tablet Unknown at Unknown time  Yes No   Sig: Take 25 mg by mouth every eight (8) hours as needed for Itching.    insulin glargine (LANTUS) 100 unit/mL injection 5/28/2017 at Unknown time  No Yes   Sig: 15 units with breakfast and 15 units at bedtime. Indications: Diabetes Mellitus   Patient taking differently: 22 Units Before breakfast and dinner. 18 units with breakfast and 18 units at bedtime. Indications: Diabetes Mellitus   insulin syringe-needle U-100 (BD INSULIN SYRINGE ULTRA-FINE) 1/2 mL 31 gauge x 15/64\" syrg   No No   Si Each by SubCUTAneous route two (2) times a day. Two shots daily. E11.42   ipratropium (ATROVENT) 0.06 % nasal spray 2017 at Unknown time  Yes Yes   Sig: instill 2 sprays into each nostril three times a day - IF NEEDED FOR RUNNING NOSE   isosorbide mononitrate ER (IMDUR) 60 mg CR tablet 2017 at Unknown time  Yes Yes   Sig: Take  by mouth every morning. metoclopramide HCl (REGLAN) 5 mg tablet 2017 at Unknown time  Yes Yes   Sig: Take 5 mg by mouth Before breakfast, lunch, and dinner. nitroglycerin (NITROSTAT) 0.4 mg SL tablet Unknown at Unknown time  Yes No   Sig: by SubLINGual route every five (5) minutes as needed for Chest Pain. omeprazole (PRILOSEC) 20 mg capsule 2017 at Unknown time  Yes Yes   Sig: Take 40 mg by mouth two (2) times a day. ondansetron (ZOFRAN ODT) 4 mg disintegrating tablet 2017 at Unknown time  No Yes   Sig: Take 1 Tab by mouth every eight (8) hours as needed for Nausea. polyethylene glycol (MIRALAX) 17 gram/dose powder 2017 at Unknown time  No Yes   Sig: Take 17 g by mouth two (2) times a day.  Continue while taking pain medication      Facility-Administered Medications: None       CURRENT MEDS  Current Facility-Administered Medications   Medication Dose Route Frequency    amLODIPine (NORVASC) tablet 10 mg  10 mg Oral DAILY    aspirin chewable tablet 81 mg  81 mg Oral DAILY    atorvastatin (LIPITOR) tablet 40 mg  40 mg Oral DAILY    benazepril (LOTENSIN) tablet 40 mg  40 mg Oral DAILY    insulin lispro (HUMALOG) injection   SubCUTAneous AC&HS    insulin glargine (LANTUS) injection 8 Units  8 Units SubCUTAneous ACB&D    isosorbide mononitrate ER (IMDUR) tablet 60 mg  60 mg Oral DAILY    metoclopramide HCl (REGLAN) tablet 5 mg  5 mg Oral TIDAC    pantoprazole (PROTONIX) tablet 40 mg  40 mg Oral ACB&D    polyethylene glycol (MIRALAX) packet 17 g  17 g Oral BID    sodium chloride (NS) flush 5-10 mL  5-10 mL IntraVENous Q8H    enoxaparin (LOVENOX) injection 40 mg  40 mg SubCUTAneous DAILY       IMPRESSION:  Tad Tarango is a 65 y.o. female who presents with new onset slurred speech and left face and left upper extremity numbness. This started yesterday. Symptoms are improving. She has history of stroke in the past.  She is presently taking aspirin 81 mg a day. Plan to increase it to 325 mg a day. Will complete a stroke workup. Other possibility is that her symptoms might be headache/migraine. 1.  Left facial numbness and left upper extremity numbness secondary to right hemispheric TIA  2. Hypertension  3. Hyperlipidemia  4. Diabetes    RECOMMENDATIONS:  -Cannot get MRI of the brain as the patient has pacemaker.  -Repeat CT scan of the head tomorrow  -Carotid ultrasound normal  - TTE-normal  - Telemetry  - BP goal is less than 140/90  - Stroke labs (HgbA1c, TSH, lipid panel). HbA1c 7.8. Goal HbA1c is less than 7  -Increase aspirin to 325 mg a day  -Increase atorvastatin 80 mg daily as LDL more than 70.  - ST/OT/PT eval  - Discussed healthy lifestyle changes, and modifiable risk factors for stroke with patient and family    Thank you very much for this consultation. We will follow up on the above studies and give further recommendations as indicated.       Kirsten Mayo MD  Neurologist

## 2017-05-30 NOTE — PROGRESS NOTES
Hospitalist Progress Note    NAME: Marbella Leal   :  1940   MRN:  [id number]   LOS:   1      Assessment / Plan:  Left facial and upper extremity numbness  Consider right hemispheric CVA  -increase aspirin to 325 mg per neurology  -repeat CT head tomorrow  -increase statin to 80 mg  -ST/OT/PT eval    LLQ pain x 3 weeks  -CT a/p without any evidence of diverticulitis, stopped abx  -consider GI follow up as outpatient, ?colonoscopy after episode of diverticulitis    GERD (gastroesophageal reflux disease)   Continue PPIs     CAD (coronary artery disease)  -asa     Hypertension  Chronic Diastolic Heart Failure  -Continue Norvasc, ARBs, nitrates  -Hold Bumex for now      H/o Breast Cancer  -Followed by Dr Naina Penn     Type 2 diabetes mellitus with diabetic polyneuropathy, with long-term current use of insulin   -Hold PO meds  -lantus 10 units BID for now  -SSI     Pacemaker in place      Code status: Full  Prophylaxis: Lovenox  Recommended Disposition: Home w/Family     Subjective:     Chief Complaint: numbness  Numbness slightly improving over left side  abd pain better        Review of Systems:  Symptom Y/N Comments  Symptom Y/N Comments   Fever/Chills    Chest Pain     Poor Appetite    Edema     Cough    Abdominal Pain     Sputum    Joint Pain     SOB/WELCH    Pruritis/Rash     Nausea/vomit    Tolerating PT/OT     Diarrhea    Tolerating Diet     Constipation    Other       Could NOT obtain due to:      Objective:     VITALS:   Last 24hrs VS reviewed since prior progress note.  Most recent are:  Patient Vitals for the past 24 hrs:   Temp Pulse Resp BP SpO2   17 1414 98.5 °F (36.9 °C) 63 18 163/79 99 %   17 1247 - 77 - 157/66 97 %   17 1018 98 °F (36.7 °C) 75 - 165/68 100 %   17 0803 - 61 - 156/67 99 %   17 0700 - 65 - 158/79 98 %   17 0600 - 65 - 137/66 99 %   17 0511 - - - 143/55 -   17 0500 - 60 13 143/52 99 %   17 0448 - 71 18 152/70 100 %   17 0400 - 79 - 110/67 98 %   05/30/17 0245 - 65 - 153/68 100 %   05/30/17 0200 - 64 - (!) 135/94 96 %   05/30/17 0100 - 63 - 160/65 98 %   05/29/17 2300 - - - 160/65 98 %   05/29/17 2204 - - - 152/79 99 %   05/29/17 2054 - - - 165/79 100 %   05/29/17 2000 - - - 153/70 100 %   05/29/17 1915 - - - 132/86 100 %   05/29/17 1900 - - - 155/64 -   05/29/17 1735 - - - - 99 %   05/29/17 1734 - - - 147/70 -   05/29/17 1708 98.6 °F (37 °C) 90 14 (!) 165/101 99 %     No intake or output data in the 24 hours ending 05/30/17 1604     PHYSICAL EXAM:  General: Alert, cooperative, no acute distress    EENT:  EOMI. Anicteric sclerae. Mucous membranes moist  Resp:  CTA bilaterally, no wheezing or rales. No accessory muscle use  CV:  Regular rhythm, no edema  GI:  Soft, non distended, non tender. +Bowel sounds  Neurologic:  Alert and oriented X 3, normal speech  Psych:   Good insight, not anxious nor agitated  Skin:  No rashes, no jaundice  ________________________________________________________________________  Care Plan discussed with:    Comments   Patient x    Family      RN x    Care Manager     Consultant                        Multidiciplinary team rounds were held today with , nursing, pharmacist and clinical coordinator. Patient's plan of care was discussed; medications were reviewed and discharge planning was addressed. ________________________________________________________________________  Total NON critical care TIME:  20   Minutes  ________________________________________________________________________  Duke Garcia MD     Procedures: see electronic medical records for all procedures/Xrays and details which were not copied into this note but were reviewed prior to creation of Plan. LABS:  I reviewed today's most current labs and imaging studies.   Pertinent labs include:  Recent Labs      05/29/17   1840   WBC  4.2   HGB  8.7*   HCT  26.0*   PLT  240     Recent Labs      05/30/17   0353  05/30/17 0005  05/29/17   1840   NA  139  138  142   K  3.7  3.8  4.0   CL  105  104  106   CO2  26  27  25   GLU  142*  78  125*   BUN  14  17  16   CREA  0.97  0.98  1.09*   CA  8.5  8.6  9.1   ALB   --    --   2.6*   TBILI   --    --   0.2   SGOT   --    --   32   ALT   --    --   19       Signed: Fawad Vinson MD

## 2017-05-30 NOTE — ED NOTES
Upon transfer to [facility], patient is resting on stretcher, appears in no acute distress, respirations equal and unlabored, VS WNL.

## 2017-05-30 NOTE — PROGRESS NOTES
TRANSFER - IN REPORT:    Verbal report received from Magda Redding, [facility] (name) on Lio Alexander  being received from ED (unit) for routine progression of care      Report consisted of patients Situation, Background, Assessment and   Recommendations(SBAR). Information from the following report(s) SBAR, Kardex, ED Summary, Intake/Output, MAR and Recent Results was reviewed with the receiving nurse. Opportunity for questions and clarification was provided.

## 2017-05-30 NOTE — PROGRESS NOTES
* No surgery found *  Bedside shift change report given to Azra Harper RN (oncoming nurse) by Deidra Paez RN (offgoing nurse). Report included the following information SBAR, Kardex, ED Summary, Intake/Output, MAR and Recent Results.     Zone Phone:   [phone]      Significant changes during shift:  New admit; patient hooked up to telemetry, PT worked with, assessment complete        Patient Information    Tad Tarango  65 y.o.  5/29/2017  5:09 PM by Kamla Collins MD. Tad Tarango was admitted from Home    Problem List    Patient Active Problem List    Diagnosis Date Noted    Left-sided weakness 05/29/2017    LLQ pain 05/29/2017    Pacemaker 66/37/5430    Diastolic CHF, chronic ([address] 05/29/2017    Type 2 diabetes mellitus with diabetic polyneuropathy, with long-term current use of insulin ([address] 12/21/2016    S/P right mastectomy 12/05/2016    Lymphedema of upper extremity following lymphadenectomy 12/18/2015    Multinodular goiter (nontoxic) 12/15/2015    Malignant neoplasm of upper-inner quadrant of right female breast ([address] 10/07/2015    Vitamin D deficiency 08/19/2015    Essential hypertension 08/07/2015    Pulmonary emboli ([address] 05/10/2015    Constipated 04/08/2015    Nausea & vomiting 03/26/2015    Pain 03/18/2015    Abdominal pain 02/20/2015    Breast cancer, right breast ([address] 11/26/2014    Hypercholesterolemia     Stroke ([address]     DJD (degenerative joint disease), lumbar     GERD (gastroesophageal reflux disease)     SEN (obstructive sleep apnea)     Sciatica     Neuropathy, arm     CAD (coronary artery disease)      Past Medical History:   Diagnosis Date    Arrhythmia     bradycardia in 30's /pacemaker inserted    Arthritis     RT KNEE    Asthma Dx age 75    Bradycardia 2009    Cardiology saw her during hospital stay; Now off coreg;  Sees Dr. Carlitos Gomez [facility])     Rt mastectomy 2/19/15    CAD (coronary artery disease)     Dr Zaria Fallon    Diabetes [facility])     Now seeing Dr. Sarah Beth Moody Diverticulitis     DJD (degenerative joint disease), lumbar     GERD (gastroesophageal reflux disease)     H. pylori infection 2008    Dr. Kev Johnson attack [facility]) April 2006    Hypercholesterolemia     Hypertension     Morbid obesity ([address]     Neuropathy, arm 2009    Right; Has had MRI and EMG at [id number] SEN (obstructive sleep apnea)     uses CPAP    Rectal bleeding 2009    Hospitalized; Had colonoscopy and EGD (ok), gastric emptying study (normal), doppler mesenteric arteries (ok)    Sciatica     Has seen Dr. Anastasiia Robins    Stroke [facility]) 2009 & 2012    no residual problems         Core Measures:    CVA: Yes Yes  CHF:No No  PNA:No No    Post Op Surgical (If Applicable):     n/a    Activity Status:    OOB to Chair No  Ambulated this shift Yes, PT walked in hallway  Bed Rest No    Supplemental O2: (If Applicable)    n/a      LINES AND DRAINS:    Peripheral IV 20 in LH    DVT prophylaxis:    DVT prophylaxis Med- Yes lovenox  DVT prophylaxis SCD or YARA- No     Wounds: (If Applicable)    Wounds- No    Location -    Patient Safety:    Falls Score Total Score: 3  Safety Level_______  Bed Alarm On? Yes  Sitter?  No    Plan for upcoming shift: neuro to see        Discharge Plan: No -    Active Consults:  IP CONSULT TO NEUROLOGY

## 2017-05-30 NOTE — H&P
Hospitalist Admission Note    NAME: Ondina Quiroz   :  1940   MRN:  [id number]     Date/Time:  2017 10:41 PM    Patient PCP: Rich Glover MD  ________________________________________________________________________    My assessment of this patient's clinical condition and my plan of care is as follows. Assessment / Plan:  Left Side Weakness  Dizziness  Headaches  Tingling and numbness left upper arm and PPM site  Admit to neuro tele  Symptoms almost for 12 hours  Unable to get MRI due to PPM, CT head negative, will repeat CT head in 24 hours  Check carotid dopplers, 2D echo, FLP  Continue ASA, statins  Neurology consult  PT/OT  PRN Fioricet and Meclizine    LLQ Pain and Tenderness X3 weeks  Pt reports history of recurrent diverticulitis, just finished the coarse of Cipro and Flagyl  Wi  Place on IV Levaquin and Flagyl  Check CT abd/Pelvis. May need GI vs Colorectal Surgery condition depending on CT scan results  Clear Liquid for now  Continue norco    GERD (gastroesophageal reflux disease)   Continue PPIs    CAD (coronary artery disease)  Continue ASA    Hypertension  Chronic Diastolic Heart Failure  Continue Norvasc, ARBs, nitrates  Hold Bumex for now     H/o Breast Cancer  Followed by Dr Frances Kumari    Type 2 diabetes mellitus with diabetic polyneuropathy, with long-term current use of insulin   Hold PO meds  Reduce lantus to 8 units BID for now  SSI    Pacemaker in place    Code Status: Full  Surrogate Decision Maker: Daughter Yousuf Wagner    DVT Prophylaxis: Lovenox        Subjective:   CHIEF COMPLAINT: dizziness, headaches, left side weakness, numbness and tingling    HISTORY OF PRESENT ILLNESS:     Puma Watson is a 65 y.o.  female who presents with dizziness, headaches, left side weakness, numbness and tingling.  As per patient, she started to have symptoms in the morning after breakfast. Pt claims first symptoms started with dizziness upon standing, then she started to have headaches, 8/10 when worse, whole head, throbbing in nature, non radiating, constant. Pt also reports associated left side numbness and tingling (including pace maker site) and when ask about weakness, she also reports weakness. Pt also reports LLQ abdominal pain ongoing for past 3 weeks. She claims to be treated for diverticulitis and just finishes coarse of abx. She claims to continue abdominal pain and symptoms never resolved. Pt denies any fever, chills, nausea, vomiting, diarrhea, chest pain, cough, shortness of breath. We were asked to admit for work up and evaluation of the above problems. Past Medical History:   Diagnosis Date    Arrhythmia     bradycardia in 30's /pacemaker inserted    Arthritis     RT KNEE    Asthma Dx age 75    Bradycardia 2009    Cardiology saw her during hospital stay; Now off coreg;  Sees Dr. Fitzpatrick Blood    Breast cancer [facility])     Rt mastectomy 2/19/15    CAD (coronary artery disease)     Dr Rell Bryson Diabetes [facility])     Now seeing Dr. Jeff Morel Diverticulitis     DJD (degenerative joint disease), lumbar     GERD (gastroesophageal reflux disease)     H. pylori infection 2008    Dr. Travon [place] attack [facility]) April 2006    Hypercholesterolemia     Hypertension     Morbid obesity ([address]     Neuropathy, arm 2009    Right; Has had MRI and [facility]G [facility]    SEN (obstructive sleep apnea)     uses CPAP    Rectal bleeding 2009    Hospitalized;  Had colonoscopy and EGD (ok), gastric emptying study (normal), doppler mesenteric arteries (ok)    Sciatica     Has seen Dr. Jan Rome    Stroke [facility]) 2009 & 2012    no residual problems        Past Surgical History:   Procedure Laterality Date    HX APPENDECTOMY  1979    HX BREAST LUMPECTOMY  12/12/2013    RIGHT BREAST DUCTAL EXCISION performed by Nick Triana [facility] at [facility] OR    HX CATARACT REMOVAL  2007    HX CHOLECYSTECTOMY  1979    HX COLONOSCOPY      HX HEART CATHETERIZATION      angioplasty    HX HYSTERECTOMY      fibroids    HX KNEE ARTHROSCOPY  1997    right    HX MASTECTOMY Right 2/19/2015    RIGHT BREAST MODIFIED RADICAL MASTECTOMY RIGHT SENTINEL NODE BIOPSY, RIGHT PORT A CATH INSERTION performed by Nataliya Pinto MD at [facility]"" AMBULATORY OR    HX PACEMAKER      HX SHOULDER ARTHROSCOPY  2004    left       Social History   Substance Use Topics    Smoking status: Never Smoker    Smokeless tobacco: Never Used    Alcohol use No        Family History   Problem Relation Age of Onset    Stroke Father     Cancer Sister      breast cancer    Hypertension Sister     Cancer Mother      Unknown type    Cancer Brother      Colon    Hypertension Brother     Anesth Problems Neg Hx      Allergies   Allergen Reactions    Codeine Itching    Duratuss [Phenylephrine-Guaifenesin] Nausea and Vomiting    Lisinopril-Hydrochlorothiazide Itching    Motrin [Ibuprofen] Nausea and Vomiting     With 800mg    Tape [Adhesive] Other (comments)     TEARS HER SKIN        Prior to Admission medications    Medication Sig Start Date End Date Taking? Authorizing Provider   HYDROcodone-acetaminophen (NORCO) 5-325 mg per tablet Take 1 Tab by mouth every six (6) hours as needed for Pain. Yes Todd Andersen MD   metoclopramide HCl (REGLAN) 5 mg tablet Take 5 mg by mouth Before breakfast, lunch, and dinner. Yes Todd Andersen MD   ipratropium (ATROVENT) 0.06 % nasal spray instill 2 sprays into each nostril three times a day - IF NEEDED FOR RUNNING NOSE 3/15/17  Yes Historical Provider   glipiZIDE (GLUCOTROL) 5 mg tablet Take 0.5 Tabs by mouth two (2) times a day. Take 1/2 every day with breakfast. If your blood sugar is above 200 take a whole tablet. 5/5/17  Yes Chani Dooley MD   polyethylene glycol [facility]) 17 gram/dose powder Take 17 g by mouth two (2) times a day.  Continue while taking pain medication 3/8/17  Yes Ernestina Stokes MD   insulin glargine (LANTUS) 100 unit/mL injection 15 units with breakfast and 15 units at bedtime. Indications: Diabetes Mellitus  Patient taking differently: 22 Units Before breakfast and dinner. 18 units with breakfast and 18 units at bedtime. Indications: Diabetes Mellitus 2/23/17  Yes Desmond Marr MD   ergocalciferol (ERGOCALCIFEROL) 50,000 unit capsule Take 1 Cap by mouth every thirty (30) days. 11/9/16  Yes Elizabeth Gordillo NP   isosorbide mononitrate ER (IMDUR) 60 mg CR tablet Take  by mouth every morning. 6/6/16  Yes Historical Provider   Blood Sugar Diagnostic, Disc (ASCENSIA BREEZE 2) strp Check your blood sugar twice daily. E11.42 6/20/16  Yes Desmond Marr MD   ondansetron (ZOFRAN ODT) 4 mg disintegrating tablet Take 1 Tab by mouth every eight (8) hours as needed for Nausea. 6/1/16  Yes Otoniel Richardson MD   aspirin 81 mg chewable tablet Take 81 mg by mouth daily. Yes Historical Provider   [facility]  12/16/14  Yes Historical Provider   benazepril (LOTENSIN) 40 mg tablet Take 40 mg by mouth every morning. Yes Historical Provider   bumetanide (BUMEX) 1 mg tablet Take 1 mg by mouth daily. 12/23/14  Yes Todd Andersen MD   atorvastatin (LIPITOR) 40 mg tablet Take 40 mg by mouth every morning. Indications: HYPERCHOLESTEROLEMIA   Yes Todd Andersen MD   omeprazole (PRILOSEC) 20 mg capsule Take 40 mg by mouth two (2) times a day. Yes Todd Andersen MD   amlodipine (NORVASC) 10 mg tablet TAKE 1 TABLET BY MOUTH ONCE DAILY 4/9/11  Yes Tej Morales MD   hydrOXYzine HCl (ATARAX) 25 mg tablet Take 25 mg by mouth every eight (8) hours as needed for Itching. Todd Andersen MD   insulin syringe-needle U-100 (BD INSULIN SYRINGE ULTRA-FINE) 1/2 mL 31 gauge x 15/64\" syrg 1 Each by SubCUTAneous route two (2) times a day. Two shots daily.  M85.42 6/20/16   Desmond Marr MD   albuterol (PROVENTIL VENTOLIN) 2.5 mg /3 mL (0.083 %) nebulizer solution 3 mL by Nebulization route every four (4) hours as needed for Wheezing. 9/20/15   Pallavi Bibles, DO nitroglycerin (NITROSTAT) 0.4 mg SL tablet by SubLINGual route every five (5) minutes as needed for Chest Pain. Todd Andersen MD       REVIEW OF SYSTEMS:     I am not able to complete the review of systems because: The patient is intubated and sedated    The patient has altered mental status due to his acute medical problems    The patient has baseline aphasia from prior stroke(s)    The patient has baseline dementia and is not reliable historian    The patient is in acute medical distress and unable to provide information           Total of 12 systems reviewed as follows:       POSITIVE= underlined text  Negative = text not underlined  General:  fever, chills, sweats, generalized weakness, weight loss/gain,      loss of appetite   Eyes:    blurred vision, eye pain, loss of vision, double vision  ENT:    rhinorrhea, pharyngitis   Respiratory:   cough, sputum production, SOB, WELCH, wheezing, pleuritic pain   Cardiology:   chest pain, palpitations, orthopnea, PND, edema, syncope   Gastrointestinal:  abdominal pain , N/V, diarrhea, dysphagia, constipation, bleeding   Genitourinary:  frequency, urgency, dysuria, hematuria, incontinence   Muskuloskeletal :  arthralgia, myalgia, back pain  Hematology:  easy bruising, nose or gum bleeding, lymphadenopathy   Dermatological: rash, ulceration, pruritis, color change / jaundice  Endocrine:   hot flashes or polydipsia   Neurological:  headache, dizziness, confusion, focal weakness, paresthesia,     Speech difficulties, memory loss, gait difficulty  Psychological: Feelings of anxiety, depression, agitation    Objective:   VITALS:    Visit Vitals    /79    Pulse 90    Temp 98.6 °F (37 °C)    Resp 14    Ht 5' 7\" (1.702 m)    Wt 93.9 kg (206 lb 15.8 oz)    SpO2 99%    BMI 32.42 kg/m2       PHYSICAL EXAM:    General:    Alert, cooperative, no distress, appears stated age.      HEENT: Atraumatic, anicteric sclerae, pink conjunctivae     No oral ulcers, mucosa moist, throat clear, dentition fair  Neck:  Supple, symmetrical,  thyroid: non tender  Lungs:   Clear to auscultation bilaterally. No Wheezing or Rhonchi. No rales. Chest wall:  No tenderness  No Accessory muscle use. Heart:   Regular  rhythm,  No  murmur   No edema  Abdomen:   Soft, non-tender. Not distended. Bowel sounds normal  Extremities: No cyanosis. No clubbing,      Skin turgor normal, Capillary refill normal, Radial dial pulse 2+  Skin:     Not pale. Not Jaundiced  No rashes   Psych:  Good insight. Not depressed. Not anxious or agitated. Neurologic: EOMs intact. No facial asymmetry. No aphasia or slurred speech. Left arm and leg weakness and decrease sensation. Alert and oriented X 4.     _______________________________________________________________________  Care Plan discussed with:    Comments   Patient y    Family  y Daughter at bedside   RN y    Care Manager                    Consultant:  elder ED physician   _______________________________________________________________________  Expected  Disposition:   Home with Family    HH/PT/OT/RN y   SNF/LTC y   [de-identified] y   ________________________________________________________________________  TOTAL TIME: 72 Minutes    Critical Care Provided     Minutes non procedure based      Comments    y Reviewed previous records   >50% of visit spent in counseling and coordination of care y Discussion with patient and family and questions answered       ________________________________________________________________________  Signed: Lisseth Goode MD    Procedures: see electronic medical records for all procedures/Xrays and details which were not copied into this note but were reviewed prior to creation of Plan.     LAB DATA REVIEWED:    Recent Results (from the past 24 hour(s))   EKG, 12 LEAD, INITIAL    Collection Time: 05/29/17  5:22 PM   Result Value Ref Range    Ventricular Rate 87 BPM    Atrial Rate 87 BPM    P-R Interval 198 ms    QRS Duration 192 ms    Q-T Interval 418 ms    QTC Calculation (Bezet) 502 ms    Calculated P Axis 77 degrees    Calculated R Axis -61 degrees    Calculated T Axis 108 degrees    Diagnosis       Normal sinus rhythm  Left axis deviation  Left ventricular hypertrophy with QRS widening  Possible Lateral infarct (cited on or before 29-MAY-2017)  When compared with ECG of 26-JAN-2017 14:19,  Sinus rhythm has replaced Electronic atrial pacemaker  Minimal criteria for Septal infarct are no longer present  Serial changes of Lateral infarct present     CBC WITH AUTOMATED DIFF    Collection Time: 05/29/17  6:40 PM   Result Value Ref Range    WBC 4.2 3.6 - 11.0 K/uL    RBC 3.35 (L) 3.80 - 5.20 M/uL    HGB 8.7 (L) 11.5 - 16.0 g/dL    HCT 26.0 (L) 35.0 - 47.0 %    MCV 77.6 (L) 80.0 - 99.0 FL    MCH 26.0 26.0 - 34.0 PG    MCHC 33.5 30.0 - 36.5 g/dL    RDW 15.5 (H) 11.5 - 14.5 %    PLATELET 763 890 - 341 K/uL    NEUTROPHILS 65 32 - 75 %    LYMPHOCYTES 23 12 - 49 %    MONOCYTES 10 5 - 13 %    EOSINOPHILS 2 0 - 7 %    BASOPHILS 0 0 - 1 %    ABS. NEUTROPHILS 2.7 1.8 - 8.0 K/UL    ABS. LYMPHOCYTES 1.0 0.8 - 3.5 K/UL    ABS. MONOCYTES 0.4 0.0 - 1.0 K/UL    ABS. EOSINOPHILS 0.1 0.0 - 0.4 K/UL    ABS. BASOPHILS 0.0 0.0 - 0.1 K/UL   METABOLIC PANEL, COMPREHENSIVE    Collection Time: 05/29/17  6:40 PM   Result Value Ref Range    Sodium 142 136 - 145 mmol/L    Potassium 4.0 3.5 - 5.1 mmol/L    Chloride 106 97 - 108 mmol/L    CO2 25 21 - 32 mmol/L    Anion gap 11 5 - 15 mmol/L    Glucose 125 (H) 65 - 100 mg/dL    BUN 16 6 - 20 MG/DL    Creatinine 1.09 (H) 0.55 - 1.02 MG/DL    BUN/Creatinine ratio 15 12 - 20      GFR est AA 59 (L) >60 ml/min/1.73m2    GFR est non-AA 49 (L) >60 ml/min/1.73m2    Calcium 9.1 8.5 - 10.1 MG/DL    Bilirubin, total 0.2 0.2 - 1.0 MG/DL    ALT (SGPT) 19 12 - 78 U/L    AST (SGOT) 32 15 - 37 U/L    Alk.  phosphatase 150 (H) 45 - 117 U/L    Protein, total 7.9 6.4 - 8.2 g/dL    Albumin 2.6 (L) 3.5 - 5.0 g/dL    Globulin 5.3 (H) 2.0 - 4.0 g/dL    A-G Ratio 0.5 (L) 1.1 - 2.2     TROPONIN I    Collection Time: 05/29/17  6:40 PM   Result Value Ref Range    Troponin-I, Qt. <0.04 <0.05 ng/mL   LIPASE    Collection Time: 05/29/17  6:40 PM   Result Value Ref Range    Lipase 101 73 - 393 U/L   LACTIC ACID, PLASMA    Collection Time: 05/29/17  6:40 PM   Result Value Ref Range    Lactic acid 1.3 0.4 - 2.0 MMOL/L   URINALYSIS W/ REFLEX CULTURE    Collection Time: 05/29/17  6:40 PM   Result Value Ref Range    Color YELLOW/STRAW      Appearance CLEAR CLEAR      Specific gravity 1.010 1.003 - 1.030      pH (UA) 5.0 5.0 - 8.0      Protein TRACE (A) NEG mg/dL    Glucose NEGATIVE  NEG mg/dL    Ketone NEGATIVE  NEG mg/dL    Bilirubin NEGATIVE  NEG      Blood SMALL (A) NEG      Urobilinogen 0.2 0.2 - 1.0 EU/dL    Nitrites NEGATIVE  NEG      Leukocyte Esterase NEGATIVE  NEG      WBC 0-4 0 - 4 /hpf    RBC 5-10 0 - 5 /hpf    Epithelial cells FEW FEW /lpf    Bacteria NEGATIVE  NEG /hpf    UA:UC IF INDICATED CULTURE NOT INDICATED BY UA RESULT CNI      Hyaline cast 2-5 0 - 5 /lpf

## 2017-05-30 NOTE — PROGRESS NOTES
Problem: Mobility Impaired (Adult and Pediatric)  Goal: *Acute Goals and Plan of Care (Insert Text)  Physical Therapy Goals  Initiated 5/30/2017  1. Patient will move from supine to sit and sit to supine in bed with independence within 7 day(s). 2. Patient will transfer from bed to chair and chair to bed with modified independence using the least restrictive device within 7 day(s). 3. Patient will perform sit to stand with modified independence within 7 day(s). 4. Patient will ambulate with modified independence for 100 feet with the least restrictive device within 7 day(s). 5. Patient will ascend/descend 15 stairs with 1 handrail(s) with supervision/set-up within 7 day(s). Add VALENCIA goal when appropriate  PHYSICAL THERAPY EVALUATION- NEURO POPULATION     Patient: Shai Oreilly (1 y.o. female)  Date: 5/30/2017  Primary Diagnosis: Left-sided weakness        Precautions:   Fall      ASSESSMENT :  Based on the objective data described below, the patient presents with reported impaired sensation, generalized weakness, decreased activity tolerance, impaired balance and altered gait. PTA pt was living with her daughter and using a SPC out in the community. She reports a fall last year which she came to the ER for. She was received in supine and cleared by nursing to mobilize, family at bedside. She was very inconsistent with her responses for sensation and noted inconsistencies with strength testing (testing , very strong on the R but then diminished when asked to repeat). Supine > sit was performed with SBA to come to EOB. Noted good static and dynamic balance. Sit<>stand was performed with CGA to come to full stand with SPC. Ambulated down the carmona for 50ft with SPC and CGA, increased trunk sway and antalgic gait. She reported that both her knees are bed and the L one locked out on her when returning to the room. She was returned to bed (no chair in room) and was educated on BEFAST.  Will perform the ERIK next session. Recommending HHPT with 24/7 vs SNF. Patient will benefit from skilled intervention to address the above impairments. Patients rehabilitation potential is considered to be Good  Factors which may influence rehabilitation potential include:   [ ]           None noted  [ ]           Mental ability/status  [ ]           Medical condition  [ ]           Home/family situation and support systems  [X]           Safety awareness  [ ]           Pain tolerance/management  [ ]           Other:        PLAN :  Recommendations and Planned Interventions:  [X]             Bed Mobility Training             [ ]      Neuromuscular Re-Education  [X]             Transfer Training                   [ ]      Orthotic/Prosthetic Training  [X]             Gait Training                         [ ]      Modalities  [X]             Therapeutic Exercises           [ ]      Edema Management/Control  [X]             Therapeutic Activities            [X]      Patient and Family Training/Education  [ ]             Other (comment):  Frequency/Duration: Patient will be followed by physical therapy 4 times a week to address goals. Discharge Recommendations: Home Health vs [facility]  Further Equipment Recommendations for Discharge: possible RW       SUBJECTIVE:   Patient stated this is much better than I was yesterday .       OBJECTIVE DATA SUMMARY:   HISTORY:    Past Medical History:   Diagnosis Date    Arrhythmia       bradycardia in 29's /pacemaker inserted    Arthritis       RT KNEE    Asthma Dx age 75    Bradycardia 2009     Cardiology saw her during hospital stay; Now off coreg;  Sees Dr. Akil Mixon    Breast cancer [facility])       Rt mastectomy 2/19/15    CAD (coronary artery disease)       Dr Abdullahi Whitfield    Diabetes [facility])       Now seeing Dr. Owen Phillips Diverticulitis      DJD (degenerative joint disease), lumbar      GERD (gastroesophageal reflux disease)      H. pylori infection 2008 Dr. Crystal Mayo attack [facility]) April 2006    Hypercholesterolemia      Hypertension      Morbid obesity ([address]      Neuropathy, arm 2009     Right; Has had MRI and [facility]    SEN (obstructive sleep apnea)       uses CPAP    Rectal bleeding 2009     Hospitalized; Had colonoscopy and EGD (ok), gastric emptying study (normal), doppler mesenteric arteries (ok)    Sciatica       Has seen Dr. Tae Leroy    Stroke [facility]) 2009 & 2012     no residual problems     Past Surgical History:   Procedure Laterality Date    HX APPENDECTOMY   1979    HX BREAST LUMPECTOMY   12/12/2013     RIGHT BREAST DUCTAL EXCISION performed by Karlee Carrasquillo MD at [facility] MAIN OR    HX CATARACT REMOVAL   2007    HX CHOLECYSTECTOMY   1979    HX COLONOSCOPY        HX HEART CATHETERIZATION         angioplasty    HX HYSTERECTOMY         fibroids    HX KNEE ARTHROSCOPY   1997     right    HX MASTECTOMY Right 2/19/2015     RIGHT BREAST MODIFIED RADICAL MASTECTOMY RIGHT SENTINEL NODE BIOPSY, RIGHT PORT A CATH INSERTION performed by Renetta Harman MD at [facility] AMBULATORY OR    HX PACEMAKER        HX SHOULDER ARTHROSCOPY   2004     left     Prior Level of Function/Home Situation: pt reports being mod I with SPC in the community and not using it in the house. She lives with her daughter.   Personal factors and/or comorbidities impacting plan of care:      Home Situation  Home Environment: Private residence  # Steps to Enter: 4  Rails to Enter: Yes  Hand Rails : Left  One/Two Story Residence: Two story  # of Interior Steps: 13  Interior Rails: Left  Living Alone: No  Support Systems: Child(magdiel) (daughter)  Patient Expects to be Discharged to[de-identified] Private residence  Current DME Used/Available at Home: Cane, straight, Grab bars (SPC only out in the community)  Tub or Shower Type: Tub/Shower combination     EXAMINATION/PRESENTATION/DECISION MAKING:   Critical Behavior:  Neurologic State: Alert  Orientation Level: Oriented X4  Cognition: Follows commands     Hearing: Auditory  Auditory Impairment: None  Skin:  WDL  Edema: WDL  Range Of Motion:  AROM: Generally decreased, functional (L shoulder (reaching behind back))                       Strength:    Strength: Generally decreased, functional (inconsistent )                    Tone & Sensation:                  Sensation: Impaired (she reports L cheek, L arm, and L foot numbness)               Coordination:     Vision:      Functional Mobility:  Bed Mobility:  Rolling: Stand-by asssistance  Supine to Sit: Stand-by asssistance  Sit to Supine: Contact guard assistance     Transfers:  Sit to Stand: Contact guard assistance  Stand to Sit: Contact guard assistance                       Balance:   Sitting: Intact  Standing: Impaired  Standing - Static: Good;Constant support  Standing - Dynamic : Fair  Ambulation/Gait Training:  Distance (ft): 50 Feet (ft)  Assistive Device: Gait belt;Cane, straight  Ambulation - Level of Assistance: Contact guard assistance        Gait Abnormalities: Decreased step clearance;Shuffling gait;Trunk sway increased        Base of Support: Widened     Speed/Domonique: Pace decreased (<100 feet/min)  Step Length: Left shortened;Right shortened           Functional Measure     Barthel Index:      Bathin  Bladder: 10  Bowels: 10  Groomin  Dressin  Feeding: 10  Mobility: 5  Stairs: 0  Toilet Use: 0  Transfer (Bed to Chair and Back): 10  Total: 55         Barthel and G-code impairment scale:  Percentage of impairment CH  0% CI  1-19% CJ  20-39% CK  40-59% CL  60-79% CM  80-99% CN  100%   Barthel Score 0-100 100 99-80 79-60 59-40 20-39 1-19    0   Barthel Score 0-20 20 17-19 13-16 9-12 5-8 1-4 0      The Barthel ADL Index: Guidelines  1. The index should be used as a record of what a patient does, not as a record of what a patient could do.   2. The main aim is to establish degree of independence from any help, physical or verbal, however minor and for whatever reason. 3. The need for supervision renders the patient not independent. 4. A patient's performance should be established using the best available evidence. Asking the patient, friends/relatives and nurses are the usual sources, but direct observation and common sense are also important. However direct testing is not needed. 5. Usually the patient's performance over the preceding 24-48 hours is important, but occasionally longer periods will be relevant. 6. Middle categories imply that the patient supplies over 50 per cent of the effort. 7. Use of aids to be independent is allowed. Lalito Valverde., Barthel, D.W. ([id number]). Functional evaluation: the Barthel Index. [facility] (14)2. [place] Curly luis e [place], J.J.M.F, Madhav Cardona, Jayden Rush., [place], [address] (1999). Measuring the change indisability after inpatient rehabilitation; comparison of the responsiveness of the Barthel Index and Functional [place] Measure. Journal of Neurology, Neurosurgery, and Psychiatry, 66(4), 499-222. JOHANNE Morse, ADONIS Quinones, & Soniya Serrato MRONI. (2004.) Assessment of post-stroke quality of life in cost-effectiveness studies: The usefulness of the Barthel Index and the EuroQoL-5D. Quality of Life Research, 13, 305-09         Gatica Balance Test: NEXT TIME  G codes: In compliance with CMSs Claims Based Outcome Reporting, the following G-code set was chosen for this patient based on their primary functional limitation being treated: The outcome measure chosen to determine the severity of the functional limitation was the barthel with a score of 55/100 which was correlated with the impairment scale.       · Mobility - Walking and Moving Around:               - CURRENT STATUS:    CK - 40%-59% impaired, limited or restricted               - GOAL STATUS:           CJ - 20%-39% impaired, limited or restricted               - D/C STATUS:                       ---------------To be determined---------------       Physical Therapy Evaluation Charge Determination   History Examination Presentation Decision-Making   HIGH Complexity :3+ comorbidities / personal factors will impact the outcome/ POC  MEDIUM Complexity : 3 Standardized tests and measures addressing body structure, function, activity limitation and / or participation in recreation  MEDIUM Complexity : Evolving with changing characteristics  MEDIUM Complexity : FOTO score of 26-74      Based on the above components, the patient evaluation is determined to be of the following complexity level: MEDIUM        Pain:  Pain Scale 1: Numeric (0 - 10)  Pain Intensity 1: 8              Activity Tolerance:   Reported feeling dizzy, VSS  Please refer to the flowsheet for vital signs taken during this treatment. After treatment:   [ ]     Patient left in no apparent distress sitting up in chair  [X]     Patient left in no apparent distress in bed  [X]     Call bell left within reach  [X]     Nursing notified  [ ]     Caregiver present  [ ]     Bed alarm activated      COMMUNICATION/EDUCATION:   The patients plan of care was discussed with: Registered Nurse. Negative CT, repeat CT in am     Patient and/or family was verbally educated on the BE FAST acronym for signs/symptoms of CVA and TIA. BE FAST was written on patient's communication board  for visual education and reinforcement. All questions answered with patient indicating good understanding.      [X]  Fall prevention education was provided and the patient/caregiver indicated understanding. [ ]  Patient/family have participated as able in goal setting and plan of care. [X]  Patient/family agree to work toward stated goals and plan of care. [ ]  Patient understands intent and goals of therapy, but is neutral about his/her participation. [ ]  Patient is unable to participate in goal setting and plan of care.      Thank you for this referral.  Ramandeep Mccracken, PT, DPT   Time Calculation: 21 mins

## 2017-05-30 NOTE — ROUTINE PROCESS
TRANSFER - OUT REPORT:    Verbal report given to Neda Andrews RN(name) on Christi Ramirez  being transferred to Neuro(unit) for routine progression of care       Report consisted of patients Situation, Background, Assessment and   Recommendations(SBAR). Information from the following report(s) SBAR, ED Summary, [facility] and Recent Results was reviewed with the receiving nurse. Lines:   Peripheral IV 05/29/17 Left Hand (Active)   Site Assessment Clean, dry, & intact 5/29/2017 11:20 PM   Phlebitis Assessment 0 5/29/2017 11:20 PM   Infiltration Assessment 0 5/29/2017 11:20 PM   Dressing Status Clean, dry, & intact 5/29/2017 11:20 PM   Dressing Type Transparent 5/29/2017 11:20 PM   Hub Color/Line Status Pink;Flushed;Patent 5/29/2017 11:20 PM        Opportunity for questions and clarification was provided.

## 2017-05-30 NOTE — PROCEDURES
[facility]  *** FINAL REPORT ***    Name: Kyle Lozano  MRN: [id number]    Inpatient  : [birth year] [address] 1940  HIS Order #: [id number]  [address] [facility] Visit #: [id number]  Date: 30 May 2017    TYPE OF TEST: Cerebrovascular Duplex    REASON FOR TEST  CVA    Right Carotid:-             Proximal               Mid                 Distal  cm/s  Systolic  Diastolic  Systolic  Diastolic  Systolic  Diastolic  CCA:     11.3                                      50.0  Bulb:  ECA:     62.0  ICA:     51.0                 62.0                 70.0  ICA/CCA:  1.0    ICA Stenosis: <50%    Right Vertebral:-  Finding: Antegrade  Sys:       38.0  Starla:    Right Subclavian: Normal    Left Carotid:-            Proximal                Mid                 Distal  cm/s  Systolic  Diastolic  Systolic  Diastolic  Systolic  Diastolic  CCA:     53.2                                      51.0  Bulb:  ECA:     45.0  ICA:     48.0                 58.0                 80.0  ICA/CCA:  0.9    ICA Stenosis: <50%    Left Vertebral:-  Finding: Antegrade  Sys:       32.0  Starla:    Left Subclavian: Normal    INTERPRETATION/FINDINGS  PROCEDURE:  Carotid Duplex Examination using B-mode, color and  spectral Doppler of the extracranial cerebrovascular arteries. 1. Bilateral <50% stenosis of the internal carotid arteries. 2. No significant stenosis in the external carotid arteries  bilaterally. 3. Antegrade flow in both vertebral arteries. 4. Normal flow in both subclavian arteries. Plaque Morphology:  1. Calcific plaque in the bulb and right ICA. 2. Calcific plaque in the bulb and left ICA. ADDITIONAL COMMENTS    I have personally reviewed the data relevant to the interpretation of  this  study. TECHNOLOGIST: Carson Pablo RVT  Signed: 2017 10:43 AM    PHYSICIAN: Maeve Hernandez MD  Signed: 2017 03:48 PM

## 2017-05-30 NOTE — ED NOTES
Assumed care of patient at this time. Verbal and bedside report received from [facility]. Pt ppears in NAD, resp equal and unlabored. VS WNL. Assisted to bedside commode to urinate. Will cont to monitor and reassess. Call bell within reach.

## 2017-05-30 NOTE — ED PROVIDER NOTES
HPI Comments: Kathe Becker is a 65 y.o. female with h/o DM, HTN arrhythmia, CAD, MI and stroke who presents ambulatory to the ED c/o R sided HA and dizziness x this AM. Additionally, pt reports L sided CP that radiates into her L arm. Pt describes dizziness as if the room is spinning and describes her HA as throbbing and radiating into her neck. Onset of pt's HA and dizziness occurred while the pt was walking down the stairs. She denies treating her HA with any OTC medication. Additionally, pt reports being diagnosed with diverticulitis per CT on 5/6 at [facility], and was placed on Keflex and Flagyl. She notes associated abd pain, N/V/D which she had been experiencing for months has greatly improved within the last week. Pt has an appointment with GI on 6/2. Of note, pt has chronic R arm pain, chronic R knee pain and has a pacemaker. Pt denies fever, chills, syncope, LOC, double vision, complete loss of vision, cough, dysuria, hematuria, weakness in extremities. PCP: Adam Ridley MD    Social Hx: -tobacco, -EtOH, -Illicit Drugs      There are no other complaints, changes or physical findings at this time. The history is provided by the patient.         Past Medical History:   Diagnosis Date    Arrhythmia     bradycardia in 30's /pacemaker inserted    Arthritis     RT KNEE    Asthma Dx age 75    Bradycardia 2009    Cardiology saw her during hospital stay; Now off coreg;  Sees Dr. Kassandra Harden    Breast cancer [facility])     Rt mastectomy 2/19/15    CAD (coronary artery disease)     Dr Cervantes Shallow Diabetes [facility])     Now seeing Dr. Rosario Estrada Diverticulitis     DJD (degenerative joint disease), lumbar     GERD (gastroesophageal reflux disease)     H. pylori infection 2008    Dr. Jesus Dasilva attack [facility]) April 2006    Hypercholesterolemia     Hypertension     Morbid obesity ([address]     Neuropathy, arm 2009    Right; Has had MRI and EMG at [address] [place] [address]    SEN (obstructive sleep apnea)     uses CPAP    Rectal bleeding 2009    Hospitalized; Had colonoscopy and EGD (ok), gastric emptying study (normal), doppler mesenteric arteries (ok)    Sciatica     Has seen Dr. Mike Barrow    Stroke [facility]) 2009 & 2012    no residual problems       Past Surgical History:   Procedure Laterality Date    HX APPENDECTOMY  1979    HX BREAST LUMPECTOMY  12/12/2013    RIGHT BREAST DUCTAL EXCISION performed [facility]naldo [facility]roso [facility]    HX CATARACT REMOVAL  2007    HX CHOLECYSTECTOMY  1979    HX COLONOSCOPY      HX HEART CATHETERIZATION      angioplasty    HX HYSTERECTOMY      fibroids    HX KNEE ARTHROSCOPY  1997    right    HX MASTECTOMY Right 2/19/2015    RIGHT BREAST MODIFIED RADICAL MASTECTOMY RIGHT SENTINEL NODE BIOPSY, RIGHT PORT A CATH INSERTION performed by Dhruv Ford MD at [facility] AMBULATORY OR    HX PACEMAKER      HX SHOULDER ARTHROSCOPY  2004    left         Family History:   Problem Relation Age of Onset    Stroke Father     Cancer Sister      breast cancer    Hypertension Sister     Cancer Mother      Unknown type    Cancer Brother      Colon    Hypertension Brother     Anesth Problems Neg Hx        Social History     Social History    Marital status:      Spouse name: N/A    Number of children: N/A    Years of education: N/A     Occupational History    Not on file. Social History Main Topics    Smoking status: Never Smoker    Smokeless tobacco: Never Used    Alcohol use No    Drug use: No    Sexual activity: No     Other Topics Concern    Not on file     Social History Narrative    Lives in [place] with oldest daughter and great-grandson 8. Occupation retired, worked at [employer] as a , Hobbies, plays with grandkiFannabee, likes [employer] and sings in the choir         ALLERGIES: Codeine; Duratuss [phenylephrine-guaifenesin]; Lisinopril-hydrochlorothiazide;  Motrin [ibuprofen]; and Tape [adhesive]    Review of Systems Constitutional: Negative for chills, fatigue and fever. HENT: Negative for congestion, rhinorrhea and sore throat. Eyes: Negative for pain, discharge and visual disturbance. Respiratory: Negative for cough, chest tightness, shortness of breath and wheezing. Cardiovascular: Positive for chest pain (L sided, radiates into L arm). Negative for palpitations and leg swelling. Gastrointestinal: Positive for abdominal pain, diarrhea, nausea and vomiting. Negative for constipation. Genitourinary: Negative for dysuria, frequency and hematuria. Musculoskeletal: Negative for arthralgias, back pain and myalgias. +chronic R arm and R knee pain   Skin: Negative for rash. Neurological: Positive for dizziness and headaches (R sided, radiates into neck). Negative for weakness and light-headedness. Psychiatric/Behavioral: Negative. All other systems reviewed and are negative. Vitals:    05/29/17 1734 05/29/17 1735 05/29/17 1900 05/29/17 1915   BP: 147/70  155/64 132/86   Pulse:       Resp:       Temp:       SpO2:  99%  100%   Weight:       Height:                Physical Exam   Constitutional: She is oriented to person, place, and time. She appears well-developed and well-nourished. No distress. HENT:   Head: Normocephalic and atraumatic. Eyes: EOM are normal. Pupils are equal, round, and reactive to light. Right eye exhibits no discharge. Left eye exhibits no discharge. No scleral icterus. No nystagmus   Neck: Normal range of motion. Neck supple. No tracheal deviation present. Cardiovascular: Normal rate, regular rhythm, normal heart sounds and intact distal pulses. Exam reveals no gallop and no friction rub. No murmur heard. Pulmonary/Chest: Effort normal and breath sounds normal. No respiratory distress. She has no wheezes. She has no rales. L anterior chest wall TTP  Pacemaker in L chest wall   Abdominal: Soft. She exhibits no distension. There is no rebound and no guarding. Diffuse abd tenderness   Musculoskeletal: Normal range of motion. She exhibits no edema. Lymphadenopathy:     She has no cervical adenopathy. Neurological: She is alert and oriented to person, place, and time. She has normal strength. No cranial nerve deficit. GCS eye subscore is 4. GCS verbal subscore is 5. GCS motor subscore is 6. No focal neuro deficits, face symmetric, pronator drift negative, finger nose finger intact bilaterally, 5/5 strength to all extremities, unable to do heel to shin due to chronic R knee pain     Skin: Skin is warm and dry. No rash noted. Psychiatric: She has a normal mood and affect. Nursing note and vitals reviewed. MDM  Number of Diagnoses or Management Options  Dizziness:   Nonintractable headache, unspecified chronicity pattern, unspecified headache type:   Diagnosis management comments:     Patient presents to ED with headache and dizziness x 11 AM.  Differential includes BPPV, labyrinthitis, central vertigo, TIA, CVA, dehydration, ACS. She also reports chronic abdominal pain x months and does have GI follow up; recent CT a/p at OSH was unremarkable (spoke with OSH and verified CT a/p was unremarkable). Do not suspect acute intraabdominal process. - CBC, CMP, lactate, lipase, Shiv, UA  - HCT  - Symptomatic management and reevaluate         Amount and/or Complexity of Data Reviewed  Clinical lab tests: ordered and reviewed  Tests in the radiology section of CPT®: ordered and reviewed  Tests in the medicine section of CPT®: ordered and reviewed  Review and summarize past medical records: yes  Discuss the patient with other providers: yes (Neurologist  Hospitalist)  Independent visualization of images, tracings, or specimens: yes    Patient Progress  Patient progress: stable    ED Course       Procedures     EKG interpretation: (Preliminary) 17:22  Rhythm: normal sinus rhythm; and regular .  Rate (approx.): 87 bpm; Axis: left axis deviation; MN interval: normal; QRS interval: prolonged; ST/T wave: non-specific changes; unchanged since prior EKG  Written by Rah Tompkins, ED Scribe, as dictated by Stanley Rodríguez MD    PROGRESS NOTE:  9:05 PM  Attempted to ambulate pt. Pt reports feeling dizzy and now reporting tingling and numbness L arm and L leg. She is stating L arm numbness has been present since 11 AM (describes as pain upon initial evaluation). She does have L lower extremity weakness on exam and significant L lumber paraspinal tenderness and history of DDD of lumbar spine. This may be related to sciatica VS MSK pain. However, given sxs will discuss with tele neurology. CONSULT NOTE:   9:10 PM  Stanley Rodríguez MD spoke with Dr. Irina Campbell,  Specialty: Neurology  Discussed pt's hx, disposition, and available diagnostic and imaging results. Reviewed care plans. Consultant agrees with plans as outlined. No further testing or treatment at this time, but recommends admission for stroke work up. Written by Rah Tompkins, ED Scribe, as dictated by Stanley Rodríguez MD    CONSULT NOTE:   9:22 PM  Stanley Rodríguez MD spoke with Dr. Gumaro Pettit,   Specialty: Hospitalist  Discussed pt's hx, disposition, and available diagnostic and imaging results. Reviewed care plans. Consultant will evaluate pt for admission.   Written by Rah Tompkins ED Scribe, as dictated by Stanley Rodríguez MD     LABORATORY TESTS:  Recent Results (from the past 12 hour(s))   EKG, 12 LEAD, INITIAL    Collection Time: 05/29/17  5:22 PM   Result Value Ref Range    Ventricular Rate 87 BPM    Atrial Rate 87 BPM    P-R Interval 198 ms    QRS Duration 192 ms    Q-T Interval 418 ms    QTC Calculation (Bezet) 502 ms    Calculated P Axis 77 degrees    Calculated R Axis -61 degrees    Calculated T Axis 108 degrees    Diagnosis       Normal sinus rhythm  Left axis deviation  Left ventricular hypertrophy with QRS widening  Possible Lateral infarct (cited on or before 29-MAY-2017)  When compared with ECG of 26-JAN-2017 14:19,  Sinus rhythm has replaced Electronic atrial pacemaker  Minimal criteria for Septal infarct are no longer present  Serial changes of Lateral infarct present     CBC WITH AUTOMATED DIFF    Collection Time: 05/29/17  6:40 PM   Result Value Ref Range    WBC 4.2 3.6 - 11.0 K/uL    RBC 3.35 (L) 3.80 - 5.20 M/uL    HGB 8.7 (L) 11.5 - 16.0 g/dL    HCT 26.0 (L) 35.0 - 47.0 %    MCV 77.6 (L) 80.0 - 99.0 FL    MCH 26.0 26.0 - 34.0 PG    MCHC 33.5 30.0 - 36.5 g/dL    RDW 15.5 (H) 11.5 - 14.5 %    PLATELET 349 014 - 016 K/uL    NEUTROPHILS 65 32 - 75 %    LYMPHOCYTES 23 12 - 49 %    MONOCYTES 10 5 - 13 %    EOSINOPHILS 2 0 - 7 %    BASOPHILS 0 0 - 1 %    ABS. NEUTROPHILS 2.7 1.8 - 8.0 K/UL    ABS. LYMPHOCYTES 1.0 0.8 - 3.5 K/UL    ABS. MONOCYTES 0.4 0.0 - 1.0 K/UL    ABS. EOSINOPHILS 0.1 0.0 - 0.4 K/UL    ABS. BASOPHILS 0.0 0.0 - 0.1 K/UL   METABOLIC PANEL, COMPREHENSIVE    Collection Time: 05/29/17  6:40 PM   Result Value Ref Range    Sodium 142 136 - 145 mmol/L    Potassium 4.0 3.5 - 5.1 mmol/L    Chloride 106 97 - 108 mmol/L    CO2 25 21 - 32 mmol/L    Anion gap 11 5 - 15 mmol/L    Glucose 125 (H) 65 - 100 mg/dL    BUN 16 6 - 20 MG/DL    Creatinine 1.09 (H) 0.55 - 1.02 MG/DL    BUN/Creatinine ratio 15 12 - 20      GFR est AA 59 (L) >60 ml/min/1.73m2    GFR est non-AA 49 (L) >60 ml/min/1.73m2    Calcium 9.1 8.5 - 10.1 MG/DL    Bilirubin, total 0.2 0.2 - 1.0 MG/DL    ALT (SGPT) 19 12 - 78 U/L    AST (SGOT) 32 15 - 37 U/L    Alk.  phosphatase 150 (H) 45 - 117 U/L    Protein, total 7.9 6.4 - 8.2 g/dL    Albumin 2.6 (L) 3.5 - 5.0 g/dL    Globulin 5.3 (H) 2.0 - 4.0 g/dL    A-G Ratio 0.5 (L) 1.1 - 2.2     TROPONIN I    Collection Time: 05/29/17  6:40 PM   Result Value Ref Range    Troponin-I, Qt. <0.04 <0.05 ng/mL   LIPASE    Collection Time: 05/29/17  6:40 PM   Result Value Ref Range    Lipase 101 73 - 393 U/L   LACTIC ACID, PLASMA    Collection Time: 05/29/17  6:40 PM   Result Value Ref Range    Lactic acid 1.3 0.4 - 2.0 MMOL/L   URINALYSIS W/ REFLEX CULTURE    Collection Time: 05/29/17  6:40 PM   Result Value Ref Range    Color YELLOW/STRAW      Appearance CLEAR CLEAR      Specific gravity 1.010 1.003 - 1.030      pH (UA) 5.0 5.0 - 8.0      Protein TRACE (A) NEG mg/dL    Glucose NEGATIVE  NEG mg/dL    Ketone NEGATIVE  NEG mg/dL    Bilirubin NEGATIVE  NEG      Blood SMALL (A) NEG      Urobilinogen 0.2 0.2 - 1.0 EU/dL    Nitrites NEGATIVE  NEG      Leukocyte Esterase NEGATIVE  NEG      WBC 0-4 0 - 4 /hpf    RBC 5-10 0 - 5 /hpf    Epithelial cells FEW FEW /lpf    Bacteria NEGATIVE  NEG /hpf    UA:UC IF INDICATED CULTURE NOT INDICATED BY UA RESULT CNI      Hyaline cast 2-5 0 - 5 /lpf       IMAGING RESULTS:    EXAM: CT HEAD WO CONT     INDICATION: dizzy x this AM     COMPARISON: 6/30/2016.     TECHNIQUE: Unenhanced CT of the head was performed using 5 mm images. Brain and  bone windows were generated. CT dose reduction was achieved through use of a  standardized protocol tailored for this examination and automatic exposure  control for dose modulation.      FINDINGS:  The ventricles and sulci are normal in size, shape and configuration and  midline. There is no significant white matter disease. There is no intracranial  hemorrhage, extra-axial collection, mass, mass effect or midline shift. The  basilar cisterns are open. No acute infarct is identified. The bone windows  demonstrate no abnormalities. The visualized portions of the paranasal sinuses  and mastoid air cells are clear.     IMPRESSION  IMPRESSION: No acute finding or significant change    EXAM: XR CHEST PA LAT     INDICATION: left chest pain x this AM     COMPARISON: 1/26/2017.     FINDINGS: PA and lateral radiographs of the chest demonstrate clear lungs. Mild  cardiomegaly is noted. Mediastinal shadows stable. Cardiac pacemaker noted. .   The bones and soft tissues are within normal limits.      IMPRESSION  IMPRESSION: No acute finding.  Mild cardiomegaly. MEDICATIONS GIVEN:  Medications   aspirin (ASPIRIN) tablet 325 mg (not administered)   meclizine (ANTIVERT) tablet 25 mg (25 mg Oral Given 5/29/17 1902)   acetaminophen (TYLENOL) tablet 650 mg (650 mg Oral Given 5/29/17 1903)       IMPRESSION:  Headache  Dizzy    PLAN:  1. To be admitted per hospitalist    9:25 PM  Patient is being admitted to the hospital by Dr. Robb Moyer. The results of their tests and reasons for their admission have been discussed with them and/or available family. They convey agreement and understanding for the need to be admitted and for their admission diagnosis. Consultation has been made with the inpatient physician specialist for hospitalization. Written by Yary Han, ED Scribe, as dictated by Kishor Trujillo MD.    This note is prepared by Yary Han, acting as Scribe for MD Kishor Hubbard MD: The scribe's documentation has been prepared under my direction and personally reviewed by me in its entirety. I confirm that the note above accurately reflects all work, treatment, procedures, and medical decision making performed by me.

## 2017-05-30 NOTE — PROGRESS NOTES
Speech Pathology bedside swallow evaluation/discharge  Patient: Anselmo Shafer (20 y.o. female)  Date: 5/30/2017  Primary Diagnosis: Left-sided weakness        Precautions:        ASSESSMENT :  Based on the objective data described below, the patient presents with functional swallowing of all textures. She could be upgraded to her home diet of soft. Unclear why she is on clear liquid diet. Minimal to mild dysfluency of part word repetition. Skilled therapy provided by a speech-language pathologist is not indicated at this time. PLAN :  Recommendations:  No follow up needed for swallowing. She has minimal to mild disfluency that she reports as new today. Alva Snooks No CVA found on head CT. May need OP follow up for stuttering. Discharge Recommendations: None     SUBJECTIVE:   Patient stated she has had two strokes in the past and had mild stuttering each time but it always resolved fully by discharge. OBJECTIVE:     Past Medical History:   Diagnosis Date    Arrhythmia     bradycardia in 30's /pacemaker inserted    Arthritis     RT KNEE    Asthma Dx age 75    Bradycardia 2009    Cardiology saw her during hospital stay; Now off coreg;  Sees Dr. Chilo Ferreira    Breast cancer [facility])     Rt mastectomy 2/19/15    CAD (coronary artery disease)     Dr Carlos Bray Diabetes [facility])     Now seeing Dr. Pawel Carcamo Diverticulitis     DJD (degenerative joint disease), lumbar     GERD (gastroesophageal reflux disease)     H. pylori infection 2008    Dr. Oseas Wheeler attack [facility]) April 2006    Hypercholesterolemia     Hypertension     Morbid obesity ([address]     Neuropathy, arm 2009    Right; Has had MRI and [facility] at [facility] center    SEN (obstructive sleep apnea)     uses CPAP    Rectal bleeding 2009    Hospitalized;  Had colonoscopy and EGD (ok), gastric emptying study (normal), doppler mesenteric arteries (ok)    Sciatica     Has seen Dr. Gm Crawley    Stroke [facility]) 2009 & 2012    no residual problems Past Surgical History:   Procedure Laterality Date    HX APPENDECTOMY  1979    HX BREAST LUMPECTOMY  12/12/2013    RIGHT BREAST DUCTAL EXCISION performed by Robbie Gomez MD at [facility] MAIN OR    HX CATARACT REMOVAL  2007    HX CHOLECYSTECTOMY  1979    HX COLONOSCOPY      HX HEART CATHETERIZATION      angioplasty    HX HYSTERECTOMY      fibroids    HX KNEE ARTHROSCOPY  1997    right    HX MASTECTOMY Right 2/19/2015    RIGHT BREAST MODIFIED RADICAL MASTECTOMY RIGHT SENTINEL NODE BIOPSY, RIGHT PORT A CATH INSERTION performed by Bev Medeiros MD at [facility] AMBULATORY OR    HX PACEMAKER      HX SHOULDER ARTHROSCOPY  2004    left     Prior Level of Function/Home Situation:   Home Situation  Home Environment: Private residence  One/Two Story Residence: Two story  Living Alone: No  Support Systems: Family member(s), Child(magdiel), Friends \ neighbors  Patient Expects to be Discharged to[de-identified] Private residence  Current DME Used/Available at Home: Cane, straight  Diet prior to admission: soft diet   Current Diet:  Clear liquids   Cognitive and Communication Status:  Neurologic State: Alert  Orientation Level: Oriented X4  Cognition: Appropriate decision making, Appropriate for age attention/concentration  Perception: Appears intact        Oral Assessment:  Oral Assessment  Labial: No impairment  Dentition: Full;Natural  Lingual: No impairment  Velum: No impairment  Mandible: No impairment  P.O. Trials:  Patient Position: upright in bed  Vocal quality prior to P.O.: No impairment  Consistency Presented: Thin liquid;Puree; Solid  How Presented: Self-fed/presented;Straw     Bolus Acceptance: No impairment  Bolus Formation/Control: No impairment     Propulsion: No impairment  Oral Residue: None  Initiation of Swallow: No impairment  Laryngeal Elevation: Functional  Aspiration Signs/Symptoms: None  Pharyngeal Phase Characteristics: No impairment, issues, or problems              Oral Phase Severity: No impairment  Pharyngeal Phase Severity : No impairment    G Codes: In compliance with CMSs Claims Based Outcome Reporting, the following G-code set was chosen for this patient based the use of the NOMS functional outcome to quantify this patient's level of swallowing impairment. Using the NOMS, the patient was determined to be at level 7 for swallow function which correlates with the CH= 0% level of severity. Based on the objective assessment provided within this note, the current, goal, and discharge g-codes are as follows:    Swallow  Swallowing:   Swallow Current Status CH= 0%   Swallow Goal Status CH= 0%   Swallow D/C Status CH= 0%      NOMS Swallowing Levels:  Level 1 (CN): NPO  Level 2 (CM): NPO but takes consistency in therapy  Level 3 (CL): Takes less than 50% of nutrition p.o. and continues with nonoral feedings; and/or safe with mod cues; and/or max diet restriction  Level 4 (CK): Safe swallow but needs mod cues; and/or mod diet restriction; and/or still requires some nonoral feeding/supplements  Level 5 (CJ): Safe swallow with min diet restriction; and/or needs min cues  Level 6 (CI): Independent with p.o.; rare cues; usually self cues; may need to avoid some foods or needs extra time  Level 7 ([address] [place] [address]): Independent for all p.o.  MICHELLE. (2003). National Outcomes Measurement System (NOMS): Adult Speech-Language Pathology User's Guide. After treatment:   [] Patient left in no apparent distress sitting up in chair  [x] Patient left in no apparent distress in bed  [x] Call bell left within reach  [x] Nursing notified  [] Caregiver present  [] Bed alarm activated    COMMUNICATION/EDUCATION:   The patients plan of care including findings, recommendations, and recommended diet changes were discussed with: Registered Nurse.     [] Posted safety precautions in patient's room. [x] Patient/family have participated as able and agree with findings and recommendations.   [] Patient is unable to participate in plan of care at this time.     Thank you for this referral.  Diego Barreto, SLP  Time Calculation: 11 mins

## 2017-05-30 NOTE — ED NOTES
Assumed care of patient from [address] [place] [address]  , introduced self to patient as primary nurse at this time. Updated patient on plan of care at this time. Pt has no other questions at this time. Bed in lowest position and locked. Side rails up x2 and call bell within reach.

## 2017-05-31 PROBLEM — I65.23 STENOSIS OF BOTH INTERNAL CAROTID ARTERIES: Status: ACTIVE | Noted: 2017-01-01

## 2017-05-31 PROBLEM — G45.1 TRANSIENT ISCHEMIC ATTACK INVOLVING RIGHT INTERNAL CAROTID ARTERY: Status: ACTIVE | Noted: 2017-01-01

## 2017-05-31 NOTE — PROGRESS NOTES
Speech LAnguage Pathology evaluation/discharge  Patient: Trina Betancourt (61 y.o. female)  Date: 5/31/2017  Primary Diagnosis: Left-sided weakness        Precautions:   Fall    ASSESSMENT :  Based on the objective data described below, the patient presents with functional basic language, motor speech and speech is fluent. No dysfluency noted. She had mild dysfluency yesterday but it has now resolved. Skilled therapy provided by a speech-language pathologist is not indicated at this time. PLAN :  Recommendations:  No recommendations for further speech therapy. Discharge Recommendations: None     SUBJECTIVE:   Patient stated she was going to ask her DrShaun For a speech therapy prescription because her speech comes and goes. Sometimes it is slurred. Sometimes she stutters. Her speech was neither slurred nor dysfluent today. OBJECTIVE:     Past Medical History:   Diagnosis Date    Arrhythmia     bradycardia in 30's /pacemaker inserted    Arthritis     RT KNEE    Asthma Dx age 75    Bradycardia 2009    Cardiology saw her during hospital stay; Now off coreg;  Sees Dr. Sundeep Maldonado    Breast cancer [facility])     Rt mastectomy 2/19/15    CAD (coronary artery disease)     Dr Carole Serrato Diabetes [facility])     Now seeing Dr. Doug Byrne Diverticulitis     DJD (degenerative joint disease), lumbar     GERD (gastroesophageal reflux disease)     H. pylori infection 2008    Dr. Barbara Coe attack [facility]) April 2006    Hypercholesterolemia     Hypertension     Morbid obesity ([address]     Neuropathy, arm 2009    Right; Has had MRI and EMG at [address] [place] [address]    SEN (obstructive sleep apnea)     uses CPAP    Rectal bleeding 2009    Hospitalized;  Had colonoscopy and EGD (ok), gastric emptying study (normal), doppler mesenteric arteries (ok)    Sciatica     Has seen Dr. Mayelin Vergara    Stroke [facility]) 2009 & 2012    no residual problems     Past Surgical History:   Procedure Laterality Date    HX APPENDECTOMY 1979    HX BREAST LUMPECTOMY  12/12/2013    RIGHT BREAST DUCTAL EXCISION performed by Warren Arriola MD at [facility] MAIN OR    HX CATARACT REMOVAL  2007    HX CHOLECYSTECTOMY  1979    HX COLONOSCOPY      HX HEART CATHETERIZATION      angioplasty    HX HYSTERECTOMY      fibroids    HX KNEE ARTHROSCOPY  1997    right    HX MASTECTOMY Right 2/19/2015    RIGHT BREAST MODIFIED RADICAL MASTECTOMY RIGHT SENTINEL NODE BIOPSY, RIGHT PORT A CATH INSERTION performed by Carol Nuñez MD at [facility] AMBUL[facility]ORY OR    HX PACEMAKER      HX SHOULDER ARTHROSCOPY  2004    left     Prior Level of Function/Home Situation:   Home Situation  Home Environment: Private residence  # Steps to Enter: 4  Rails to Enter: Yes  Hand Rails : Left  One/Two Story Residence: Two story  # of Interior Steps: 13  Interior Rails: Left  Living Alone: No  Support Systems: Child(magdiel) (daughter)  Patient Expects to be Discharged to[de-identified] Private residence  Current DME Used/Available at Home: Cane, straight, Grab bars (SPC only out in the community)  Tub or Shower Type: Tub/Shower combination  Mental Status:  Neurologic State: Alert  Orientation Level: Disoriented to time, Oriented X4  Cognition: Appropriate decision making, Appropriate for age attention/concentration, Appropriate safety awareness  Perception: Appears intact  Perseveration: No perseveration noted     Motor Speech:  Oral-Motor Structure/Motor Speech  Apraxic Characteristics: None  Dysarthric Characteristics: None  Intelligibility: No impairment  Overall Impairment Severity: None  Language Comprehension and Expression:     Verbal Expression  Primary Mode of Expression: Verbal  Initiation: No impairment  Conversation: No impairment;Fluent  Overall Impairment: None  Reading Comprehension  Visual Impairment: No impairment  Scanning/Tracking : No impairment  Oral Reading: No impairment  Effective Techniques: Prescription glasses/contact lenses  Overall Impairment Severity: None Pragmatics:  Pragmatics Impairment: No impairment  Pragmatics Impairment Severity: None               G Codes: In compliance with CMSs Claims Based Outcome Reporting, the following G-code set was chosen for this patient based the use of the [facility] to quantify this patient's level of 7 impairment. Using the [facility], the patient was determined to be at level 7 for fluency function which correlates with the CH= 0% level of severity. Based on the objective assessment provided within this note, the current, goal, and discharge g-codes are as follows: Motor   Current  CH= 0%   Goal  CH= 0%   D/C  CH= 0%        Pain:  Pain Scale 1: Numeric (0 - 10)  Pain Intensity 1: 0     After treatment:   []   Patient left in no apparent distress sitting up in chair  [x]   Patient left in no apparent distress in bed  [x]   Call bell left within reach  [x]   Nursing notified  []   Caregiver present  []   Bed alarm activated    COMMUNICATION/EDUCATION:   The patients plan of care including findings and recommendations was discussed with: Registered Nurse. [x]   Patient/family have participated as able and agree with findings and recommendations. []   Patient is unable to participate in plan of care at this time.     Thank you for this referral.  Cheryl Sena, JOSSELIN  Time Calculation: 8 mins

## 2017-05-31 NOTE — PROGRESS NOTES
Discharge instructions reviewed with patient including changes in medications and follow-up appointments. She verbalized understanding.

## 2017-05-31 NOTE — PROGRESS NOTES
CM met with pt and discussed home health companies and provided pt with a list. Pt chose [facility]. FOC form completed. Referral sent via [facility]. F/u appointment made and documented on the discharge paperwork. Pt's daughter will transport pt home after she is finished with work. Pt thinks she might come at 5pm. Pt in good spirits and ready for discharge. Care Management Interventions  PCP Verified by CM: Yes (Dr. Castillo Dates - saw PCP Friday)  Mode of Transport at Discharge: Other (see comment) (pt's daughter will transport by car)  Hospital Transport Time of Discharge: [address]  (CM Consult): [facility] [address] [facility]  [address]: Yes  Discharge Durable Medical Equipment: No  Physical Therapy Consult: Yes  Occupational Therapy Consult: Yes  Speech Therapy Consult: Yes  Current Support Network:  Other, Own Home (lives with her daughter in a 2 story home with 3-4 steps to the entrance)  Confirm Follow Up Transport: Family  Plan discussed with Pt/Family/Caregiver: Yes  Freedom of Choice Offered: Yes  Discharge Location  Discharge Placement: Home with home health    Nate Llanos, [address] [place]

## 2017-05-31 NOTE — CDMP QUERY
Dr. Choco Orozco :    The [address] [place] [address] has issued a statement indicating that, \"Individuals who are overweight, obese, or morbidly obese are at an increased risk for certain medical conditions when compared to persons of normal weight. Therefore, these conditions are always clinically significant and reportable when documented by the provider. \"    Review of the documentation for this patient demonstrates the clinical indicators of a BMI of 31.9, height of 5'7\" and a weight of 203 lbs. Please clarify if the patient has a diagnosis associated with those findings:    =>Obesity  (BMI: 30-39.9)  =>Morbid Obesity (BMI: >40.0)  =>Overweight  (BMI: 25-29.9)  =>Other weight status (specify status)  =>Unable to determine    Thank You,   [email or web address]  [phone]

## 2017-05-31 NOTE — PROGRESS NOTES
* No surgery found *  Bedside shift change report given to Amna Landers RN (oncoming nurse) by Jonathan Diaz RN (offgoing nurse). Report included the following information SBAR, Kardex, ED Summary, Intake/Output, MAR and Recent Results.     Zone Phone:   [phone]      Significant changes during shift:  None        Patient Information    Juan Park  65 y.o.  5/29/2017  5:09 PM by Erna Seth MD. Juan Park was admitted from Home    Problem List    Patient Active Problem List    Diagnosis Date Noted    Left-sided weakness 05/29/2017    LLQ pain 05/29/2017    Pacemaker 28/95/3331    Diastolic CHF, chronic ([address] 05/29/2017    Type 2 diabetes mellitus with diabetic polyneuropathy, with long-term current use of insulin ([address] 12/21/2016    S/P right mastectomy 12/05/2016    Lymphedema of upper extremity following lymphadenectomy 12/18/2015    Multinodular goiter (nontoxic) 12/15/2015    Malignant neoplasm of upper-inner quadrant of right female breast ([address] 10/07/2015    Vitamin D deficiency 08/19/2015    Essential hypertension 08/07/2015    Pulmonary emboli ([address] 05/10/2015    Constipated 04/08/2015    Nausea & vomiting 03/26/2015    Pain 03/18/2015    Abdominal pain 02/20/2015    Breast cancer, right breast ([address] 11/26/2014    Hypercholesterolemia     Stroke ([address]     DJD (degenerative joint disease), lumbar     GERD (gastroesophageal reflux disease)     SEN (obstructive sleep apnea)     Sciatica     Neuropathy, arm     CAD (coronary artery disease)      Past Medical History:   Diagnosis Date    Arrhythmia     bradycardia in 30's /pacemaker inserted    Arthritis     RT KNEE    Asthma Dx age 75    Bradycardia 2009    Cardiology saw her during hospital stay; Now off coreg;  Sees Dr. Maurisio Damon    Breast cancer [facility])     Rt mastectomy 2/19/15    CAD (coronary artery disease)     Dr Sarah Stephenson    Diabetes [facility])     Now seeing Dr. Anusha Dowd Diverticulitis     DJD (degenerative joint disease), lumbar     GERD (gastroesophageal reflux disease)     H. pylori infection 2008    Dr. Sejal Crawley attack [facility]) April 2006    Hypercholesterolemia     Hypertension     Morbid obesity ([address]     Neuropathy, arm 2009    Right; Has had MRI and EMG at [phone] SEN (obstructive sleep apnea)     uses CPAP    Rectal bleeding 2009    Hospitalized; Had colonoscopy and EGD (ok), gastric emptying study (normal), doppler mesenteric arteries (ok)    Sciatica     Has seen Dr. Monisha Granger    Stroke [facility]) 2009 & 2012    no residual problems         Core Measures:    CVA: Yes Yes  CHF:No No  PNA:No No    Post Op Surgical (If Applicable):     n/a    Activity Status:    OOB to Chair YES  Ambulated this shift Yes, PT walked in hallway  Bed Rest No    Supplemental O2: (If Applicable)    n/a      LINES AND DRAINS:    Peripheral IV 20 in LH    DVT prophylaxis:    DVT prophylaxis Med- Yes lovenox  DVT prophylaxis SCD or YARA- No     Wounds: (If Applicable)    Wounds- No    Location -    Patient Safety:    Falls Score Total Score: 2  Safety Level_______  Bed Alarm On? Yes  Sitter?  No    Plan for upcoming shift: neuro to see        Discharge Plan: Yes when medically stable    Active Consults:  IP CONSULT TO NEUROLOGY

## 2017-05-31 NOTE — ROUTINE PROCESS
Bedside shift change report given to [address] (oncoming nurse) by Steffany Steele RN (offgoing nurse).  Report included the following information SBAR, Kardex, ED Summary, Intake/Output, MAR and Recent Results.     Zone Phone: [phone]        Significant changes during shift: Discharged           Patient Information     Lanny Roque  65 y.o.  5/29/2017 5:09 PM by Hank Mason MD. Lanny Roque was admitted from Home     Problem List          Patient Active Problem List     Diagnosis Date Noted    Left-sided weakness 05/29/2017    LLQ pain 05/29/2017    Pacemaker 20/35/5876    Diastolic CHF, chronic ([address] 05/29/2017    Type 2 diabetes mellitus with diabetic polyneuropathy, with long-term current use of insulin ([address] 12/21/2016    S/P right mastectomy 12/05/2016    Lymphedema of upper extremity following lymphadenectomy 12/18/2015    Multinodular goiter (nontoxic) 12/15/2015    Malignant neoplasm of upper-inner quadrant of right female breast ([address] 10/07/2015    Vitamin D deficiency 08/19/2015    Essential hypertension 08/07/2015    Pulmonary emboli ([address] 05/10/2015    Constipated 04/08/2015    Nausea & vomiting 03/26/2015    Pain 03/18/2015    Abdominal pain 02/20/2015    Breast cancer, right breast ([address] 11/26/2014    Hypercholesterolemia      Stroke ([address]      DJD (degenerative joint disease), lumbar      GERD (gastroesophageal reflux disease)      SEN (obstructive sleep apnea)      Sciatica      Neuropathy, arm      CAD (coronary artery disease)             Past Medical History:   Diagnosis Date    Arrhythmia       bradycardia in 30's /pacemaker inserted    Arthritis       RT KNEE    Asthma Dx age 75    Bradycardia 2009     Cardiology saw her during hospital stay; Now off coreg; Sees Dr. Monica Aguilera    Breast cancer [facility])       Rt mastectomy 2/19/15    CAD (coronary artery disease)       Dr Leander Crabtree    Diabetes [facility])       Now seeing Dr. Arina Reagan Diverticulitis      DJD (degenerative joint disease), lumbar      GERD (gastroesophageal reflux disease)      H. pylori infection 2008     Dr. Sejal Crawley attack [facility]) April 2006    Hypercholesterolemia      Hypertension      Morbid obesity ([address]      Neuropathy, arm 2009     Right; [phone] SEN (obstructive sleep apnea)       uses CPAP    Rectal bleeding 2009     Hospitalized; Had colonoscopy and EGD (ok), gastric emptying study (normal), doppler mesenteric arteries (ok)    Sciatica       Has seen Dr. Monisha Granger    Stroke [facility]) 2009 & 2012     no residual problems            Core Measures:     CVA: Yes Yes  CHF:No No  PNA:No No     Post Op Surgical (If Applicable):      n/a     Activity Status:     OOB to Chair YES  Ambulated this shift Yes, PT walked in hallway  Bed Rest No     Supplemental O2: (If Applicable)     n/a          LINES AND DRAINS:     Peripheral IV 20 in LH     DVT prophylaxis:     DVT prophylaxis Med- Yes lovenox  DVT prophylaxis SCD or YARA- No      Wounds: (If Applicable)     Wounds- No     Location -     Patient Safety:     Falls Score Total Score: 2  Safety Level_______  Bed Alarm On? no  Sitter?  No     Plan for upcoming shift: Daughter to  at 1700           Discharge Plan: Yes home with Home Health     Active Consults:  IP CONSULT TO NEUROLOGY

## 2017-05-31 NOTE — PROGRESS NOTES
Neurology progress note        Chief complaint: Dizziness    Subjective:  Symptoms resolved    HISTORY OF PRESENT ILLNESS  Juan Saucedo is a 65 y.o. female who presents to the hospital because Yesterday morning, she woke up and went downstairs. She had her breakfast and vacuumed her room and then had sudden onset dizziness which she states was like she was falling backwards. After that she did have some slurred speech along with left upper extremity numbness. She did have a headache which was 8/10 in severity, throbbing and holocephalic. She states that her symptoms have improved but she still continues to have numbness in the left upper extremity. She has had strokes in 2009 and 2013. During those strokes as well, she had numbness on the left side of the face and left side of the body. She recovered quite well from that. She does have a pacemaker and hence we cannot get MRI of the brain. ROS  A ten system review of constitutional, cardiovascular, respiratory, musculoskeletal, endocrine, skin, SHEENT, genitourinary, psychiatric and neurologic systems was obtained and is unremarkable except as stated in HPI     PHYSICAL EXAM  EXAMINATION:   Patient Vitals for the past 24 hrs:   Temp Pulse Resp BP SpO2   05/31/17 1142 98.3 °F (36.8 °C) 60 16 121/64 100 %   05/31/17 0731 98.6 °F (37 °C) 60 18 155/72 98 %   05/30/17 2351 98.3 °F (36.8 °C) 60 18 152/81 98 %   05/30/17 1916 98.3 °F (36.8 °C) 60 18 152/87 97 %   05/30/17 1414 98.5 °F (36.9 °C) 63 18 163/79 99 %        General:   General appearance: Pt is in no acute distress   Distal pulses are preserved    Neurological Examination:   Mental Status:  AAO x3. Speech is fluent. Follows commands, has normal fund of knowledge, attention, short term recall, comprehension and insight. Cranial Nerves: Visual fields are full. PERRL, Extraocular movements are full. Facial sensation decreased on the left. Facial movement intact, symmetric.  Hearing intact to conversation. Palate elevates symmetrically. Shoulder shrug symmetric. Tongue midline. Motor: Strength is 5/5 in all 4 ext. Normal tone. No atrophy. Sensation: Decreased pinprick sensation in the left upper extremity    Coordination/Cerebellar: Intact to finger-nose-finger     Gait: Deferred    Skin: No significant bruising or lacerations. LAB DATA REVIEWED:    Results for orders placed or performed during the hospital encounter of 05/29/17   CBC WITH AUTOMATED DIFF   Result Value Ref Range    WBC 4.2 3.6 - 11.0 K/uL    RBC 3.35 (L) 3.80 - 5.20 M/uL    HGB 8.7 (L) 11.5 - 16.0 g/dL    HCT 26.0 (L) 35.0 - 47.0 %    MCV 77.6 (L) 80.0 - 99.0 FL    MCH 26.0 26.0 - 34.0 PG    MCHC 33.5 30.0 - 36.5 g/dL    RDW 15.5 (H) 11.5 - 14.5 %    PLATELET 275 616 - 301 K/uL    NEUTROPHILS 65 32 - 75 %    LYMPHOCYTES 23 12 - 49 %    MONOCYTES 10 5 - 13 %    EOSINOPHILS 2 0 - 7 %    BASOPHILS 0 0 - 1 %    ABS. NEUTROPHILS 2.7 1.8 - 8.0 K/UL    ABS. LYMPHOCYTES 1.0 0.8 - 3.5 K/UL    ABS. MONOCYTES 0.4 0.0 - 1.0 K/UL    ABS. EOSINOPHILS 0.1 0.0 - 0.4 K/UL    ABS. BASOPHILS 0.0 0.0 - 0.1 K/UL   METABOLIC PANEL, COMPREHENSIVE   Result Value Ref Range    Sodium 142 136 - 145 mmol/L    Potassium 4.0 3.5 - 5.1 mmol/L    Chloride 106 97 - 108 mmol/L    CO2 25 21 - 32 mmol/L    Anion gap 11 5 - 15 mmol/L    Glucose 125 (H) 65 - 100 mg/dL    BUN 16 6 - 20 MG/DL    Creatinine 1.09 (H) 0.55 - 1.02 MG/DL    BUN/Creatinine ratio 15 12 - 20      GFR est AA 59 (L) >60 ml/min/1.73m2    GFR est non-AA 49 (L) >60 ml/min/1.73m2    Calcium 9.1 8.5 - 10.1 MG/DL    Bilirubin, total 0.2 0.2 - 1.0 MG/DL    ALT (SGPT) 19 12 - 78 U/L    AST (SGOT) 32 15 - 37 U/L    Alk.  phosphatase 150 (H) 45 - 117 U/L    Protein, total 7.9 6.4 - 8.2 g/dL    Albumin 2.6 (L) 3.5 - 5.0 g/dL    Globulin 5.3 (H) 2.0 - 4.0 g/dL    A-G Ratio 0.5 (L) 1.1 - 2.2     TROPONIN I   Result Value Ref Range    Troponin-I, Qt. <0.04 <0.05 ng/mL   LIPASE   Result Value Ref Range Lipase 101 73 - 393 U/L   LACTIC ACID, PLASMA   Result Value Ref Range    Lactic acid 1.3 0.4 - 2.0 MMOL/L   URINALYSIS W/ REFLEX CULTURE   Result Value Ref Range    Color YELLOW/STRAW      Appearance CLEAR CLEAR      Specific gravity 1.010 1.003 - 1.030      pH (UA) 5.0 5.0 - 8.0      Protein TRACE (A) NEG mg/dL    Glucose NEGATIVE  NEG mg/dL    Ketone NEGATIVE  NEG mg/dL    Bilirubin NEGATIVE  NEG      Blood SMALL (A) NEG      Urobilinogen 0.2 0.2 - 1.0 EU/dL    Nitrites NEGATIVE  NEG      Leukocyte Esterase NEGATIVE  NEG      WBC 0-4 0 - 4 /hpf    RBC 5-10 0 - 5 /hpf    Epithelial cells FEW FEW /lpf    Bacteria NEGATIVE  NEG /hpf    UA:UC IF INDICATED CULTURE NOT INDICATED BY UA RESULT CNI      Hyaline cast 2-5 0 - 5 /lpf   HEMOGLOBIN A1C WITH EAG   Result Value Ref Range    Hemoglobin A1c 7.8 (H) 4.2 - 6.3 %    Est. average glucose 443 mg/dL   METABOLIC PANEL, BASIC   Result Value Ref Range    Sodium 138 136 - 145 mmol/L    Potassium 3.8 3.5 - 5.1 mmol/L    Chloride 104 97 - 108 mmol/L    CO2 27 21 - 32 mmol/L    Anion gap 7 5 - 15 mmol/L    Glucose 78 65 - 100 mg/dL    BUN 17 6 - 20 MG/DL    Creatinine 0.98 0.55 - 1.02 MG/DL    BUN/Creatinine ratio 17 12 - 20      GFR est AA >60 >60 ml/min/1.73m2    GFR est non-AA 55 (L) >60 ml/min/1.73m2    Calcium 8.6 8.5 - 80.8 MG/DL   METABOLIC PANEL, BASIC   Result Value Ref Range    Sodium 139 136 - 145 mmol/L    Potassium 3.7 3.5 - 5.1 mmol/L    Chloride 105 97 - 108 mmol/L    CO2 26 21 - 32 mmol/L    Anion gap 8 5 - 15 mmol/L    Glucose 142 (H) 65 - 100 mg/dL    BUN 14 6 - 20 MG/DL    Creatinine 0.97 0.55 - 1.02 MG/DL    BUN/Creatinine ratio 14 12 - 20      GFR est AA >60 >60 ml/min/1.73m2    GFR est non-AA 56 (L) >60 ml/min/1.73m2    Calcium 8.5 8.5 - 10.1 MG/DL   LIPID PANEL   Result Value Ref Range    LIPID PROFILE          Cholesterol, total 157 <200 MG/DL    Triglyceride 122 <150 MG/DL    HDL Cholesterol 42 MG/DL    LDL, calculated 90.6 0 - 100 MG/DL    VLDL, calculated 24.4 MG/DL    CHOL/HDL Ratio 3.7 0 - 5.0     METABOLIC PANEL, BASIC   Result Value Ref Range    Sodium 142 136 - 145 mmol/L    Potassium 4.2 3.5 - 5.1 mmol/L    Chloride 106 97 - 108 mmol/L    CO2 29 21 - 32 mmol/L    Anion gap 7 5 - 15 mmol/L    Glucose 133 (H) 65 - 100 mg/dL    BUN 11 6 - 20 MG/DL    Creatinine 0.94 0.55 - 1.02 MG/DL    BUN/Creatinine ratio 12 12 - 20      GFR est AA >60 >60 ml/min/1.73m2    GFR est non-AA 58 (L) >60 ml/min/1.73m2    Calcium 9.1 8.5 - 10.1 MG/DL   GLUCOSE, POC   Result Value Ref Range    Glucose (POC) 129 (H) 65 - 100 mg/dL    Performed by Iona Connelly    GLUCOSE, POC   Result Value Ref Range    Glucose (POC) 244 (H) 65 - 100 mg/dL    Performed by Ruy Mauricio (PCT)    GLUCOSE, POC   Result Value Ref Range    Glucose (POC) 129 (H) 65 - 100 mg/dL    Performed by Raquel Rodriguez (PCT)    GLUCOSE, POC   Result Value Ref Range    Glucose (POC) 172 (H) 65 - 100 mg/dL    Performed by Sujey Mcgowan    GLUCOSE, POC   Result Value Ref Range    Glucose (POC) 154 (H) 65 - 100 mg/dL    Performed by Anna Ervin    GLUCOSE, POC   Result Value Ref Range    Glucose (POC) 242 (H) 65 - 100 mg/dL    Performed by Tracie Lora (PCT)    EKG, 12 LEAD, INITIAL   Result Value Ref Range    Ventricular Rate 87 BPM    Atrial Rate 87 BPM    P-R Interval 198 ms    QRS Duration 192 ms    Q-T Interval 418 ms    QTC Calculation (Bezet) 502 ms    Calculated P Axis 77 degrees    Calculated R Axis -61 degrees    Calculated T Axis 108 degrees    Diagnosis       Normal sinus rhythm  Left axis deviation  Electronic demand pacing  Confirmed by Jai Ott ([id number]) on 5/30/2017 8:24:56 AM          Imaging review:  See below.     Stroke workup  CT scan brain ×2  Normal    5/29/2017   Carotid ultrasound  Less than 50% stenosis of ICA    Transthoracic echo  Ejection fraction 55-60%    5/30/2017  LDL 90.6 and HbA1c 7.8    Stroke labs:  HgBA1c    Lab Results   Component Value Date/Time    Hemoglobin A1c 7.8 05/30/2017 03:53 AM     LDL   Lab Results   Component Value Date/Time    LDL, calculated 90.6 05/30/2017 03:53 AM       CURRENT MEDS  Current Facility-Administered Medications   Medication Dose Route Frequency    aspirin (ASPIRIN) tablet 325 mg  325 mg Oral DAILY    atorvastatin (LIPITOR) tablet 80 mg  80 mg Oral DAILY    insulin glargine (LANTUS) injection 10 Units  10 Units SubCUTAneous ACB&D    amLODIPine (NORVASC) tablet 10 mg  10 mg Oral DAILY    benazepril (LOTENSIN) tablet 40 mg  40 mg Oral DAILY    insulin lispro (HUMALOG) injection   SubCUTAneous AC&HS    isosorbide mononitrate ER (IMDUR) tablet 60 mg  60 mg Oral DAILY    metoclopramide HCl (REGLAN) tablet 5 mg  5 mg Oral TIDAC    pantoprazole (PROTONIX) tablet 40 mg  40 mg Oral ACB&D    polyethylene glycol (MIRALAX) packet 17 g  17 g Oral BID    sodium chloride (NS) flush 5-10 mL  5-10 mL IntraVENous Q8H    enoxaparin (LOVENOX) injection 40 mg  40 mg SubCUTAneous DAILY       IMPRESSION:  Shai Oreilly is a 65 y.o. female who presents with new onset slurred speech and left face and left upper extremity numbness. This started yesterday. Symptoms are improving. She has history of stroke in the past.  She is presently taking aspirin 81 mg a day. Stroke workup shows hyperlipidemia and uncontrolled diabetes. 1.  Left facial numbness and left upper extremity numbness secondary to right hemispheric TIA  2. Hypertension  3. Hyperlipidemia  4. Diabetes    RECOMMENDATIONS:  -Cannot get MRI of the brain as the patient has pacemaker.  -Repeat CT scan has been normal  -Carotid ultrasound normal  - TTE-normal  - Telemetry  - BP goal is less than 140/90  - Stroke labs (HgbA1c, TSH, lipid panel). HbA1c 7.8.   Goal HbA1c is less than 7  -Continue aspirin to 325 mg a day  -Continue atorvastatin 80 mg daily    - ST/OT/PT eval  - Discussed healthy lifestyle changes, and modifiable risk factors for stroke with patient and family    Pt is cleared for discharge from a neuro standpoint. She should follow up with neuro as an outpt in 4-6 wks. Please call with further questions.       Pierre Em MD  Neurologist

## 2017-05-31 NOTE — CARDIO/PULMONARY
C/P REHAB:  Chart reviewed. Pt admitted with stroke. History significant for CAD, diastolic HF, pacemaker, HTN, DM with diabetic polyneuropathy, hypercholesterolemia, SEN, previous stroke, breast cancer, DJD, GERD. LVEF 55-60% on echo 5/30/17. Nonsmoker. Met with patient. Explained educational role of cardiac rehab RN. Gave/reviewed CHF teaching materials including CHF \"Zones,\" Avoiding Triggers with Heart Failure\" and low sodium diet. Emphasized s/s worsening CHF, daily wts, when to call MD for fluid wt gain, low sodium diet,  The importance of taking all meds as prescribed and MD follow up. Pt does not weigh daily and states she does not have scales. Rationale explained for daily weights and pt encouraged to purchase scales. She is med compliant and does take a diuretic. She is aware she should follow low salt diet. She does not add salt to food but does cook with a little. Reviewed use of salt substitute or herbs/spices for flavor enhancement. Also encouraged pt to read food labels for sodium content. Named specific high sodium foods she should avoid. Pt had no questions and indicated understanding but would benefit from reinforcement.

## 2017-05-31 NOTE — DISCHARGE SUMMARY
Hospitalist Discharge Summary     Patient ID:  Bradley Bai  [id number]  89 y.o.  1940    PCP on record: Jillian Potter MD    Admit date: 2017  Discharge date: 2017       DISCHARGE DIAGNOSES  DISCHARGE SUMMARY/HOSPITAL COURSE: for full details see H&P, daily progress notes, labs, consult notes. Left facial and upper extremity numbness  Right hemispheric CVA  -increase aspirin to 325 mg daily per neurology  -increase statin to 80 mg  -repeat head CT okay, carotid without stenosis, echo okay  -PT recs home health     LLQ pain x 3 weeks  -CT a/p without any evidence of diverticulitis, stopped abx  -consider GI follow up as outpatient     GERD (gastroesophageal reflux disease)   Continue PPIs      CAD (coronary artery disease)  -asa      Hypertension  Chronic Diastolic Heart Failure  -Continue Norvasc, ARBs, nitrates, bumex      H/o Breast Cancer  -Followed by Dr Duke Jimenez      Type 2 diabetes mellitus with diabetic polyneuropathy, with long-term current use of insulin   -resume home meds      Pacemaker in place  Obesity Body mass index is 31.9 kg/(m^2). CONSULTATIONS:  IP CONSULT TO NEUROLOGY    Excerpted HPI from H&P of Ariella Willingham MD:  Jose Lyn is a 65 y.o.  female who presents with dizziness, headaches, left side weakness, numbness and tingling. As per patient, she started to have symptoms in the morning after breakfast. Pt claims first symptoms started with dizziness upon standing, then she started to have headaches, 8/10 when worse, whole head, throbbing in nature, non radiating, constant. Pt also reports associated left side numbness and tingling (including pace maker site) and when ask about weakness, she also reports weakness. Pt also reports LLQ abdominal pain ongoing for past 3 weeks. She claims to be treated for diverticulitis and just finishes coarse of abx. She claims to continue abdominal pain and symptoms never resolved.  Pt denies any fever, chills, nausea, vomiting, diarrhea, chest pain, cough, shortness of breath.  _______________________________________________________________________  Patient seen and examined by me on discharge day. Pertinent Findings:  Gen:    Not in distress  Chest: Clear lungs  CVS:   Regular rhythm. No edema  Abd:  Soft, not distended, not tender  Neuro:  Alert, oriented  _______________________________________________________________________  DISCHARGE MEDICATIONS:   Current Discharge Medication List      START taking these medications    Details   aspirin (ASPIRIN) 325 mg tablet Take 1 Tab by mouth daily. Qty: 30 Tab, Refills: 0         CONTINUE these medications which have CHANGED    Details   atorvastatin (LIPITOR) 80 mg tablet Take 1 Tab by mouth daily. Indications: hypercholesterolemia  Qty: 30 Tab, Refills: 0         CONTINUE these medications which have NOT CHANGED    Details   HYDROcodone-acetaminophen (NORCO) 5-325 mg per tablet Take 1 Tab by mouth every six (6) hours as needed for Pain.      metoclopramide HCl (REGLAN) 5 mg tablet Take 5 mg by mouth Before breakfast, lunch, and dinner. ipratropium (ATROVENT) 0.06 % nasal spray instill 2 sprays into each nostril three times a day - IF NEEDED FOR RUNNING NOSE  Refills: 0      glipiZIDE (GLUCOTROL) 5 mg tablet Take 0.5 Tabs by mouth two (2) times a day. Take 1/2 every day with breakfast. If your blood sugar is above 200 take a whole tablet. Qty: 30 Tab, Refills: 3      polyethylene glycol (MIRALAX) 17 gram/dose powder Take 17 g by mouth two (2) times a day. Continue while taking pain medication  Qty: 225 g, Refills: 2    Associated Diagnoses: Constipation, unspecified constipation type      insulin glargine (LANTUS) 100 unit/mL injection 15 units with breakfast and 15 units at bedtime. Indications: Diabetes Mellitus  Qty: 1 Vial, Refills: 0      ergocalciferol (ERGOCALCIFEROL) 50,000 unit capsule Take 1 Cap by mouth every thirty (30) days.   Qty: 3 Cap, Refills: 2 Associated Diagnoses: Vitamin D deficiency      isosorbide mononitrate ER (IMDUR) 60 mg CR tablet Take  by mouth every morning. Refills: 0      Blood Sugar Diagnostic, Disc (ASCENSIA BREEZE 2) strp Check your blood sugar twice daily. E11.42  Qty: 100 Strip, Refills: 6      ondansetron (ZOFRAN ODT) 4 mg disintegrating tablet Take 1 Tab by mouth every eight (8) hours as needed for Nausea. Qty: 60 Tab, Refills: 2    Associated Diagnoses: Nausea and vomiting, unspecified intactability, vomiting of unspecified type      MICROLET LANCET misc Refills: 0      benazepril (LOTENSIN) 40 mg tablet Take 40 mg by mouth every morning. bumetanide (BUMEX) 1 mg tablet Take 1 mg by mouth daily. omeprazole (PRILOSEC) 20 mg capsule Take 40 mg by mouth two (2) times a day. amlodipine (NORVASC) 10 mg tablet TAKE 1 TABLET BY MOUTH ONCE DAILY  Qty: 30 Tab, Refills: 10      hydrOXYzine HCl (ATARAX) 25 mg tablet Take 25 mg by mouth every eight (8) hours as needed for Itching. insulin syringe-needle U-100 (BD INSULIN SYRINGE ULTRA-FINE) 1/2 mL 31 gauge x 15/64\" syrg 1 Each by SubCUTAneous route two (2) times a day. Two shots daily. E11.42  Qty: 100 Pen Needle, Refills: 3      albuterol (PROVENTIL VENTOLIN) 2.5 mg /3 mL (0.083 %) nebulizer solution 3 mL by Nebulization route every four (4) hours as needed for Wheezing. Qty: 24 Each, Refills: 0      nitroglycerin (NITROSTAT) 0.4 mg SL tablet by SubLINGual route every five (5) minutes as needed for Chest Pain. STOP taking these medications       aspirin 81 mg chewable tablet Comments:   Reason for Stopping:               My Recommended Diet, Activity, Wound Care, and follow-up labs are listed in the patient's Discharge Insturctions which I have personally completed and reviewed.     _______________________________________________________________________  DISPOSITION:    Home with Family:    Home with HH/PT/OT/RN: x   SNF/LTC:    THERESA:    OTHER:        Condition at Discharge:  Stable  _______________________________________________________________________  Follow up with:   PCP : Merlyn Sutton MD  Follow-up Information     Follow up With Details Comments 1971 F MD Evelio On 6/8/2017 8:00am [address]  [phone]      [facility] [address]  PT and OT services  [facility]  [address] [address]  [phone]              Total time in minutes spent coordinating this discharge (includes going over instructions, follow-up, prescriptions, and preparing report for sign off to her PCP) :  40 minutes    Signed:  Marcello Mello MD

## 2017-05-31 NOTE — PROGRESS NOTES
Problem: Falls - Risk of  Goal: *Absence of falls  Outcome: Progressing Towards Goal  Pt alert, oriented. Ambulates in room with cane. States she walked in hallway with PT today. Appears slow, steady. States she is at baseline.

## 2017-05-31 NOTE — ROUTINE PROCESS
Bedside shift change report given to Jl Roy RN (oncoming nurse) by Desiree Montalvo (offgoing nurse).  Report included the following information SBAR, Kardex, ED Summary, Intake/Output, MAR and Recent Results.     Zone Phone: [phone]        Significant changes during shift: None           Patient Information     Becky Valladares  65 y.o.  5/29/2017 5:09 PM by Tej Sandoval MD. Becky Valladares was admitted from Home     Problem List          Patient Active Problem List     Diagnosis Date Noted    Left-sided weakness 05/29/2017    LLQ pain 05/29/2017    Pacemaker 02/18/5348    Diastolic CHF, chronic ([address] 05/29/2017    Type 2 diabetes mellitus with diabetic polyneuropathy, with long-term current use of insulin ([address] 12/21/2016    S/P right mastectomy 12/05/2016    Lymphedema of upper extremity following lymphadenectomy 12/18/2015    Multinodular goiter (nontoxic) 12/15/2015    Malignant neoplasm of upper-inner quadrant of right female breast ([address] 10/07/2015    Vitamin D deficiency 08/19/2015    Essential hypertension 08/07/2015    Pulmonary emboli ([address] 05/10/2015    Constipated 04/08/2015    Nausea & vomiting 03/26/2015    Pain 03/18/2015    Abdominal pain 02/20/2015    Breast cancer, right breast ([address] 11/26/2014    Hypercholesterolemia      Stroke ([address]      DJD (degenerative joint disease), lumbar      GERD (gastroesophageal reflux disease)      SEN (obstructive sleep apnea)      Sciatica      Neuropathy, arm      CAD (coronary artery disease)             Past Medical History:   Diagnosis Date    Arrhythmia       bradycardia in 30's /pacemaker inserted    Arthritis       RT KNEE    Asthma Dx age 75    Bradycardia 2009     Cardiology saw her during hospital stay; Now off coreg; Sees Dr. Glorya Gaucher    Breast cancer [facility])       Rt mastectomy 2/19/15    CAD (coronary artery disease)       Dr Kailey Rojo    Diabetes [facility])       Now seeing Dr. Elizabeth Brumfield Diverticulitis      DJD (degenerative joint disease), lumbar      GERD (gastroesophageal reflux disease)      H. pylori infection 2008     Dr. Mary Mendoza attack [facility]) April 2006    Hypercholesterolemia      Hypertension      Morbid obesity ([address]      Neuropathy, arm 2009     Right; Has had MRI and EMG at [facility] [id number] SEN (obstructive sleep apnea)       uses CPAP    Rectal bleeding 2009     Hospitalized; Had colonoscopy and EGD (ok), gastric emptying study (normal), doppler mesenteric arteries (ok)    Sciatica       Has seen Dr. Mike Barrow    Stroke [facility]) 2009 & 2012     no residual problems            Core Measures:     CVA: Yes Yes  CHF:No No  PNA:No No     Post Op Surgical (If Applicable):      n/a     Activity Status:     OOB to Chair YES  Ambulated this shift Yes, PT walked in hallway  Bed Rest No     Supplemental O2: (If Applicable)     n/a          LINES AND DRAINS:     Peripheral IV 20 in LH     DVT prophylaxis:     DVT prophylaxis Med- Yes lovenox  DVT prophylaxis SCD or YARA- No      Wounds: (If Applicable)     Wounds- No     Location -     Patient Safety:     Falls Score Total Score: 2  Safety Level_______  Bed Alarm On? Yes  Sitter?  No     Plan for upcoming shift: neuro to see           Discharge Plan: Yes home     Active Consults:  IP CONSULT TO NEUROLOGY

## 2017-05-31 NOTE — PROGRESS NOTES
Occupational Therapy EVALUATION/discharge  Patient: Doug Sharp (53 y.o. female)  Date: 5/31/2017  Primary Diagnosis: Left-sided weakness        Precautions:   Fall    ASSESSMENT:   Based on the objective data described below, the patient presents with mobility deficits leading to SBA level for self care tasks that require standing tolerance (e.g. LE bathing, toileting, and LE dressing). She and her daughter both report that she is only limited at this time by her mobility. I educated her and her daughter on tub transfers and use of a shower chair with a back to complete a sit and turn transfer so she doesn't have to step over the tub wall. There are no additional acute care OT needs identified at this time. I will sign off. Thank you    Further skilled acute occupational therapy is not indicated at this time. Discharge Recommendations: None- Home Health Physical Therapy only  Further Equipment Recommendations for Discharge: possible shower chair with a back- issued a catalog and recommend they follow through with home health physical therapy for training and recomendation before purchasing. SUBJECTIVE:   Patient stated I can get myself dressed and I already went to the bathroom and got myself washed up this morning without help.     OBJECTIVE DATA SUMMARY:   HISTORY:   Past Medical History:   Diagnosis Date    Arrhythmia     bradycardia in 29's /pacemaker inserted    Arthritis     RT KNEE    Asthma Dx age 75    Bradycardia 2009    Cardiology saw her during hospital stay; Now off coreg;  Sees Dr. Fitzpatrick Blood    Breast cancer [facility])     Rt mastectomy 2/19/15    CAD (coronary artery disease)     Dr Rick Pain    Diabetes [facility])     Now seeing Dr. Jeff Morel Diverticulitis     DJD (degenerative joint disease), lumbar     GERD (gastroesophageal reflux disease)     H. pylori infection 2008    Dr. Travon [place] attack [facility]) April 2006    Hypercholesterolemia     Hypertension     Morbid obesity ([address]     Neuropathy, arm 2009    Right; [id number] SEN (obstructive sleep apnea)     uses CPAP    Rectal bleeding 2009    Hospitalized; Had colonoscopy and EGD (ok), gastric emptying study (normal), doppler mesenteric arteries (ok)    Sciatica     Has seen Dr. Yulissa Carballo    Stroke [facility]) 2009 & 2012    no residual problems     Past Surgical History:   Procedure Laterality Date    HX APPENDECTOMY  1979    HX BREAST LUMPECTOMY  12/12/2013    RIGHT BREAST DUCTAL EXCISION performed by Inna Tovar MD at hospitals MAIN OR    HX CATARACT REMOVAL  2007    HX CHOLECYSTECTOMY  1979    HX COLONOSCOPY      HX HEART CATHETERIZATION      angioplasty    HX HYSTERECTOMY      fibroids    HX KNEE ARTHROSCOPY  1997    right    HX MASTECTOMY Right 2/19/2015    RIGHT BREAST MODIFIED RADICAL MASTECTOMY RIGHT SENTINEL NODE BIOPSY, RIGHT PORT A CATH INSERTION performed by Andi Shah MD at hospitals AMBULATORY OR    HX PACEMAKER      HX SHOULDER ARTHROSCOPY  2004    left       Prior Level of Function/Home Situation: lives with daughter and 25year old grandson  Expanded or extensive additional review of patient history: independent in all self care; not driving    Home Situation  Home Environment: Private residence  # Steps to Enter: 4  Rails to Enter: Yes  Hand Rails : Left  One/Two Story Residence: Two story  # of Interior Steps: 13  Interior Rails: Left  Living Alone: No (lives with daughter, and 25year old great grandson)  Support Systems: Child(magdiel), Family member(s)  Patient Expects to be Discharged to[de-identified] Private residence  Current DME Used/Available at Home: Cane, straight, Grab bars  Tub or Shower Type: Tub/Shower combination  [x]  Right hand dominant   []  Left hand dominant    EXAMINATION OF PERFORMANCE DEFICITS:  Cognitive/Behavioral Status:  Neurologic State: Alert  Orientation Level: Disoriented to time;Oriented X4  Cognition: Appropriate decision making; Appropriate for age attention/concentration; Appropriate safety awareness  Perception: Appears intact  Perseveration: No perseveration noted       Skin: bilateral UE intact    Edema: lymphedema in right arm since CA surgery    Hearing: Auditory  Auditory Impairment: None    Vision/Perceptual:                      Diplopia: Yes (cleared since Monday)    Acuity: Able to read clock/calendar on wall without difficulty; Able to read employee name badge without difficulty    Corrective Lenses: Glasses    Range of Motion:  Bilateral UE within functional limits with deficits in shoulders due to arthritis only  AROM: Generally decreased, functional (bilateral UE)          Strength:  Generally intact bilaterally with left stronger than right  Strength: Generally decreased, functional (left stronger than right UE)                Coordination:     Fine Motor Skills-Upper: Left Intact; Right Intact    Gross Motor Skills-Upper: Left Intact; Right Intact    Tone & Sensation: Tone is normal bilateral UE     Sensation: Impaired (tingly a \"little bit\" in left fingers only\")          Functional Mobility and Transfers for ADLs:  Bed Mobility:       Transfers:  Sit to Stand: Stand-by asssistance    ADL Assessment:  Feeding: Independent    Oral Facial Hygiene/Grooming: Independent    Bathing: Stand-by assistance (when standing to wash buttocks)    Upper Body Dressing: Modified independent    Lower Body Dressing: Stand-by assistance    Toileting: Stand by assistance                ADL Intervention and task modifications:      I educated her and her daughter on tub transfers and use of a shower chair with a back to complete a sit and turn transfer so she doesn't have to step over the tub wall. I recommended a shower chair with a back and issued a catalog and recommend they follow through with home health physical therapy for training in her home mobility and recomendation before purchasing.        Functional Measure:  Barthel Index:    Bathin  Bladder: 10  Bowels: 10  Groomin  Dressing: 10  Feeding: 10  Mobility: 5  Stairs: 0  Toilet Use: 5  Transfer (Bed to Chair and Back): 10  Total: 65       Barthel and G-code impairment scale:  Percentage of impairment CH  0% CI  1-19% CJ  20-39% CK  40-59% CL  60-79% CM  80-99% CN  100%   Barthel Score 0-100 100 99-80 79-60 59-40 20-39 1-19   0   Barthel Score 0-20 20 17-19 13-16 9-12 5-8 1-4 0      The Barthel ADL Index: Guidelines  1. The index should be used as a record of what a patient does, not as a record of what a patient could do. 2. The main aim is to establish degree of independence from any help, physical or verbal, however minor and for whatever reason. 3. The need for supervision renders the patient not independent. 4. A patient's performance should be established using the best available evidence. Asking the patient, friends/relatives and nurses are the usual sources, but direct observation and common sense are also important. However direct testing is not needed. 5. Usually the patient's performance over the preceding 24-48 hours is important, but occasionally longer periods will be relevant. 6. Middle categories imply that the patient supplies over 50 per cent of the effort. 7. Use of aids to be independent is allowed. Charan De La Torre., Barthel, D.W. ([id number]). Functional evaluation: the Barthel Index. [facility] (14)2. Benito Lynch, J.J.M.F, Bud Eaton., Virgie Ornelas., [place], [address] [place] [address] (). Measuring the change indisability after inpatient rehabilitation; comparison of the responsiveness of the Barthel Index and Functional [place] Measure. Journal of Neurology, Neurosurgery, and Psychiatry, 66(4), 051-978. Sylvia Grant, N.J.A, Brian Arrington,  W.J.M, & Savannah Miguel MRONI. (2004.) Assessment of post-stroke quality of life in cost-effectiveness studies: The usefulness of the Barthel Index and the EuroQoL-5D. Quality of Life Research, 13, 328-32         G codes:   In compliance with CMSs Claims Based Outcome Reporting, the following G-code set was chosen for this patient based on their primary functional limitation being treated: The outcome measure chosen to determine the severity of the functional limitation was the Barthel with a score of 65/100 which was correlated with the impairment scale. ? Self Care:     - CURRENT STATUS: CJ - 20%-39% impaired, limited or restricted    - GOAL STATUS: N/A    - D/C STATUS:  ---------------To be determined---------------     Occupational Therapy Evaluation Charge Determination   History Examination Decision-Making   MEDIUM Complexity : Expanded review of history including physical, cognitive and psychosocial  history  LOW Complexity : 1-3 performance deficits relating to physical, cognitive , or psychosocial skils that result in activity limitations and / or participation restrictions  LOW Complexity : No comorbidities that affect functional and no verbal or physical assistance needed to complete eval tasks       Based on the above components, the patient evaluation is determined to be of the following complexity level: LOW   Pain:  Pain Scale 1: Numeric (0 - 10)  Pain Intensity 1: 0              Activity Tolerance:   Fair- could improve if she does exercises  Please refer to the flowsheet for vital signs taken during this treatment. After treatment:   [x]  Patient left in no apparent distress sitting up in chair  []  Patient left in no apparent distress in bed  [x]  Call bell left within reach  [x]  Nursing notified  [x]  Daughter present  []  Bed alarm activated    COMMUNICATION/EDUCATION:   Communication/Collaboration:  [x]      Home safety education was provided and the patient/caregiver indicated understanding. [x]      Patient/family have participated as able and agree with findings and recommendations. []      Patient is unable to participate in plan of care at this time.   Findings and recommendations were discussed with: Physical Therapist and Registered Nurse    Marele Castillo, OT  Time Calculation: 19 mins

## 2017-05-31 NOTE — PROGRESS NOTES
Problem: Mobility Impaired (Adult and Pediatric)  Goal: *Acute Goals and Plan of Care (Insert Text)  Physical Therapy Goals  Initiated 5/30/2017  1. Patient will move from supine to sit and sit to supine in bed with independence within 7 day(s). 2. Patient will transfer from bed to chair and chair to bed with modified independence using the least restrictive device within 7 day(s). 3. Patient will perform sit to stand with modified independence within 7 day(s). 4. Patient will ambulate with modified independence for 100 feet with the least restrictive device within 7 day(s). 5. Patient will ascend/descend 15 stairs with 1 handrail(s) with supervision/set-up within 7 day(s). Add VALENCIA goal when appropriate     PHYSICAL THERAPY TREATMENT  SEEN 5596 TO 1552        Patient: Gwen Magdaleno (20 y.o. female)  Date: 5/31/2017   Diagnosis: Left-sided weakness   Precautions: Fall      ASSESSMENT:  Patient seen for mobility and gait training. She was in the bathroom independently upon arrival.  She was using her straight cane. She was agreeable to working with PT. She was dressed to go home. She ambulated 125' with SPC and CGA. Slow steady pace. Needed to stop and rest several times. No true LOB. Seems generally weak and deconditioned. Up in chair at end of session. Progression toward goals:  [ ]       Improving appropriately and progressing toward goals  [X]       Improving slowly and progressing toward goals  [ ]       Not making progress toward goals and plan of care will be adjusted       PLAN:  Patient continues to benefit from skilled intervention to address the above impairments. Continue treatment per established plan of care. Discharge Recommendations:  Home Health  Further Equipment Recommendations for Discharge:  Doing fairly well with the straight cane, but may need a RW. HHPT can assess in the home setting for this need. SUBJECTIVE:   Patient stated I'm getting better everyday.  OBJECTIVE DATA SUMMARY:   Critical Behavior:  Neurologic State: Alert  Orientation Level: Disoriented to time, Oriented X4  Cognition: Appropriate decision making, Appropriate for age attention/concentration, Appropriate safety awareness     Functional Mobility Training:  Transfers:  Sit to Stand: Independent  Stand to Sit: Independent        Balance:  Sitting: Intact  Standing: Impaired  Standing - Static: Constant support;Good  Standing - Dynamic : Fair     Ambulation/Gait Training:  Distance (ft): 125 Feet (ft)  Assistive Device: Cane, straight;Gait belt  Ambulation - Level of Assistance: Contact guard assistance  Gait Abnormalities: Decreased step clearance  Base of Support: Widened  Speed/Domonique: Pace decreased (<100 feet/min)  Step Length: Left shortened;Right shortened     Pain:  Pain Scale 1: Numeric (0 - 10)  Pain Intensity 1: 0     Activity Tolerance: Tolerated PT treatment session well. After treatment:   [X] Patient left in no apparent distress sitting up in chair  [ ] Patient left in no apparent distress in bed  [X] Call bell left within reach  [ ] Nursing notified  [ ] Caregiver present  [ ] Bed alarm activated (not being used)        [X]  Fall prevention education was provided and the patient/caregiver indicated understanding. [ ]  Patient/family have participated as able in goal setting and plan of care. [X]  Patient/family agree to work toward stated goals and plan of care. [ ]  Patient understands intent and goals of therapy, but is neutral about his/her participation. [ ]  Patient is unable to participate in goal setting and plan of care.      Thank you for this referral.  Maico Ozuna, PT  Time Calculation: 27 mins

## 2017-06-06 NOTE — PROGRESS NOTES
HISTORY OF PRESENT ILLNESS  Jessie Nice is a 65 y.o. female. HPI  ESTABLISHED patient here for follow up RIGHT breast cancer. Reports RIGHT arm and breast swelling. She continues to wear lymphedema sleeve. Denies any pain, lumps, or other breast problems at this time. She was hospitalized last week for a stroke. History of breast cancer  RIGHT breast pT3 N3a M0 (Stage IIIC) infiltrating ductal carcinoma, Tumor size 7.5 cm, LN 17/19 +ve with extracapsular extension in 2 nodes, grade 3, ki 67 35%, ER -ve, [place] -ve, Her 2 -ve   2/19/15- Right mastectomy with axillary LN dissection. No reconstruction. Completed Adjuvant chemotherapy (Taxotere, Cytoxan, s/p 3 Cycles). Followed by Dr. Carmelo Hayden.    8/13/15- Completed radiation. Followed by Dr. Tim Cardenas. Past Surgical History:   Procedure Laterality Date    HX APPENDECTOMY  1979    HX BREAST LUMPECTOMY  12/12/2013    RIGHT BREAST DUCTAL EXCISION performed by Eileen Willis MD at [facility] MAIN OR    HX CATARACT REMOVAL  2007    HX CHOLECYSTECTOMY  1979    HX COLONOSCOPY      HX HEART CATHETERIZATION      angioplasty    HX HYSTERECTOMY      fibroids    HX KNEE ARTHROSCOPY  1997    right    HX MASTECTOMY Right 2/19/2015    RIGHT BREAST MODIFIED RADICAL MASTECTOMY RIGHT SENTINEL NODE BIOPSY, RIGHT PORT A CATH INSERTION performed by Cali Harvey MD at [facility] AMBULATORY OR    HX PACEMAKER      HX SHOULDER ARTHROSCOPY  2004    left       FH includes-  sister, diagnosed at age 64 with breast cancer, survivor. Recent imaging-  LEFT mammogram on 10/13/16: BI-RADS 2.   ROS    Physical Exam   Constitutional: She appears well-developed and well-nourished. Pulmonary/Chest: Left breast exhibits no inverted nipple, no mass, no nipple discharge, no skin change and no tenderness. Musculoskeletal:        Left upper arm: She exhibits swelling and edema.    Left arm feels heavy when trying to extend it and it is hard to lift above shoulder height Lymphadenopathy:     She has no cervical adenopathy. She has no axillary adenopathy. Right: No supraclavicular adenopathy present. Left: No supraclavicular adenopathy present. Skin: Skin is warm, dry and intact. Chest and breasts examined   Psychiatric: She has a normal mood and affect. Her speech is normal and behavior is normal.     Visit Vitals    /59    Pulse 78    Ht 5' 7\" (1.702 m)    Wt 211 lb (95.7 kg)    BMI 33.05 kg/m2     ASSESSMENT and PLAN  Encounter Diagnoses   Name Primary?  Malignant neoplasm of upper-inner quadrant of right female breast ([facility] [address] Yes    Lymphedema of upper extremity following lymphadenectomy     S/P right mastectomy      Normal breast exam with no signs of local recurrence. Patient will have a LS mammogram in 10/2017. Prescription given for bras and prosthesis. Right arm lymphedema - patient has some limit to ROM; wearing sleeve today and still very swollen. Will follow-up with lymphedema clinic.   RTC here in 6 months or sooner PRN

## 2017-06-06 NOTE — PROGRESS NOTES
HISTORY OF PRESENT ILLNESS  Enmanuel Pelayo is a 65 y.o. female.   HPI       ROS    Physical Exam    ASSESSMENT and PLAN  {ASSESSMENT/PLAN:65763}

## 2017-06-06 NOTE — TELEPHONE ENCOUNTER
Please schedule to see lymphedema clinic. Patient has previously been seen, but needs some follow-up at this time.

## 2017-06-06 NOTE — PATIENT INSTRUCTIONS
Breast Self-Exam: Care Instructions  Your Care Instructions  A breast self-exam is when you check your breasts for lumps or changes. This regular exam helps you learn how your breasts normally look and feel. Most breast problems or changes are not because of cancer. Breast self-exam is not a substitute for a mammogram. Having regular breast exams by your doctor and regular mammograms improve your chances of finding any problems with your breasts. Some women set a time each month to do a step-by-step breast self-exam. Other women like a less formal system. They might look at their breasts as they brush their teeth, or feel their breasts once in a while in the shower. If you notice a change in your breast, tell your doctor. Follow-up care is a key part of your treatment and safety. Be sure to make and go to all appointments, and call your doctor if you are having problems. Its also a good idea to know your test results and keep a list of the medicines you take. How do you do a breast self-exam?  · The best time to examine your breasts is usually one week after your menstrual period begins. Your breasts should not be tender then. If you do not have periods, you might do your exam on a day of the month that is easy to remember. · To examine your breasts:  ¨ Remove all your clothes above the waist and lie down. When you are lying down, your breast tissue spreads evenly over your chest wall, which makes it easier to feel all your breast tissue. ¨ Use the pads--not the fingertips--of the 3 middle fingers of your left hand to check your right breast. Move your fingers slowly in small coin-sized circles that overlap. ¨ Use three levels of pressure to feel of all your breast tissue. Use light pressure to feel the tissue close to the skin surface. Use medium pressure to feel a little deeper. Use firm pressure to feel your tissue close to your breastbone and ribs.  Use each pressure level to feel your breast tissue before moving on to the next spot. ¨ Check your entire breast, moving up and down as if following a strip from the collarbone to the bra line, and from the armpit to the ribs. Repeat until you have covered the entire breast.  ¨ Repeat this procedure for your left breast, using the pads of the 3 middle fingers of your right hand. · To examine your breasts while in the shower:  ¨ Place one arm over your head and lightly soap your breast on that side. ¨ Using the pads of your fingers, gently move your hand over your breast (in the strip pattern described above), feeling carefully for any lumps or changes. ¨ Repeat for the other breast.  · Have your doctor inspect anything you notice to see if you need further testing. Where can you learn more? Go to http://sarkis-karina.info/. Enter P148 in the search box to learn more about \"Breast Self-Exam: Care Instructions. \"  Current as of: July 26, 2016  Content Version: 11.2  © 2196-7645 [employer]. Care instructions adapted under license by [employer] (which disclaims liability or warranty for this information). If you have questions about a medical condition or this instruction, always ask your healthcare professional. [place] [address] any warranty or liability for your use of this information.

## 2017-06-06 NOTE — MR AVS SNAPSHOT
Visit Information Date & Time Provider Department Dept. Phone Encounter #  
 6/6/2017 10:00 AM Kandy Everett NP [address] at [facility] [id number] Your Appointments 8/18/2017  9:10 AM  
Follow Up with MD [facility] Appt Note: f/u    dm             3 months [facility] [id number] [address]  
  
    
 9/8/2017 10:00 AM  
Any with Maurizio Martinez MD  
[address] Oncology at [facility]) Appt Note: 6 mth f/u  
 [address] [id number]  
[phone]  
  
   
 [address] [place] [address]  
  
    
 12/5/2017 10:30 AM  
Follow Up with [address] [facility] Appt Note: 6 month follow up Cp$0 6/6/17 tt  
 [address]  
  
   
 [address] Upcoming Health Maintenance Date Due DTaP/Tdap/Td series (1 - Tdap) 1/15/1960 ZOSTER VACCINE AGE 60> 1/15/1999 OSTEOPOROSIS SCREENING (DEXA) 1/15/2004 Pneumococcal 65+ High/Highest Risk (1 of 2 - PCV13) 1/15/2004 MEDICARE YEARLY EXAM 1/15/2004 MICROALBUMIN Q1 6/20/2017 EYE EXAM RETINAL OR DILATED Q1 6/24/2017 INFLUENZA AGE 9 TO ADULT 8/1/2017 HEMOGLOBIN A1C Q6M 11/30/2017 FOOT EXAM Q1 5/5/2018 LIPID PANEL Q1 5/30/2018 GLAUCOMA SCREENING Q2Y 6/24/2018 Allergies as of 6/6/2017  Review Complete On: 6/6/2017 By: Kandy Everett NP Severity Noted Reaction Type Reactions Codeine  10/16/2009    Itching Duratuss [Phenylephrine-guaifenesin]  10/16/2009    Nausea and Vomiting Lisinopril-hydrochlorothiazide  10/16/2009    Itching Motrin [Ibuprofen]  10/16/2009    Nausea and Vomiting With 800mg Tape [Adhesive]  12/10/2014    Other (comments) TEARS HER SKIN Current Immunizations  Reviewed on 3/8/2017 Name Date Influenza Vaccine 9/1/2014 Not reviewed this visit You Were Diagnosed With   
  
 Codes Comments Malignant neoplasm of upper-inner quadrant of right female breast ([facility] [address]    -  Primary ICD-10-CM: Q02.760 ICD-9-CM: 174.2 Lymphedema of upper extremity following lymphadenectomy     ICD-10-CM: E89.89, I89.0 ICD-9-CM: 997.99, 039.8 S/P right mastectomy     ICD-10-CM: Z90.11 ICD-9-CM: V45.71 Vitals BP Pulse Height(growth percentile) Weight(growth percentile) BMI OB Status 148/59 78 5' 7\" (1.702 m) 211 lb (95.7 kg) 33.05 kg/m2 Hysterectomy Smoking Status Never Smoker BMI and BSA Data Body Mass Index Body Surface Area 33.05 kg/m 2 2.13 m 2 Preferred Pharmacy Pharmacy Name Phone [phone] [facility], [address] [facility] Your Updated Medication List  
  
   
This list is accurate as of: 6/6/17  2:49 PM.  Always use your most recent med list.  
  
  
  
  
 albuterol 2.5 mg /3 mL (0.083 %) nebulizer solution Commonly known as:  PROVENTIL VENTOLIN  
3 mL by Nebulization route every four (4) hours as needed for Wheezing. amLODIPine 10 mg tablet Commonly known as:  Brandi Menjivar TAKE 1 TABLET BY MOUTH ONCE DAILY  
  
 aspirin 325 mg tablet Commonly known as:  ASPIRIN Take 1 Tab by mouth daily. atorvastatin 80 mg tablet Commonly known as:  LIPITOR Take 1 Tab by mouth daily. Indications: hypercholesterolemia  
  
 benazepril 40 mg tablet Commonly known as:  LOTENSIN Take 40 mg by mouth every morning. Blood Sugar Diagnostic, Disc Strp Commonly known as:  Ascensia Breeze 2 Check your blood sugar twice daily. E11.42  
  
 bumetanide 1 mg tablet Commonly known as:  Meño Pancho Take 1 mg by mouth daily. ergocalciferol 50,000 unit capsule Commonly known as:  ERGOCALCIFEROL Take 1 Cap by mouth every thirty (30) days. glipiZIDE 5 mg tablet Commonly known as:  Sonia Fitting Take 0.5 Tabs by mouth two (2) times a day. Take 1/2 every day with breakfast. If your blood sugar is above 200 take a whole tablet. HYDROcodone-acetaminophen 5-325 mg per tablet Commonly known as:  Jarome Ameena Take 1 Tab by mouth every six (6) hours as needed for Pain.  
  
 hydrOXYzine HCl 25 mg tablet Commonly known as:  ATARAX Take 25 mg by mouth every eight (8) hours as needed for Itching. insulin glargine 100 unit/mL injection Commonly known as:  LANTUS  
15 units with breakfast and 15 units at bedtime. Indications: Diabetes Mellitus  
  
 insulin syringe-needle U-100 1/2 mL 31 gauge x 15/64\" Syrg Commonly known as:  BD INSULIN SYRINGE ULTRA-FINE  
1 Each by SubCUTAneous route two (2) times a day. Two shots daily. E11.42  
  
 ipratropium 0.06 % nasal spray Commonly known as:  ATROVENT  
instill 2 sprays into each nostril three times a day - IF NEEDED FOR RUNNING NOSE  
  
 isosorbide mononitrate ER 60 mg CR tablet Commonly known as:  IMDUR Take  by mouth every morning. metoclopramide HCl 5 mg tablet Commonly known as:  REGLAN Take 5 mg by mouth Before breakfast, lunch, and dinner. [facility] Generic drug:  Lancets NITROSTAT 0.4 mg SL tablet Generic drug:  nitroglycerin  
by SubLINGual route every five (5) minutes as needed for Chest Pain. omeprazole 20 mg capsule Commonly known as:  PRILOSEC Take 40 mg by mouth two (2) times a day. ondansetron 4 mg disintegrating tablet Commonly known as:  ZOFRAN ODT Take 1 Tab by mouth every eight (8) hours as needed for Nausea. polyethylene glycol 17 gram/dose powder Commonly known as:  Brenda Degroot Take 17 g by mouth two (2) times a day. Continue while taking pain medication To-Do List   
 06/06/2017 Imaging:  [facility] MAMMO LT SCREENING INCL CAD   
  
 06/07/2017 To Be Determined Appointment with Bryon Lyn at [place] [address]  
  
 06/07/2017 To Be Determined Appointment with Grisel Crabtree PT at [place] [address]  
  
 06/09/2017 To Be Determined Appointment with Grisel Crabtree PT at [place] [address]  
  
 06/12/2017 To Be Determined Appointment with Grisel Crabtree PT at [place] [address]  
  
 06/14/2017 To Be Determined Appointment with Grisel Crabtree PT at [place] [address]  
  
 06/16/2017 To Be Determined Appointment with Grisel Crabtree PT at [place] [address]  
  
 06/19/2017 10:00 AM  
  Appointment with [facility] 1 at [facility] ([phone]) CONTRAST STUDY: 1. The patient should not eat solid food four hours before the appointment but should be encouraged to drink clear liquids. 2.  If you have to drink oral contrast, please pick it up any weekday prior to your appointment, if you cannot please check in 2 hrs before appt time. 3.  The patient will require IV access for contrast administration. 4.  The patient should not take Ibuprofen (Advil, Motrin, etc.) and Naproxen Sodium (Aleve, etc.)  on the day of the exam. Stopping non-steroidal anti-inflammatory agents (NSAIDs) like Ibuprofen decreases the risk of kidney damage from the x-ray contrast (dye). 5.  Bring any non [facility] facility films/images pertaining to the area of interest with you on the day of appointment. 6.  Bring current lab work if available (within last 90 days [facility]) ***If scheduled at [facility] i[facility]ARIE is not available, labs will need to be done before appointment*** 7.   Check in at registration at least 30 minutes before appt time unless you were instructed to do otherwise. Patient Instructions Breast Self-Exam: Care Instructions Your Care Instructions A breast self-exam is when you check your breasts for lumps or changes. This regular exam helps you learn how your breasts normally look and feel. Most breast problems or changes are not because of cancer. Breast self-exam is not a substitute for a mammogram. Having regular breast exams by your doctor and regular mammograms improve your chances of finding any problems with your breasts. Some women set a time each month to do a step-by-step breast self-exam. Other women like a less formal system. They might look at their breasts as they brush their teeth, or feel their breasts once in a while in the shower. If you notice a change in your breast, tell your doctor. Follow-up care is a key part of your treatment and safety. Be sure to make and go to all appointments, and call your doctor if you are having problems. Its also a good idea to know your test results and keep a list of the medicines you take. How do you do a breast self-exam? 
· The best time to examine your breasts is usually one week after your menstrual period begins. Your breasts should not be tender then. If you do not have periods, you might do your exam on a day of the month that is easy to remember. · To examine your breasts: ¨ Remove all your clothes above the waist and lie down. When you are lying down, your breast tissue spreads evenly over your chest wall, which makes it easier to feel all your breast tissue. ¨ Use the padsnot the fingertipsof the 3 middle fingers of your left hand to check your right breast. Move your fingers slowly in small coin-sized circles that overlap. ¨ Use three levels of pressure to feel of all your breast tissue. Use light pressure to feel the tissue close to the skin surface.  Use medium pressure to feel a little deeper. Use firm pressure to feel your tissue close to your breastbone and ribs. Use each pressure level to feel your breast tissue before moving on to the next spot. ¨ Check your entire breast, moving up and down as if following a strip from the collarbone to the bra line, and from the armpit to the ribs. Repeat until you have covered the entire breast. 
¨ Repeat this procedure for your left breast, using the pads of the 3 middle fingers of your right hand. · To examine your breasts while in the shower: 
¨ Place one arm over your head and lightly soap your breast on that side. ¨ Using the pads of your fingers, gently move your hand over your breast (in the strip pattern described above), feeling carefully for any lumps or changes. ¨ Repeat for the other breast. 
· Have your doctor inspect anything you notice to see if you need further testing. Where can you learn more? Go to http://sarkis-karina.info/. Enter P148 in the search box to learn more about \"Breast Self-Exam: Care Instructions. \" Current as of: July 26, 2016 Content Version: 11.2 © 4787-1394 [employer]. Care instructions adapted under license by [employer] (which disclaims liability or warranty for this information). If you have questions about a medical condition or this instruction, always ask your healthcare professional. [address] any warranty or liability for your use of this information. Introducing [facility] HE[facility]! [facility] introduces [employer] patient portal. Now you can access parts of your medical record, email your doctor's office, and request medication refills online. 1. In your internet browser, go to [email or web address] 2. Click on the First Time User? Click Here link in the Sign In box. You will see the New Member Sign Up page. 3. Enter your [employer] Access Code exactly as it appears below.  You will not need to use this code after youve completed the sign-up process. If you do not sign up before the expiration date, you must request a new code. · [employer] Access Code: [email or web address] Expires: 8/3/2017 11:33 AM 
 
4. Enter the last four digits of your Social Security Number (xxxx) and Date of Birth (mm/dd/yyyy) as indicated and click Submit. You will be taken to the next sign-up page. 5. Create a [employer] ID. This will be your [employer] login ID and cannot be changed, so think of one that is secure and easy to remember. 6. Create a [employer] password. You can change your password at any time. 7. Enter your Password Reset Question and Answer. This can be used at a later time if you forget your password. 8. Enter your e-mail address. You will receive e-mail notification when new information is available in [address] 9. Click Sign Up. You can now view and download portions of your medical record. 10. Click the Download Summary menu link to download a portable copy of your medical information. If you have questions, please visit the Frequently Asked Questions section of the [employer] website. Remember, [employer] is NOT to be used for urgent needs. For medical emergencies, dial 911. Now available from your iPhone and Android! Please provide this summary of care documentation to your next provider. Your primary care clinician is listed as Tiana Srinivasan. If you have any questions after today's visit, please call [phone].

## 2017-06-06 NOTE — TELEPHONE ENCOUNTER
Called patient to see if she wants lymphedema clinic at [facility] AND [facility], again. No answer. Left her a detailed message to call me if she wants to go elsewhere.

## 2017-06-10 NOTE — ED NOTES
Patient ambulatory to bedside commode with slow steady gait. Patient requesting to sit up in stretcher.  Call bell remains within reach

## 2017-06-10 NOTE — ED NOTES
Patient ambulated to bedside chair with slow steady gait. Warm blanket given and call bell within reach.

## 2017-06-10 NOTE — ED PROVIDER NOTES
HPI Comments: Desirae Escobedo, 65 y.o. Female with PMHx of DM, GERD, HTN, hypercholesterolemia, DJD, SEN, stroke, breast cancer, CAD, TIA, and asthma presents ambulatory to the ED with cc of diffuse 10/10 abd pain radiating to back x ~2 weeks. Pt also reports chills, vomiting, and increased urinary frequency due to diuretics. Abd pain is constant and she reports experiencing abd \"contractions\" every 7-8 minutes. She was admitted here 5/29-5/31/17 for TIA and abd pain has been ongoing since she was discharged. Pt repots being diagnosed with diverticulitis via CT scan at [facility][note 3] on 5/6/17 and was placed on Flagyl and Keflex prescriptions then which she has completed. She denies any fevers, CP, SOB, or dysuria. PCP: Sumanth Ruby MD    Social history significant for: - Tobacco, - EtOH, - Illicit Drug Use  PSHx: cholecystectomy, appendectomy, hysterectomy, pacemaker placement    There are no other complaints, changes, or physical findings at this time. Written by MYRTLE Stoddard, as dictated by Acacia Cm MD.      The history is provided by the patient. No  was used.         Past Medical History:   Diagnosis Date    Arrhythmia     bradycardia in 30's /pacemaker inserted    Arthritis     RT KNEE    Asthma Dx age 75    Bradycardia 2009    Cardiology saw her during hospital stay; Now off coreg;  Sees Dr. Ruy Guardado    Breast cancer [facility])     Rt mastectomy 2/19/15    CAD (coronary artery disease)     Dr Ran Salinas Diabetes [facility])     Now seeing Dr. Barbara Strickland Diverticulitis     DJD (degenerative joint disease), lumbar     GERD (gastroesophageal reflux disease)     H. pylori infection 2008    Dr. Andrea Gutierrez attack [facility]) April 2006    Hypercholesterolemia     Hypertension     Morbid obesity ([facility] [address]     Neuropathy, arm 2009    Right; Has had MRI and EMG at [facility] [id number] SEN (obstructive sleep apnea)     uses CPAP    Rectal bleeding 2009 Hospitalized; Had colonoscopy and EGD (ok), gastric emptying study (normal), doppler mesenteric arteries (ok)    Sciatica     Has seen Dr. Maida Gurrola    Stroke [facility]) 2009 & 2012    no residual problems       Past Surgical History:   Procedure Laterality Date    HX APPENDECTOMY  1979    HX BREAST LUMPECTOMY  12/12/2013    RIGHT BREAST DUCTAL EXCISION performed by Robbie Gomez MD at [facility] MAIN OR    HX CATARACT REMOVAL  2007    HX CHOLECYSTECTOMY  1979    HX COLONOSCOPY      HX HEART CATHETERIZATION      angioplasty    HX HYSTERECTOMY      fibroids    HX KNEE ARTHROSCOPY  1997    right    HX MASTECTOMY Right 2/19/2015    RIGHT BREAST MODIFIED RADICAL MASTECTOMY RIGHT SENTINEL NODE BIOPSY, RIGHT PORT A CATH INSERTION performed by Bev Medeiros MD at [facility] AMBULATORY OR    HX PACEMAKER      HX SHOULDER ARTHROSCOPY  2004    left         Family History:   Problem Relation Age of Onset    Stroke Father     Cancer Sister      breast cancer    Hypertension Sister     Cancer Mother      Unknown type    Cancer Brother      Colon    Hypertension Brother     Anesth Problems Neg Hx        Social History     Social History    Marital status:      Spouse name: N/A    Number of children: N/A    Years of education: N/A     Occupational History    Not on file. Social History Main Topics    Smoking status: Never Smoker    Smokeless tobacco: Never Used    Alcohol use No    Drug use: No    Sexual activity: No     Other Topics Concern    Not on file     Social History Narrative    Lives in [place] with oldest daughter and great-grandson 8. Occupation retired, worked at [employer] as a , Hobbies, plays with grandkids, likes [employer] and sings in the choir         ALLERGIES: Codeine; Duratuss [phenylephrine-guaifenesin]; Lisinopril-hydrochlorothiazide; Motrin [ibuprofen]; and Tape [adhesive]    Review of Systems   Constitutional: Positive for chills. Negative for fever.    HENT: Negative for congestion. Eyes: Negative. Respiratory: Negative for shortness of breath. Cardiovascular: Negative for chest pain. Gastrointestinal: Positive for abdominal pain, nausea and vomiting. Endocrine: Negative for heat intolerance. Genitourinary: Positive for frequency. Negative for dysuria. Musculoskeletal: Positive for back pain. Skin: Negative for rash. Allergic/Immunologic: Negative for immunocompromised state. Neurological: Negative for dizziness. Hematological: Does not bruise/bleed easily. Psychiatric/Behavioral: Negative. All other systems reviewed and are negative. Patient Vitals for the past 12 hrs:   Temp Pulse Resp BP SpO2   06/10/17 1858 - 77 16 173/67 99 %   06/10/17 1510 - 76 17 174/68 99 %   06/10/17 1215 98.2 °F (36.8 °C) 63 16 164/75 99 %       Physical Exam   Constitutional: She is oriented to person, place, and time. She appears well-developed and well-nourished. She appears distressed (moderate). HENT:   Head: Normocephalic and atraumatic. Eyes: EOM are normal.   Neck: Normal range of motion. Neck supple. Cardiovascular: Normal rate, regular rhythm and normal heart sounds. Pulmonary/Chest: Effort normal and breath sounds normal. No respiratory distress. Abdominal: Soft. Bowel sounds are normal. She exhibits no mass. There is tenderness (diffuse). Musculoskeletal: Normal range of motion. She exhibits no edema. Left breast tenderness   Lumbar and lower thoracic tenderness  Baseline left-sided weakness   Neurological: She is alert and oriented to person, place, and time. Coordination normal.   Skin: Skin is warm and dry. Psychiatric: She has a normal mood and affect. Nursing note and vitals reviewed. MDM  Number of Diagnoses or Management Options  Abdominal pain, generalized:    Anemia, unspecified type:   Diverticulosis of intestine without bleeding, unspecified intestinal tract location:   Diagnosis management comments: DDx: diverticulitis, abscess, musculoskeletal pain, UTI, dehydration, electrolyte abnormality, pancreatitis, CAD       Amount and/or Complexity of Data Reviewed  Clinical lab tests: ordered and reviewed  Tests in the radiology section of CPT®: ordered and reviewed  Tests in the medicine section of CPT®: ordered and reviewed    Patient Progress  Patient progress: stable    ED Course       Procedures    EKG interpretation: (Preliminary) 13:50  Rhythm: AV dual-paced rhythm; and regular . Rate (approx.): 60; Axis: normal; FL interval: normal; QRS interval: normal ; ST/T wave: normal; Other findings: abnormal ekg. Written by MYRTLE Willis, as dictated by Ian Mcclain MD.    3:18 PM  CT on 5/29 with evidence of diverticulosis. Written by MYRTLE Willis, as dictated by Ian Mcclain MD.    4:53 PM  Pt's pain is still 10/10. Written by MYRTLE Willis, as dictated by Ian Mcclain MD.    6:55 PM   Pt reports pain has improved. Written by MYRTLE Willis, as dictated by Ian cMclain MD.    7:58 PM   Pt is requesting pain medication prescription before d/c.   Written by MYRTLE Willis, as dictated by Ian Mcclain MD.     LABORATORY TESTS:  Recent Results (from the past 12 hour(s))   EKG, 12 LEAD, INITIAL    Collection Time: 06/10/17  1:50 PM   Result Value Ref Range    Ventricular Rate 60 BPM    Atrial Rate 60 BPM    P-R Interval 192 ms    QRS Duration 192 ms    Q-T Interval 444 ms    QTC Calculation (Bezet) 444 ms    Calculated R Axis -67 degrees    Calculated T Axis 108 degrees    Diagnosis       AV dual-paced rhythm  Abnormal ECG  When compared with ECG of 29-MAY-2017 17:22,  Electronic ventricular pacemaker has replaced Sinus rhythm     CBC WITH AUTOMATED DIFF    Collection Time: 06/10/17  2:56 PM   Result Value Ref Range    WBC 4.5 3.6 - 11.0 K/uL    RBC 3.11 (L) 3.80 - 5.20 M/uL    HGB 8.2 (L) 11.5 - 16.0 g/dL    HCT 24.1 (L) 35.0 - 47.0 %    MCV 77.5 (L) 80.0 - 99.0 FL    MCH 26.4 26.0 - 34.0 PG    MCHC 34.0 30.0 - 36.5 g/dL    RDW 15.5 (H) 11.5 - 14.5 %    PLATELET 701 781 - 553 K/uL    NEUTROPHILS 67 32 - 75 %    LYMPHOCYTES 20 12 - 49 %    MONOCYTES 11 5 - 13 %    EOSINOPHILS 2 0 - 7 %    BASOPHILS 0 0 - 1 %    ABS. NEUTROPHILS 3.0 1.8 - 8.0 K/UL    ABS. LYMPHOCYTES 0.9 0.8 - 3.5 K/UL    ABS. MONOCYTES 0.5 0.0 - 1.0 K/UL    ABS. EOSINOPHILS 0.1 0.0 - 0.4 K/UL    ABS. BASOPHILS 0.0 0.0 - 0.1 K/UL   METABOLIC PANEL, COMPREHENSIVE    Collection Time: 06/10/17  2:56 PM   Result Value Ref Range    Sodium 135 (L) 136 - 145 mmol/L    Potassium 4.3 3.5 - 5.1 mmol/L    Chloride 101 97 - 108 mmol/L    CO2 29 21 - 32 mmol/L    Anion gap 5 5 - 15 mmol/L    Glucose 141 (H) 65 - 100 mg/dL    BUN 15 6 - 20 MG/DL    Creatinine 1.17 (H) 0.55 - 1.02 MG/DL    BUN/Creatinine ratio 13 12 - 20      GFR est AA 54 (L) >60 ml/min/1.73m2    GFR est non-AA 45 (L) >60 ml/min/1.73m2    Calcium 9.2 8.5 - 10.1 MG/DL    Bilirubin, total 0.3 0.2 - 1.0 MG/DL    ALT (SGPT) 16 12 - 78 U/L    AST (SGOT) 40 (H) 15 - 37 U/L    Alk.  phosphatase 182 (H) 45 - 117 U/L    Protein, total 7.9 6.4 - 8.2 g/dL    Albumin 2.6 (L) 3.5 - 5.0 g/dL    Globulin 5.3 (H) 2.0 - 4.0 g/dL    A-G Ratio 0.5 (L) 1.1 - 2.2     MAGNESIUM    Collection Time: 06/10/17  2:56 PM   Result Value Ref Range    Magnesium 2.1 1.6 - 2.4 mg/dL   PROTHROMBIN TIME + INR    Collection Time: 06/10/17  2:56 PM   Result Value Ref Range    INR 1.1 0.9 - 1.1      Prothrombin time 10.8 9.0 - 11.1 sec   TROPONIN I    Collection Time: 06/10/17  2:56 PM   Result Value Ref Range    Troponin-I, Qt. <0.04 <0.05 ng/mL   LIPASE    Collection Time: 06/10/17  2:56 PM   Result Value Ref Range    Lipase 109 73 - 393 U/L   URINALYSIS W/MICROSCOPIC    Collection Time: 06/10/17  3:31 PM   Result Value Ref Range    Color YELLOW/STRAW      Appearance CLEAR CLEAR      Specific gravity 1.016 1.003 - 1.030      pH (UA) 6.5 5.0 - 8.0      Protein 100 (A) NEG mg/dL    Glucose NEGATIVE  NEG mg/dL    Ketone NEGATIVE  NEG mg/dL    Bilirubin NEGATIVE  NEG      Blood NEGATIVE  NEG      Urobilinogen 0.2 0.2 - 1.0 EU/dL    Nitrites NEGATIVE  NEG      Leukocyte Esterase NEGATIVE  NEG      WBC 0-4 0 - 4 /hpf    RBC 0-5 0 - 5 /hpf    Epithelial cells FEW FEW /lpf    Bacteria 1+ (A) NEG /hpf   LACTIC ACID, PLASMA    Collection Time: 06/10/17  4:22 PM   Result Value Ref Range    Lactic acid 1.0 0.4 - 2.0 MMOL/L       IMAGING RESULTS:  CT ABD PELV WO CONT   Final Result   INDICATION: pain , recent diagnosis of diverticulitis     COMPARISON: 5/29/2017     TECHNIQUE:   Thin axial images were obtained through the abdomen and pelvis. Coronal and  sagittal reconstructions were generated. Oral contrast was administered. CT dose  reduction was achieved through use of a standardized protocol tailored for this  examination and automatic exposure control for dose modulation.      The absence of intravenous contrast material reduces the sensitivity for  evaluation of the solid parenchymal organs of the abdomen. IV contrast was not  administered because of the patient's abnormal renal function.     FINDINGS:   LUNG BASES: Unchanged 4 mm right middle lobe nodule. No focal airspace disease  or pleural fluid. INCIDENTALLY IMAGED HEART AND MEDIASTINUM: Cardiac pacemaker. Cardiomegaly. No  pericardial fluid. LIVER: No mass or biliary dilatation. GALLBLADDER: Surgically absent. Common duct within normal limits for a  postcholecystectomy patient. SPLEEN: No mass. PANCREAS: No mass or ductal dilatation. ADRENALS: Unremarkable. KIDNEYS/URETERS: Right parapelvic renal cyst unchanged. No significant  abnormalities. STOMACH: Unremarkable. SMALL BOWEL: No dilatation or wall thickening. COLON: Diverticulosis. No CT evidence of acute diverticulitis. Postsurgical  change in the right colon. APPENDIX: Surgically absent. PERITONEUM: No ascites or pneumoperitoneum.   RETROPERITONEUM: No lymphadenopathy or aortic aneurysm. REPRODUCTIVE ORGANS: Status post hysterectomy. Small amount of gas in the  vagina, also present previously. URINARY BLADDER: Not well distended. BONES: No destructive bone lesion. ADDITIONAL COMMENTS: N/A     IMPRESSION  IMPRESSION:     1. Colonic diverticulosis. No CT evidence of acute diverticulitis. 2. Unchanged 4 mm right middle lobe pulmonary nodule.      XR CHEST PORT   Final Result   EXAM: XR CHEST PORT     INDICATION: Chest Pain     COMPARISON: 5/29/2017     FINDINGS:     A portable AP radiograph of the chest was obtained at 1326 hours. There is an  unchanged dual chamber cardiac pacemaker. The lungs are clear. There is  unchanged moderate cardiomegaly but no vascular congestion or pleural fluid. Postsurgical changes are present in the right breast and axilla. There has been  no change since the prior study.     IMPRESSION  IMPRESSION:      Cardiomegaly. No acute process. MEDICATIONS GIVEN:  Medications   sodium chloride (NS) flush 5-10 mL (not administered)   sodium chloride (NS) flush 5-10 mL (not administered)   HYDROmorphone (PF) (DILAUDID) injection 0.5 mg (not administered)   ondansetron (ZOFRAN) injection 4 mg (not administered)   sodium chloride 0.9 % bolus infusion 1,000 mL (0 mL IntraVENous IV Completed 6/10/17 1817)   morphine injection 6 mg (6 mg IntraVENous Given 6/10/17 1531)   ondansetron (ZOFRAN) injection 4 mg (4 mg IntraVENous Given 6/10/17 1531)   diatrizoate meglumine-d.sodium (MD-GASTROVIEW,GASTROGRAFIN) 66-10 % contrast solution 30 mL (30 mL Oral Given 6/10/17 1717)   HYDROmorphone (PF) (DILAUDID) injection 0.5 mg (0.5 mg IntraVENous Given 6/10/17 1717)   ondansetron (ZOFRAN) injection 4 mg (4 mg IntraVENous Given 6/10/17 1717)       IMPRESSION:  1. Abdominal pain, generalized    2. Diverticulosis of intestine without bleeding, unspecified intestinal tract location    3. Anemia, unspecified type        PLAN:  1.    Current Discharge Medication List START taking these medications    Details   oxyCODONE-acetaminophen (PERCOCET) 5-325 mg per tablet Take 1 Tab by mouth every four (4) hours as needed for Pain. Max Daily Amount: 6 Tabs. Qty: 20 Tab, Refills: 0      dicyclomine (BENTYL) 20 mg tablet Take 1 Tab by mouth every six (6) hours as needed (abdominal cramps) for up to 20 doses. Qty: 20 Tab, Refills: 0      ondansetron hcl (ZOFRAN, AS HYDROCHLORIDE,) 4 mg tablet Take 1 Tab by mouth every eight (8) hours as needed for Nausea. Qty: 20 Tab, Refills: 0           2. Follow-up Information     Follow up With Details Comments 1900 YANCY Fraser MD In 2 days As needed 4078 Mariana Taylor MD In 2 days or your GI doctor Feliciano Mcqueen. [address]  [facility]  [phone]      [facility] EMERGENCY DEPT  If symptoms worsen [address] [place] [address] Drive  [address] [facility]  [phone]        Return to ED if worse     Discharge Note:  9:25 PM  The pt is ready for discharge. The pt's signs, symptoms, diagnosis, and discharge instructions have been discussed and pt has conveyed their understanding. The pt is to follow up as recommended or return to ER should their symptoms worsen. Plan has been discussed and pt is in agreement. This note is prepared by Fer Aldana, acting as a Scribe for Domenico Young MD.    Domenico Young MD: The scribe's documentation has been prepared under my direction and personally reviewed by me in its entirety. I confirm that the notes above accurately reflects all work, treatment, procedures, and medical decision making performed by me.

## 2017-06-10 NOTE — DISCHARGE INSTRUCTIONS
Abdominal Pain: Care Instructions  Your Care Instructions    Abdominal pain has many possible causes. Some aren't serious and get better on their own in a few days. Others need more testing and treatment. If your pain continues or gets worse, you need to be rechecked and may need more tests to find out what is wrong. You may need surgery to correct the problem. Don't ignore new symptoms, such as fever, nausea and vomiting, urination problems, pain that gets worse, and dizziness. These may be signs of a more serious problem. Your doctor may have recommended a follow-up visit in the next 8 to 12 hours. If you are not getting better, you may need more tests or treatment. The doctor has checked you carefully, but problems can develop later. If you notice any problems or new symptoms, get medical treatment right away. Follow-up care is a key part of your treatment and safety. Be sure to make and go to all appointments, and call your doctor if you are having problems. It's also a good idea to know your test results and keep a list of the medicines you take. How can you care for yourself at home? · Rest until you feel better. · To prevent dehydration, drink plenty of fluids, enough so that your urine is light yellow or clear like water. Choose water and other caffeine-free clear liquids until you feel better. If you have kidney, heart, or liver disease and have to limit fluids, talk with your doctor before you increase the amount of fluids you drink. · If your stomach is upset, eat mild foods, such as rice, dry toast or crackers, bananas, and applesauce. Try eating several small meals instead of two or three large ones. · Wait until 48 hours after all symptoms have gone away before you have spicy foods, alcohol, and drinks that contain caffeine. · Do not eat foods that are high in fat. · Avoid anti-inflammatory medicines such as aspirin, ibuprofen (Advil, Motrin), and naproxen (Aleve).  These can cause stomach upset. Talk to your doctor if you take daily aspirin for another health problem. When should you call for help? Call 911 anytime you think you may need emergency care. For example, call if:  · You passed out (lost consciousness). · You pass maroon or very bloody stools. · You vomit blood or what looks like coffee grounds. · You have new, severe belly pain. Call your doctor now or seek immediate medical care if:  · Your pain gets worse, especially if it becomes focused in one area of your belly. · You have a new or higher fever. · Your stools are black and look like tar, or they have streaks of blood. · You have unexpected vaginal bleeding. · You have symptoms of a urinary tract infection. These may include:  ¨ Pain when you urinate. ¨ Urinating more often than usual.  ¨ Blood in your urine. · You are dizzy or lightheaded, or you feel like you may faint. Watch closely for changes in your health, and be sure to contact your doctor if:  · You are not getting better after 1 day (24 hours). Where can you learn more? Go to http://sarkis-akrina.info/. Enter N686 in the search box to learn more about \"Abdominal Pain: Care Instructions. \"  Current as of: May 27, 2016  Content Version: 11.2  © 6718-1765 [employer]. Care instructions adapted under license by [employer] (which disclaims liability or warranty for this information). If you have questions about a medical condition or this instruction, always ask your healthcare professional. [place] [address] any warranty or liability for your use of this information. Diverticulosis: Care Instructions  Your Care Instructions  In diverticulosis, pouches called diverticula form in the wall of the large intestine (colon). The pouches do not cause any pain or other symptoms. Most people who have diverticulosis do not know they have it.  But the pouches sometimes bleed, and if they become infected, they can cause pain and other symptoms. When this happens, it is called diverticulitis. Diverticula form when pressure pushes the wall of the colon outward at certain weak points. A diet that is too low in fiber can cause diverticula. Follow-up care is a key part of your treatment and safety. Be sure to make and go to all appointments, and call your doctor if you are having problems. It's also a good idea to know your test results and keep a list of the medicines you take. How can you care for yourself at home? · Include fruits, leafy green vegetables, beans, and whole grains in your diet each day. These foods are high in fiber. · Take a fiber supplement, such as Citrucel or Metamucil, every day if needed. Read and follow all instructions on the label. · Drink plenty of fluids, enough so that your urine is light yellow or clear like water. If you have kidney, heart, or liver disease and have to limit fluids, talk with your doctor before you increase the amount of fluids you drink. · Get at least 30 minutes of exercise on most days of the week. Walking is a good choice. You also may want to do other activities, such as running, swimming, cycling, or playing tennis or team sports. · Cut out foods that cause gas, pain, or other symptoms. When should you call for help? Call your doctor now or seek immediate medical care if:  · You have belly pain. · You pass maroon or very bloody stools. · You have a fever. · You have nausea and vomiting. · You have unusual changes in your bowel movements or abdominal swelling. · You have burning pain when you urinate. · You have abnormal vaginal discharge. · You have shoulder pain. · You have cramping pain that does not get better when you have a bowel movement or pass gas. · You pass gas or stool from your urethra while urinating. Watch closely for changes in your health, and be sure to contact your doctor if you have any problems. Where can you learn more?   Go to http://sarkis-karina.info/. Enter N624 in the search box to learn more about \"Diverticulosis: Care Instructions. \"  Current as of: August 9, 2016  Content Version: 11.2  © 8902-8412 [employer]. Care instructions adapted under license by [employer] (which disclaims liability or warranty for this information). If you have questions about a medical condition or this instruction, always ask your healthcare professional. [place] [address] any warranty or liability for your use of this information.

## 2017-06-10 NOTE — ED NOTES
Unable to obtain IV and labs ordered. Warm blanket pillow and socks given as per requested by patient.  Call bell within reach and daughter at bedside

## 2017-06-10 NOTE — ED NOTES
Patient arrived with complaints of generalized abdominal pain for three months. Patient states that she was to follow up with GI but never did. Patient states that she was seen and admitted to this facility \"but nothing was ever done for me\". Patient denies any vomiting or diarrhea.  Family at bedside and call bell within reach

## 2017-06-10 NOTE — ED NOTES
Bedside and Verbal shift change report given to [address] (oncoming nurse) by Amy Garcia RN (offgoing nurse). Report included the following information SBAR, ED Summary, MAR and Recent Results.

## 2017-06-11 NOTE — ED NOTES
Dr. Tushar Freeman has reviewed discharge instructions with the patient and caregiver. The patient and caregiver verbalized understanding. Pt. A&Ox4, respirations even and unlabored. VS stable as noted in flowsheet. Wheelchair assist from department with paperwork in hand.

## 2017-06-22 NOTE — TELEPHONE ENCOUNTER
Pt called the office and reported she had been in the ER recently and had a low hgb. Pt reports it was 7, but we have no record of that, last hgb in ER is recorded at 8.2. Pt is frustrated that nobody has done anything about it. I reached out to her primary care office and they report she has missed her last two follow ups with them. I spoke to nurse Elena Sylvester and asked her to reach out to Pt and review labs, and I informed her we could certainly get her a sooner appointment, ( she is scheduled for follow up with Dr Tessie Oconnor Sept 8) if the PCP office feels it is necessary. Pt given the office fax number if she needs to fax over most recent labs.

## 2017-07-02 NOTE — ED NOTES
Assumed care of patient from [facility]. Patient resting on stretcher, NAD noted at this time; Denies complaints. Bed low and locked, call bell in reach. Will continue to monitor.

## 2017-07-02 NOTE — ED PROVIDER NOTES
HPI Comments: 65 y.o. female with past medical history significant for H. 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 Pt c/o constant, generalized abd pain with vomiting (x1 this morning). Pt states that she had a colonoscopy, endoscopy, and biopsy 5 days ago. When asked when her pain started she initially stated that it started the day after her biopsy but then stated that it started today. Pt states that she \"has been having problems with stomach pain for 3 months\" but notes that her pain today is different. Pt states that she has been taking hydrocodone for her pain. Pt's LBM was today and was normal. Pt denies hematemesis, diarrhea, fever, difficulty urinating, and having spoken to GI PTA. There are no other acute medical concerns at this time. Social hx: (-)smoker, (-)EtOH    PCP: Sandrita Tom MD    Gastroenterology: Tracee Robin. Svetlana Sher MD    Note written by Cas Plascencia. [facility], as dictated by Ruma Harmon MD 6:05 PM          The history is provided by the patient.         Past Medical History:   Diagnosis Date    Arrhythmia     bradycardia in 30's /pacemaker inserted    Arthritis     RT KNEE    Asthma Dx age 75    Bradycardia 2009    Cardiology saw her during hospital stay; Now off coreg;  Sees Dr. Beaulah Homans    Breast cancer [facility])     Rt mastectomy 2/19/15    CAD (coronary artery disease)     Dr Arteaga Neighbor    Diabetes [facility])     Now seeing Dr. Andres Jiang Diverticulitis     DJD (degenerative joint disease), lumbar     GERD (gastroesophageal reflux disease)     H. pylori infection 2008    Dr. Jeremy Llamas attack [facility]) April 2006    Hypercholesterolemia     Hypertension     Morbid obesity ([facility] [address]     Neuropathy, arm 2009    Right; Has had MRI and EMG [facility]    SEN (obstructive sleep apnea)     uses CPAP    Rectal bleeding 2009    Hospitalized; Had colonoscopy and EGD (ok), gastric emptying study (normal), doppler mesenteric arteries (ok)    Sciatica     Has seen Dr. Varinder Cannon    Stroke [facility]) 2009 & 2012    no residual problems       Past Surgical History:   Procedure Laterality Date    HX APPENDECTOMY  1979    HX BREAST LUMPECTOMY  12/12/2013    RIGHT BREAST DUCTAL EXCISION [facility]    HX CATARACT REMOVAL  2007    HX CHOLECYSTECTOMY  1979    HX COLONOSCOPY      HX HEART CATHETERIZATION      angioplasty    HX HYSTERECTOMY      fibroids    HX KNEE ARTHROSCOPY  1997    right    HX MASTECTOMY Right 2/19/2015    RIGHT BREAST MODIFIED RADICAL MASTECTOMY RIGHT SENTINEL NODE BIOPSY, RIGHT PORT A CATH INSERTION performed by Simeon Valladares MD at [facility] AMBUL[facility]ORY OR    HX PACEMAKER      HX SHOULDER ARTHROSCOPY  2004    left         Family History:   Problem Relation Age of Onset    Stroke Father     Cancer Sister      breast cancer    Hypertension Sister     Cancer Mother      Unknown type    Cancer Brother      Colon    Hypertension Brother     Anesth Problems Neg Hx        Social History     Social History    Marital status:      Spouse name: N/A    Number of children: N/A    Years of education: N/A     Occupational History    Not on file. Social History Main Topics    Smoking status: Never Smoker    Smokeless tobacco: Never Used    Alcohol use No    Drug use: No    Sexual activity: No     Other Topics Concern    Not on file     Social History Narrative    Lives in [place] with oldest daughter and great-grandson 8.  Occupation retired, worked at [email or web address] as a , Hobbies, plays with grandkids, likes [place] and sings in the choir         ALLERGIES: Codeine; Duratuss [phenylephrine-guaifenesin]; Lisinopril-hydrochlorothiazide; Motrin [ibuprofen]; and Tape [adhesive]    Review of Systems   Constitutional: Negative for fever. HENT: Negative for facial swelling. Eyes: Negative for visual disturbance. Respiratory: Negative for chest tightness. Cardiovascular: Negative for chest pain. Gastrointestinal: Positive for abdominal pain (generalized) and vomiting (x1). Negative for diarrhea. Genitourinary: Negative for difficulty urinating and dysuria. Musculoskeletal: Negative for arthralgias. Skin: Negative for rash. Neurological: Negative for dizziness. Hematological: Negative for adenopathy. Psychiatric/Behavioral: Negative for suicidal ideas. All other systems reviewed and are negative. Vitals:    07/02/17 1739   BP: 166/75   Pulse: 80   Resp: 18   Temp: 99.1 °F (37.3 °C)   SpO2: 96%   Weight: 91.2 kg (201 lb)   Height: 5' 7\" (1.702 m)            Physical Exam   Constitutional: She is oriented to person, place, and time. She appears well-developed and well-nourished. No distress. obese   HENT:   Head: Normocephalic and atraumatic. Mouth/Throat: Oropharynx is clear and moist.   Eyes: Pupils are equal, round, and reactive to light. No scleral icterus. Neck: Normal range of motion. Neck supple. No thyromegaly present. Cardiovascular: Normal rate, regular rhythm, normal heart sounds and intact distal pulses. No murmur heard. Pulmonary/Chest: Effort normal and breath sounds normal. No respiratory distress. Abdominal: Soft. Bowel sounds are normal. She exhibits no distension. There is tenderness (diffuse abd pain with palpation but (-)focal tenderness). Musculoskeletal: Normal range of motion. She exhibits no edema. Neurological: She is alert and oriented to person, place, and time. Skin: Skin is warm and dry. No rash noted. She is not diaphoretic. Nursing note and vitals reviewed. Note written by Rachid Tafoya, as dictated by Rita Puente MD 6:05 PM       MDM  Number of Diagnoses or Management Options  Abdominal pain, generalized:   Diagnosis management comments: A:  67yo F with abd pain 4 days after colonoscopy. VS normal.  Exam unremarkable. Pt has h/o chronic abd pain but she states this is different. DDx:  Diverticulitis, perforation, constipation, obstruction    P:  Labs  ivf  Morphine  Ct a/p    ED Course       Procedures      Labs unremarkable except for mildly elevated AP. CT Results (most recent):    Results from Hospital Encounter encounter on 07/02/17   CT ABD PELV W CONT   Narrative EXAM:  CT ABD PELV W CONT    INDICATION: abd pain. 3 days s/p colonoscopy    COMPARISON: 6/10/2017    CONTRAST:  100 mL of Isovue-370. TECHNIQUE:   Following the uneventful intravenous administration of 100 cc Isovue-370, thin  axial images were obtained through the abdomen and pelvis. Coronal and sagittal  reconstructions were generated. Oral contrast was not administered. CT dose  reduction was achieved through use of a standardized protocol tailored for this  examination and automatic exposure control for dose modulation. FINDINGS:   LUNG BASES: Scarring at the right lung base and 4 mm nodule subpleural right  lung base are unchanged. INCIDENTALLY IMAGED HEART AND MEDIASTINUM: Unremarkable. LIVER: No mass or biliary dilatation. GALLBLADDER: Surgically absent  SPLEEN: No mass. PANCREAS: No mass or ductal dilatation. ADRENALS: Unremarkable. KIDNEYS: No mass, calculus, or hydronephrosis. STOMACH: Unremarkable. SMALL BOWEL: No dilatation or wall thickening. COLON: No dilatation or wall thickening. Patient status post right colectomy  APPENDIX: Eclampsia  PERITONEUM: No ascites or pneumoperitoneum. RETROPERITONEUM: No lymphadenopathy or aortic aneurysm. REPRODUCTIVE ORGANS:  URINARY BLADDER: No mass or calculus.   BONES: There are vague lucent areas scattered in the lower thoracic and lumbar  region which are new since 9/16/2016  ADDITIONAL COMMENTS: N/A         Impression IMPRESSION:  1. No acute abnormality is identified. 2. There are vague lucent areas scattered within the lower thoracic and lumbar  vertebral bodies new since 9/16/2016 and could represent focal areas of  osteopenia however myeloma or other etiologies are not excluded and correlation  would be helpful. Incidental findings on radiologic imaging discussed with patient. Copy of radiologist report provided to patient with instructions to follow up with their PCP within the next 2 weeks to further discuss findings and appropriate additional evaluation as needed. Pt to f/u with heme/onc about findings of lucencies on spine. She is to f/u with GI for abd pain. No clear etiology for patient's symptoms. Will prescribe 12 hydrocodone for symptoms.

## 2017-07-02 NOTE — LETTER
[address] 
[facility] [id number] 
[phone] Work/School Note Date: 7/2/2017 To Whom It May concern: 
 
Brenna Mcgowan was seen and treated today in the emergency room by the following provider(s): 
Attending Provider: Alex Oliveira MD.   
 
Brenna Mcgowan was accompanied by her son in the ED this evening.  
 
Sincerely, 
 
 
 
 
Alex Oliveira MD

## 2017-07-02 NOTE — ED TRIAGE NOTES
Pt states that she has a \"colon test on Wednesday with a tube down my throat\" pt states a biopsy was taken and she is now having pain. Pt denies vomiting/diarrhea or blood in her stool.

## 2017-07-03 NOTE — DISCHARGE INSTRUCTIONS
CT scan showed lucencies on your thoracid and lumbar spine. This most likely represents osteopenia or weak bones but a form of cancer call multiple myeloma should also be considered. You should follow up with your oncologist or primary care doctor to discuss this. Abdominal Pain: Care Instructions  Your Care Instructions    Abdominal pain has many possible causes. Some aren't serious and get better on their own in a few days. Others need more testing and treatment. If your pain continues or gets worse, you need to be rechecked and may need more tests to find out what is wrong. You may need surgery to correct the problem. Don't ignore new symptoms, such as fever, nausea and vomiting, urination problems, pain that gets worse, and dizziness. These may be signs of a more serious problem. Your doctor may have recommended a follow-up visit in the next 8 to 12 hours. If you are not getting better, you may need more tests or treatment. The doctor has checked you carefully, but problems can develop later. If you notice any problems or new symptoms, get medical treatment right away. Follow-up care is a key part of your treatment and safety. Be sure to make and go to all appointments, and call your doctor if you are having problems. It's also a good idea to know your test results and keep a list of the medicines you take. How can you care for yourself at home? · Rest until you feel better. · To prevent dehydration, drink plenty of fluids, enough so that your urine is light yellow or clear like water. Choose water and other caffeine-free clear liquids until you feel better. If you have kidney, heart, or liver disease and have to limit fluids, talk with your doctor before you increase the amount of fluids you drink. · If your stomach is upset, eat mild foods, such as rice, dry toast or crackers, bananas, and applesauce. Try eating several small meals instead of two or three large ones.   · Wait until 48 hours after all symptoms have gone away before you have spicy foods, alcohol, and drinks that contain caffeine. · Do not eat foods that are high in fat. · Avoid anti-inflammatory medicines such as aspirin, ibuprofen (Advil, Motrin), and naproxen (Aleve). These can cause stomach upset. Talk to your doctor if you take daily aspirin for another health problem. When should you call for help? Call 911 anytime you think you may need emergency care. For example, call if:  · You passed out (lost consciousness). · You pass maroon or very bloody stools. · You vomit blood or what looks like coffee grounds. · You have new, severe belly pain. Call your doctor now or seek immediate medical care if:  · Your pain gets worse, especially if it becomes focused in one area of your belly. · You have a new or higher fever. · Your stools are black and look like tar, or they have streaks of blood. · You have unexpected vaginal bleeding. · You have symptoms of a urinary tract infection. These may include:  ¨ Pain when you urinate. ¨ Urinating more often than usual.  ¨ Blood in your urine. · You are dizzy or lightheaded, or you feel like you may faint. Watch closely for changes in your health, and be sure to contact your doctor if:  · You are not getting better after 1 day (24 hours). Where can you learn more? Go to http://sarkis-karina.info/. Enter F352 in the search box to learn more about \"Abdominal Pain: Care Instructions. \"  Current as of: March 20, 2017  Content Version: 11.3  © 2980-1341 [employer]. Care instructions adapted under license by [employer] (which disclaims liability or warranty for this information). If you have questions about a medical condition or this instruction, always ask your healthcare professional. [address] any warranty or liability for your use of this information. We hope that we have addressed all of your medical concerns. The examination and treatment you received in the Emergency Department were for an emergent problem and were not intended as complete care. It is important that you follow up with your healthcare provider(s) for ongoing care. If your symptoms worsen or do not improve as expected, and you are unable to reach your usual health care provider(s), you should return to the Emergency Department. Today's healthcare is undergoing tremendous change, and patient satisfaction surveys are one of the many tools to assess the quality of medical care. You may receive a survey from the [employer] organization regarding your experience in the Emergency Department. I hope that your experience has been completely positive, particularly the medical care that I provided. As such, please participate in the survey; anything less than excellent does not meet my expectations or intentions. [facility] and [facility] participate in nationally recognized quality of care measures. If your blood pressure is greater than 120/80, as reported below, we urge that you seek medical care to address the potential of high blood pressure, commonly known as hypertension. Hypertension can be hereditary or can be caused by certain medical conditions, pain, stress, or \"white coat syndrome. \"       Please make an appointment with your health care provider(s) for follow up of your Emergency Department visit. VITALS:   Patient Vitals for the past 8 hrs:   Temp Pulse Resp BP SpO2   07/02/17 2013 - - - 164/84 97 %   07/02/17 1900 - - - 158/72 99 %   07/02/17 1830 - - - 152/82 97 %   07/02/17 1739 99.1 °F (37.3 °C) 80 18 166/75 96 %          Thank you for allowing us to provide you with medical care today. We realize that you have many choices for your emergency care needs. Please choose us in the future for any continued health care needs.       Radha Grover MD    [facility].   Office: [phone]            Recent Results (from the past 24 hour(s))   CBC WITH AUTOMATED DIFF    Collection Time: 07/02/17  6:18 PM   Result Value Ref Range    WBC 4.7 3.6 - 11.0 K/uL    RBC 3.11 (L) 3.80 - 5.20 M/uL    HGB 7.7 (L) 11.5 - 16.0 g/dL    HCT 23.2 (L) 35.0 - 47.0 %    MCV 74.6 (L) 80.0 - 99.0 FL    MCH 24.8 (L) 26.0 - 34.0 PG    MCHC 33.2 30.0 - 36.5 g/dL    RDW 16.6 (H) 11.5 - 14.5 %    PLATELET 405 151 - 063 K/uL    NEUTROPHILS 83 (H) 32 - 75 %    LYMPHOCYTES 13 12 - 49 %    MONOCYTES 4 (L) 5 - 13 %    EOSINOPHILS 0 0 - 7 %    BASOPHILS 0 0 - 1 %    ABS. NEUTROPHILS 3.9 1.8 - 8.0 K/UL    ABS. LYMPHOCYTES 0.6 (L) 0.8 - 3.5 K/UL    ABS. MONOCYTES 0.2 0.0 - 1.0 K/UL    ABS. EOSINOPHILS 0.0 0.0 - 0.4 K/UL    ABS. BASOPHILS 0.0 0.0 - 0.1 K/UL    DF MANUAL      RBC COMMENTS POLYCHROMASIA  PRESENT        RBC COMMENTS ANISOCYTOSIS  1+        RBC COMMENTS MICROCYTOSIS  PRESENT        RBC COMMENTS HYPOCHROMIA  PRESENT       METABOLIC PANEL, COMPREHENSIVE    Collection Time: 07/02/17  6:18 PM   Result Value Ref Range    Sodium 137 136 - 145 mmol/L    Potassium 3.5 3.5 - 5.1 mmol/L    Chloride 102 97 - 108 mmol/L    CO2 28 21 - 32 mmol/L    Anion gap 7 5 - 15 mmol/L    Glucose 158 (H) 65 - 100 mg/dL    BUN 12 6 - 20 MG/DL    Creatinine 0.85 0.55 - 1.02 MG/DL    BUN/Creatinine ratio 14 12 - 20      GFR est AA >60 >60 ml/min/1.73m2    GFR est non-AA >60 >60 ml/min/1.73m2    Calcium 9.0 8.5 - 10.1 MG/DL    Bilirubin, total 0.3 0.2 - 1.0 MG/DL    ALT (SGPT) 17 12 - 78 U/L    AST (SGOT) 23 15 - 37 U/L    Alk. phosphatase 251 (H) 45 - 117 U/L    Protein, total 7.1 6.4 - 8.2 g/dL    Albumin 2.2 (L) 3.5 - 5.0 g/dL    Globulin 4.9 (H) 2.0 - 4.0 g/dL    A-G Ratio 0.4 (L) 1.1 - 2.2     LIPASE    Collection Time: 07/02/17  6:18 PM   Result Value Ref Range    Lipase 94 73 - 393 U/L       Ct Abd Pelv W Cont    Result Date: 7/2/2017  EXAM:  CT ABD PELV W CONT INDICATION: abd pain.   3 days s/p colonoscopy COMPARISON: 6/10/2017 CONTRAST:  100 mL of Isovue-370. TECHNIQUE: Following the uneventful intravenous administration of 100 cc Isovue-370, thin axial images were obtained through the abdomen and pelvis. Coronal and sagittal reconstructions were generated. Oral contrast was not administered. CT dose reduction was achieved through use of a standardized protocol tailored for this examination and automatic exposure control for dose modulation. FINDINGS: LUNG BASES: Scarring at the right lung base and 4 mm nodule subpleural right lung base are unchanged. INCIDENTALLY IMAGED HEART AND MEDIASTINUM: Unremarkable. LIVER: No mass or biliary dilatation. GALLBLADDER: Surgically absent SPLEEN: No mass. PANCREAS: No mass or ductal dilatation. ADRENALS: Unremarkable. KIDNEYS: No mass, calculus, or hydronephrosis. STOMACH: Unremarkable. SMALL BOWEL: No dilatation or wall thickening. COLON: No dilatation or wall thickening. Patient status post right colectomy APPENDIX: Eclampsia PERITONEUM: No ascites or pneumoperitoneum. RETROPERITONEUM: No lymphadenopathy or aortic aneurysm. REPRODUCTIVE ORGANS: URINARY BLADDER: No mass or calculus. BONES: There are vague lucent areas scattered in the lower thoracic and lumbar region which are new since 9/16/2016 ADDITIONAL COMMENTS: N/A     IMPRESSION: 1. No acute abnormality is identified. 2. There are vague lucent areas scattered within the lower thoracic and lumbar vertebral bodies new since 9/16/2016 and could represent focal areas of osteopenia however myeloma or other etiologies are not excluded and correlation would be helpful.

## 2017-07-03 NOTE — ED NOTES
MD reviewed discharge instructions and options with patient; patient verbalized understanding. RN reviewed discharge instructions using teachback method. Pt. Was wheeled to exit without difficulty and in no signs of acute distress. Pt was escorted by grandson and picked up by  to bring her home.

## 2017-07-07 NOTE — PROGRESS NOTES
Rosalina Godoy is a 65 y.o. female here today for follow up for Breast   had concerns including Follow-up. C/o pain to abdomen #9/10,mild hot flashes,nausea,loss of appetite. Ms. Samuel Elias has a reminder for a \"due or due soon\" health maintenance. I have asked that she contact her primary care provider for follow-up on this health maintenance.

## 2017-07-07 NOTE — PROGRESS NOTES
Follow up Note        Patient: Tino Wallace MRN: [id number]  SSN: [id number]    YOB: 1940  Age: 65 y.o. Sex: female        Diagnosis:     1. Right breast carcinoma:  pT3 N3a M0 (Stage IIIC) infiltrating ductal carcinoma, Tumor size 7.5 cm, LN 17/19 +ve with extracapsular extension in 2 nodes, grade 3, ki 67 35%, ER -ve, AK -ve, Her 2 -ve      Treatment:     1. Right mastectomy with axillary LN dissection on 01/19/2015  2. Adjuvant chemotherapy   Taxotere, Cytoxan, s/p 3 Cycles completed 4/29/15      Subjective:      Tino Wallace is a 65 y.o. female with a diagnosis of locally advanced right sided breast cancer. She underwent a right mastectomy with axillary LN dissection on 01/19/2015. She received three cycles of adjuvant chemotherapy with TC and stopped therapy due to side effects. She has completed radiation on 08/13/2015. She is in remission from breast cancer. She has also been noted to have progressive unexplained anemia. She feels fatigued. She also has ongoing back pain. . A recent CT shows abnormal signal in the bone marrow. Ms. Alessandro Mauricio is here today with her daughter for follow-up.        Review of Systems:    Constitutional: fatigue  Eyes: negative  Ears, Nose, Mouth, Throat, and Face: negative  Respiratory: negative  Cardiovascular: negative  Gastrointestinal: abdominal pain, nausea  Integument/chest wall: negative  Hematologic/Lymphatic: right sleeve in place for lymphadema  Musculoskeletal: back pain and joint pain   Neurological: negative      Past Medical History:   Diagnosis Date    Arrhythmia     bradycardia in 30's /pacemaker inserted    Arthritis     RT KNEE    Asthma Dx age 75    Bradycardia 2009    Cardiology saw her during hospital stay; Now off coreg;  Sees Dr. Everton Velasquez    Breast cancer [facility])     Rt mastectomy 2/19/15    CAD (coronary artery disease)     Dr Dominique Brooks    Diabetes [facility])     Now seeing Dr. Siena Faulkner Diverticulitis     MALU (degenerative joint disease), lumbar     GERD (gastroesophageal reflux disease)     H. pylori infection 2008    Dr. Brooke Schwartz attack [facility]) April 2006    Hypercholesterolemia     Hypertension     Morbid obesity ([address]     Neuropathy, arm 2009    Right; [facility] [id number] SEN (obstructive sleep apnea)     uses CPAP    Rectal bleeding 2009    Hospitalized; Had colonoscopy and EGD (ok), gastric emptying study (normal), doppler mesenteric arteries (ok)    Sciatica     Has seen Dr. Elena Giles    Stroke [facility]) 2009 & 2012    no residual problems     Past Surgical History:   Procedure Laterality Date    HX APPENDECTOMY  1979    HX BREAST LUMPECTOMY  12/12/2013    RIGHT BREAST DUCTAL EXCISION performed by Mónica Chery MD at [facility] MAIN OR    HX CATARACT REMOVAL  2007    HX CHOLECYSTECTOMY  1979    HX COLONOSCOPY      HX HEART CATHETERIZATION      angioplasty    HX HYSTERECTOMY      fibroids    HX KNEE ARTHROSCOPY  1997    right    HX MASTECTOMY Right 2/19/2015    RIGHT BREAST MODIFIED RADICAL MASTECTOMY RIGHT SENTINEL NODE BIOPSY, RIGHT PORT A CATH INSERTION performed by She More MD at [facility] AMBULATORY OR    HX PACEMAKER      HX SHOULDER ARTHROSCOPY  2004    left      Family History   Problem Relation Age of Onset    Stroke Father     Cancer Sister      breast cancer    Hypertension Sister     Cancer Mother      Unknown type    Cancer Brother      Colon    Hypertension Brother     Anesth Problems Neg Hx      Social History   Substance Use Topics    Smoking status: Never Smoker    Smokeless tobacco: Never Used    Alcohol use No      Prior to Admission medications    Medication Sig Start Date End Date Taking? Authorizing Provider   sucralfate (CARAFATE) 1 gram tablet Take 1 g by mouth four (4) times daily. Yes Historical Provider   HYDROcodone-acetaminophen (NORCO) 5-325 mg per tablet Take 1 Tab by mouth every four (4) hours as needed for Pain.  Max Daily Amount: 6 Tabs. 7/2/17  Yes Marie Rodriguez MD   insulin glargine (LANTUS) 100 unit/mL injection 18 units in the morning and 18 units at bedtime 6/21/17  Yes Mar Ching MD   oxyCODONE-acetaminophen (PERCOCET) 5-325 mg per tablet Take 1 Tab by mouth every four (4) hours as needed for Pain. Max Daily Amount: 6 Tabs. 6/10/17  Yes Kings Hernandez MD   dicyclomine (BENTYL) 20 mg tablet Take 1 Tab by mouth every six (6) hours as needed (abdominal cramps) for up to 20 doses. 6/10/17  Yes Kings Hernandez MD   ondansetron hcl (ZOFRAN, AS HYDROCHLORIDE,) 4 mg tablet Take 1 Tab by mouth every eight (8) hours as needed for Nausea. 6/10/17  Yes Kings Hernandez MD   atorvastatin (LIPITOR) 80 mg tablet Take 1 Tab by mouth daily. Indications: hypercholesterolemia 5/31/17  Yes Kavitha Kessler MD   aspirin (ASPIRIN) 325 mg tablet Take 1 Tab by mouth daily. 5/31/17  Yes Kavitha Kessler MD   hydrOXYzine HCl (ATARAX) 25 mg tablet Take 25 mg by mouth every eight (8) hours as needed for Itching. Yes Todd Andersen MD   metoclopramide HCl (REGLAN) 5 mg tablet Take 5 mg by mouth Before breakfast, lunch, and dinner. Yes Todd Andersen MD   ipratropium (ATROVENT) 0.06 % nasal spray instill 2 sprays into each nostril three times a day - IF NEEDED FOR RUNNING NOSE 3/15/17  Yes Historical Provider   glipiZIDE (GLUCOTROL) 5 mg tablet Take 0.5 Tabs by mouth two (2) times a day. Take 1/2 every day with breakfast. If your blood sugar is above 200 take a whole tablet. 5/5/17  Yes Mar Ching MD   polyethylene glycol [facility]) 17 gram/dose powder Take 17 g by mouth two (2) times a day. Continue while taking pain medication 3/8/17  Yes Aleksander Wilde MD   ergocalciferol (ERGOCALCIFEROL) 50,000 unit capsule Take 1 Cap by mouth every thirty (30) days. 11/9/16  Yes Asa Martinez NP   isosorbide mononitrate ER (IMDUR) 60 mg CR tablet Take  by mouth every morning.  6/6/16  Yes Historical Provider   Blood Sugar Diagnostic, Disc (ASCENSIA BREEZE 2) strp Check your blood sugar twice daily. E11.42 6/20/16  Yes Fredo Hwang MD   insulin syringe-needle U-100 (BD INSULIN SYRINGE ULTRA-FINE) 1/2 mL 31 gauge x 15/64\" syrg 1 Each by SubCUTAneous route two (2) times a day. Two shots daily. E11.42 6/20/16  Yes Fredo Hwang MD   ondansetron (ZOFRAN ODT) 4 mg disintegrating tablet Take 1 Tab by mouth every eight (8) hours as needed for Nausea. 6/1/16  Yes Rocael Chiu MD   albuterol (PROVENTIL VENTOLIN) 2.5 mg /3 mL (0.083 %) nebulizer solution 3 mL by Nebulization route every four (4) hours as needed for Wheezing. 9/20/15  Yes Josue Byrne DO   MICROLET LANCET misc  12/16/14  Yes Historical Provider   benazepril (LOTENSIN) 40 mg tablet Take 40 mg by mouth every morning. Yes Historical Provider   bumetanide (BUMEX) 1 mg tablet Take 1 mg by mouth daily. 12/23/14  Yes Todd Andersen MD   omeprazole (PRILOSEC) 20 mg capsule Take 40 mg by mouth two (2) times a day. Yes Todd Andersen MD   amlodipine (NORVASC) 10 mg tablet TAKE 1 TABLET BY MOUTH ONCE DAILY 4/9/11  Yes Fiorella Tavera MD   nitroglycerin (NITROSTAT) 0.4 mg SL tablet by SubLINGual route every five (5) minutes as needed for Chest Pain. Todd Andersen MD              Allergies   Allergen Reactions    Codeine Itching    Duratuss [Phenylephrine-Guaifenesin] Nausea and Vomiting    Lisinopril-Hydrochlorothiazide Itching    Motrin [Ibuprofen] Nausea and Vomiting     With 800mg    Tape [Adhesive] Other (comments)     TEARS HER SKIN           Objective:     Vitals:    07/07/17 1024   BP: 187/75   Pulse: 70   Resp: 18   Temp: 99 °F (37.2 °C)   TempSrc: Oral   SpO2: 96%   Weight: 204 lb (92.5 kg)   Height: 5' 7\" (1.702 m)          Physical Exam:    GENERAL: alert, cooperative, obese  EYE: negative  LYMPHATIC: negative  THROAT & NECK: normal and no erythema or exudates noted.    LUNG: clear to auscultation bilaterally  HEART: regular rate and rhythm  ABDOMEN: soft, non-tender  EXTREMITIES: right arm edema - has sleeve in place  SKIN: negative  NEUROLOGIC: negative        CT Results (most recent):    Results from Hospital Encounter encounter on 07/02/17   CT ABD PELV W CONT   Narrative EXAM:  CT ABD PELV W CONT    INDICATION: abd pain. 3 days s/p colonoscopy    COMPARISON: 6/10/2017    CONTRAST:  100 mL of Isovue-370. TECHNIQUE:   Following the uneventful intravenous administration of 100 cc Isovue-370, thin  axial images were obtained through the abdomen and pelvis. Coronal and sagittal  reconstructions were generated. Oral contrast was not administered. CT dose  reduction was achieved through use of a standardized protocol tailored for this  examination and automatic exposure control for dose modulation. FINDINGS:   LUNG BASES: Scarring at the right lung base and 4 mm nodule subpleural right  lung base are unchanged. INCIDENTALLY IMAGED HEART AND MEDIASTINUM: Unremarkable. LIVER: No mass or biliary dilatation. GALLBLADDER: Surgically absent  SPLEEN: No mass. PANCREAS: No mass or ductal dilatation. ADRENALS: Unremarkable. KIDNEYS: No mass, calculus, or hydronephrosis. STOMACH: Unremarkable. SMALL BOWEL: No dilatation or wall thickening. COLON: No dilatation or wall thickening. Patient status post right colectomy  APPENDIX: Eclampsia  PERITONEUM: No ascites or pneumoperitoneum. RETROPERITONEUM: No lymphadenopathy or aortic aneurysm. REPRODUCTIVE ORGANS:  URINARY BLADDER: No mass or calculus. BONES: There are vague lucent areas scattered in the lower thoracic and lumbar  region which are new since 9/16/2016  ADDITIONAL COMMENTS: N/A         Impression IMPRESSION:  1. No acute abnormality is identified. 2. There are vague lucent areas scattered within the lower thoracic and lumbar  vertebral bodies new since 9/16/2016 and could represent focal areas of  osteopenia however myeloma or other etiologies are not excluded and correlation  would be helpful.             Lab Results   Component Value Date/Time WBC 4.7 07/02/2017 06:18 PM    Hemoglobin (POC) 9.2 09/20/2015 05:25 PM    HGB 7.7 07/02/2017 06:18 PM    Hematocrit (POC) 27 09/20/2015 05:25 PM    HCT 23.2 07/02/2017 06:18 PM    PLATELET 331 79/12/7668 06:18 PM    MCV 74.6 07/02/2017 06:18 PM         Assessment:     1. Right breast carcinoma:  pT3 N3a M0 (Stage IIIC) infiltrating ductal carcinoma, Tumor size 7.5 cm, LN 17/19 +ve with extracapsular extension in 2 nodes, grade 3, ki 67 35%, ER -ve, IN -ve, Her 2 -ve    ECOG PS 0  Intent of therapy - curative       S/P right sided mastectomy with axillary LN dissection  Received adjuvant chemotherapy   Taxotere, Cytoxan, s/p 3 Cycles    Therapy was discontinued due to severe side effects. Completed radiation to the chest wall and axilla  In remission  Lymphedema - followed by lymphedema clinic at [address] [place] [address] (10/13/2016) - normal  Bone scan (10/13/2016) - normal    Symptom management form reviewed with patient. 2. Pulmonary embolism    Patient discontinued Rivaroxaban  Taking aspirin 81 mg daily      3. ? Multiple Myeloma    Unexplained normocytic anemia  Abnormal A/G ratio in serum  CT Abdomen Pelv (7/2/2017): new vague lucent areas scattered in the lower thoracic and lumbar region     > Bone marrow biopsy and labs to establish diagnosis  > Staging with MRI spine - patient has pacemaker, if not able to perform MRI, will plan for PET CT        Plan:       > Bone marrow biopsy by IR  > Labs today: SPEP, SHERIF, FLC, Quant. Immunoglobulins, Beta-2 microglobulin, urine PEP and SHERIF  > Mammogram in October 2017  > MRI spine or PET CT  > Follow-up appointment in 3 weeks        Signed by: Shwetha Saravia MD                     July 9, 2017             Allan Ledesma MD  CC.  Corina Shannon MD

## 2017-07-17 NOTE — IP AVS SNAPSHOT
Summary of Care Report The Summary of Care report has been created to help improve care coordination. Users with access to "[employer] or [facility] (Web-based application) may access additional patient information including the Discharge Summary. If you are not currently a [facility] user and need more information, please call the number listed below in the [place] section and ask to be connected with Medical Records. Facility Information Name Address Phone [address] [address] [phone] Patient Information Patient Name Sex MARLEN Baird ([id number]) Female 1940 Discharge Information Admitting Provider Service Area Unit  
 (none) [employer] [facility] / [phone] Discharge Provider Discharge Date/Time Discharge Disposition Destination (none) (none) (none) (none) Patient Language Language ENGLISH [13] Hospital Problems as of 2017  Reviewed: 2017 10:59 PM by Lory Castorena MD  
 None Non-Hospital Problems as of 2017  Reviewed: 2017 10:59 PM by Lory Castorena MD  
  
  
  
 Class Noted - Resolved Last Modified Active Problems   Hypercholesterolemia  Unknown - Present 10/16/2009 by Mamta Cazares MD  
  Entered by Mamta Cazares MD  
  Stroke [facility])  Unknown - Present 10/16/2009 by Mamta Cazares MD  
  Entered by Mamta Cazares MD  
  DJD (degenerative joint disease), lumbar  Unknown - Present 10/16/2009 by Mamta Cazares MD  
  Entered by Mamta Cazares MD  
  GERD (gastroesophageal reflux disease)  Unknown - Present 2017 by You Werner MD  
  Entered by Mamta Cazares MD  
  SEN (obstructive sleep apnea)  Unknown - Present 10/16/2009 by Mamta Cazares MD  
  Entered by Mamta Cazares MD  
 Sciatica  Unknown - Present 10/16/2009 by Jordon Valdivia MD  
  Entered by Jordon Valdivia MD  
  Neuropathy, arm  Unknown - Present 10/16/2009 by Jordon Valdivia MD  
  Entered by Jordon Valdivia MD  
  CAD (coronary artery disease)  Unknown - Present 5/29/2017 by Lenise Curling, MD  
  Entered by Jordon Valdivia MD  
  Breast cancer, right breast [facility])  11/26/2014 - Present 2/20/2015 by Claudio Brittle, MD  
  Entered by Anival Orantes MD  
  Abdominal pain  2/20/2015 - Present 2/20/2015 by Claudio Brittle, MD  
  Entered by Claudio Brittle, MD  
  Pain  3/18/2015 - Present 3/18/2015 by Clemente Pedroza, LPN Entered by Clemente Pedroza, LPN Nausea & vomiting  3/26/2015 - Present 3/26/2015 by Clemente Pedroza, LPN Entered by Clemente Pedroza, LPN Constipated  4/8/2015 - Present 4/8/2015 by Clemente Pedroza, LPN Entered by Clemente Pedroza, LPN Pulmonary emboli ([address]  5/10/2015 - Present 5/10/2015 by Gerline Nageotte, MD  
  Entered by Gerline Nageotte, MD  
  Essential hypertension  8/7/2015 - Present 5/29/2017 by Lenise Curling, MD  
  Entered by Bradley Dubois MD  
  Vitamin D deficiency  8/19/2015 - Present 8/19/2015 by Clemente Pedroza, LPN Entered by Clemente Pedroza, DANISHAN Malignant neoplasm of upper-inner quadrant of right female breast ([address]  10/7/2015 - Present 1/31/2017 by Diamond Wallace Entered by Natalei Anderson   Multinodular goiter (nontoxic)  12/15/2015 - Present 12/15/2015 by Bradley Dubois MD  
  Entered by Bradley Dubois MD  
  Lymphedema of upper extremity following lymphadenectomy  12/18/2015 - Present 12/18/2015 by Claudio Brittle, MD  
  Entered by Claudio Brittle, MD  
  S/P right mastectomy  12/5/2016 - Present 5/29/2017 by Lenise Curling, MD  
  Entered by Claudio Brittle, MD  
  Type 2 diabetes mellitus with diabetic polyneuropathy, with long-term current use of insulin ([facility]  12/21/2016 - Present 5/29/2017 by Lenise Curling, MD  
  Entered by Bradley Dubois MD  
 Left-sided weakness  5/29/2017 - Present 5/29/2017 by Katerina Santamaria MD  
  Entered by Katerina Santamaria MD  
  LLQ pain  5/29/2017 - Present 5/29/2017 by Katerina Santamaria MD  
  Entered by Katerina Santamaria MD  
  Pacemaker  5/29/2017 - Present 5/29/2017 by Katerina Santamaria MD  
  Entered by Katerina Santamaria MD  
  Diastolic CHF, chronic ([address]  5/29/2017 - Present 5/29/2017 by Katerina Santamaria MD  
  Entered by Katerina Santamaria MD  
  Transient ischemic attack involving right internal carotid artery  5/31/2017 - Present 5/31/2017 by Vielka Dotson MD  
  Entered by Vielka Dotson MD  
  Stenosis of both internal carotid arteries  5/31/2017 - Present 5/31/2017 by Vielka Dotson MD  
  Entered by Vielka Dotson MD  
  
You are allergic to the following Allergen Reactions Codeine Itching Duratuss (Phenylephrine-Guaifenesin) Nausea and Vomiting Lisinopril-Hydrochlorothiazide Itching Motrin (Ibuprofen) Nausea and Vomiting With 800mg Tape (Adhesive) Other (comments) TEARS HER SKIN Current Discharge Medication List  
  
ASK your doctor about these medications Dose & Instructions Dispensing Information Comments  
 albuterol 2.5 mg /3 mL (0.083 %) nebulizer solution Commonly known as:  PROVENTIL VENTOLIN Dose:  2.5 mg  
3 mL by Nebulization route every four (4) hours as needed for Wheezing. Quantity:  24 Each Refills:  0  
   
 amLODIPine 10 mg tablet Commonly known as:  Savage Donato TAKE 1 TABLET BY MOUTH ONCE DAILY Quantity:  30 Tab Refills:  10  
   
 aspirin 325 mg tablet Commonly known as:  ASPIRIN Dose:  325 mg Take 1 Tab by mouth daily. Quantity:  30 Tab Refills:  0  
   
 atorvastatin 80 mg tablet Commonly known as:  LIPITOR Dose:  80 mg Take 1 Tab by mouth daily. Indications: hypercholesterolemia Quantity:  30 Tab Refills:  0 BD INSULIN SYRINGE ULTRA-FINE 1/2 mL 31 gauge x 15/64\" Syrg Generic drug:  insulin syringe-needle U-100  
 inject twice a day as directed Quantity:  100 Pen Needle Refills:  5  
   
 benazepril 40 mg tablet Commonly known as:  LOTENSIN Dose:  40 mg Take 40 mg by mouth every morning. Refills:  0 Blood Sugar Diagnostic, Disc Strp Commonly known as:  Ascensia Breeze 2 Check your blood sugar twice daily. E11.42 Quantity:  100 Strip Refills:  6  
   
 bumetanide 1 mg tablet Commonly known as:  Bernell Rumble Dose:  1 mg Take 1 mg by mouth daily. Refills:  0  
   
 dicyclomine 20 mg tablet Commonly known as:  BENTYL Dose:  20 mg Take 1 Tab by mouth every six (6) hours as needed (abdominal cramps) for up to 20 doses. Quantity:  20 Tab Refills:  0  
   
 ergocalciferol 50,000 unit capsule Commonly known as:  ERGOCALCIFEROL Dose:  77446 Units Take 1 Cap by mouth every thirty (30) days. Quantity:  3 Cap Refills:  2  
   
 glipiZIDE 5 mg tablet Commonly known as:  Xiang Jay Dose:  2.5 mg Take 0.5 Tabs by mouth two (2) times a day. Take 1/2 every day with breakfast. If your blood sugar is above 200 take a whole tablet. Quantity:  30 Tab Refills:  3 HYDROcodone-acetaminophen 5-325 mg per tablet Commonly known as:  Dakota Alexis Dose:  1 Tab Take 1 Tab by mouth every four (4) hours as needed for Pain. Max Daily Amount: 6 Tabs. Quantity:  12 Tab Refills:  0  
   
 hydrOXYzine HCl 25 mg tablet Commonly known as:  ATARAX Dose:  25 mg Take 25 mg by mouth every eight (8) hours as needed for Itching. Refills:  0  
   
 insulin glargine 100 unit/mL injection Commonly known as:  LANTUS  
 18 units in the morning and 18 units at bedtime Quantity:  20 mL Refills:  6  
   
 ipratropium 0.06 % nasal spray Commonly known as:  ATROVENT  
 instill 2 sprays into each nostril three times a day - IF NEEDED FOR RUNNING NOSE Refills:  0  
   
 isosorbide mononitrate ER 60 mg CR tablet Commonly known as:  IMDUR Take  by mouth every morning. Refills:  0  
   
 metoclopramide HCl 5 mg tablet Commonly known as:  REGLAN Dose:  5 mg Take 5 mg by mouth Before breakfast, lunch, and dinner. Refills:  0 [place] Generic drug:  Lancets Refills:  0  
   
 NITROSTAT 0.4 mg SL tablet Generic drug:  nitroglycerin  
 by SubLINGual route every five (5) minutes as needed for Chest Pain. Refills:  0  
   
 omeprazole 20 mg capsule Commonly known as:  PRILOSEC Dose:  40 mg Take 40 mg by mouth two (2) times a day. Refills:  0  
   
 ondansetron 4 mg disintegrating tablet Commonly known as:  ZOFRAN ODT Dose:  4 mg Take 1 Tab by mouth every eight (8) hours as needed for Nausea. Quantity:  60 Tab Refills:  2  
   
 ondansetron hcl 4 mg tablet Commonly known as:  ZOFRAN (AS HYDROCHLORIDE) Dose:  4 mg Take 1 Tab by mouth every eight (8) hours as needed for Nausea. Quantity:  20 Tab Refills:  0  
   
 oxyCODONE-acetaminophen 5-325 mg per tablet Commonly known as:  PERCOCET Dose:  1 Tab Take 1 Tab by mouth every four (4) hours as needed for Pain. Max Daily Amount: 6 Tabs. Quantity:  20 Tab Refills:  0  
   
 polyethylene glycol 17 gram/dose powder Commonly known as:  Kathrin Hodgkin Dose:  17 g Take 17 g by mouth two (2) times a day. Continue while taking pain medication Quantity:  225 g Refills:  2  
   
 sucralfate 1 gram tablet Commonly known as:  Marylu Gerardo Dose:  1 g Take 1 g by mouth four (4) times daily. Refills:  0 Current Immunizations Name Date Influenza Vaccine 9/1/2014 Follow-up Information None Discharge Instructions [facility] Special Procedures/Radiology Department Radiologist:  Dr Rom Thao Date:  07/17/2017 Bone Marrow Biopsy You may have an aching pain in the biopsy site tonight.   Take Tylenol, as directed on the label, for pain, or take your prescribed pain medication. Avoid ibuprofen and aspirin for the next 48 hours as these drugs may cause you to bruise or bleed. Watch for signs of infection:  Redness, pain, drainage, fever and chills. If this occurs, call your doctor. Resume your previous diet and follow medication reconciliation form. Rest today. Because you received sedation, do not drive or sign any documents until tomorrow morning. It may take up to 7 days for your test results to become available to your physician. Call your physician if you have not heard anything after 7 business days. If you have any questions or concerns, please call [phone] and ask to speak to the nurse on-call. Chart Review Routing History Recipient Method Report Sent By Linda Shane MD, MD  
Phone: [phone] In Basket Note Review Mary Hinkle MD [[id number]] 2/24/2010 12:21 PM 02/24/2010 Linda Shane MD, MD  
Phone: [phone] In Basket Note Review Mary Hinkle MD [[id number]] 8/24/2010 10:59 AM 08/24/2010 Linda Shane MD, MD  
Phone: [phone] In Basket Note Review Liliane Luna [[id number]] 11/29/2010 12:28 PM 11/29/2010 Elvis Cash MD, MD  
Fax: [phone] Phone: [phone] Fax Provider Comm Report Liliane Luna [[id number]] 11/29/2010 12:28 PM 11/29/2010 Celina Chávez MD, MD  
Fax: [phone] Phone: [phone] Fax Provider Comm Report Liliane Luna [[id number]] 11/29/2010 12:28 PM 11/29/2010 Cammy Cuellar MD  
Phone: [phone] In Basket Note Review Liliane Luna [[id number]] 2/28/2011 10:27 AM 02/28/2011 Elvis Cash MD  
Fax: [phone] Phone: [phone] Fax Provider Comm Report Liliane Luna [[id number]] 2/28/2011 10:27 AM 02/28/2011 Celina Chávez MD  
Fax: [phone] Phone: [phone] Fax Provider Comm Report Liliane Luna [[id number]] 2/28/2011 10:27 AM 02/28/2011  Rena Robertson MD  
 Phone: [phone] In Basket Note Review Virginia Givens [[id number]] 2/28/2011 10:27 AM 02/28/2011 Porfirio Marsh MD  
Phone: [phone] In [employer] [address] [place] [address] [[id number]] 12/12/2013  3:27 PM 12/12/2013 Jazlyn Muro MD  
Phone: [phone] In [employer] [address] [place] [address] [[id number]] 12/12/2013  3:27 PM 12/12/2013 Porfirio Marsh MD  
Fax: [phone] Phone: [phone] Fax Notes/Transcriptions MD Kay Gatica 12/12/2013  4:25 PM 12/12/2013 Javy Villafuerte MD  
Fax: [phone] Phone: [phone] Fax Notes/Transcriptions MD Kay Gatica 12/12/2013  4:25 PM 12/12/2013 Kaylen Mckeon MD  
Fax: [phone] Phone: [phone] Fax Notes/Transcriptions MD Kay Gatica 12/12/2013  4:25 PM 12/12/2013 Jeff Trotter MD  
Fax: [phone] Phone: [phone] Fax Notes/Transcriptions MD Kay Gatica 12/12/2013  4:25 PM 12/12/2013 Leanna Manning MD  
Phone: [phone] [facility] Jazlyn Muro [[id number]] 12/20/2013 11:31 AM 12/18/2013 Veronica Uribe MD  
Phone: [phone] In [employer] ED Visit Summary Jose Mccray MD [[id number]] 11/17/2014 12:44 AM 11/15/2014 Veronica Uribe MD  
Phone: [phone] In Basket Note Review Darrin Anthony MD [[id number]] 11/26/2014  4:06 PM 11/26/2014 Darrin Anthony MD  
Fax: [phone] Phone: [phone] Fax [facility] IP AMB RESULT REPORT IMAGING Lane Agrawals [[id number]] 12/31/2014 10:41 AM 11/25/2014 [facility] IP AMB RESULT REPORT IMAGING [address] Agrawals [[id number]] 12/31/2014 10:41 AM 11/17/2014 [facility] IP AMB RESULT REPORT IMAGING Lane Agrawals [[id number]] 12/31/2014 10:41 AM 11/16/2014 [facility] IP AMB RESULT REPORT IMAGING [address] Ag[address] [[id number]] 12/31/2014 10:41 AM 11/16/2014 [facility] IP AMB RESULT REPORT IMAGING Lane Agrawals [[id number]] 12/31/2014 10:41 AM 10/31/2014 [facility] RESULT RE[facility] IMAGING Bettie Jones [[id number]] 12/31/2014 10:41 AM 10/29/2014 [facility] Fax: [phone] Fax [address] [place]  CUSTOM LAB REPORT REKHA Estes [[id number]] 2/20/2015 12:59 PM 02/19/2015 Alex Sandoval MD  
Fax: [phone] Phone: [phone] Fax Notes/Transcriptions Milli Rodriguez MD [[id number]] 2/20/2015  1:16 PM 02/20/2015 Milli Rodriguez MD  
Fax: [phone] Phone: [phone] Fax Notes/Transcriptions Milli Rodriguez MD [[id number]] 2/20/2015  1:16 PM 02/20/2015 Alex Sandoval MD  
Phone: [phone] In Basket Note Review Tamiko Wood [[id number]] 3/9/2015 12:26 PM 03/02/2015 Paulette Rosario MD  
Phone: [phone] In Basket Note Review Tamiko Wood [[id number]] 3/9/2015 12:26 PM 03/02/2015 Anu Moeller MD  
Phone: [phone] In Basket Note Review Tamiko Wood [[id number]] 3/9/2015 12:26 PM 03/02/2015 Paulette Rosario MD  
Phone: [phone] In Basket Note Review Kathleen Ibrahim MD [[id number]] 4/8/2015 11:57 PM 04/08/2015 Yeny Mehta RD, [facility] Phone: [phone] In [address], [address] [place]  [[id number]] 4/9/2015  4:01 PM 04/08/2015 Yeny [address], [facility] Phone: [phone] In [address], [address] [address] 5/4/2015  8:56 AM 04/08/2015 Alex Sandoval MD  
Phone: [phone] In Basket IP Auto Routed Notes Skyler Mauricio MD [place] [employer] 5/10/2015 11:13 PM 05/10/2015 Alex Sandoval MD  
Phone: [phone] In [employer] Routed Notes Rebecca Aguilera, [place] [[id number]] 5/14/2015  4:26 PM 05/14/2015 Alex Sandoval MD  
Phone: [phone] In Basket Note Review Tamiko Wood [[id number]] 8/20/2015  1:45 PM 08/20/2015 Paulette Rosario MD  
Phone: [phone] In Basket Note Review Tamiko Wood [[id number]] 8/20/2015  1:45 PM 08/20/2015 Rajiv Robert NP Phone: [phone] In Basket Note Review Kathleen Ibrahim MD [[id number]] 10/8/2015 10:20 AM 10/07/2015  Washington Gamboa LPN  
 Phone: [phone] In Basket Note Review Ruthie Gomez MD [[id number]] 10/8/2015 10:20 AM 10/07/2015 Ruthie Gomez MD  
Phone: [phone] In [address] [place] [address] MD Anupama [[id number]] 12/15/2015  3:32 PM   
 Vero Infante MD  
Phone: [phone] In [address] [place] [address] MD Anupama [[id number]] 12/15/2015  3:32 PM   
 Sandeep Van MD  
Phone: [phone] In [address] [place] [address] MD Anupama [[id number]] 12/15/2015  3:32 PM   
 Ruthie Gomez MD  
Phone: [phone] In [address] [place] [address] MD Anupama [[id number]] 3/18/2016  1:03 PM   
 Coby Gallegos MD  
Phone: [phone] In Basket Note Review Ruthie Gomez MD [[id number]] 6/1/2016  1:43 PM 06/01/2016 Coby Gallegos MD  
Phone: [phone] In Basket IP Auto Routed Notes Jossy Friedman MD [[id number]] 9/15/2016 10:17 PM 09/15/2016 Coby Gallegos MD  
Phone: [phone] In [employer] IP Auto Routed Blanca Rust MD [[id number]] 9/16/2016  7:15 PM 09/16/2016 Coby Gallegos MD  
Phone: [phone] In [employer] IP Auto Routed Blanca Rust MD [[id number]] 9/16/2016  7:15 PM 09/16/2016 Coby Gallegos MD  
Phone: [phone] In Basket IP Auto Routed Notes Sarah Peña MD [[id number]] 9/18/2016 10:10 AM 09/18/2016 Coby Gallegos MD  
Phone: [phone] In Basket IP Auto Routed Notes Radha Cadena MD [[id number]] 5/29/2017 10:57 PM 05/29/2017 Coby Gallegos MD  
Phone: [phone] In Basket IP Auto Routed Notes Reddy Schilling MD [[id number]] 5/31/2017  4:14 PM 05/31/2017 Coby Gallegos MD  
Phone: [phone] In Basket IP Auto Routed Notes Jossy Hawthorne MD [[id number]] 7/17/2017 11:45 AM 07/17/2017

## 2017-07-17 NOTE — PROGRESS NOTES
Sitting up in bed without complaint post procedure eating crackers and drinking ginger ale. Family member is at bedside. Received + verbal feedback from the d/c instructions presented. IV removed, cath tip intact. Able to get dressed with assistance from family member. To pov via w/c. In NAD at time of d/c.

## 2017-07-17 NOTE — H&P
Interventional Radiology History and Physical (Inpatient)    Patient: Noreen Morales 65 y.o. female     Referring Physician:  Betina Murillo NP    Chief Complaint: No chief complaint on file. History of Present Illness: conscious sedation (Versed and fentanyl) for bone marrow biopsy. History:  Past Medical History:   Diagnosis Date    Arrhythmia     bradycardia in 30's /pacemaker inserted    Arthritis     RT KNEE    Asthma Dx age 75    Bradycardia 2009    Cardiology saw her during hospital stay; Now off coreg;  Sees Dr. Lacy Tyson    Breast cancer [facility])     Rt mastectomy 2/19/15    CAD (coronary artery disease)     Dr Rea Friendly Diabetes [facility])     Now seeing Dr. Giovana Cheng Diverticulitis     DJD (degenerative joint disease), lumbar     GERD (gastroesophageal reflux disease)     H. pylori infection 2008    Dr. Asya Aldana attack [facility]) April 2006    Hypercholesterolemia     Hypertension     Morbid obesity ([address]     Neuropathy, arm 2009    Right; Has had MRI and EMG at [address] [place] [address]    SEN (obstructive sleep apnea)     uses CPAP    Rectal bleeding 2009    Hospitalized; Had colonoscopy and EGD (ok), gastric emptying study (normal), doppler mesenteric arteries (ok)    Sciatica     Has seen Dr. Jamar Pompa    Stroke [facility]) 2009 & 2012    no residual problems     Family History   Problem Relation Age of Onset    Stroke Father     Cancer Sister      breast cancer    Hypertension Sister     Cancer Mother      Unknown type    Cancer Brother      Colon    Hypertension Brother     Anesth Problems Neg Hx      Social History     Social History    Marital status:      Spouse name: N/A    Number of children: N/A    Years of education: N/A     Occupational History    Not on file.      Social History Main Topics    Smoking status: Never Smoker    Smokeless tobacco: Never Used    Alcohol use No    Drug use: No    Sexual activity: No     Other Topics Concern    Not on file     Social History Narrative    Lives in [place] with oldest daughter and great-grandson 8. Occupation retired, worked at [employer] as a , Hobbies, plays with grandkids, likes [employer] and sings in the choir       Allergies: Allergies   Allergen Reactions    Codeine Itching    Duratuss [Phenylephrine-Guaifenesin] Nausea and Vomiting    Lisinopril-Hydrochlorothiazide Itching    Motrin [Ibuprofen] Nausea and Vomiting     With 800mg    Tape [Adhesive] Other (comments)     TEARS HER SKIN       Current Medications:  Current Outpatient Prescriptions   Medication Sig    BD INSULIN SYRINGE ULTRA-FINE 1/2 mL 31 gauge x 15/64\" syrg inject twice a day as directed    sucralfate (CARAFATE) 1 gram tablet Take 1 g by mouth four (4) times daily.  HYDROcodone-acetaminophen (NORCO) 5-325 mg per tablet Take 1 Tab by mouth every four (4) hours as needed for Pain. Max Daily Amount: 6 Tabs.  insulin glargine (LANTUS) 100 unit/mL injection 18 units in the morning and 18 units at bedtime    oxyCODONE-acetaminophen (PERCOCET) 5-325 mg per tablet Take 1 Tab by mouth every four (4) hours as needed for Pain. Max Daily Amount: 6 Tabs.  dicyclomine (BENTYL) 20 mg tablet Take 1 Tab by mouth every six (6) hours as needed (abdominal cramps) for up to 20 doses.  ondansetron hcl (ZOFRAN, AS HYDROCHLORIDE,) 4 mg tablet Take 1 Tab by mouth every eight (8) hours as needed for Nausea.  atorvastatin (LIPITOR) 80 mg tablet Take 1 Tab by mouth daily. Indications: hypercholesterolemia    aspirin (ASPIRIN) 325 mg tablet Take 1 Tab by mouth daily.  hydrOXYzine HCl (ATARAX) 25 mg tablet Take 25 mg by mouth every eight (8) hours as needed for Itching.  metoclopramide HCl (REGLAN) 5 mg tablet Take 5 mg by mouth Before breakfast, lunch, and dinner.     ipratropium (ATROVENT) 0.06 % nasal spray instill 2 sprays into each nostril three times a day - IF NEEDED FOR RUNNING NOSE    glipiZIDE (GLUCOTROL) 5 mg tablet Take 0.5 Tabs by mouth two (2) times a day. Take 1/2 every day with breakfast. If your blood sugar is above 200 take a whole tablet.  polyethylene glycol (MIRALAX) 17 gram/dose powder Take 17 g by mouth two (2) times a day. Continue while taking pain medication    ergocalciferol (ERGOCALCIFEROL) 50,000 unit capsule Take 1 Cap by mouth every thirty (30) days.  isosorbide mononitrate ER (IMDUR) 60 mg CR tablet Take  by mouth every morning.  Blood Sugar Diagnostic, Disc (ASCENSIA BREEZE 2) strp Check your blood sugar twice daily. E11.42    ondansetron (ZOFRAN ODT) 4 mg disintegrating tablet Take 1 Tab by mouth every eight (8) hours as needed for Nausea.  albuterol (PROVENTIL VENTOLIN) 2.5 mg /3 mL (0.083 %) nebulizer solution 3 mL by Nebulization route every four (4) hours as needed for Wheezing.  MICROLET LANCET misc     benazepril (LOTENSIN) 40 mg tablet Take 40 mg by mouth every morning.  bumetanide (BUMEX) 1 mg tablet Take 1 mg by mouth daily.  omeprazole (PRILOSEC) 20 mg capsule Take 40 mg by mouth two (2) times a day.  nitroglycerin (NITROSTAT) 0.4 mg SL tablet by SubLINGual route every five (5) minutes as needed for Chest Pain.  amlodipine (NORVASC) 10 mg tablet TAKE 1 TABLET BY MOUTH ONCE DAILY     Current Facility-Administered Medications   Medication Dose Route Frequency    sodium bicarbonate (4%) (NEUT) injection 2 mL  2 mL SubCUTAneous RAD ONCE    fentaNYL citrate (PF) injection 100 mcg  100 mcg IntraVENous Multiple    0.9% sodium chloride infusion  25 mL/hr IntraVENous CONTINUOUS    midazolam (VERSED) injection 5 mg  5 mg IntraVENous Multiple        Physical Exam:  Blood pressure (!) 156/98, pulse (!) 50, temperature 98.5 °F (36.9 °C), resp. rate 20, height 5' 7\" (1.702 m), weight 100.7 kg (222 lb), SpO2 95 %, not currently breastfeeding.   GENERAL: alert, cooperative, no distress, appears stated age, LUNG: clear to auscultation bilaterally, distant HEART: paced, irregular, bradycardic    Findings/Diagnosis: conscious sedation (Versed and fentanyl) for bone marrow biopsy. Alerts:    Hospital Problems  Date Reviewed: 7/9/2017    None          Laboratory:      Recent Labs      07/17/17   1050   HGB  7.7*   HCT  23.3*   WBC  4.4   PLT  186         Plan of Care/Planned Procedure:  Risks, benefits, and alternatives reviewed with patient and she agrees to proceed with the procedure. Full dictated report to follow.

## 2017-07-17 NOTE — DISCHARGE INSTRUCTIONS
[address] [facility]  Special Procedures/Radiology Department      Radiologist:  Dr Danette Musa      Date:  07/17/2017        Bone Marrow Biopsy    You may have an aching pain in the biopsy site tonight. Take Tylenol, as directed on the label, for pain, or take your prescribed pain medication. Avoid ibuprofen and aspirin for the next 48 hours as these drugs may cause you to bruise or bleed. Watch for signs of infection:  Redness, pain, drainage, fever and chills. If this occurs, call your doctor. Resume your previous diet and follow medication reconciliation form. Rest today. Because you received sedation, do not drive or sign any documents until tomorrow morning. It may take up to 7 days for your test results to become available to your physician. Call your physician if you have not heard anything after 7 business days. If you have any questions or concerns, please call [phone] and ask to speak to the nurse on-call.

## 2017-07-21 NOTE — TELEPHONE ENCOUNTER
Pt called reporting pain in her hip since her bone marrow bx in IR this past Monday. Pt denies fever, redness, heat, swelling or discharge at the bx site. Pt rates the pain 10/10, took torodol for pain without relief. This nurse called the the IR department for further instruction. Per [facility] RN, Pt was instructed OTC pain meds and can try heat or ice. Also discussed with NP who does not want to prescribe narcotics for this and also recommends OTC pain meds and ice. This was relayed to the Pt and she states she will try the ice and let us know if there is no improvement.

## 2017-07-25 NOTE — TELEPHONE ENCOUNTER
Pt daughter,Deedee called, HIPAA verified by two patient identifiers. Said she went to [facility] on Sunday for pain of her abdomen and SOB. Daughter would like to make sure the doctors talk to each other and she would like to have information as well on bone marrow bx.    [phone].

## 2017-08-11 PROBLEM — C50.919 METASTATIC BREAST CANCER (HCC): Status: ACTIVE | Noted: 2017-01-01

## 2017-08-11 NOTE — PROGRESS NOTES
Armin Agarwal is a 65 y.o. female here for follow up of right breast cancer. Patient states pain tp hips and back #10/10.states Tramadol does not help a lot. Ms. Carie Brenner has a reminder for a \"due or due soon\" health maintenance. I have asked that she contact her primary care provider for follow-up on this health maintenance.

## 2017-08-11 NOTE — MR AVS SNAPSHOT
Visit Information Date & Time Provider Department Dept. Phone Encounter #  
 8/11/2017  2:00 PM Clementine Cheadle, [place] [facility] [phone] [id number] Your Appointments 8/18/2017  9:10 AM  
Follow Up with MD Drake [facility] Appt Note: f/u    dm             3 months [address] [id number] [address]  
  
    
 9/8/2017 10:00 AM  
Any with Clementine Cheadle, MD  
[address] Oncology at [facility]) Appt Note: 6 mth f/u  
 [address] [facility] [address] [address]  
[phone]  
  
   
 [address]  
  
    
 12/5/2017 10:30 AM  
Follow Up with Katy Ghosh NP [address] at [employer] [facility] Appt Note: 6 month follow up Cp$0 6/6/17 tt  
 [address]  
  
   
 [place] [address] Upcoming Health Maintenance Date Due DTaP/Tdap/Td series (1 - Tdap) 1/15/1960 ZOSTER VACCINE AGE 60> 11/15/1998 OSTEOPOROSIS SCREENING (DEXA) 1/15/2004 Pneumococcal 65+ High/Highest Risk (1 of 2 - PCV13) 1/15/2004 MEDICARE YEARLY EXAM 1/15/2004 MICROALBUMIN Q1 6/20/2017 EYE EXAM RETINAL OR DILATED Q1 6/24/2017 INFLUENZA AGE 9 TO ADULT 8/1/2017 HEMOGLOBIN A1C Q6M 11/30/2017 FOOT EXAM Q1 5/5/2018 LIPID PANEL Q1 5/30/2018 GLAUCOMA SCREENING Q2Y 6/24/2018 Allergies as of 8/11/2017  Review Complete On: 8/11/2017 By: Manuel Caro NP Severity Noted Reaction Type Reactions Codeine  10/16/2009    Itching Duratuss [Phenylephrine-guaifenesin]  10/16/2009    Nausea and Vomiting Lisinopril-hydrochlorothiazide  10/16/2009    Itching Motrin [Ibuprofen]  10/16/2009    Nausea and Vomiting With 800mg Tape [Adhesive]  12/10/2014    Other (comments) TEARS HER SKIN Current Immunizations  Reviewed on 8/11/2017 Name Date Influenza Vaccine 9/1/2014 Reviewed by Brice Chirinos LPN on 1/47/5362 at  2:14 PM  
You Were Diagnosed With   
  
 Codes Comments [facility]    -  Primary ICD-10-CM: C50.919, C79.9 ICD-9-CM: 174.9, 199.1 Vitals BP Pulse Temp Resp Height(growth percentile) Weight(growth percentile) 154/83 (BP 1 Location: Left arm, BP Patient Position: Sitting) 95 98.6 °F (37 °C) 18 5' 7\" (1.702 m) 185 lb (83.9 kg) SpO2 BMI OB Status Smoking Status 93% 28.98 kg/m2 Hysterectomy Never Smoker Vitals History BMI and BSA Data Body Mass Index Body Surface Area  
 28.98 kg/m 2 1.99 m 2 Preferred Pharmacy Pharmacy Name Phone [phone] [facility], [address] [facility] Your Updated Medication List  
  
   
This list is accurate as of: 8/11/17  2:34 PM.  Always use your most recent med list.  
  
  
  
  
 albuterol 2.5 mg /3 mL (0.083 %) nebulizer solution Commonly known as:  PROVENTIL VENTOLIN  
3 mL by Nebulization route every four (4) hours as needed for Wheezing. amLODIPine 10 mg tablet Commonly known as:  Analia Matar TAKE 1 TABLET BY MOUTH ONCE DAILY  
  
 aspirin 325 mg tablet Commonly known as:  ASPIRIN Take 1 Tab by mouth daily. atorvastatin 80 mg tablet Commonly known as:  LIPITOR Take 1 Tab by mouth daily. Indications: hypercholesterolemia BD INSULIN SYRINGE ULTRA-FINE 1/2 mL 31 gauge x 15/64\" Syrg Generic drug:  insulin syringe-needle U-100  
inject twice a day as directed  
  
 benazepril 40 mg tablet Commonly known as:  LOTENSIN Take 40 mg by mouth every morning. Blood Sugar Diagnostic, Disc Strp Commonly known as:  Ascensia Breeze 2  
 Check your blood sugar twice daily. E11.42  
  
 bumetanide 1 mg tablet Commonly known as:  Brandee Ansarimitt Take 1 mg by mouth daily. diclofenac 1 % Gel Commonly known as:  VOLTAREN Apply 2 g to affected area four (4) times daily as needed (Pain). dicyclomine 20 mg tablet Commonly known as:  BENTYL Take 1 Tab by mouth every six (6) hours as needed (abdominal cramps) for up to 20 doses. ergocalciferol 50,000 unit capsule Commonly known as:  ERGOCALCIFEROL Take 1 Cap by mouth every thirty (30) days. glipiZIDE 5 mg tablet Commonly known as:  Temi Shown Take 0.5 Tabs by mouth two (2) times a day. Take 1/2 every day with breakfast. If your blood sugar is above 200 take a whole tablet. insulin glargine 100 unit/mL injection Commonly known as:  LANTUS  
18 units in the morning and 18 units at bedtime  
  
 ipratropium 0.06 % nasal spray Commonly known as:  ATROVENT  
instill 2 sprays into each nostril three times a day - IF NEEDED FOR RUNNING NOSE  
  
 isosorbide mononitrate ER 60 mg CR tablet Commonly known as:  IMDUR Take  by mouth every morning. [facility] Generic drug:  Lancets * morphine IR 15 mg tablet Commonly known as:  MS IR Take 1 Tab by mouth every four (4) hours as needed for Pain. Max Daily Amount: 90 mg.  
  
 * morphine CR 15 mg CR tablet Commonly known as:  MS CONTIN Take 1 Tab by mouth every twelve (12) hours. Max Daily Amount: 30 mg. NITROSTAT 0.4 mg SL tablet Generic drug:  nitroglycerin  
by SubLINGual route every five (5) minutes as needed for Chest Pain. omeprazole 20 mg capsule Commonly known as:  PRILOSEC Take 40 mg by mouth two (2) times a day. ondansetron 4 mg disintegrating tablet Commonly known as:  ZOFRAN ODT Take 1 Tab by mouth every eight (8) hours as needed for Nausea. ondansetron hcl 4 mg tablet Commonly known as:  ZOFRAN (AS HYDROCHLORIDE) Take 1 Tab by mouth every eight (8) hours as needed for Nausea. polyethylene glycol 17 gram/dose powder Commonly known as:  Verjavier Redder Take 17 g by mouth two (2) times a day. Continue while taking pain medication * Notice: This list has 2 medication(s) that are the same as other medications prescribed for you. Read the directions carefully, and ask your doctor or other care provider to review them with you. Prescriptions Printed Refills  
 morphine IR (MS IR) 15 mg tablet 0 Sig: Take 1 Tab by mouth every four (4) hours as needed for Pain. Max Daily Amount: 90 mg.  
 Class: Print Route: Oral  
 morphine CR (MS CONTIN) 15 mg CR tablet 0 Sig: Take 1 Tab by mouth every twelve (12) hours. Max Daily Amount: 30 mg.  
 Class: Print Route: Oral  
  
To-Do List   
 08/14/2017 1:00 PM  
  Appointment with Denisse Fuentes OT at [place] [address]  
  
 08/15/2017 To Be Determined Appointment with Chandler Sharif PT at [place] [address]  
  
 08/16/2017 To Be Determined Appointment with Denisse Fuentes OT at [place] [address]  
  
 08/16/2017 To Be Determined Appointment with Hawa Barraza at [place] [address]  
  
 08/17/2017 To Be Determined Appointment with Chandler Sharif PT at [place] [address]  
  
 08/22/2017 To Be Determined Appointment with Chandler Sharif PT at [place] [address]  
  
 08/23/2017 To Be Determined Appointment with Alondra Gutierrez RN at [place] [address]  
  
 08/24/2017 To Be Determined Appointment with Chandler Sharif PT at [place] [address]  
  
 08/29/2017 To Be Determined Appointment with Chandler Shraif PT at [place] [address]  
  
 08/31/2017 To Be Determined Appointment with Lizy Velazquez PT at [place] [address]  
  
 09/05/2017 To Be Determined [facility]  
  
 09/07/2017 To Be Determined Appointment with Lizy Velazquez PT at [place] [address] Patient Instructions You will be receiving Abraxane + Denise Magaña You will need a port Side effects: fatigue, lowering of the blood counts and hair loss. You will also have to look for side effects including excessive diarrhea andbreathing difficulty. If you have a fever of 100.5 or above while receiving chemotherapy please call our office immediately. If it is after hours please call our on call MD at [phone]. Two prescriptions of anti-nausea medications will be sent to your pharmacy. If for any reason any of the prescriptions are extremely expensive please notify our office. You will be given long acting anti-nausea medications through your port prior to your treatments. We prescribe at home medications incase you are to experience nausea. Please call our office if not effective. Please make sure you have imodium OTC at home prior to starting treatments. This is incase you are to experience diarrhea. If this is not effective please call our office. [phone]. On the day of your chemo you will report to the infusion center, [address], the nurses will access your port and draw labs, they will then send you over to our office, [address]. We will look at the labs, discuss any symptoms/ potential side effects, and answer any questions. We will then send you back to the infusion center to complete your infusion. Please take all of your medications as prescribed and eat breakfast prior to your arrival. 
 
Yohana Montana is not provided, but there is limited snacks/drinks available for the patient only. Please be mindful that space is limited therefore please allow 1 person to accompany you to your treatments. You will need a  for your infusions, however, it is not mandatory someone stays with your during your treatment. Paclitaxel Protein-bound (By injection) Paclitaxel Protein-Bound (kwp-hc-KUG-el PROE-teen - bownd) Treats cancer, including cancer of the breast, lung, or pancreas. Brand Name(s): Abraxane There may be other brand names for this medicine. When This Medicine Should Not Be Used: This medicine is not right for everyone. You should not receive this medicine if you had an allergic reaction to paclitaxel protein-bound, or if you are pregnant. How to Use This Medicine:  
Injectable · Medicines used to treat cancer are very strong and can have many side effects. Before receiving this medicine, make sure you understand all the risks and benefits. It is important for you to work closely with your doctor during your treatment. · You will receive this medicine while you are in a hospital or cancer treatment center. A nurse or other trained health professional will give you this medicine. · Your doctor will prescribe your dose and schedule. This medicine is given through a needle placed in a vein. · Read and follow the patient instructions that come with this medicine. Talk to your doctor or pharmacist if you have any questions. · Missed dose: This medicine needs to be given on a fixed schedule. If you miss a dose, call your doctor, home health caregiver, or treatment clinic for instructions. Drugs and Foods to Avoid: Ask your doctor or pharmacist before using any other medicine, including over-the-counter medicines, vitamins, and herbal products. · Some foods and medicines can affect how paclitaxel works.  Tell your doctor if you are using cimetidine, fluoxetine, gemfibrozil, rifampicin, verapamil, medicine to treat an infection (such as erythromycin, ketoconazole), medicine to treat HIV infection (such as efavirenz, indinavir, nelfinavir, nevirapine, ritonavir, saquinavir), or medicine to treat seizures (such as carbamazepine, phenytoin). Warnings While Using This Medicine: · This medicine may cause birth defects if either partner is using it during conception or pregnancy. Tell your doctor right away if you or your partner becomes pregnant. · Tell your doctor if you are breastfeeding, or if you have liver disease or an infection. · This medicine may cause the following problems: ¨ Nerve damage in the arms or legs ¨ Immune system problems ¨ Lung or breathing problems ¨ Severe infection · This medicine is made from donated human blood. The blood is tested before the medicine is prepared. Although the risk is low, some people have received viruses from human blood products. Talk with your doctor if this concerns you. · This medicine may make you bleed, bruise, or get infections more easily. Take precautions to prevent illness and injury. Wash your hands often. · Cancer medicine can cause nausea or vomiting, sometimes even after you receive medicine to prevent these effects. Ask your doctor or nurse about other ways to control any nausea or vomiting that might happen. · Your doctor will do lab tests at regular visits to check on the effects of this medicine. Keep all appointments. Possible Side Effects While Using This Medicine:  
Call your doctor right away if you notice any of these side effects: · Allergic reaction: Itching or hives, swelling in your face or hands, swelling or tingling in your mouth or throat, chest tightness, trouble breathing · Fever, chills, cough, sore throat, or body aches · Numbness, tingling, or burning pain in your hands, arms, legs, or feet · Ongoing vomiting or diarrhea, extreme thirst 
· Trouble breathing, dry cough · Unusual bleeding, bruising, or weakness If you notice these less serious side effects, talk with your doctor: · Diarrhea, nausea, vomiting 
· Hair loss · Joint or muscle pain · Pain, itching, burning, swelling, or a lump under your skin where the needle is placed If you notice other side effects that you think are caused by this medicine, tell your doctor. Call your doctor for medical advice about side effects. You may report side effects to FDA at 7-383-MCG-6340 © 2017 [facility] Information is for End User's use only and may not be sold, redistributed or otherwise used for commercial purposes. The above information is an  only. It is not intended as medical advice for individual conditions or treatments. Talk to your doctor, nurse or pharmacist before following any medical regimen to see if it is safe and effective for you. Pembrolizumab (By injection) Pembrolizumab (ckf-iewc-KKH-ue-mab) Treats cancer, including skin, lung, head and neck cancer. Brand Name(s): Tova Jhaveri There may be other brand names for this medicine. When This Medicine Should Not Be Used: This medicine is not right for everyone. You should not receive it if you had an allergic reaction to pembrolizumab, or if you are pregnant or breastfeeding. How to Use This Medicine:  
Injectable · Your doctor will prescribe your dose and schedule. This medicine is given through a needle placed in a vein. · You will receive this medicine while you are in a hospital or cancer treatment center. A nurse or other trained health professional will give you this medicine. · This medicine should come with a Medication Guide. Ask your pharmacist for a copy if you do not have one. · Missed dose: This medicine needs to be given on a fixed schedule. If you miss a dose, call your doctor, home health caregiver, or treatment clinic for instructions. Drugs and Foods to Avoid: Ask your doctor or pharmacist before using any other medicine, including over-the-counter medicines, vitamins, and herbal products. Warnings While Using This Medicine: · This medicine may cause birth defects if either partner is using it during conception or pregnancy. Tell your doctor right away if you or your partner becomes pregnant. Use an effective form of birth control while you receive this medicine and for 4 months after your last dose. · Tell your doctor if you have liver disease, diabetes, lung disease, or immune system problems. · This medicine may cause the following problems: ¨ Pneumonitis (lung problems) ¨ Colitis (inflammation of the colon), which might damage your digestive system ¨ Hepatitis ¨ Adrenal, pituitary, or thyroid gland problems ¨ Changes in blood sugar levels ¨ Kidney problems, which could lead to kidney failure ¨ Infusion reactions, which could be severe · Your doctor will do lab tests at regular visits to check on the effects of this medicine. Keep all appointments. Possible Side Effects While Using This Medicine:  
Call your doctor right away if you notice any of these side effects: · Allergic reaction: Itching or hives, swelling in your face or hands, swelling or tingling in your mouth or throat, chest tightness, trouble breathing · Bloody or black, tarry stools, diarrhea, severe stomach pain · Bloody or cloudy urine, swelling of your face, feet, or lower legs · Blurred vision or vision changes · Cough, chest tightness, trouble breathing · Dark urine or pale stools, nausea, vomiting, loss of appetite, yellow skin or eyes · Fever, chills, shaking, rash, itchy skin · Increased hunger or thirst, dry mouth, sweating, changes in how much or how often you urinate · Joint or muscle pain · Lightheadedness, dizziness, fainting · Weakness, unusual headaches, tiredness, weight changes, feeling cold If you notice these less serious side effects, talk with your doctor: · Constipation · Pain, itching, burning, swelling, or a lump under your skin where the needle is placed If you notice other side effects that you think are caused by this medicine, tell your doctor. Call your doctor for medical advice about side effects. You may report side effects to FDA at 5-559-GIL-5998 © 2017 [facility][note 3] Information is for End User's use only and may not be sold, redistributed or otherwise used for commercial purposes. The above information is an  only. It is not intended as medical advice for individual conditions or treatments. Talk to your doctor, nurse or pharmacist before following any medical regimen to see if it is safe and effective for you. Introducing [facility]! [facility] introduces [employer] patient portal. Now you can access parts of your medical record, email your doctor's office, and request medication refills online. 1. In your internet browser, go to [email or web address] 2. Click on the First Time User? Click Here link in the Sign In box. You will see the New Member Sign Up page. 3. Enter your [employer] TOTE Access Code exactly as it appears below. You will not need to use this code after youve completed the sign-up process. If you do not sign up before the expiration date, you must request a new code. · LE TOTE Access Code: [email or web address] Expires: 11/1/2017  3:52 PM 
 
4. Enter the last four digits of your Social Security Number (xxxx) and Date of Birth (mm/dd/yyyy) as indicated and click Submit. You will be taken to the next sign-up page. 5. Create a LE TOTE ID. This will be your [employer]OTE login ID and cannot be changed, so think of one that is secure and easy to remember. 6. Create a [employer] TOTE password. You can change your password at any time. 7. Enter your Password Reset Question and Answer. This can be used at a later time if you forget your password. 8. Enter your e-mail address. You will receive e-mail notification when new information is available in [address] 9. Click Sign Up. You can now view and download portions of your medical record. 10. Click the Download Summary menu link to download a portable copy of your medical information. If you have questions, please visit the Frequently Asked Questions section of the [employer] website. Remember, [employer] is NOT to be used for urgent needs. For medical emergencies, dial 911. Now available from your iPhone and Android! Please provide this summary of care documentation to your next provider. Your primary care clinician is listed as Chrissy Chávez. If you have any questions after today's visit, please call [phone].

## 2017-08-11 NOTE — PROGRESS NOTES
Follow up Note        Patient: Abhay Caal MRN: [id number]  SSN: [id number]    YOB: 1940  Age: 65 y.o. Sex: female        Diagnosis:     1. Right breast carcinoma:  pT3 N3a M0 (Stage IIIC) infiltrating ductal carcinoma, Tumor size 7.5 cm, LN 17/19 +ve with extracapsular extension in 2 nodes, grade 3, ki 67 35%, ER -ve, AR -ve, Her 2 -ve      Treatment:     1. Right mastectomy with axillary LN dissection on 01/19/2015  2. Adjuvant chemotherapy   Taxotere, Cytoxan, s/p 3 Cycles completed 4/29/15      Subjective:      Abhay Caal is a 65 y.o. female with a diagnosis of locally advanced right sided breast cancer. She underwent a right mastectomy with axillary LN dissection on 01/19/2015. She received three cycles of adjuvant chemotherapy with TC and stopped therapy due to side effects. She has completed radiation on 08/13/2015. She has developed progressive anemia. She feels fatigued. She also has ongoing back pain. . A bone marrow biopsy confirmed the diagnosis of metastatic TNBC. She was admitted to the [facility] for uncontrolled pain and fatigue. She received RBC transfusion was discharged from the hospital. She comes in to discuss the results of the bone marrow, CT scans and next steps.          Review of Systems:    Constitutional: fatigue  Eyes: negative  Ears, Nose, Mouth, Throat, and Face: negative  Respiratory: negative  Cardiovascular: negative  Gastrointestinal: abdominal pain, nausea, decreased appetite  Integument/chest wall: negative  Hematologic/Lymphatic: right sleeve in place for lymphadema  Musculoskeletal: back pain and joint pain   Neurological: negative      Past Medical History:   Diagnosis Date    Arrhythmia     bradycardia in 30's /pacemaker inserted    Arthritis     RT KNEE    Asthma Dx age 75    Bradycardia 2009    Cardiology saw her during hospital stay; Now off coreg;  Sees Dr. Jacquelin Aguilera    Breast cancer [facility])     Rt mastectomy 2/19/15    CAD (coronary artery disease)     Dr Joe Baker Diabetes [facility])     Now seeing Dr. Panchito Fernandez Diverticulitis     DJD (degenerative joint disease), lumbar     GERD (gastroesophageal reflux disease)     H. pylori infection 2008    Dr. Grace Gurrola attack [facility]) April 2006    Hypercholesterolemia     Hypertension     Morbid obesity ([address]     Neuropathy, arm 2009    Right; Has had MRI and EMG at [facility] [id number] SEN (obstructive sleep apnea)     uses CPAP    Rectal bleeding 2009    Hospitalized; Had colonoscopy and EGD (ok), gastric emptying study (normal), doppler mesenteric arteries (ok)    Sciatica     Has seen Dr. Nichole Scherer    Stroke [facility]) 2009 & 2012    no residual problems     Past Surgical History:   Procedure Laterality Date    HX APPENDECTOMY  1979    HX BREAST LUMPECTOMY  12/12/2013    RIGHT BREAST DUCTAL EXCISION performed by Terrance Garcia MD at [facility] MAIN OR    HX CATARACT REMOVAL  2007    HX CHOLECYSTECTOMY  1979    HX COLONOSCOPY      HX HEART CATHETERIZATION      angioplasty    HX HYSTERECTOMY      fibroids    HX KNEE ARTHROSCOPY  1997    right    HX MASTECTOMY Right 2/19/2015    RIGHT BREAST MODIFIED RADICAL MASTECTOMY RIGHT SENTINEL NODE BIOPSY, RIGHT PORT A CATH INSERTION performed by Delphine Calderón MD at [facility] AMBULATORY OR    HX PACEMAKER      HX SHOULDER ARTHROSCOPY  2004    left      Family History   Problem Relation Age of Onset    Stroke Father     Cancer Sister      breast cancer    Hypertension Sister     Cancer Mother      Unknown type    Cancer Brother      Colon    Hypertension Brother     Anesth Problems Neg Hx      Social History   Substance Use Topics    Smoking status: Never Smoker    Smokeless tobacco: Never Used    Alcohol use No      Prior to Admission medications    Medication Sig Start Date End Date Taking? Authorizing Provider   diclofenac (VOLTAREN) 1 % gel Apply 2 g to affected area four (4) times daily as needed (Pain).    Yes Historical Provider   BD INSULIN SYRINGE ULTRA-FINE 1/2 mL 31 gauge x 15/64\" syrg inject twice a day as directed 7/17/17  Yes Dianne Silva MD   HYDROcodone-acetaminophen [facility]) 5-325 mg per tablet Take 1 Tab by mouth every four (4) hours as needed for Pain. Max Daily Amount: 6 Tabs. 7/2/17  Yes Brooks Walker MD   insulin glargine (LANTUS) 100 unit/mL injection 18 units in the morning and 18 units at bedtime 6/21/17  Yes Dianne Silva MD   dicyclomine (BENTYL) 20 mg tablet Take 1 Tab by mouth every six (6) hours as needed (abdominal cramps) for up to 20 doses. 6/10/17  Yes Bairon Albright MD   ondansetron hcl (ZOFRAN, AS HYDROCHLORIDE,) 4 mg tablet Take 1 Tab by mouth every eight (8) hours as needed for Nausea. 6/10/17  Yes Bairon Albright MD   atorvastatin (LIPITOR) 80 mg tablet Take 1 Tab by mouth daily. Indications: hypercholesterolemia 5/31/17  Yes Nicolle Alberto MD   aspirin (ASPIRIN) 325 mg tablet Take 1 Tab by mouth daily. 5/31/17  Yes Nicolle Alberto MD   ipratropium (ATROVENT) 0.06 % nasal spray instill 2 sprays into each nostril three times a day - IF NEEDED FOR RUNNING NOSE 3/15/17  Yes Historical Provider   glipiZIDE (GLUCOTROL) 5 mg tablet Take 0.5 Tabs by mouth two (2) times a day. Take 1/2 every day with breakfast. If your blood sugar is above 200 take a whole tablet. 5/5/17  Yes Dianne Silva MD   polyethylene glycol [facility]) 17 gram/dose powder Take 17 g by mouth two (2) times a day. Continue while taking pain medication 3/8/17  Yes Aracelis Torrez MD   ergocalciferol (ERGOCALCIFEROL) 50,000 unit capsule Take 1 Cap by mouth every thirty (30) days. 11/9/16  Yes Archie Steen NP   isosorbide mononitrate ER (IMDUR) 60 mg CR tablet Take  by mouth every morning. 6/6/16  Yes Historical Provider   Blood Sugar Diagnostic, Disc (ASCENSIA BREEZE 2) strp Check your blood sugar twice daily.  E11.42 6/20/16  Yes Dianne Silva MD   ondansetron (ZOFRAN ODT) 4 mg disintegrating tablet Take 1 Tab by mouth every eight (8) hours as needed for Nausea. 6/1/16  Yes Michelle Rose MD   albuterol (PROVENTIL VENTOLIN) 2.5 mg /3 mL (0.083 %) nebulizer solution 3 mL by Nebulization route every four (4) hours as needed for Wheezing. 9/20/15  Yes Jorge Brown DO   MICROLET LANCET misc  12/16/14  Yes Historical Provider   benazepril (LOTENSIN) 40 mg tablet Take 40 mg by mouth every morning. Yes Historical Provider   bumetanide (BUMEX) 1 mg tablet Take 1 mg by mouth daily. 12/23/14  Yes Todd Andersen MD   nitroglycerin (NITROSTAT) 0.4 mg SL tablet by SubLINGual route every five (5) minutes as needed for Chest Pain. Yes Todd Andersen MD   amlodipine (NORVASC) 10 mg tablet TAKE 1 TABLET BY MOUTH ONCE DAILY 4/9/11  Yes Lois Martin MD   traMADol Ceclia Johan) 50 mg tablet Take 50 mg by mouth every six (6) hours as needed for Pain. Take 1 tab by mouth four times a day if needed for 30 days. 7/15/17   Hope Ferreira MD   oxyCODONE-acetaminophen (PERCOCET) 5-325 mg per tablet Take 1 Tab by mouth every four (4) hours as needed for Pain. Max Daily Amount: 6 Tabs. Patient not taking: Reported on 8/4/2017 6/10/17   Emily Menjivar MD   omeprazole (PRILOSEC) 20 mg capsule Take 40 mg by mouth two (2) times a day. Todd Andersen MD              Allergies   Allergen Reactions    Codeine Itching    Duratuss [Phenylephrine-Guaifenesin] Nausea and Vomiting    Lisinopril-Hydrochlorothiazide Itching    Motrin [Ibuprofen] Nausea and Vomiting     With 800mg    Tape [Adhesive] Other (comments)     TEARS HER SKIN           Objective:     Vitals:    08/11/17 1358   BP: 154/83   Pulse: 95   Resp: 18   Temp: 98.6 °F (37 °C)   SpO2: 93%   Weight: 185 lb (83.9 kg)   Height: 5' 7\" (1.702 m)        Physical Exam:    GENERAL: alert, cooperative, obese  EYE: negative  LYMPHATIC: negative  THROAT & NECK: normal and no erythema or exudates noted.    LUNG: clear to auscultation bilaterally  HEART: regular rate and rhythm  ABDOMEN: soft, non-tender  EXTREMITIES: right arm edema - has sleeve in place  SKIN: negative  NEUROLOGIC: negative        CT Results (most recent):    Results from Hospital Encounter encounter on 07/17/17   CT BX BONE MARROW NDL/TROCAR   Narrative CT-GUIDED BONE MARROW BIOPSY    : Milagros Kumari M.D. INDICATION:  Multiple myeloma. Thrombocytopenia. ESTIMATED BLOOD LOSS: None. COMPARISON:  None. FINDINGS:   The patient's relevant allergies, medications, laboratory results, and history  were reviewed. The patient was identified by name and date of birth. The  procedure, benefits, risks, and alternatives (including not having the  procedure) were discussed. All of the patient's questions were answered. Informed verbal and written consent was obtained. CT dose reduction was achieved  through use of a standardized protocol tailored for this examination and  automatic exposure control for dose modulation. The patient was evaluated and felt to be an appropriate candidate for conscious  sedation. Please see preprocedure assessment and nursing record for full  documentation. Sedation was achieved with Versed and Fentanyl, and continuous  monitoring was performed. The patient was positioned prone on the CT gantry. A focused CT was performed  over the posterior iliac bones. The skin site was marked. The skin was prepped  and draped using usual sterile technique. Skin and subcutaneous tissues were  anesthetized with buffered lidocaine. Under CT guidance, an 11-gauge core bone  biopsy needle was advanced into the left iliac bone. Approximately 5 cc of  marrow blood was aspirated. A single core biopsy was obtained from the lesion  and submitted to the on-site cytopathology technologist and sample adequacy  confirmed. The core biopsy measured 20 mm. There were no immediate  complications. The patient was recovered in the recovery room without incident.     Postprocedure diagnosis: Multiple myeloma, thrombocytopenia. Impression IMPRESSION:  Successful CT guided bone marrow biopsy. Lab Results   Component Value Date/Time    WBC 4.4 07/17/2017 10:50 AM    Hemoglobin (POC) 9.2 09/20/2015 05:25 PM    HGB 7.7 07/17/2017 10:50 AM    Hematocrit (POC) 27 09/20/2015 05:25 PM    HCT 23.3 07/17/2017 10:50 AM    PLATELET 349 04/79/9857 10:50 AM    MCV 73.3 07/17/2017 10:50 AM         Assessment: 1. Metastatic breast carcinoma - dx 7/17/2017     Bone marrow involvement  Diffuse skeletal metastasis    ECOG PS 2  Intent of treatment - palliative    I discussed many different treatment options. A) single agent chemotherapy e.g Gemzar, Paclitaxel  B) MERCK 355 - she is not eligible due to un measurable disease  C) combination of nab-paclitaxel and pembrolizumab. She understands pembrolizumab is off-label. I counseled the patient regarding the chemotherapy. Discussions included side-effect, toxicity, benefit and risks of chemotherapy. She understood the expected side-effect which includes alopecia, nausea, peripheral neuropathy, neutropenic fever, anemia, need for transfusion among other things. After weighing the benefit and risks, she agreed to proceed with chemotherapy. She understands the alternative to this treatment. I counseled the patient regarding the immunotherapy. Discussions included side-effect, toxicity, benefit and risks of immunotherapy. She understood the expected side-effect which includes immunological colitis, pneumonitis among other things. After weighing the benefit and risks, she agreed to proceed with immunotherapy. She understands the alternative to this treatment. Plan to start Abraxane + Keytruda      2.  Right breast carcinoma: dx 11/17/2014  pT3 N3a M0 (Stage IIIC) infiltrating ductal carcinoma, Tumor size 7.5 cm, LN 17/19 +ve with extracapsular extension in 2 nodes, grade 3, ki 67 35%, ER -ve, AL -ve, Her 2 -ve    ECOG PS 0  Intent of therapy - curative S/P right sided mastectomy with axillary LN dissection  Received adjuvant chemotherapy   Taxotere, Cytoxan, s/p 3 Cycles    Therapy was discontinued due to severe side effects. Completed radiation to the chest wall and axilla  Lymphedema - followed by lymphedema clinic at [facility]      3. Pulmonary embolism    Patient discontinued Rivaroxaban  Taking aspirin 81 mg daily      4. Pain - back    From metastatic disease in the bones    > referral to palliative  > MS IR 15 mg #60  > MS CR 15 mg #60  >  reviewed  > Consider Zometa/Xgeva      Plan:       > referral to palliative medicine  > Pain medication - MS IR 15 mg #60 and MS CR 15 mg #60  > port placement  > Has zofran for nausea at home  > Increase Miralax to bid for constipation  > EMLA sent to pharmacy  > consent signed  > Plan to start Abraxane + Keytruda in the up coming weeks (Philis Noss off label)  > follow up at start of chemotherapy      I saw the patient in conjunction with HERMINIA Whelan. Signed by: Tamy Dorsey MD                     August 13, 2017             Crow Gallegos MD  CC.  Alonzo Reyes MD

## 2017-08-11 NOTE — PATIENT INSTRUCTIONS
You will be receiving Abraxane + Keytruda    You will need a port    Side effects: fatigue, lowering of the blood counts and hair loss. You will also have to look for side effects including excessive diarrhea andbreathing difficulty. If you have a fever of 100.5 or above while receiving chemotherapy please call our office immediately. If it is after hours please call our on call MD at [phone]. Two prescriptions of anti-nausea medications will be sent to your pharmacy. If for any reason any of the prescriptions are extremely expensive please notify our office. You will be given long acting anti-nausea medications through your port prior to your treatments. We prescribe at home medications incase you are to experience nausea. Please call our office if not effective. Please make sure you have imodium OTC at home prior to starting treatments. This is incase you are to experience diarrhea. If this is not effective please call our office. [phone]. On the day of your chemo you will report to the infusion center, [address], the nurses will access your port and draw labs, they will then send you over to our office, [address]. We will look at the labs, discuss any symptoms/ potential side effects, and answer any questions. We will then send you back to the infusion center to complete your infusion. Please take all of your medications as prescribed and eat breakfast prior to your arrival.    Helen Small is not provided, but there is limited snacks/drinks available for the patient only. Please be mindful that space is limited therefore please allow 1 person to accompany you to your treatments. You will need a  for your infusions, however, it is not mandatory someone stays with your during your treatment. Paclitaxel Protein-bound (By injection)   Paclitaxel Protein-Bound (mds-ma-DKC-el PROE-teen - bownd)  Treats cancer, including cancer of the breast, lung, or pancreas.    Brand Name(s): Abraxane   There may be other brand names for this medicine. When This Medicine Should Not Be Used: This medicine is not right for everyone. You should not receive this medicine if you had an allergic reaction to paclitaxel protein-bound, or if you are pregnant. How to Use This Medicine:   Injectable  · Medicines used to treat cancer are very strong and can have many side effects. Before receiving this medicine, make sure you understand all the risks and benefits. It is important for you to work closely with your doctor during your treatment. · You will receive this medicine while you are in a hospital or cancer treatment center. A nurse or other trained health professional will give you this medicine. · Your doctor will prescribe your dose and schedule. This medicine is given through a needle placed in a vein. · Read and follow the patient instructions that come with this medicine. Talk to your doctor or pharmacist if you have any questions. · Missed dose: This medicine needs to be given on a fixed schedule. If you miss a dose, call your doctor, home health caregiver, or treatment clinic for instructions. Drugs and Foods to Avoid:   Ask your doctor or pharmacist before using any other medicine, including over-the-counter medicines, vitamins, and herbal products. · Some foods and medicines can affect how paclitaxel works. Tell your doctor if you are using cimetidine, fluoxetine, gemfibrozil, rifampicin, verapamil, medicine to treat an infection (such as erythromycin, ketoconazole), medicine to treat HIV infection (such as efavirenz, indinavir, nelfinavir, nevirapine, ritonavir, saquinavir), or medicine to treat seizures (such as carbamazepine, phenytoin). Warnings While Using This Medicine:   · This medicine may cause birth defects if either partner is using it during conception or pregnancy. Tell your doctor right away if you or your partner becomes pregnant.   · Tell your doctor if you are breastfeeding, or if you have liver disease or an infection. · This medicine may cause the following problems:  ¨ Nerve damage in the arms or legs  ¨ Immune system problems  ¨ Lung or breathing problems  ¨ Severe infection  · This medicine is made from donated human blood. The blood is tested before the medicine is prepared. Although the risk is low, some people have received viruses from human blood products. Talk with your doctor if this concerns you. · This medicine may make you bleed, bruise, or get infections more easily. Take precautions to prevent illness and injury. Wash your hands often. · Cancer medicine can cause nausea or vomiting, sometimes even after you receive medicine to prevent these effects. Ask your doctor or nurse about other ways to control any nausea or vomiting that might happen. · Your doctor will do lab tests at regular visits to check on the effects of this medicine. Keep all appointments. Possible Side Effects While Using This Medicine:   Call your doctor right away if you notice any of these side effects:  · Allergic reaction: Itching or hives, swelling in your face or hands, swelling or tingling in your mouth or throat, chest tightness, trouble breathing  · Fever, chills, cough, sore throat, or body aches  · Numbness, tingling, or burning pain in your hands, arms, legs, or feet  · Ongoing vomiting or diarrhea, extreme thirst  · Trouble breathing, dry cough  · Unusual bleeding, bruising, or weakness  If you notice these less serious side effects, talk with your doctor:   · Diarrhea, nausea, vomiting  · Hair loss  · Joint or muscle pain  · Pain, itching, burning, swelling, or a lump under your skin where the needle is placed  If you notice other side effects that you think are caused by this medicine, tell your doctor. Call your doctor for medical advice about side effects.  You may report side effects to FDA at 5-711-FDA-6508  © 2017 [address] Information is for End User's use only and may not be sold, redistributed or otherwise used for commercial purposes. The above information is an  only. It is not intended as medical advice for individual conditions or treatments. Talk to your doctor, nurse or pharmacist before following any medical regimen to see if it is safe and effective for you. Pembrolizumab (By injection)   Pembrolizumab (ttr-kuwx-DWD-ue-mab)  Treats cancer, including skin, lung, head and neck cancer. Brand Name(s): Keytruda   There may be other brand names for this medicine. When This Medicine Should Not Be Used: This medicine is not right for everyone. You should not receive it if you had an allergic reaction to pembrolizumab, or if you are pregnant or breastfeeding. How to Use This Medicine:   Injectable  · Your doctor will prescribe your dose and schedule. This medicine is given through a needle placed in a vein. · You will receive this medicine while you are in a hospital or cancer treatment center. A nurse or other trained health professional will give you this medicine. · This medicine should come with a Medication Guide. Ask your pharmacist for a copy if you do not have one. · Missed dose: This medicine needs to be given on a fixed schedule. If you miss a dose, call your doctor, home health caregiver, or treatment clinic for instructions. Drugs and Foods to Avoid:      Ask your doctor or pharmacist before using any other medicine, including over-the-counter medicines, vitamins, and herbal products. Warnings While Using This Medicine:   · This medicine may cause birth defects if either partner is using it during conception or pregnancy. Tell your doctor right away if you or your partner becomes pregnant. Use an effective form of birth control while you receive this medicine and for 4 months after your last dose. · Tell your doctor if you have liver disease, diabetes, lung disease, or immune system problems.   · This medicine may cause the following problems:  ¨ Pneumonitis (lung problems)  ¨ Colitis (inflammation of the colon), which might damage your digestive system  ¨ Hepatitis  ¨ Adrenal, pituitary, or thyroid gland problems  ¨ Changes in blood sugar levels  ¨ Kidney problems, which could lead to kidney failure  ¨ Infusion reactions, which could be severe  · Your doctor will do lab tests at regular visits to check on the effects of this medicine. Keep all appointments. Possible Side Effects While Using This Medicine:   Call your doctor right away if you notice any of these side effects:  · Allergic reaction: Itching or hives, swelling in your face or hands, swelling or tingling in your mouth or throat, chest tightness, trouble breathing  · Bloody or black, tarry stools, diarrhea, severe stomach pain  · Bloody or cloudy urine, swelling of your face, feet, or lower legs  · Blurred vision or vision changes  · Cough, chest tightness, trouble breathing  · Dark urine or pale stools, nausea, vomiting, loss of appetite, yellow skin or eyes  · Fever, chills, shaking, rash, itchy skin  · Increased hunger or thirst, dry mouth, sweating, changes in how much or how often you urinate  · Joint or muscle pain  · Lightheadedness, dizziness, fainting  · Weakness, unusual headaches, tiredness, weight changes, feeling cold  If you notice these less serious side effects, talk with your doctor:   · Constipation  · Pain, itching, burning, swelling, or a lump under your skin where the needle is placed  If you notice other side effects that you think are caused by this medicine, tell your doctor. Call your doctor for medical advice about side effects. You may report side effects to FDA at 4-291-FDA-3121  © 2017 [facility] Information is for End User's use only and may not be sold, redistributed or otherwise used for commercial purposes. The above information is an  only.  It is not intended as medical advice for individual conditions or treatments. Talk to your doctor, nurse or pharmacist before following any medical regimen to see if it is safe and effective for you.

## 2017-08-16 NOTE — LETTER
8/16/2017 2:31 PM 
 
Ms. Tushar Simmons [address] [place] [address] Dear Ms. Miguel Ángel Jefferson, It was a pleasure speaking with you today about our [facility] program. 
 
Palliative Medicine provides an extra layer of support to patients and families living with serious illness, focusing on relieving suffering and improving quality of life. Our Palliative Medicine program consists of an interdisciplinary team of doctors, nurses, social workers, and other specialists who work hand-in-hand with a patients health care team.  [address] is provided alongside all other medical treatments; patients can be receiving treatments such as chemotherapy, radiation therapy, etc. while being seen by the Palliative Medicine team.  We address such issues as care decisions (treatment choices, advanced care planning), overwhelming symptoms (such as pain, fatigue, nausea, loss of appetite), psychosocial distress (such as anxiety, fears), and end-stage disease with a focus on the well-being of the person and the family. We will work with you and your family to understand what is important to you as you live with your illness. As we discussed, you will need to check with you daughter about giving you a ride. We have a clinic at [facility] in [place] [address], [address] that meets on Mondays and Tuesdays. You might want to schedule a Palliative Medicine appointment right before or after your appointment with Dr. Shayy Leyva so you can make one trip. If you would like to schedule an appointment, please call us at [phone]).    
 
Sincerely, 
 
 
Nakul Ngo RN

## 2017-08-16 NOTE — PROGRESS NOTES
[facility] Palliative Medicine Office  Nursing Note  [phone]  Fax [phone]  Name:  Vilma Rosa  YOB: 1940     Received outpatient Palliative Medicine referral from Dr. Sherrell Romero to see pt for symptom management and care decisions. Chart  reviewed. Pt has history of Stage IIIC infiltrating ductal carcinoma, chemo completed 4/19/15, radiation completed 8/13/15. Bone marrow bx on 7/17/17 showed bone marrow is extensively involved by metastatic carcinoma, breast primary. Pt's most recent office visit with Dr. Mart Kearney was on 8/11/17. Per Dr. Conrado Chow note, plan to start [facility]. ACP: none on file      Nurse called pt and introduced Palliative Medicine services. Pt says she is currently living with her daughter. HH PT is coming. Pt says she will need to check with her dtr who will be giving her a ride before she schedules an appt.   Nurse will mail Palliative information to pt and she says she will call us back to schedule an appt.  Priti Cardenas, EDELN, RN  Clinical Referral Navigator

## 2017-08-18 NOTE — PROGRESS NOTES
Chief Complaint   Patient presents with    Diabetes    Other     PCP and Pharmacy Confirmed   Records since last visit reviewed. History of Present Illness: Fede Henderson is a 65 y.o. female here for follow up of diabetes and thyroid nodules. At her last appointment in May 2017 her A1C was 7.3% on Lantus 18 units BID and PRN Glipizide for BG >200. She has been on steroids for her asthma, which caused her BGs to increase. Pt was instructed to continue the Lantus 18 units BID and to start Glipizide 2.5mg with breakfast (5mg if +). Pt was in the ED for abdominal pain and nausea x2 in June and July as part to the work up she had CT that showed new lesions in the lumbar spine. She was taken to the OR for bone marrow biopsy to look for MM, but the pathology found that the lesions were \"Extensively involved by metastatic breast carcinoma. \"  Next week she will have a port placed and will start on Chemotherapy next week as well. Reading Dr. Perez Arnold last note he discussed the chemo regimen and there was no mention of any steroids as part of her regimen. As part of her other work-up of abdominal pain she was tested for gastroparesis, but her GES was normal. At [facility] she was diagnosed with diverticulitis. She brought her BG logs with her today. Her FBGs have been in the 's range. She is taking the Lantus 18 units BID, but has not been taking the Glipizide. She had one episode of hypoglycemia in the morning last week. Her A1C today was 7.3%. Pt eats two meals, if she eats at all. \"I have to go by what my taste buds tell me\". She has breakfast around 9-11AM, this AM she has 1/2 bowl of oatmeal, but she lost her appetite and stopped eating. She has dinner around 7PM last night she has a bowl of vegetable soup and water. She will have a banana or an apple in the afternoon if she does get hungry.     She is still getting PT to help get her strength back and get her ready for chemotherapy. Pt has a hx of CAD and CVA. Pt has a hx of retinopathy, and neuropathy, but no nephropathy. Last eye exam December 2016, no retinopathy. She is followed by Dr. Samuel Delarosa of podiatry. At her hospitalization in May 2015 she had a CTA that showed the PE, but it also showed a left thyroid nodule 1.3x1.7 cm. review of prior CT chest imaging shows that this is not a new finding but pt has never had a work up for a thyroid nodule in the past. Pt denies issues of dysphagia, dysphonia, or chocking sensations. We decided in July to wait till she had completed her therapy for breast cancer before starting the work up for the thyroid nodule. She had a thyroid US in December 2015, which showed multiple nodules, the two dominate nodules were 1.8cm and 2.6 cm. In March of 2016 she had FNA of both dominate nodules and they were benign. Her last thyroid US was in December 2016 and it was unchanged from the previous [address]. She denies issues of dysphagia, dysphonia or chocking sensations. Current Outpatient Prescriptions   Medication Sig    VITAMIN D2 50,000 unit capsule take 1 capsule by mouth every 30 DAYS    hydrOXYzine HCl (ATARAX) 25 mg tablet Take 25 mg by mouth every eight (8) hours as needed for Itching.  morphine IR (MS IR) 15 mg tablet Take 1 Tab by mouth every four (4) hours as needed for Pain. Max Daily Amount: 90 mg.    morphine CR (MS CONTIN) 15 mg CR tablet Take 1 Tab by mouth every twelve (12) hours. Max Daily Amount: 30 mg.    megestrol (MEGACE) 400 mg/10 mL (10 mL) suspension Take 10 mL by mouth daily.  diclofenac (VOLTAREN) 1 % gel Apply 2 g to affected area four (4) times daily as needed (Pain).     BD INSULIN SYRINGE ULTRA-FINE 1/2 mL 31 gauge x 15/64\" syrg inject twice a day as directed    insulin glargine (LANTUS) 100 unit/mL injection 18 units in the morning and 18 units at bedtime    dicyclomine (BENTYL) 20 mg tablet Take 1 Tab by mouth every six (6) hours as needed (abdominal cramps) for up to 20 doses.  ondansetron hcl (ZOFRAN, AS HYDROCHLORIDE,) 4 mg tablet Take 1 Tab by mouth every eight (8) hours as needed for Nausea.  atorvastatin (LIPITOR) 80 mg tablet Take 1 Tab by mouth daily. Indications: hypercholesterolemia    aspirin (ASPIRIN) 325 mg tablet Take 1 Tab by mouth daily.  ipratropium (ATROVENT) 0.06 % nasal spray instill 2 sprays into each nostril three times a day - IF NEEDED FOR RUNNING NOSE    glipiZIDE (GLUCOTROL) 5 mg tablet Take 0.5 Tabs by mouth two (2) times a day. Take 1/2 every day with breakfast. If your blood sugar is above 200 take a whole tablet.  polyethylene glycol (MIRALAX) 17 gram/dose powder Take 17 g by mouth two (2) times a day. Continue while taking pain medication    isosorbide mononitrate ER (IMDUR) 60 mg CR tablet Take  by mouth every morning.  Blood Sugar Diagnostic, Disc (HUSSEIN BREEZE 2) strp Check your blood sugar twice daily. E11.42    ondansetron (ZOFRAN ODT) 4 mg disintegrating tablet Take 1 Tab by mouth every eight (8) hours as needed for Nausea.  albuterol (PROVENTIL VENTOLIN) 2.5 mg /3 mL (0.083 %) nebulizer solution 3 mL by Nebulization route every four (4) hours as needed for Wheezing.  MICROLET LANCET misc     benazepril (LOTENSIN) 40 mg tablet Take 40 mg by mouth every morning.  bumetanide (BUMEX) 1 mg tablet Take 1 mg by mouth daily.  omeprazole (PRILOSEC) 20 mg capsule Take 40 mg by mouth two (2) times a day.  nitroglycerin (NITROSTAT) 0.4 mg SL tablet by SubLINGual route every five (5) minutes as needed for Chest Pain.  amlodipine (NORVASC) 10 mg tablet TAKE 1 TABLET BY MOUTH ONCE DAILY     No current facility-administered medications for this visit.       Allergies   Allergen Reactions    Codeine Itching    Duratuss [Phenylephrine-Guaifenesin] Nausea and Vomiting    Lisinopril-Hydrochlorothiazide Itching    Motrin [Ibuprofen] Nausea and Vomiting     With 800mg    Tape [Adhesive] Other (comments)     TEARS HER SKIN     Review of Systems:  - Eyes: no blurry vision or double vision  - Cardiovascular: no chest pain  - Respiratory: no shortness of breath  - Musculoskeletal: no myalgias  - Neurological: no numbness/tingling in extremities    Physical Examination:  Blood pressure 119/62, pulse 88, temperature 97.1 °F (36.2 °C), temperature source Oral, resp. rate 18, height 5' 7\" (1.702 m), weight 185 lb (83.9 kg), SpO2 98 %. - General: pleasant, no distress, good eye contact   - Neck: no carotid bruits  - Cardiovascular: regular, normal rate, nl s1 and s2, no m/r/g, 2+ DP pulses   - Respiratory: clear bilaterally        -     Abd: Non-tender, NABS  - Integumentary: 1+ edema, no foot ulcers, sensation to monofilament and vibration intact bilaterally  - Psychiatric: normal mood and affect    Data Reviewed:   Her A1C today was 7.3%. Assessment/Plan:   1) DM > Her A1C today was 7.3%. For now will continue the Lantus 18 units BID and Glipizide PRN for BG >200. I will check with Dr. Sara Li to confirm no steroids in her chemo regimen. Pt to check her BG twice per day and mail her BG log to me in 3 weeks. Pt to call me if she starts to have low BGs. With pt's hx of neuropathy she needs to have diabetic shoes and shoe inserts. 2) Thyroid nodule > Pt has no new or worsening symptoms of dysphagia. Her last Thyroid US in December 2016 was unchanged. Will not need to repeat the US unless she has a change in her clinical picture. 3) HTN > BP at goal on her current regimen. She is on an ACEI for renal protection. 4) HLD > Pt is taking Atorvastatin 40mg daily. Her lipid panel in May 2017 was at goal.    Pt voices understanding and agreement with the plan. Follow-up Disposition:  Return in about 3 months (around 11/18/2017).     Copy sent to:  Dr. Jodi Martinez

## 2017-08-18 NOTE — PROGRESS NOTES
Natan Cox is a 65 y.o. female    Chief Complaint   Patient presents with    Diabetes    Other     PCP and Pharmacy Confirmed

## 2017-08-21 NOTE — PROGRESS NOTES
Pre call completed with patient and daughter regarding upcoming procedure. Patient states she stopped her Aspirin 325 mg 2 weeks ago.

## 2017-08-21 NOTE — PROGRESS NOTES
The amount of protein in her urine has decreased, this tells me her blood sugar and blood pressure have been better. Keep up the good work.

## 2017-08-22 NOTE — PROGRESS NOTES
[employer]  Social Work Navigator Encounter     Patient Name:      Medical History:     Advance Directives:    Narrative: SW emailed Anali Patrick, Eliza Worley, and called Matt Felipe approved Greg Boo - should arrive (8/23)  at [facility] Inpatient Case # [id number]      I am sorry this arrives tomorrow morning and the patient is being treated tomorrow morning - I was OOTO on Friday/Monday and this slowed down the date of shipment. I requested the treatment date if 8/22 last Thursday but it didnt get shipped until this morning when I called Sheela Cortez  - approved until 12/31/17 - I provided [employer] Patient Assistance  NINI New would be reordering 5 to 7 days before treatment date.      Barriers to Care:     Plan:

## 2017-08-22 NOTE — IP AVS SNAPSHOT
[address] [facility] 
[phone] Patient: Celine Child MRN: [id number] CS You are allergic to the following Allergen Reactions Codeine Itching Duratuss (Phenylephrine-Guaifenesin) Nausea and Vomiting Lisinopril-Hydrochlorothiazide Itching Motrin (Ibuprofen) Nausea and Vomiting With 800mg Tape (Adhesive) Other (comments) TEARS HER SKIN Recent Documentation OB Status Smoking Status Hysterectomy Never Smoker Emergency Contacts Name Discharge Info Relation Home Work Mobile Joslyn Zavalay DISCHARGE CAREGIVER [3] Daughter [21] [phone] Brian Felipe DISCHARGE CAREGIVER [3] Daughter [21] [phone] Enrico,Deedee DISCHARGE CAREGIVER [3] Child [2] [phone] About your hospitalization You were admitted on:  2017 You last received care in the:  [facility] RAD ANGIO IR You were discharged on:  2017 Unit phone number:  [phone] Why you were hospitalized Your primary diagnosis was:  Not on File Providers Seen During Your Hospitalizations Provider Role Specialty Primary office phone Kinsey Bach MD Attending Provider Hematology and Oncology [phone] Your Primary Care Physician (PCP) Primary Care Physician Office Phone Office Fax Asim Jose [phone] Follow-up Information None Your Appointments 2017 To Be Determined PT ROUTINE with Abby Thompson, PT  
[place] [address] ([address]) [address] ([address]) 2017  9:00 AM EDT INFUSION 240 RI with [place] INFUSION NURSE 1  
[place] ([address]) [address] [facility] [address]  
[phone] Go to [facility], [address], [address] [facility], [place] [address] Thursday August 24, 2017 To Be Determined PT ROUTINE with Amsanty Hernandez, PT  
[place] [address] ([address]) [address] [facility] [address] ([address]) Friday August 25, 2017 To Be Determined SN REASSESSMENT with Arthur Ribeiro, NATHAN  
[place] [address] ([address]) [address] [facility] [address] ([address]) Tuesday August 29, 2017 To Be Determined PT ROUTINE with Amsanty Hernandez, PT  
[place] [address] ([address]) [address] [facility] [address] ([address]) Wednesday August 30, 2017 11:00 AM EDT INFUSION 240 RI with HANOVER INFUSION NURSE 1  
[place] ([address]) [address]  
[phone] Go to [facility], [address], [address] [place] [address] [address] [place] Thursday August 31, 2017 To Be Determined PT ROUTINE with Amsanty Hernandez, PT  
[place] [address] ([address]) [address] [facility] [address] ([address]) Tuesday September 05, 2017 To Be Determined PT ROUTINE with Amsanty Hernandez, PT  
[place] [address] ([address]) [address] [facility] [address] ([address]) Wednesday September 06, 2017 11:00 AM EDT INFUSION 240 RI with HANOVER INFUSION NURSE 1  
[place] ([address]) [address]  
[phone] Go to [facility], [address], [address] [facility] [address] [facility] [address], [place], [address] Thursday September 07, 2017 To Be Determined PT ROUTINE with Caroline Sharif, PT  
[place] [address] ([address]) [address] ([address]) Wednesday September 13, 2017 11:00 AM EDT INFUSION 240 RI with HANOVER INFUSION NURSE 1  
[place] ([address]) [address]  
[phone] Go to [facility], [address], [address] [place] [address] [address] [place] Wednesday September 20, 2017 11:00 AM EDT INFUSION 240 RI with HANOVER INFUSION NURSE 1  
[place] ([address]) [address]  
[phone] Go to [facility], [address], [address] [place] [address] [address] [place] Wednesday September 27, 2017 11:00 AM EDT INFUSION 240 RI with HANOVER INFUSION NURSE 1  
[place] ([address]) [address]  
[phone] Go to [facility], [address], [address] [place] [address] [address] [place] Wednesday October 04, 2017 11:00 AM EDT INFUSION 240 RI with HANOVER INFUSION NURSE 1  
[place] ([address]) [address]  
[phone] Go to [facility], [address], [address] [place] [address] [address] [place] Current Discharge Medication List  
  
ASK your doctor about these medications Dose & Instructions Dispensing Information Comments Morning Noon Evening Bedtime  
 albuterol 2.5 mg /3 mL (0.083 %) nebulizer solution Commonly known as:  PROVENTIL VENTOLIN Your last dose was: Your next dose is:    
   
   
 Dose:  2.5 mg  
3 mL by Nebulization route every four (4) hours as needed for Wheezing. Quantity:  24 Each Refills:  0  
     
   
   
   
  
 amLODIPine 10 mg tablet Commonly known as:  Alyssa Villa Your last dose was: Your next dose is: TAKE 1 TABLET BY MOUTH ONCE DAILY Quantity:  30 Tab Refills:  10  
     
   
   
   
  
 aspirin 325 mg tablet Commonly known as:  ASPIRIN Your last dose was: Your next dose is:    
   
   
 Dose:  325 mg Take 1 Tab by mouth daily. Quantity:  30 Tab Refills:  0  
     
   
   
   
  
 atorvastatin 80 mg tablet Commonly known as:  LIPITOR Your last dose was: Your next dose is:    
   
   
 Dose:  80 mg Take 1 Tab by mouth daily. Indications: hypercholesterolemia Quantity:  30 Tab Refills:  0  
     
   
   
   
  
 benazepril 40 mg tablet Commonly known as:  LOTENSIN Your last dose was: Your next dose is:    
   
   
 Dose:  40 mg Take 40 mg by mouth every morning. Refills:  0 Blood Sugar Diagnostic, Disc Strp Commonly known as:  Ascensia Breeze 2 Your last dose was: Your next dose is:    
   
   
 Check your blood sugar twice daily. E11.42 Quantity:  100 Strip Refills:  6  
     
   
   
   
  
 bumetanide 1 mg tablet Commonly known as:  Lazarus [place] Your last dose was: Your next dose is:    
   
   
 Dose:  1 mg Take 1 mg by mouth daily. Refills:  0  
     
   
   
   
  
 diclofenac 1 % Gel Commonly known as:  VOLTAREN Your last dose was: Your next dose is:    
   
   
 Dose:  2 g Apply 2 g to affected area four (4) times daily as needed (Pain). Refills:  0 Applied to bilateral knees  
    
   
   
   
  
 dicyclomine 20 mg tablet Commonly known as:  BENTYL Your last dose was: Your next dose is:    
   
   
 Dose:  20 mg Take 1 Tab by mouth every six (6) hours as needed (abdominal cramps) for up to 20 doses. Quantity:  20 Tab Refills:  0 glipiZIDE 5 mg tablet Commonly known as:  Donny Vela Your last dose was: Your next dose is:    
   
   
 Dose:  2.5 mg Take 0.5 Tabs by mouth two (2) times a day. Take 1/2 every day with breakfast. If your blood sugar is above 200 take a whole tablet. Quantity:  30 Tab Refills:  3  
     
   
   
   
  
 hydrOXYzine HCl 25 mg tablet Commonly known as:  ATARAX Your last dose was: Your next dose is:    
   
   
 Dose:  25 mg Take 25 mg by mouth every eight (8) hours as needed for Itching. Refills:  0  
     
   
   
   
  
 insulin glargine 100 unit/mL injection Commonly known as:  LANTUS Your last dose was: Your next dose is:    
   
   
 18 units in the morning and 18 units at bedtime Quantity:  20 mL Refills:  6  
     
   
   
   
  
 insulin syringe-needle U-100 1/2 mL 31 gauge x 15/64\" Syrg Commonly known as:  BD INSULIN SYRINGE ULTRA-FINE Your last dose was: Your next dose is:    
   
   
 Dose:  1 Syringe 1 Syringe by SubCUTAneous route two (2) times a day. Quantity:  100 Pen Needle Refills:  5  
     
   
   
   
  
 ipratropium 0.06 % nasal spray Commonly known as:  ATROVENT Your last dose was: Your next dose is:    
   
   
 instill 2 sprays into each nostril three times a day - IF NEEDED FOR RUNNING NOSE Refills:  0  
     
   
   
   
  
 isosorbide mononitrate ER 60 mg CR tablet Commonly known as:  IMDUR Your last dose was: Your next dose is: Take  by mouth every morning. Refills:  0  
     
   
   
   
  
 megestrol 400 mg/10 mL (10 mL) suspension Commonly known as:  MEGACE Your last dose was: Your next dose is:    
   
   
 Dose:  400 mg Take 10 mL by mouth daily. Quantity:  300 mL Refills:  2 [facility] Generic drug:  Lancets Your last dose was: Your next dose is:    
   
   
  Refills:  0 * morphine IR 15 mg tablet Commonly known as:  MS IR Your last dose was: Your next dose is:    
   
   
 Dose:  15 mg Take 1 Tab by mouth every four (4) hours as needed for Pain. Max Daily Amount: 90 mg. Quantity:  60 Tab Refills:  0  
     
   
   
   
  
 * morphine CR 15 mg CR tablet Commonly known as:  MS CONTIN Your last dose was: Your next dose is:    
   
   
 Dose:  15 mg Take 1 Tab by mouth every twelve (12) hours. Max Daily Amount: 30 mg.  
 Quantity:  60 Tab Refills:  0  
     
   
   
   
  
 NITROSTAT 0.4 mg SL tablet Generic drug:  nitroglycerin Your last dose was: Your next dose is:    
   
   
 by SubLINGual route every five (5) minutes as needed for Chest Pain. Refills:  0  
     
   
   
   
  
 omeprazole 20 mg capsule Commonly known as:  PRILOSEC Your last dose was: Your next dose is:    
   
   
 Dose:  40 mg Take 40 mg by mouth two (2) times a day. Refills:  0  
     
   
   
   
  
 ondansetron 4 mg disintegrating tablet Commonly known as:  ZOFRAN ODT Your last dose was: Your next dose is:    
   
   
 Dose:  4 mg Take 1 Tab by mouth every eight (8) hours as needed for Nausea. Quantity:  60 Tab Refills:  2  
     
   
   
   
  
 ondansetron hcl 4 mg tablet Commonly known as:  ZOFRAN (AS HYDROCHLORIDE) Your last dose was: Your next dose is:    
   
   
 Dose:  4 mg Take 1 Tab by mouth every eight (8) hours as needed for Nausea. Quantity:  20 Tab Refills:  0  
     
   
   
   
  
 polyethylene glycol 17 gram/dose powder Commonly known as:  Maurene Lamp Your last dose was: Your next dose is:    
   
   
 Dose:  17 g Take 17 g by mouth two (2) times a day. Continue while taking pain medication Quantity:  225 g Refills:  2 VITAMIN D2 50,000 unit capsule Generic drug:  ergocalciferol Your last dose was: Your next dose is:    
   
   
 take 1 capsule by mouth every 30 DAYS Quantity:  3 Cap Refills:  2  
     
   
   
   
  
 * Notice: This list has 2 medication(s) that are the same as other medications prescribed for you. Read the directions carefully, and ask your doctor or other care provider to review them with you. Discharge Instructions [facility] Special Procedures/Angiography Department Radiologist:    Dr. Chrissie King Date:   8/22/2017 [facility] Discharge Instructions Watch for signs of infection: 1. Redness,  
2. Fever, chills,  
3. Increased pain, and/or drainage from the site. If this occurs, call your physician at once. Return next week for a [employer] check: Do not sign in. Go to the podium, and ask them to call our department [phone] Please try to come between 9:00AM and 1:00PM 
 
Keep your dressing clean and dry. Leave the dressing in place until seen here next week. The dressing may be changed in your physicians office. Continue your previous diet and follow the medication reconciliation list. 
 
You may take Tylenol, as directed on the label, for pain. Avoid ibuprofen (Advil, Motrin) and aspirin as they may cause you to bleed. Because you received sedation, you are not to drive or sign any legal documents for the next 24 hours. Do not lift anything heavier than 5 pounds with the affected arm for the next week. If you have any questions or concerns, please call [phone] and ask for the nurse on-call Discharge Orders None Introducing [facility] & HEALTH SERVICES! [facility] introduces [facility] patient portal. Now you can access parts of your medical record, email your doctor's office, and request medication refills online. 1. In your internet browser, go to [email or web address] 2. Click on the First Time User? Click Here link in the Sign In box. You will see the New Member Sign Up page. 3. Enter your Picturae Access Code exactly as it appears below. You will not need to use this code after youve completed the sign-up process. If you do not sign up before the expiration date, you must request a new code. · Picturae Access Code: [email or web address] Expires: 11/1/2017  3:52 PM 
 
4. Enter the last four digits of your Social Security Number (xxxx) and Date of Birth (mm/dd/yyyy) as indicated and click Submit. You will be taken to the next sign-up page. 5. Create a Picturae ID. This will be your Picturae login ID and cannot be changed, so think of one that is secure and easy to remember. 6. Create a Pict[employer]ae password. You can change your password at any time. 7. Enter your Password Reset Question and Answer. This can be used at a later time if you forget your password. 8. Enter your e-mail address. You will receive e-mail notification when new information is available in [address] 9. Click Sign Up. You can now view and download portions of your medical record. 10. Click the Download Summary menu link to download a portable copy of your medical information. If you have questions, please visit the Frequently Asked Questions section of the Picturae website. Remember, Picturae is NOT to be used for urgent needs. For medical emergencies, dial 911. Now available from your iPhone and Android! General Information Please provide this summary of care documentation to your next provider. Patient Signature:  ____________________________________________________________ Date:  ____________________________________________________________  
  
Maria G Nger Provider Signature:  ____________________________________________________________ Date:  ____________________________________________________________

## 2017-08-22 NOTE — H&P
Radiology History and Physical    Patient: Brenna Mcgowan 65 y.o. female       Chief Complaint: No chief complaint on file. History of Present Illness: chemotherapy    History:    Past Medical History:   Diagnosis Date    Arrhythmia     bradycardia in 30's /pacemaker inserted    Arthritis     RT KNEE    Asthma Dx age 75    Bradycardia 2009    Cardiology saw her during hospital stay; Now off coreg;  Sees Dr. Guy Diamond    Breast cancer [facility])     Rt mastectomy 2/19/15    CAD (coronary artery disease)     Dr Simone Sanon Diabetes [facility])     Now seeing Dr. Alison Obrien Diverticulitis     DJD (degenerative joint disease), lumbar     GERD (gastroesophageal reflux disease)     H. pylori infection 2008    Dr. Lady Islas attack [facility]) April 2006    Hypercholesterolemia     Hypertension     Morbid obesity ([address]     Neuropathy, arm 2009    Right; Has had MRI and EMG at [facility]    SEN (obstructive sleep apnea)     uses CPAP    Rectal bleeding 2009    Hospitalized; Had colonoscopy and EGD (ok), gastric emptying study (normal), doppler mesenteric arteries (ok)    Sciatica     Has seen Dr. Kimmy Velasco    Stroke [facility]) 2009 & 2012    no residual problems     Family History   Problem Relation Age of Onset    Stroke Father     Cancer Sister      breast cancer    Hypertension Sister     Cancer Mother      Unknown type    Cancer Brother      Colon    Hypertension Brother     Anesth Problems Neg Hx      Social History     Social History    Marital status:      Spouse name: N/A    Number of children: N/A    Years of education: N/A     Occupational History    Not on file. Social History Main Topics    Smoking status: Never Smoker    Smokeless tobacco: Never Used    Alcohol use No    Drug use: No    Sexual activity: No     Other Topics Concern    Not on file     Social History Narrative    Lives in [place] with oldest daughter and great-grandson 8.  Occupation retired, worked at [employer] as a , Hobbies, plays with grandkids, likes [employer] and sings in the choir       Allergies: Allergies   Allergen Reactions    Codeine Itching    Duratuss [Phenylephrine-Guaifenesin] Nausea and Vomiting    Lisinopril-Hydrochlorothiazide Itching    Motrin [Ibuprofen] Nausea and Vomiting     With 800mg    Tape [Adhesive] Other (comments)     TEARS HER SKIN       Current Medications:  Current Outpatient Prescriptions   Medication Sig    insulin glargine (LANTUS) 100 unit/mL injection 18 units in the morning and 18 units at bedtime    insulin syringe-needle U-100 (BD INSULIN SYRINGE ULTRA-FINE) 1/2 mL 31 gauge x 15/64\" syrg 1 Syringe by SubCUTAneous route two (2) times a day.  VITAMIN D2 50,000 unit capsule take 1 capsule by mouth every 30 DAYS    hydrOXYzine HCl (ATARAX) 25 mg tablet Take 25 mg by mouth every eight (8) hours as needed for Itching.  morphine IR (MS IR) 15 mg tablet Take 1 Tab by mouth every four (4) hours as needed for Pain. Max Daily Amount: 90 mg.    morphine CR (MS CONTIN) 15 mg CR tablet Take 1 Tab by mouth every twelve (12) hours. Max Daily Amount: 30 mg.    megestrol (MEGACE) 400 mg/10 mL (10 mL) suspension Take 10 mL by mouth daily.  diclofenac (VOLTAREN) 1 % gel Apply 2 g to affected area four (4) times daily as needed (Pain).  dicyclomine (BENTYL) 20 mg tablet Take 1 Tab by mouth every six (6) hours as needed (abdominal cramps) for up to 20 doses.  ondansetron hcl (ZOFRAN, AS HYDROCHLORIDE,) 4 mg tablet Take 1 Tab by mouth every eight (8) hours as needed for Nausea.  atorvastatin (LIPITOR) 80 mg tablet Take 1 Tab by mouth daily. Indications: hypercholesterolemia    ipratropium (ATROVENT) 0.06 % nasal spray instill 2 sprays into each nostril three times a day - IF NEEDED FOR RUNNING NOSE    glipiZIDE (GLUCOTROL) 5 mg tablet Take 0.5 Tabs by mouth two (2) times a day.  Take 1/2 every day with breakfast. If your blood sugar is above 200 take a whole tablet.  polyethylene glycol (MIRALAX) 17 gram/dose powder Take 17 g by mouth two (2) times a day. Continue while taking pain medication    isosorbide mononitrate ER (IMDUR) 60 mg CR tablet Take  by mouth every morning.  Blood Sugar Diagnostic, Disc (ASCENSIA BREEZE 2) strp Check your blood sugar twice daily. E11.42    ondansetron (ZOFRAN ODT) 4 mg disintegrating tablet Take 1 Tab by mouth every eight (8) hours as needed for Nausea.  albuterol (PROVENTIL VENTOLIN) 2.5 mg /3 mL (0.083 %) nebulizer solution 3 mL by Nebulization route every four (4) hours as needed for Wheezing.  MICROLET LANCET misc     benazepril (LOTENSIN) 40 mg tablet Take 40 mg by mouth every morning.  bumetanide (BUMEX) 1 mg tablet Take 1 mg by mouth daily.  omeprazole (PRILOSEC) 20 mg capsule Take 40 mg by mouth two (2) times a day.  nitroglycerin (NITROSTAT) 0.4 mg SL tablet by SubLINGual route every five (5) minutes as needed for Chest Pain.  amlodipine (NORVASC) 10 mg tablet TAKE 1 TABLET BY MOUTH ONCE DAILY    aspirin (ASPIRIN) 325 mg tablet Take 1 Tab by mouth daily.      Current Facility-Administered Medications   Medication Dose Route Frequency    fentaNYL citrate (PF) injection 100 mcg  100 mcg IntraVENous Multiple    midazolam (VERSED) injection 5 mg  5 mg IntraVENous Multiple    0.9% sodium chloride infusion  25 mL/hr IntraVENous CONTINUOUS    heparinized saline 2 units/mL infusion 200 Units  200 Units Irrigation ONCE     Facility-Administered Medications Ordered in Other Encounters   Medication Dose Route Frequency    [START ON 8/23/2017] pembrolizumab (KEYTRUDA) 200 mg in 0.9% sodium chloride 100 mL, overfill volume 10 mL IVPB  200 mg IntraVENous ONCE    [START ON 8/23/2017] PACLitaxel-Protein Bound (ABRAXANE) 200 mg in 0.9% sodium chloride 40 mL chemo infusion  200 mg IntraVENous ONCE    [START ON 8/23/2017] 0.9% sodium chloride infusion  25 mL/hr IntraVENous CONTINUOUS    [START ON 8/23/2017] ondansetron (ZOFRAN) injection 8 mg  8 mg IntraVENous ONCE    [START ON 8/23/2017] prochlorperazine (COMPAZINE) with saline injection 10 mg  10 mg IntraVENous Q6H PRN        Physical Exam:  Blood pressure 116/64, pulse 69, temperature 97.7 °F (36.5 °C), resp. rate 20, height 5' 7\" (1.702 m), weight 82.1 kg (181 lb), SpO2 98 %. LUNG: clear to auscultation bilaterally, HEART: regular rate and rhythm, S1, S2 normal, no murmur, click, rub or gallop      Alerts:    Hospital Problems  Date Reviewed: 8/18/2017    None          Laboratory:    No results for input(s): HGB, HCT, WBC, PLT, INR, BUN, CREA, K, CRCLT, HGBEXT, HCTEXT, PLTEXT in the last 72 hours. No lab exists for component: PTT, PT, INREXT      Plan of Care/Planned Procedure:  Risks, benefits, and alternatives reviewed with patient and she agrees to proceed with the procedure.      Plan is for chest port placement       Kleber Leone MD

## 2017-08-22 NOTE — DISCHARGE INSTRUCTIONS
[facility].  Special Procedures/Angiography Department      Radiologist:    Dr. Moris Griffiths     Date:   8/22/2017      [facility] Discharge Instructions      Watch for signs of infection:    1. Redness,   2. Fever, chills,   3. Increased pain, and/or drainage from the site. If this occurs, call your physician at once. Return next week for a [employer] Check check:     Do not sign in. Go to the podium, and ask them to call our department [phone]). Please try to come between 9:00AM and 1:00PM    Keep your dressing clean and dry. Leave the dressing in place until seen here next week. The dressing may be changed in your physicians office. Continue your previous diet and follow the medication reconciliation list.    You may take Tylenol, as directed on the label, for pain. Avoid ibuprofen (Advil, Motrin) and aspirin as they may cause you to bleed. Because you received sedation, you are not to drive or sign any legal documents for the next 24 hours. Do not lift anything heavier than 5 pounds with the affected arm for the next week.     If you have any questions or concerns, please call [phone] and ask for the nurse on-call

## 2017-08-23 NOTE — MR AVS SNAPSHOT
Visit Information Date & Time Provider Department Dept. Phone Encounter #  
 8/23/2017  9:45 AM Asa Martinez NP [address] Oncology at [facility] [phone] [id number] Your Appointments 11/24/2017 11:30 AM  
Follow Up with Mar Ching MD  
[facility]) Appt Note: 3 MONTH F/U  
 [phone] [address] [place] [address] Road  
  
    
 12/5/2017 10:30 AM  
Follow Up with Sebas Varghese NP [address] at [place] [facility]) Appt Note: 6 month follow up Cp$0 6/6/17 tt  
 [address]  
  
   
 [place] [address] Upcoming Health Maintenance Date Due DTaP/Tdap/Td series (1 - Tdap) 1/15/1960 ZOSTER VACCINE AGE 60> 11/15/1998 OSTEOPOROSIS SCREENING (DEXA) 1/15/2004 Pneumococcal 65+ High/Highest Risk (1 of 2 - PCV13) 1/15/2004 MEDICARE YEARLY EXAM 1/15/2004 EYE EXAM RETINAL OR DILATED Q1 6/24/2017 INFLUENZA AGE 9 TO ADULT 8/1/2017 HEMOGLOBIN A1C Q6M 2/18/2018 LIPID PANEL Q1 5/30/2018 GLAUCOMA SCREENING Q2Y 6/24/2018 FOOT EXAM Q1 8/18/2018 MICROALBUMIN Q1 8/18/2018 Allergies as of 8/23/2017  Review Complete On: 8/23/2017 By: Hank Argueta LPN Severity Noted Reaction Type Reactions Codeine  10/16/2009    Itching Duratuss [Phenylephrine-guaifenesin]  10/16/2009    Nausea and Vomiting Lisinopril-hydrochlorothiazide  10/16/2009    Itching Motrin [Ibuprofen]  10/16/2009    Nausea and Vomiting With 800mg Tape [Adhesive]  12/10/2014    Other (comments) TEARS HER SKIN Current Immunizations  Reviewed on 8/23/2017 Name Date Influenza Vaccine 9/1/2014 Reviewed by Xenia Hudson RN on 8/23/2017 at  9:35 AM  
Vitals BP Pulse Temp Resp Height(growth percentile) SpO2  
 134/70 (BP 1 Location: Left arm, BP Patient Position: Sitting) 92 98.2 °F (36.8 °C) (Oral) 18 5' 6.09\" (1.679 m) 97% BMI OB Status Smoking Status 29.87 kg/m2 Hysterectomy Never Smoker Vitals History BMI and BSA Data Body Mass Index Body Surface Area  
 29.87 kg/m 2 1.98 m 2 Preferred Pharmacy Pharmacy Name Phone [id number], [facility] Your Updated Medication List  
  
   
This list is accurate as of: 8/23/17 10:48 AM.  Always use your most recent med list.  
  
  
  
  
 albuterol 2.5 mg /3 mL (0.083 %) nebulizer solution Commonly known as:  PROVENTIL VENTOLIN  
3 mL by Nebulization route every four (4) hours as needed for Wheezing. amLODIPine 10 mg tablet Commonly known as:  Soraya Kita TAKE 1 TABLET BY MOUTH ONCE DAILY  
  
 aspirin 325 mg tablet Commonly known as:  ASPIRIN Take 1 Tab by mouth daily. atorvastatin 80 mg tablet Commonly known as:  LIPITOR Take 1 Tab by mouth daily. Indications: hypercholesterolemia  
  
 benazepril 40 mg tablet Commonly known as:  LOTENSIN Take 40 mg by mouth every morning. Blood Sugar Diagnostic, Disc Strp Commonly known as:  Ascensia Breeze 2 Check your blood sugar twice daily. E11.42  
  
 bumetanide 1 mg tablet Commonly known as:  Shellia Goad Take 1 mg by mouth daily. diclofenac 1 % Gel Commonly known as:  VOLTAREN Apply 2 g to affected area four (4) times daily as needed (Pain). dicyclomine 20 mg tablet Commonly known as:  BENTYL Take 1 Tab by mouth every six (6) hours as needed (abdominal cramps) for up to 20 doses. glipiZIDE 5 mg tablet Commonly known as:  Merrill Campi Take 0.5 Tabs by mouth two (2) times a day. Take 1/2 every day with breakfast. If your blood sugar is above 200 take a whole tablet. hydrOXYzine HCl 25 mg tablet Commonly known as:  ATARAX Take 25 mg by mouth every eight (8) hours as needed for Itching. insulin glargine 100 unit/mL injection Commonly known as:  LANTUS  
18 units in the morning and 18 units at bedtime  
  
 insulin syringe-needle U-100 1/2 mL 31 gauge x 15/64\" Syrg Commonly known as:  BD INSULIN SYRINGE ULTRA-FINE  
1 Syringe by SubCUTAneous route two (2) times a day. ipratropium 0.06 % nasal spray Commonly known as:  ATROVENT  
instill 2 sprays into each nostril three times a day - IF NEEDED FOR RUNNING NOSE  
  
 isosorbide mononitrate ER 60 mg CR tablet Commonly known as:  IMDUR Take  by mouth every morning. megestrol 400 mg/10 mL (10 mL) suspension Commonly known as:  MEGACE Take 10 mL by mouth daily. [facility] Generic drug:  Lancets * morphine IR 15 mg tablet Commonly known as:  MS IR Take 1 Tab by mouth every four (4) hours as needed for Pain. Max Daily Amount: 90 mg.  
  
 * morphine CR 15 mg CR tablet Commonly known as:  MS CONTIN Take 1 Tab by mouth every twelve (12) hours. Max Daily Amount: 30 mg. NITROSTAT 0.4 mg SL tablet Generic drug:  nitroglycerin  
by SubLINGual route every five (5) minutes as needed for Chest Pain. omeprazole 20 mg capsule Commonly known as:  PRILOSEC Take 40 mg by mouth two (2) times a day. ondansetron 4 mg disintegrating tablet Commonly known as:  ZOFRAN ODT Take 1 Tab by mouth every eight (8) hours as needed for Nausea. ondansetron hcl 4 mg tablet Commonly known as:  ZOFRAN (AS HYDROCHLORIDE) Take 1 Tab by mouth every eight (8) hours as needed for Nausea. polyethylene glycol 17 gram/dose powder Commonly known as:  Bandar Coma Take 17 g by mouth two (2) times a day. Continue while taking pain medication VITAMIN D2 50,000 unit capsule Generic drug:  ergocalciferol  
take 1 capsule by mouth every 30 DAYS * Notice: This list has 2 medication(s) that are the same as other medications prescribed for you. Read the directions carefully, and ask your doctor or other care provider to review them with you. To-Do List   
 08/24/2017 To Be Determined Appointment with Nichole Gaytan PT at [place] [address]  
  
 08/25/2017 To Be Determined Appointment with Olvin Busch RN at [place] [address]  
  
 08/29/2017 To Be Determined Appointment with Nichole Gaytan PT at [place] [address]  
  
 08/30/2017 11:00 AM  
  Appointment with [facility] ([phone])  
  
 08/31/2017 To Be Determined Appointment with Nichole Gaytan PT at [place] [address]  
  
 09/05/2017 To Be Determined Appointment with Nichole Gaytan PT at [place] [address]  
  
 09/06/2017 11:00 AM  
  Appointment with [facility] ([phone])  
  
 09/07/2017 To Be Determined Appointment with Nichole Gaytan PT at [place] [address]  
  
 09/13/2017 11:00 AM  
  Appointment with [facility] ([phone])  
  
 09/20/2017 11:00 AM  
  Appointment with [facility] ([phone])  
  
 09/27/2017 11:00 AM  
  Appointment with [facility] ([phone]) 10/04/2017 11:00 AM  
  Appointment with [facility] ([phone]) 10/18/2017 11:00 AM  
  Appointment with [facility] ([phone])  
  
 10/25/2017 11:00 AM  
  Appointment with [facility] ([phone])  
  
 11/01/2017 11:00 AM  
  Appointment with [facility] ([phone]) Patient Instructions Please stop the bumex and lotensin at this time. We plan to give fluids for the rest of the week. Introducing [facility]! Alisha Mejias introduces [employer] patient portal. Now you can access parts of your medical record, email your doctor's office, and request medication refills online. 1. In your internet browser, go to [email or web address] 2. Click on the First Time User? Click Here link in the Sign In box. You will see the New Member Sign Up page. 3. Enter your [employer] Access Code exactly as it appears below. You will not need to use this code after youve completed the sign-up process. If you do not sign up before the expiration date, you must request a new code. · [employer] Access Code: [email or web address] Expires: 11/1/2017  3:52 PM 
 
4. Enter the last four digits of your Social Security Number (xxxx) and Date of Birth (mm/dd/yyyy) as indicated and click Submit. You will be taken to the next sign-up page. 5. Create a [employer] ID. This will be your [employer] login ID and cannot be changed, so think of one that is secure and easy to remember. 6. Create a [employer] password. You can change your password at any time. 7. Enter your Password Reset Question and Answer. This can be used at a later time if you forget your password. 8. Enter your e-mail address. You will receive e-mail notification when new information is available in [address] 9. Click Sign Up. You can now view and download portions of your medical record. 10. Click the Download Summary menu link to download a portable copy of your medical information. If you have questions, please visit the Frequently Asked Questions section of the [employer] website. Remember, [employer] is NOT to be used for urgent needs. For medical emergencies, dial 911. Now available from your iPhone and Android! Please provide this summary of care documentation to your next provider. Your primary care clinician is listed as Cesario Gastelum. If you have any questions after today's visit, please call [phone].

## 2017-08-23 NOTE — PROGRESS NOTES
Follow up Note        Patient: Lincoln Sosa MRN: [id number]  SSN: [id number]    YOB: 1940  Age: 65 y.o. Sex: female        Diagnosis:     1. Right breast carcinoma:  pT3 N3a M0 (Stage IIIC) infiltrating ductal carcinoma, Tumor size 7.5 cm, LN 17/19 +ve with extracapsular extension in 2 nodes, grade 3, ki 67 35%, ER -ve, OH -ve, Her 2 -ve      Treatment:     1. Right mastectomy with axillary LN dissection on 01/19/2015  2. Adjuvant chemotherapy   Taxotere, Cytoxan, s/p 3 Cycles completed 4/29/15  3. Palliative chemotherapy   Abraxane + Pembrolizumab      Subjective:      Lincoln Sosa is a 65 y.o. female with a diagnosis of locally advanced right sided breast cancer. She underwent a right mastectomy with axillary LN dissection on 01/19/2015. She received three cycles of adjuvant chemotherapy with TC and stopped therapy due to side effects. She has completed radiation on 08/13/2015. She has developed progressive anemia. She feels fatigued. She also has ongoing back pain. . A bone marrow biopsy confirmed the diagnosis of metastatic TNBC. She was admitted to the [facility] for uncontrolled pain and fatigue. She received RBC transfusion was discharged from the hospital. She is here today to start treatment. She is feeling fatigued and her renal function is compromised. This could be from dehydration and bumex. We will stop the bumex and lotensin and bolus x 3 days.     Review of Systems:    Constitutional: fatigue  Eyes: negative  Ears, Nose, Mouth, Throat, and Face: negative  Respiratory: negative  Cardiovascular: negative  Gastrointestinal: abdominal pain, nausea, decreased appetite  Integument/chest wall: negative  Hematologic/Lymphatic: right sleeve in place for lymphadema  Musculoskeletal: back pain and joint pain   Neurological: negative      Past Medical History:   Diagnosis Date    Arrhythmia     bradycardia in 30's /pacemaker inserted    Arthritis     RT KNEE    Asthma Dx age 78    Bradycardia 2009    Cardiology saw her during hospital stay; Now off coreg;  Sees Dr. Sendy Kiran [facility])     Rt mastectomy 2/19/15    CAD (coronary artery disease)     Dr Jeannine Minor Diabetes [facility])     Now seeing Dr. Dilip Maza Diverticulitis     DJD (degenerative joint disease), lumbar     GERD (gastroesophageal reflux disease)     H. pylori infection 2008    Dr. Mandi Burroughs attack [facility]) April 2006    Hypercholesterolemia     Hypertension     Morbid obesity ([address]     Neuropathy, arm 2009    Right; Has had MRI and EMG at [facility] [phone] SEN (obstructive sleep apnea)     uses CPAP    Rectal bleeding 2009    Hospitalized;  Had colonoscopy and EGD (ok), gastric emptying study (normal), doppler mesenteric arteries (ok)    Sciatica     Has seen Dr. Torrey Ventura    Stroke [facility]) 2009 & 2012    no residual problems     Past Surgical History:   Procedure Laterality Date    HX APPENDECTOMY  1979    HX BREAST LUMPECTOMY  12/12/2013    RIGHT BREAST DUCTAL EXCISION performed by Alonso Dougherty MD at [facility] MAIN OR    HX CATARACT REMOVAL  2007    HX CHOLECYSTECTOMY  1979    HX COLONOSCOPY      HX HEART CATHETERIZATION      angioplasty    HX HYSTERECTOMY      fibroids    HX KNEE ARTHROSCOPY  1997    right    HX MASTECTOMY Right 2/19/2015    RIGHT BREAST MODIFIED RADICAL MASTECTOMY RIGHT SENTINEL NODE BIOPSY, RIGHT PORT A CATH INSERTION performed by Ray Verdugo MD at [facility] OR    HX PACEMAKER      HX SHOULDER ARTHROSCOPY  2004    left      Family History   Problem Relation Age of Onset    Stroke Father     Cancer Sister      breast cancer    Hypertension Sister     Cancer Mother      Unknown type    Cancer Brother      Colon    Hypertension Brother     Anesth Problems Neg Hx      Social History   Substance Use Topics    Smoking status: Never Smoker    Smokeless tobacco: Never Used    Alcohol use No      Prior to Admission medications    Medication Sig Start Date End Date Taking? Authorizing Provider   insulin glargine (LANTUS) 100 unit/mL injection 18 units in the morning and 18 units at bedtime 8/18/17   Mar Ching MD   insulin syringe-needle U-100 (BD INSULIN SYRINGE ULTRA-FINE) 1/2 mL 31 gauge x 15/64\" syrg 1 Syringe by SubCUTAneous route two (2) times a day. 8/18/17   Mar Ching MD   VITAMIN D2 50,000 unit capsule take 1 capsule by mouth every 30 DAYS 8/16/17   Asa Martinez NP   hydrOXYzine HCl (ATARAX) 25 mg tablet Take 25 mg by mouth every eight (8) hours as needed for Itching. Historical Provider   morphine IR (MS IR) 15 mg tablet Take 1 Tab by mouth every four (4) hours as needed for Pain. Max Daily Amount: 90 mg. 8/11/17   Asa Martinez NP   morphine CR (MS CONTIN) 15 mg CR tablet Take 1 Tab by mouth every twelve (12) hours. Max Daily Amount: 30 mg. 8/11/17   Asa Martinez NP   megestrol (MEGACE) 400 mg/10 mL (10 mL) suspension Take 10 mL by mouth daily. 8/11/17   Asa Martinez NP   diclofenac (VOLTAREN) 1 % gel Apply 2 g to affected area four (4) times daily as needed (Pain). Historical Provider   dicyclomine (BENTYL) 20 mg tablet Take 1 Tab by mouth every six (6) hours as needed (abdominal cramps) for up to 20 doses. 6/10/17   Kings Hernandez MD   ondansetron hcl (ZOFRAN, AS HYDROCHLORIDE,) 4 mg tablet Take 1 Tab by mouth every eight (8) hours as needed for Nausea. 6/10/17   Kings Hernandez MD   atorvastatin (LIPITOR) 80 mg tablet Take 1 Tab by mouth daily. Indications: hypercholesterolemia 5/31/17   Kavitha Kessler MD   aspirin (ASPIRIN) 325 mg tablet Take 1 Tab by mouth daily. 5/31/17   Kavitha Kessler MD   ipratropium (ATROVENT) 0.06 % nasal spray instill 2 sprays into each nostril three times a day - IF NEEDED FOR RUNNING NOSE 3/15/17   Historical Provider   glipiZIDE (GLUCOTROL) 5 mg tablet Take 0.5 Tabs by mouth two (2) times a day. Take 1/2 every day with breakfast. If your blood sugar is above 200 take a whole tablet.  5/5/17 Thi Becerril MD   polyethylene glycol [facility]) 17 gram/dose powder Take 17 g by mouth two (2) times a day. Continue while taking pain medication 3/8/17   Ruthie Gomez MD   isosorbide mononitrate ER (IMDUR) 60 mg CR tablet Take  by mouth every morning. 6/6/16   Historical Provider   Blood Sugar Diagnostic, Disc (ASCENSIA BREEZE 2) strp Check your blood sugar twice daily. P72.68 6/20/16   Thi Becerril MD   ondansetron (ZOFRAN ODT) 4 mg disintegrating tablet Take 1 Tab by mouth every eight (8) hours as needed for Nausea. 6/1/16   Ruthie Gomez MD   albuterol (PROVENTIL VENTOLIN) 2.5 mg /3 mL (0.083 %) nebulizer solution 3 mL by Nebulization route every four (4) hours as needed for Wheezing. 9/20/15   Leighton Mario DO   MICROLET LANCET misc  12/16/14   Historical Provider   benazepril (LOTENSIN) 40 mg tablet Take 40 mg by mouth every morning. Historical Provider   bumetanide (BUMEX) 1 mg tablet Take 1 mg by mouth daily. 12/23/14   Todd Andersen MD   omeprazole (PRILOSEC) 20 mg capsule Take 40 mg by mouth two (2) times a day. Phys MD Ganesh   nitroglycerin (NITROSTAT) 0.4 mg SL tablet by SubLINGual route every five (5) minutes as needed for Chest Pain. Todd Andersen MD   amlodipine (NORVASC) 10 mg tablet TAKE 1 TABLET BY MOUTH ONCE DAILY 4/9/11   Salvatore Rose MD          Allergies   Allergen Reactions    Codeine Itching    Duratuss [Phenylephrine-Guaifenesin] Nausea and Vomiting    Lisinopril-Hydrochlorothiazide Itching    Motrin [Ibuprofen] Nausea and Vomiting     With 800mg    Tape [Adhesive] Other (comments)     TEARS HER SKIN         Objective:     Vitals:    08/23/17 1008   BP: 134/70   Pulse: 92   Resp: 18   Temp: 98.2 °F (36.8 °C)   TempSrc: Oral   SpO2: 97%   Height: 5' 6.09\" (1.679 m)        Physical Exam:    GENERAL: alert, cooperative, obese  EYE: negative  LYMPHATIC: negative  THROAT & NECK: normal and no erythema or exudates noted.    LUNG: clear to auscultation bilaterally  HEART: regular rate and rhythm  ABDOMEN: soft, non-tender  EXTREMITIES: right arm edema - has sleeve in place  SKIN: negative  NEUROLOGIC: negative        CT Results (most recent):    Results from Hospital Encounter encounter on 07/17/17   CT BX BONE MARROW NDL/TROCAR   Narrative CT-GUIDED BONE MARROW BIOPSY    : Milagros Kumari M.D. INDICATION:  Multiple myeloma. Thrombocytopenia. ESTIMATED BLOOD LOSS: None. COMPARISON:  None. FINDINGS:   The patient's relevant allergies, medications, laboratory results, and history  were reviewed. The patient was identified by name and date of birth. The  procedure, benefits, risks, and alternatives (including not having the  procedure) were discussed. All of the patient's questions were answered. Informed verbal and written consent was obtained. CT dose reduction was achieved  through use of a standardized protocol tailored for this examination and  automatic exposure control for dose modulation. The patient was evaluated and felt to be an appropriate candidate for conscious  sedation. Please see preprocedure assessment and nursing record for full  documentation. Sedation was achieved with Versed and Fentanyl, and continuous  monitoring was performed. The patient was positioned prone on the CT gantry. A focused CT was performed  over the posterior iliac bones. The skin site was marked. The skin was prepped  and draped using usual sterile technique. Skin and subcutaneous tissues were  anesthetized with buffered lidocaine. Under CT guidance, an 11-gauge core bone  biopsy needle was advanced into the left iliac bone. Approximately 5 cc of  marrow blood was aspirated. A single core biopsy was obtained from the lesion  and submitted to the on-site cytopathology technologist and sample adequacy  confirmed. The core biopsy measured 20 mm. There were no immediate  complications.  The patient was recovered in the recovery room without incident. Postprocedure diagnosis: Multiple myeloma, thrombocytopenia. Impression IMPRESSION:  Successful CT guided bone marrow biopsy. Lab Results   Component Value Date/Time    WBC 6.9 08/23/2017 09:14 AM    Hemoglobin (POC) 9.2 09/20/2015 05:25 PM    HGB 7.7 08/23/2017 09:14 AM    Hematocrit (POC) 27 09/20/2015 05:25 PM    HCT 21.4 08/23/2017 09:14 AM    PLATELET 227 43/03/1334 09:14 AM    MCV 72.5 08/23/2017 09:14 AM       Lab Results   Component Value Date/Time    Sodium 124 08/23/2017 09:14 AM    Potassium 4.4 08/23/2017 09:14 AM    Chloride 91 08/23/2017 09:14 AM    CO2 21 08/23/2017 09:14 AM    Anion gap 12 08/23/2017 09:14 AM    Glucose 128 08/23/2017 09:14 AM    BUN 62 08/23/2017 09:14 AM    Creatinine 2.57 08/23/2017 09:14 AM    BUN/Creatinine ratio 24 08/23/2017 09:14 AM    GFR est AA 22 08/23/2017 09:14 AM    GFR est non-AA 18 08/23/2017 09:14 AM    Calcium 8.7 08/23/2017 09:14 AM         Assessment: 1. Metastatic breast carcinoma - dx 7/17/2017     Bone marrow involvement  Diffuse skeletal metastasis    ECOG PS 2  Intent of treatment - palliative    Starting palliative chemothePlan to start Abraxane + Keytruda  Understands the potential side effects and ways to manage it. Blood counts are acceptable. Detailed symptom assessment was performed. 2. Right breast carcinoma: dx 11/17/2014  pT3 N3a M0 (Stage IIIC) infiltrating ductal carcinoma, Tumor size 7.5 cm, LN 17/19 +ve with extracapsular extension in 2 nodes, grade 3, ki 67 35%, ER -ve, SC -ve, Her 2 -ve    ECOG PS 0  Intent of therapy - curative       S/P right sided mastectomy with axillary LN dissection  Received adjuvant chemotherapy   Taxotere, Cytoxan, s/p 3 Cycles    Therapy was discontinued due to severe side effects. Completed radiation to the chest wall and axilla  Lymphedema - [facility] by lymphedema [facility] at [facility]      3.  Pulmonary embolism    Patient discontinued Rivaroxaban  Taking aspirin 81 mg daily      4. Pain - back    From metastatic disease in the bones    > referral to palliative  > MS IR 15 mg #60  > MS CR 15 mg #60  >  reviewed  > Consider Zometa/Xgeva       5. Hyponatremia    Stop Bumex and Lotensin       6. Acute renal failure     From diuretics  Urinalysis  Urine creatinine  Urine sodium        Plan:       > Start Abraxane + Keytruda today  > Stop bumex and lotension  > fluids in the OPIC x 3 days  > urinalysis, Urine creatinine, urine sodium  > Follow up in 3 weeks      I saw the patient in conjunction with HERMINIA Oglesby. Signed by: Shwetha Saravia MD                     August 23, 2017             Allan Ledesma MD  CC.  Corina Shannon MD

## 2017-08-23 NOTE — DISCHARGE INSTRUCTIONS

## 2017-08-23 NOTE — PROGRESS NOTES
Gloria Hoskins is a 65 y.o. female here today for Onc. f/u start Chemo (Metastatic breast cancer). Patient states she has generalized pain, joint pain 9/10. Patient arrived in w/c. Unable to obtain pt's weight; patient stated she is unable to stand that's why she's in the w/c.

## 2017-08-23 NOTE — PROGRESS NOTES
Outpatient [facility]fusion [facility] - Chemotherapy Progress Note    0910- Pt admit to [facility] for Abraxane + Keytruda C1D1 via wheelchair in stable condition. Assessment completed. Pt reports extreme fatigue and poor appetite. Pt ambulates with walker at home. Pt also c/o of 10/10 pain \"all over\" r/t arthritis and did not take any pain medication this morning. Pt states she has difficulty speaking after her stroke in May. Pt wears a compression sleeve on R arm and reports numbness/tingling in fingertips. R chest port accessed with 20G 1 inch guevara needle with positive blood return. Labs drawn per order and sent. Line flushed, clamped, Curos Cap applied to end clave. Pt over to MD office accompanied by family. Received orders from Patricia Crowley NP for pt to get 1 L bolus today and obtain UA, UR sodium, UR creatinine. Visit Vitals    /66 (BP 1 Location: Left arm, BP Patient Position: Sitting)    Pulse 85    Temp 99 °F (37.2 °C)    Resp 18    Wt 84.2 kg (185 lb 9.6 oz)    Breastfeeding No    BMI 29.07 kg/m2     Recent Results (from the past 12 hour(s))   CBC WITH 3 PART DIFF    Collection Time: 08/23/17  9:14 AM   Result Value Ref Range    WBC 6.9 3.6 - 11.0 K/uL    RBC 2.95 (L) 3.80 - 5.20 M/uL    HGB 7.7 (L) 11.5 - 16.0 g/dL    HCT 21.4 (L) 35.0 - 47.0 %    MCV 72.5 (L) 80.0 - 99.0 FL    MCH 26.1 26.0 - 34.0 PG    MCHC 36.0 30.0 - 36.5 g/dL    RDW 19.9 (H) 11.8 - 15.8 %    PLATELET 112 411 - 998 K/uL    NEUTROPHILS 84 (H) 32 - 75 %    MIXED CELLS 7 3.2 - 16.9 %    LYMPHOCYTES 9 (L) 12 - 49 %    ABS. NEUTROPHILS 5.8 1.8 - 8.0 K/UL    ABS. MIXED CELLS 0.5 0.2 - 1.2 K/uL    ABS.  LYMPHOCYTES 0.6 (L) 0.8 - 3.5 K/UL    DF AUTOMATED     METABOLIC PANEL, BASIC    Collection Time: 08/23/17  9:14 AM   Result Value Ref Range    Sodium 124 (L) 136 - 145 mmol/L    Potassium 4.4 3.5 - 5.1 mmol/L    Chloride 91 (L) 97 - 108 mmol/L    CO2 21 21 - 32 mmol/L    Anion gap 12 5 - 15 mmol/L    Glucose 128 (H) 65 - 100 mg/dL    BUN 62 (H) 6 - 20 MG/DL    Creatinine 2.57 (H) 0.55 - 1.02 MG/DL    BUN/Creatinine ratio 24 (H) 12 - 20      GFR est AA 22 (L) >60 ml/min/1.73m2    GFR est non-AA 18 (L) >60 ml/min/1.73m2    Calcium 8.7 8.5 - 10.1 MG/DL   HEPATIC FUNCTION PANEL    Collection Time: 08/23/17  9:14 AM   Result Value Ref Range    Protein, total 7.3 6.4 - 8.2 g/dL    Albumin 1.8 (L) 3.5 - 5.0 g/dL    Globulin 5.5 (H) 2.0 - 4.0 g/dL    A-G Ratio 0.3 (L) 1.1 - 2.2      Bilirubin, total 0.5 0.2 - 1.0 MG/DL    Bilirubin, direct 0.3 (H) 0.0 - 0.2 MG/DL    Alk. phosphatase 216 (H) 45 - 117 U/L    AST (SGOT) 30 15 - 37 U/L    ALT (SGPT) 12 12 - 78 U/L   TSH 3RD GENERATION    Collection Time: 08/23/17  9:14 AM   Result Value Ref Range    TSH 1.03 0.36 - 3.74 uIU/mL   URINALYSIS W/ REFLEX CULTURE    Collection Time: 08/23/17 11:54 AM   Result Value Ref Range    Color YELLOW/STRAW      Appearance CLOUDY (A) CLEAR      Specific gravity 1.012 1.003 - 1.030      pH (UA) 5.0 5.0 - 8.0      Protein TRACE (A) NEG mg/dL    Glucose NEGATIVE  NEG mg/dL    Ketone NEGATIVE  NEG mg/dL    Bilirubin NEGATIVE  NEG      Blood NEGATIVE  NEG      Urobilinogen 0.2 0.2 - 1.0 EU/dL    Nitrites NEGATIVE  NEG      Leukocyte Esterase SMALL (A) NEG      WBC 5-10 0 - 4 /hpf    RBC 0-5 0 - 5 /hpf    Epithelial cells FEW FEW /lpf    Bacteria 1+ (A) NEG /hpf    UA:UC IF INDICATED URINE CULTURE ORDERED (A) CNI      Mucus TRACE (A) NEG /lpf    Amorphous Crystals FEW (A) NEG     CREATININE, UR, RANDOM    Collection Time: 08/23/17 11:54 AM   Result Value Ref Range    Creatinine, urine 46.90 mg/dL     *Please follow up in CC for pending lab results. Medications:  Normal saline 1 L bolus  Zofran 8 mg IVP  NS KVO  Keytruda 200 mg IV  Abraxane 200 mg IV    Pt monitored for 30 mins post treatment. Pt tolerated treatment well. Port maintained positive blood return throughout treatment, flushed with positive blood return at conclusion and needle removed.  Discharge instructions and chemo education reviewed with patient. Pt verbalized understanding. Copy sent with pt. Visit Vitals    /73    Pulse 81    Temp 99 °F (37.2 °C)    Resp 18    Ht 5' 6.93\" (1.7 m)    Wt 84.2 kg (185 lb 9.6 oz)    Breastfeeding No    BMI 29.13 kg/m2       Per RYLIE Broussard RN - Pt family member asked on multiple occassions to get pt oatmeal she was requesting. Family members/multiple visitors informed pt may eat while she is here and they are welcome/encouraged to bring her food. Snacks provided. Discharge instructions given. 1445- D/c home in wheelchair in no distress. Pt aware of next [facility] appointment scheduled for 8/34/17.

## 2017-08-24 NOTE — PROGRESS NOTES
Pt arrived to [facility] for hydration/labs in no acute distress at 1430.  Assessment unremarkable except pt reports abdominal pain 10/10 and generalized weakness and fatigue. Pt has been taking pain medication regularly, but according to daughter it was not time yet for another dose. R chest port accessed without issue and positive blood return noted.  Labs obtained- [facility]. Patient Vitals for the past 12 hrs:   Temp Pulse Resp BP SpO2   08/24/17 1546 - 89 18 129/69 -   08/24/17 1433 98.5 °F (36.9 °C) 91 18 115/71 100 %     Recent Results (from the past 12 hour(s))   METABOLIC PANEL, COMPREHENSIVE    Collection Time: 08/24/17  2:42 PM   Result Value Ref Range    Sodium 131 (L) 136 - 145 mmol/L    Potassium 4.2 3.5 - 5.1 mmol/L    Chloride 101 97 - 108 mmol/L    CO2 20 (L) 21 - 32 mmol/L    Anion gap 10 5 - 15 mmol/L    Glucose 140 (H) 65 - 100 mg/dL    BUN 45 (H) 6 - 20 MG/DL    Creatinine 1.79 (H) 0.55 - 1.02 MG/DL    BUN/Creatinine ratio 25 (H) 12 - 20      GFR est AA 33 (L) >60 ml/min/1.73m2    GFR est non-AA 27 (L) >60 ml/min/1.73m2    Calcium 8.5 8.5 - 10.1 MG/DL    Bilirubin, total 0.4 0.2 - 1.0 MG/DL    ALT (SGPT) 7 (L) 12 - 78 U/L    AST (SGOT) 22 15 - 37 U/L    Alk. phosphatase 178 (H) 45 - 117 U/L    Protein, total 6.9 6.4 - 8.2 g/dL    Albumin 1.8 (L) 3.5 - 5.0 g/dL    Globulin 5.1 (H) 2.0 - 4.0 g/dL    A-G Ratio 0.4 (L) 1.1 - 2.2         The following medications administered:  NS 1L IV over 1 hour     Pt tolerated treatment well.  No adverse reaction noted. Port flushed per policy and needle removed, 2x2 and paper tape placed.  Pt discharged ambulatory in no acute distress at 1540, accompanied by family members. Next appointment 8/25/17 @ 1500 for another liter of saline.

## 2017-08-25 NOTE — PROGRESS NOTES
Pt arrived to [id number] [place] [address]. ambulatory in no acute distress at 1515 for Hydration.  Assessment unremarkable except, \"beyond 10 pain in my hip and stomach\". R chest port accessed without issue and positive blood return noted.  Labs obtained, CMP. Visit Vitals    /76 (BP 1 Location: Left arm, BP Patient Position: Sitting)    Pulse 85    Temp 97.7 °F (36.5 °C)    Resp 18     Recent Results (from the past 12 hour(s))   METABOLIC PANEL, COMPREHENSIVE    Collection Time: 08/25/17  3:16 PM   Result Value Ref Range    Sodium 133 (L) 136 - 145 mmol/L    Potassium 4.7 3.5 - 5.1 mmol/L    Chloride 104 97 - 108 mmol/L    CO2 21 21 - 32 mmol/L    Anion gap 8 5 - 15 mmol/L    Glucose 159 (H) 65 - 100 mg/dL    BUN 36 (H) 6 - 20 MG/DL    Creatinine 1.46 (H) 0.55 - 1.02 MG/DL    BUN/Creatinine ratio 25 (H) 12 - 20      GFR est AA 42 (L) >60 ml/min/1.73m2    GFR est non-AA 35 (L) >60 ml/min/1.73m2    Calcium 8.3 (L) 8.5 - 10.1 MG/DL    Bilirubin, total 0.3 0.2 - 1.0 MG/DL    ALT (SGPT) 7 (L) 12 - 78 U/L    AST (SGOT) 23 15 - 37 U/L    Alk. phosphatase 167 (H) 45 - 117 U/L    Protein, total 7.0 6.4 - 8.2 g/dL    Albumin 1.9 (L) 3.5 - 5.0 g/dL    Globulin 5.1 (H) 2.0 - 4.0 g/dL    A-G Ratio 0.4 (L) 1.1 - 2.2       The following medications administered:  NS 1L IV over 1 hour    Pt tolerated treatment well. Port flushed per policy and de-accessed, 2x2 and tape placed.  Pt discharged ambulatory in no acute distress at 1625, accompanied by daughter. Next appointment 8/30/17 at 1100.

## 2017-08-30 NOTE — PROGRESS NOTES
Pt arrived to [address] [place] [address]. ambulatory with walker for Abraxane C1D8 in no acute distress at 1115.  Assessment completed. R chest port accessed without issue and positive blood return noted.  Labs obtained- CBCap (failed, CBC with diff sent to lab), and BMP. Patient Vitals for the past 12 hrs:   Temp Pulse Resp BP SpO2   08/30/17 1439 - 67 18 160/81 -   08/30/17 1117 98 °F (36.7 °C) 60 18 143/73 100 %     Recent Results (from the past 12 hour(s))   CBC WITH AUTOMATED DIFF    Collection Time: 08/30/17 11:28 AM   Result Value Ref Range    WBC 3.1 (L) 3.6 - 11.0 K/uL    RBC 2.68 (L) 3.80 - 5.20 M/uL    HGB 6.7 (L) 11.5 - 16.0 g/dL    HCT 19.5 (L) 35.0 - 47.0 %    MCV 72.8 (L) 80.0 - 99.0 FL    MCH 25.0 (L) 26.0 - 34.0 PG    MCHC 34.4 30.0 - 36.5 g/dL    RDW 20.3 (H) 11.5 - 14.5 %    PLATELET 325 547 - 407 K/uL    NEUTROPHILS 67 32 - 75 %    LYMPHOCYTES 24 12 - 49 %    MONOCYTES 8 5 - 13 %    EOSINOPHILS 1 0 - 7 %    BASOPHILS 0 0 - 1 %    ABS. NEUTROPHILS 2.2 1.8 - 8.0 K/UL    ABS. LYMPHOCYTES 0.7 (L) 0.8 - 3.5 K/UL    ABS. MONOCYTES 0.2 0.0 - 1.0 K/UL    ABS. EOSINOPHILS 0.0 0.0 - 0.4 K/UL    ABS.  BASOPHILS 0.0 0.0 - 0.1 K/UL    RBC COMMENTS ANISOCYTOSIS  2+        RBC COMMENTS MICROCYTOSIS  PRESENT        RBC COMMENTS POLYCHROMASIA  PRESENT        RBC COMMENTS HYPOCHROMIA  1+        DF SMEAR SCANNED     METABOLIC PANEL, BASIC    Collection Time: 08/30/17 11:28 AM   Result Value Ref Range    Sodium 132 (L) 136 - 145 mmol/L    Potassium 4.9 3.5 - 5.1 mmol/L    Chloride 107 97 - 108 mmol/L    CO2 17 (L) 21 - 32 mmol/L    Anion gap 8 5 - 15 mmol/L    Glucose 170 (H) 65 - 100 mg/dL    BUN 25 (H) 6 - 20 MG/DL    Creatinine 1.31 (H) 0.55 - 1.02 MG/DL    BUN/Creatinine ratio 19 12 - 20      GFR est AA 48 (L) >60 ml/min/1.73m2    GFR est non-AA 39 (L) >60 ml/min/1.73m2    Calcium 8.3 (L) 8.5 - 10.1 MG/DL   TYPE + CROSSMATCH    Collection Time: 08/30/17  1:25 PM   Result Value Ref Range    Crossmatch Expiration 09/02/2017 ABO/Rh(D) O POSITIVE     Antibody screen NEG      Florence Wiggins, NP notified of patient's low Hgb (6.7). Orders received to proceed with chemo today and type and cross patient for blood transfusion this week. The following medications administered:  NS @ KVO  Zofran 8mg IVP  Abraxane 200mg IV over 30 minutes    Pt tolerated treatment well.  No adverse reaction noted. Port flushed per policy and needle removed, 2x2 and paper tape placed.  Pt discharged ambulatory in no acute distress at 1440, accompanied by family member. Next appointment 9/1/17 @ 0800 for blood transfusion.

## 2017-08-31 PROBLEM — D64.9 ANEMIA: Status: ACTIVE | Noted: 2017-01-01

## 2017-09-01 NOTE — DISCHARGE INSTRUCTIONS
[facility]PATIENT [facility]ENTER    DISCHARGE INSTRUCTIONS FOR:  BLOOD TRANSFUSION    We hope you are feeling better after your blood transfusion. Some mild tenderness or slight bruising at your IV site is normal.  Avoid lifting or heavy use of that extremity for the rest of the day. Drink plenty of fluids, eat a normal diet and get some rest.    There are some important signs that you need to watch for in case you experience a delayed reaction to the blood you have received. Call your physician immediately if you develop any of the following symptoms:    1. Severe headache or backache;    2. Fever above 100 degrees;    3. Chills;    4. Difficulty breathing;    5.  Blood or red color in urine;    6. The feeling of weakness or constant fatigue;    7. Yellowing of the whites of your eyes or skin (jaundice). If your physician is not available, call or go to the nearest emergency room, or dial 911.     Niko Joseph, Signature: ___________________________ 9/1/2017  Beatriz Leos RN

## 2017-09-01 NOTE — PROGRESS NOTES
0810 Pt arrived at [facility] and in no distress for Blood Transfusion. Assessment completed, no new complaints voiced. Port accessed per protocol with positive blood return. Patient Vitals for the past 12 hrs:   Temp Pulse Resp BP SpO2   09/01/17 1425 99.2 °F (37.3 °C) 60 18 137/64 -   09/01/17 1330 99.2 °F (37.3 °C) 62 18 138/68 -   09/01/17 1315 99.2 °F (37.3 °C) 61 18 137/68 -   09/01/17 1215 98.9 °F (37.2 °C) 60 18 139/66 -   09/01/17 1145 98.7 °F (37.1 °C) 61 18 133/63 -   09/01/17 1130 98.9 °F (37.2 °C) 61 18 134/62 -   09/01/17 1107 98.7 °F (37.1 °C) (!) 59 18 132/65 -   09/01/17 1100 99 °F (37.2 °C) (!) 59 18 134/62 -   09/01/17 1000 97.5 °F (36.4 °C) 60 18 131/61 -   09/01/17 0930 98.8 °F (37.1 °C) 60 18 128/60 -   09/01/17 0915 98.9 °F (37.2 °C) 66 18 123/58 -   09/01/17 0856 98.2 °F (36.8 °C) 69 18 117/55 100 %   09/01/17 0810 98.2 °F (36.8 °C) 67 20 131/60 -       Medications received:  Tylenol 650 mg PO  Benadryl 25 mg PO  0900 1st unit PRBC's, completed @ 1107  1115 2nd unit PRBC's, completed @ 3514 2202 Pt monitored one hour post transfusion. Tolerated treatment well, no adverse reaction noted. Discharge instructions given. Port flushed and de-accessed. D/Cd from [facility] and in no distress accompanied by family. Next appt 9/6 @ 1100.

## 2017-09-13 PROBLEM — R11.15 CYCLICAL VOMITING WITH NAUSEA: Status: ACTIVE | Noted: 2017-01-01

## 2017-09-13 PROBLEM — G89.3 PAIN DUE TO MALIGNANT NEOPLASM METASTATIC TO BONE (HCC): Status: ACTIVE | Noted: 2017-01-01

## 2017-09-13 PROBLEM — R53.0 NEOPLASTIC MALIGNANT RELATED FATIGUE: Status: ACTIVE | Noted: 2017-01-01

## 2017-09-13 PROBLEM — C79.51 PAIN DUE TO MALIGNANT NEOPLASM METASTATIC TO BONE (HCC): Status: ACTIVE | Noted: 2017-01-01

## 2017-09-13 NOTE — PROGRESS NOTES
Hospitalist consult called to Dr. Dick Blackman. MD Dick Blackman informed of pts admission and reasoning for consult. Diet order changed to diabetic diet and MD stated would be in to see pt.

## 2017-09-13 NOTE — PROGRESS NOTES
Follow up Note        Patient: Celine Osborne MRN: [id number]  SSN: [id number]    YOB: 1940  Age: 65 y.o. Sex: female        Diagnosis:     1. Right breast carcinoma:  pT3 N3a M0 (Stage IIIC) infiltrating ductal carcinoma, Tumor size 7.5 cm, LN 17/19 +ve with extracapsular extension in 2 nodes, grade 3, ki 67 35%, ER -ve, RI -ve, Her 2 -ve      Treatment:     1. Right mastectomy with axillary LN dissection on 01/19/2015  2. Adjuvant chemotherapy   Taxotere, Cytoxan, s/p 3 Cycles completed 4/29/15  3. Palliative chemotherapy   Abraxane + Pembrolizumab      Subjective:      Celine Osborne is a 65 y.o. female with a diagnosis of locally advanced right sided breast cancer. She underwent a right mastectomy with axillary LN dissection on 01/19/2015. She received three cycles of adjuvant chemotherapy with TC and stopped therapy due to side effects. She has completed radiation on 08/13/2015. She has developed progressive anemia. She feels fatigued. She also has ongoing back pain. . A bone marrow biopsy confirmed the diagnosis of metastatic TNBC. She was admitted to the [facility] for uncontrolled pain and fatigue. She received RBC transfusion was discharged from the hospital. She is now receiving Abraxane and Keytruda. Treatment was held last time d/t acute renal failure. She complains of severe abdominal pain that is not relieved with pain medications. She also has bilateral lower extremity edema.       Review of Systems:    Constitutional: fatigue  Eyes: negative  Ears, Nose, Mouth, Throat, and Face: negative  Respiratory: negative  Cardiovascular: negative  Gastrointestinal: abdominal pain, nausea, decreased appetite  Integument/chest wall: negative  Hematologic/Lymphatic: right sleeve in place for lymphadema  Musculoskeletal: back pain and joint pain   Neurological: negative      Past Medical History:   Diagnosis Date    Arrhythmia     bradycardia in 30's /pacemaker inserted    Arthritis     RT KNEE    Asthma Dx age 75    Bradycardia 2009    Cardiology saw her during hospital stay; Now off coreg;  Sees Dr. Padilla Apt    Breast cancer [facility])     Rt mastectomy 2/19/15    CAD (coronary artery disease)     Dr Joe Baker Diabetes [facility])     Now seeing Dr. Panchito Fernandez Diverticulitis     DJD (degenerative joint disease), lumbar     GERD (gastroesophageal reflux disease)     H. pylori infection 2008    Dr. Grace Gurrola attack [facility]) April 2006    Hypercholesterolemia     Hypertension     Morbid obesity ([address]     Neuropathy, arm 2009    Right; Has had MRI and EMG at [facility]    SEN (obstructive sleep apnea)     uses CPAP    Rectal bleeding 2009    Hospitalized;  Had colonoscopy and EGD (ok), gastric emptying study (normal), doppler mesenteric arteries (ok)    Sciatica     Has seen Dr. Nichole Scherer    Stroke [facility]) 2009 & 2012    no residual problems     Past Surgical History:   Procedure Laterality Date    HX APPENDECTOMY  1979    HX BREAST LUMPECTOMY  12/12/2013    RIGHT BREAST DUCTAL EXCISION performed by Terrance Garcia MD at [facility] MAIN OR    HX CATARACT REMOVAL  2007    HX CHOLECYSTECTOMY  1979    HX COLONOSCOPY      HX HEART CATHETERIZATION      angioplasty    HX HYSTERECTOMY      fibroids    HX KNEE ARTHROSCOPY  1997    right    HX MASTECTOMY Right 2/19/2015    RIGHT BREAST MODIFIED RADICAL MASTECTOMY RIGHT SENTINEL NODE BIOPSY, RIGHT PORT A CATH INSERTION performed by Johnny Patrick MD at [facility] AMBULATORY OR    HX PACEMAKER      HX SHOULDER ARTHROSCOPY  2004    left      Family History   Problem Relation Age of Onset    Stroke Father     Cancer Sister      breast cancer    Hypertension Sister     Cancer Mother      Unknown type    Cancer Brother      Colon    Hypertension Brother     Anesth Problems Neg Hx      Social History   Substance Use Topics    Smoking status: Never Smoker    Smokeless tobacco: Never Used    Alcohol use No      Prior to Admission medications    Medication Sig Start Date End Date Taking? Authorizing Provider   insulin glargine (LANTUS) 100 unit/mL injection 18 units in the morning and 18 units at bedtime 8/18/17  Yes Los Lozada MD   insulin syringe-needle U-100 (BD INSULIN SYRINGE ULTRA-FINE) 1/2 mL 31 gauge x 15/64\" syrg 1 Syringe by SubCUTAneous route two (2) times a day. 8/18/17  Yes Los Lozada MD   VITAMIN D2 50,000 unit capsule take 1 capsule by mouth every 30 DAYS 8/16/17  Yes Jarrett Patricia NP   hydrOXYzine HCl (ATARAX) 25 mg tablet Take 25 mg by mouth every eight (8) hours as needed for Itching. Yes Historical Provider   morphine IR (MS IR) 15 mg tablet Take 1 Tab by mouth every four (4) hours as needed for Pain. Max Daily Amount: 90 mg. 8/11/17  Yes Jarrett Patricia NP   morphine CR (MS CONTIN) 15 mg CR tablet Take 1 Tab by mouth every twelve (12) hours. Max Daily Amount: 30 mg. 8/11/17  Yes Jarrett Patricia NP   megestrol (MEGACE) 400 mg/10 mL (10 mL) suspension Take 10 mL by mouth daily. 8/11/17  Yes Jarrett Patricia NP   diclofenac (VOLTAREN) 1 % gel Apply 2 g to affected area four (4) times daily as needed (Pain). Yes Historical Provider   ondansetron hcl (ZOFRAN, AS HYDROCHLORIDE,) 4 mg tablet Take 1 Tab by mouth every eight (8) hours as needed for Nausea. 6/10/17  Yes Peri Frazier MD   aspirin (ASPIRIN) 325 mg tablet Take 1 Tab by mouth daily. 5/31/17  Yes Serene Dowd MD   glipiZIDE (GLUCOTROL) 5 mg tablet Take 0.5 Tabs by mouth two (2) times a day. Take 1/2 every day with breakfast. If your blood sugar is above 200 take a whole tablet. 5/5/17  Yes Los Lozada MD   polyethylene glycol [facility]) 17 gram/dose powder Take 17 g by mouth two (2) times a day. Continue while taking pain medication 3/8/17  Yes Darrin Anthony MD   isosorbide mononitrate ER (IMDUR) 60 mg CR tablet Take  by mouth every morning.  6/6/16  Yes Historical Provider   Blood Sugar Diagnostic, Disc (ASCENSIA BREEZE 2) strp Check your blood sugar twice daily. E11.42 6/20/16  Yes Meila Yung MD   ondansetron (ZOFRAN ODT) 4 mg disintegrating tablet Take 1 Tab by mouth every eight (8) hours as needed for Nausea. 6/1/16  Yes Lucien Taylor MD   albuterol (PROVENTIL VENTOLIN) 2.5 mg /3 mL (0.083 %) nebulizer solution 3 mL by Nebulization route every four (4) hours as needed for Wheezing. 9/20/15  Yes Teri Fournier,    MICROLET LANCET misc  12/16/14  Yes Historical Provider   benazepril (LOTENSIN) 40 mg tablet Take 40 mg by mouth every morning. Yes Historical Provider   omeprazole (PRILOSEC) 20 mg capsule Take 40 mg by mouth two (2) times a day. Yes Todd Andersen MD   nitroglycerin (NITROSTAT) 0.4 mg SL tablet by SubLINGual route every five (5) minutes as needed for Chest Pain. Yes Todd Andersen MD   amlodipine (NORVASC) 10 mg tablet TAKE 1 TABLET BY MOUTH ONCE DAILY 4/9/11  Yes Lynette Driscoll MD   dicyclomine (BENTYL) 20 mg tablet Take 1 Tab by mouth every six (6) hours as needed (abdominal cramps) for up to 20 doses. 6/10/17   Asa Black MD   atorvastatin (LIPITOR) 80 mg tablet Take 1 Tab by mouth daily. Indications: hypercholesterolemia 5/31/17   Stark Goltz, MD   ipratropium (ATROVENT) 0.06 % nasal spray instill 2 sprays into each nostril three times a day - IF NEEDED FOR RUNNING NOSE 3/15/17   Historical Provider          Allergies   Allergen Reactions    Codeine Itching    Duratuss [Phenylephrine-Guaifenesin] Nausea and Vomiting    Lisinopril-Hydrochlorothiazide Itching    Motrin [Ibuprofen] Nausea and Vomiting     With 800mg    Tape [Adhesive] Other (comments)     TEARS HER SKIN         Objective:     Vitals:    09/13/17 1007   BP: 153/80   Pulse: 60   Resp: 18   Temp: 98.8 °F (37.1 °C)   TempSrc: Oral   SpO2: 100%   Weight: 199 lb (90.3 kg)   Height: 5' 6\" (1.676 m)        Physical Exam:    GENERAL: alert, cooperative, obese  EYE: negative  LYMPHATIC: negative  THROAT & NECK: normal and no erythema or exudates noted.    LUNG: clear to auscultation bilaterally  HEART: regular rate and rhythm  ABDOMEN: soft, non-tender  EXTREMITIES: right arm edema - has sleeve in place  SKIN: negative  NEUROLOGIC: negative        CT Results (most recent):    Results from Hospital Encounter encounter on 07/17/17   CT BX BONE MARROW NDL/TROCAR   Narrative CT-GUIDED BONE MARROW BIOPSY    : Js Kumar M.D. INDICATION:  Multiple myeloma. Thrombocytopenia. ESTIMATED BLOOD LOSS: None. COMPARISON:  None. FINDINGS:   The patient's relevant allergies, medications, laboratory results, and history  were reviewed. The patient was identified by name and date of birth. The  procedure, benefits, risks, and alternatives (including not having the  procedure) were discussed. All of the patient's questions were answered. Informed verbal and written consent was obtained. CT dose reduction was achieved  through use of a standardized protocol tailored for this examination and  automatic exposure control for dose modulation. The patient was evaluated and felt to be an appropriate candidate for conscious  sedation. Please see preprocedure assessment and nursing record for full  documentation. Sedation was achieved with Versed and Fentanyl, and continuous  monitoring was performed. The patient was positioned prone on the CT gantry. A focused CT was performed  over the posterior iliac bones. The skin site was marked. The skin was prepped  and draped using usual sterile technique. Skin and subcutaneous tissues were  anesthetized with buffered lidocaine. Under CT guidance, an 11-gauge core bone  biopsy needle was advanced into the left iliac bone. Approximately 5 cc of  marrow blood was aspirated. A single core biopsy was obtained from the lesion  and submitted to the on-site cytopathology technologist and sample adequacy  confirmed. The core biopsy measured 20 mm. There were no immediate  complications.  The patient was recovered in the recovery room without incident. Postprocedure diagnosis: Multiple myeloma, thrombocytopenia. Impression IMPRESSION:  Successful CT guided bone marrow biopsy. Lab Results   Component Value Date/Time    WBC 2.0 09/06/2017 11:54 AM    Hemoglobin (POC) 9.2 09/20/2015 05:25 PM    HGB 7.6 09/06/2017 11:54 AM    Hematocrit (POC) 27 09/20/2015 05:25 PM    HCT 21.5 09/06/2017 11:54 AM    PLATELET 054 27/72/2747 11:54 AM    MCV 73.1 09/06/2017 11:54 AM       Lab Results   Component Value Date/Time    Sodium 129 09/06/2017 11:54 AM    Potassium 4.7 09/06/2017 11:54 AM    Chloride 103 09/06/2017 11:54 AM    CO2 18 09/06/2017 11:54 AM    Anion gap 8 09/06/2017 11:54 AM    Glucose 107 09/06/2017 11:54 AM    BUN 21 09/06/2017 11:54 AM    Creatinine 1.26 09/06/2017 11:54 AM    BUN/Creatinine ratio 17 09/06/2017 11:54 AM    GFR est AA 50 09/06/2017 11:54 AM    GFR est non-AA 41 09/06/2017 11:54 AM    Calcium 7.7 09/06/2017 11:54 AM         Assessment: 1. Metastatic breast carcinoma - dx 7/17/2017     Bone marrow involvement  Diffuse skeletal metastasis    ECOG PS 2  Intent of treatment - palliative    Receiving palliative chemotherapy   Abraxane + Keytruda     Symptom management form reviewed with patient. 2. Right breast carcinoma: dx 11/17/2014  pT3 N3a M0 (Stage IIIC) infiltrating ductal carcinoma, Tumor size 7.5 cm, LN 17/19 +ve with extracapsular extension in 2 nodes, grade 3, ki 67 35%, ER -ve, CT -ve, Her 2 -ve    ECOG PS 0  Intent of therapy - curative       S/P right sided mastectomy with axillary LN dissection  Received adjuvant chemotherapy   Taxotere, Cytoxan, s/p 3 Cycles    Therapy was discontinued due to severe side effects. Completed radiation to the chest wall and axilla  Lymphedema - [facility]hed[facility]      3. Abdominal pain    > Unresponsive to out-patient management  > Palliative Medicine consult  > MS IR 15 mg q4hr prn  > MS CR 15 mg q12hr      4.  Anemia From bone marrow involvement  Transfuse 2 units of RBC        Plan:       [id number] [place] [address] admission for pain management  > Palliative care consult  > 2 units of RBC transfusion      I saw the patient with Sreekanth Nelson NP. Signed by: Lucien Taylor MD                     September 13, 2017             Marilee Boothe MD  CC.  Josy Richard MD

## 2017-09-13 NOTE — PROGRESS NOTES
Chief Complaint   Patient presents with    Breast Cancer     3 week follow up     1. Have you been to the ER, urgent care clinic since your last visit? Hospitalized since your last visit? NO    2. Have you seen or consulted any other health care providers outside of the [address] [place] [address] since your last visit? Include any pap smears or colon screening.  NO

## 2017-09-13 NOTE — PROGRESS NOTES
Famotidine dose adjustment:    Famotidine changed to 20 mg daily for CrCl <50 ml/min    Thank you,  Pee Garcia

## 2017-09-13 NOTE — H&P
Hospitalist Consultation Note    NAME: Shyanne Ortiz   :  1940   MRN:  [id number]     Date/Time:  2017 6:59 PM    Patient PCP: Eran Rivers MD    I have been asked to see this patient by the attending, Dr. Babar Franco , for advice/opinion re: DM type 2. My advice is:  ________________________________________________________________________   Assessment &  Plan:  Severe central abdominal pain with intractable nausea and vomiting  Dilated small bowel loop suggestive of early obstruction. Her symptoms are consistent with this  --NPO, will start IVF. Recommended NG tube for decompression; this would help with pain, decrease use of opiates which would only worsen obstruction, as well as help with nausea and vomiting. She is hesitant about this, says she would have to be knocked out or numbed up for this. Explained that this is a routine bedside procedure for which sedation is not given, could gave cetacaine spray after tube is placed to help with discomfort. Discussed with daughter Brennan Hodge over phone as well. Repeat xray in AM.  If she refuses NGT and symptoms persist, would pursue CT abdomen and potential general surgery consult. --repeat labs in AM along with lipase, lactic acid    DM type 2  --follows with Dr. Ingram . A1c 7.8 in . , 144.  --hold lantus 18u bid due to npo. Also stop glipizide, per Dr. Caity Philiply note and patient, she takes only if BS >200. 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   Nitroglycerin past or hydralazine IV prn SBP >170.  --change aspirin to suppository  --not currently on bumex, given IVF last week per patient    Renal insufficiency  --baseline unclear. Was 0.9 in 5/17 but had ARF Cr 2.57 in 8/23 and Cr since been ~1.3    SEN  --uses cpap on and off. CPAP qhs    Chronic microcytic anemia hgb 9.7, was 7.6 last week    Hx PE 2015  Chronic back pain due to bone mets  Obesity  Body mass index is 32.15 kg/(m^2). Dr. Lalito Mart will follow up starting 9/15/17        Subjective:   CHIEF COMPLAINT:  Abdominal pain, nausea and vomiting    HISTORY OF PRESENT ILLNESS:     Brenna Mcgowan is a 65 y.o.  female who was directly admitted for management of abdominal pain. Patient reports onset of sharp 10/10 abdominal pain start in epigastrium and spread down throughout central part of abdomen started last night with several episodes of nausea and vomiting, at least 6x since 10am today; associated with hot flashes when she vomit. \"Feels like having a baby\" pain. Felt a little constipation, no BM since 2 days ago. Pain slightly improved after enema given today after arrival with moderate bowel. Denies any CP, SOB.  +leg edema started after given IVF last week. We were consulted for DM management. Patient sees Dr. Martha Handley, last seen 8/17/17. Per his note, patient to take lantus 18 units bid, glipizide if BS >200. Report appetite has been good, no weight loss.     Past Medical History:   Diagnosis Date    Arrhythmia     bradycardia in 30's /pacemaker inserted    Arthritis     RT KNEE    Asthma Dx age 75    Bradycardia 2009    Cardiology saw her during hospital stay; Now off coreg;  Sees Dr. Guy Diamond    Breast cancer [facility])     Rt mastectomy 2/19/15    CAD (coronary artery disease)     Dr Denver Monroy    Diabetes [facility])     Now seeing Dr. Alison Obrien Diverticulitis     DJD (degenerative joint disease), lumbar     GERD (gastroesophageal reflux disease)     H. pylori infection 2008    Dr. Lady Islas attack [facility]) April 2006    Hypercholesterolemia     Hypertension     Morbid obesity ([facility] [address]     Neuropathy, arm 2009    Right; Has had MRI and EMG at [facility] [id number] SEN (obstructive sleep apnea)     uses CPAP    Rectal bleeding 2009    Hospitalized; Had colonoscopy and EGD (ok), gastric emptying study (normal), doppler mesenteric arteries (ok)    Sciatica     Has seen Dr. Juice Ferguson    Stroke [facility]) 2009 & 2012    no residual problems      Past Surgical History:   Procedure Laterality Date    HX APPENDECTOMY  1979    HX BREAST LUMPECTOMY  12/12/2013    RIGHT BREAST DUCTAL EXCISION performed by Raquel Norman MD at [facility] MAIN OR    HX CATARACT REMOVAL  2007    HX CHOLECYSTECTOMY  1979    HX COLONOSCOPY      HX HEART CATHETERIZATION      angioplasty    HX HYSTERECTOMY      fibroids    HX KNEE ARTHROSCOPY  1997    right    HX MASTECTOMY Right 2/19/2015    RIGHT BREAST MODIFIED RADICAL MASTECTOMY RIGHT SENTINEL NODE BIOPSY, RIGHT PORT A CATH INSERTION performed by Paulette Rosario MD at [facility] AMBULATORY OR    HX PACEMAKER      HX SHOULDER ARTHROSCOPY  2004    left     Social History   Substance Use Topics    Smoking status: Never Smoker    Smokeless tobacco: Never Used    Alcohol use No      Family History   Problem Relation Age of Onset    Stroke Father     Cancer Sister      breast cancer    Hypertension Sister     Cancer Mother      Unknown type    Cancer Brother      Colon    Hypertension Brother     Anesth Problems Neg Hx       Allergies   Allergen Reactions    Codeine Itching    Duratuss [Phenylephrine-Guaifenesin] Nausea and Vomiting    Lisinopril-Hydrochlorothiazide Itching    Motrin [Ibuprofen] Nausea and Vomiting     With 800mg    Tape [Adhesive] Other (comments)     TEARS HER SKIN        Prior to Admission medications    Medication Sig Start Date End Date Taking?  Authorizing Provider   insulin glargine (LANTUS) 100 unit/mL injection 18 units in the morning and 18 units at bedtime 8/18/17   Hazeline Dung MD Rebecca   insulin syringe-needle U-100 (BD INSULIN SYRINGE ULTRA-FINE) 1/2 mL 31 gauge x 15/64\" syrg 1 Syringe by SubCUTAneous route two (2) times a day. 8/18/17   Jaquan Chowdhury MD   VITAMIN D2 50,000 unit capsule take 1 capsule by mouth every 30 DAYS 8/16/17   Brandon Florence NP   hydrOXYzine HCl (ATARAX) 25 mg tablet Take 25 mg by mouth every eight (8) hours as needed for Itching. Historical Provider   morphine IR (MS IR) 15 mg tablet Take 1 Tab by mouth every four (4) hours as needed for Pain. Max Daily Amount: 90 mg. 8/11/17   Brandon Florence NP   morphine CR (MS CONTIN) 15 mg CR tablet Take 1 Tab by mouth every twelve (12) hours. Max Daily Amount: 30 mg. 8/11/17   Brandon Florence NP   megestrol (MEGACE) 400 mg/10 mL (10 mL) suspension Take 10 mL by mouth daily. 8/11/17   Brandon Florence NP   diclofenac (VOLTAREN) 1 % gel Apply 2 g to affected area four (4) times daily as needed (Pain). Historical Provider   dicyclomine (BENTYL) 20 mg tablet Take 1 Tab by mouth every six (6) hours as needed (abdominal cramps) for up to 20 doses. 6/10/17   Tamika Ferguson MD   ondansetron hcl (ZOFRAN, AS HYDROCHLORIDE,) 4 mg tablet Take 1 Tab by mouth every eight (8) hours as needed for Nausea. 6/10/17   Tamika Ferguson MD   atorvastatin (LIPITOR) 80 mg tablet Take 1 Tab by mouth daily. Indications: hypercholesterolemia 5/31/17   Trent Rivera MD   aspirin (ASPIRIN) 325 mg tablet Take 1 Tab by mouth daily. 5/31/17   Trent Rivera MD   ipratropium (ATROVENT) 0.06 % nasal spray instill 2 sprays into each nostril three times a day - IF NEEDED FOR RUNNING NOSE 3/15/17   Historical Provider   glipiZIDE (GLUCOTROL) 5 mg tablet Take 0.5 Tabs by mouth two (2) times a day. Take 1/2 every day with breakfast. If your blood sugar is above 200 take a whole tablet. 5/5/17   Jaquan Chowdhury MD   polyethylene glycol [facility]) 17 gram/dose powder Take 17 g by mouth two (2) times a day.  Continue while taking pain medication 3/8/17   Floyd Rich MD Russell   isosorbide mononitrate ER (IMDUR) 60 mg CR tablet Take  by mouth every morning. 6/6/16   Historical Provider   Blood Sugar Diagnostic, Disc (ASCENSIA BREEZE 2) strp Check your blood sugar twice daily. K72.58 6/20/16   Los Lozada MD   ondansetron (ZOFRAN ODT) 4 mg disintegrating tablet Take 1 Tab by mouth every eight (8) hours as needed for Nausea. 6/1/16   Darrin Anthony MD   albuterol (PROVENTIL VENTOLIN) 2.5 mg /3 mL (0.083 %) nebulizer solution 3 mL by Nebulization route every four (4) hours as needed for Wheezing. 9/20/15   Chantelle Garcia DO   MICROLET LANCET misc  12/16/14   Historical Provider   benazepril (LOTENSIN) 40 mg tablet Take 40 mg by mouth every morning. Historical Provider   omeprazole (PRILOSEC) 20 mg capsule Take 40 mg by mouth two (2) times a day. Todd Andersen MD   nitroglycerin (NITROSTAT) 0.4 mg SL tablet by SubLINGual route every five (5) minutes as needed for Chest Pain.     Todd Andersen MD   amlodipine (NORVASC) 10 mg tablet TAKE 1 TABLET BY MOUTH ONCE DAILY 4/9/11   Leanna Manning MD     Current Facility-Administered Medications   Medication Dose Route Frequency    prochlorperazine (COMPAZINE) tablet 10 mg  10 mg Oral Q6H PRN    morphine injection 2 mg  2 mg IntraVENous Q2H PRN    morphine injection 4 mg  4 mg IntraVENous Q2H PRN    norflurane-pentafluoropropane (PAIN EASE) topical spray 1 Spray  1 Spray Topical ONCE    ondansetron (ZOFRAN) injection 4 mg  4 mg IntraVENous Q6H PRN    prochlorperazine (COMPAZINE) with saline injection 10 mg  10 mg IntraVENous Q6H PRN    [START ON 9/14/2017] enoxaparin (LOVENOX) injection 30 mg  30 mg SubCUTAneous DAILY    insulin lispro (HUMALOG) injection   SubCUTAneous AC&HS    glucose chewable tablet 16 g  4 Tab Oral PRN    dextrose (D50W) injection syrg 12.5-25 g  12.5-25 g IntraVENous PRN    glucagon (GLUCAGEN) injection 1 mg  1 mg IntraMUSCular PRN    hydrALAZINE (APRESOLINE) 20 mg/mL injection 10 mg  10 mg IntraVENous Q4H PRN    famotidine (PF) (PEPCID) injection 20 mg  20 mg IntraVENous Q12H    [START ON 2017] aspirin (ASA) suppository 300 mg  300 mg Rectal DAILY    0.9% sodium chloride infusion  100 mL/hr IntraVENous CONTINUOUS      REVIEW OF SYSTEMS:  BOLD = POSITIVE; negative = normal text  General:  fever, chills, sweats, weakness, weight loss/gain  Eyes: blurred vision, eye pain, loss of vision, diplopia  Ear Nose and Throat: rhinorrhea, pharyngitis, otalgia, tinnitus, speech or swallowing difficulties  Respiratory: cough, sputum production, SOB, wheezing, WELCH, pleuritic pain  Cardiology:   chest pain, palpitations, orthopnea, PND, edema, syncope   Gastrointestinal: abdominal pain, N/V, dysphagia, change in bowel habits, bleeding  Genitourinary: frequency, urgency, dysuria, hematuria, incontinence  Muskuloskeletal : arthralgia, myalgia  Hematology:  easy bruising, bleeding, lymphadenopathy  Dermatological: rash, ulceration, mole change, new lesion  Endocrine: hot flashes or polydipsia  Neurological:  headache, dizziness, confusion, focal weakness, paresthesia, memory loss, gait disturbance  Psychological:  anxiety, depression, agitation    Objective:   VITALS:    Visit Vitals    /73    Pulse 60    Temp 98.5 °F (36.9 °C)    Resp 24    Wt 90.4 kg (199 lb 3.2 oz)    SpO2 99%    BMI 32.15 kg/m2     Temp (24hrs), Av.7 °F (37.1 °C), Min:98.5 °F (36.9 °C), Max:98.8 °F (37.1 °C)    PHYSICAL EXAM:    General:    Alert, overweight female, cooperative, vomited x 1 during interview appears stated age. HEENT: Atraumatic, anicteric sclerae, pale conjunctivae     No oral ulcers, mucosa moist, throat clear. Hearing intact. Neck:  Supple, symmetrical,  thyroid: non tender  Lungs:   Clear to auscultation bilaterally. No Wheezing or Rhonchi. No rales. Chest wall:  No tenderness  No Accessory muscle use. Heart:   Regular  rhythm,  No  murmur   No gallop.  Trace pitting edema lower legs  Abdomen: Obese, mildly distended, severe diffuse tender, + rebound, +bowel sounds. No masses  Extremities: No cyanosis. No clubbing  Skin:     Not pale Not Jaundiced  No rashes   Psych:  Not depressed. Not anxious or agitated. Neurologic: EOMs intact. No facial asymmetry. No aphasia or slurred speech. Symmetrical strength, Alert and oriented X 3.     ________________________________________________________________________  Care Plan discussed with:    Comments   Patient y    Family  y Daughter Teri over phone   RN y    Care Manager                    Consultant:      ________________________________________________________________________  TOTAL TIME:  45 minutes  ________________________________________________________________________  Fabiola Vidal MD      Procedures: see electronic medical records for all procedures/Xrays and details which were not copied into this note but were reviewed prior to creation of Plan. LAB DATA REVIEWED:    Recent Results (from the past 24 hour(s))   METABOLIC PANEL, COMPREHENSIVE    Collection Time: 09/13/17 12:01 PM   Result Value Ref Range    Sodium 138 136 - 145 mmol/L    Potassium 5.0 3.5 - 5.1 mmol/L    Chloride 111 (H) 97 - 108 mmol/L    CO2 16 (L) 21 - 32 mmol/L    Anion gap 11 5 - 15 mmol/L    Glucose 135 (H) 65 - 100 mg/dL    BUN 36 (H) 6 - 20 MG/DL    Creatinine 1.49 (H) 0.55 - 1.02 MG/DL    BUN/Creatinine ratio 24 (H) 12 - 20      GFR est AA 41 (L) >60 ml/min/1.73m2    GFR est non-AA 34 (L) >60 ml/min/1.73m2    Calcium 8.7 8.5 - 10.1 MG/DL    Bilirubin, total 0.4 0.2 - 1.0 MG/DL    ALT (SGPT) 12 12 - 78 U/L    AST (SGOT) 11 (L) 15 - 37 U/L    Alk.  phosphatase 509 (H) 45 - 117 U/L    Protein, total 7.4 6.4 - 8.2 g/dL    Albumin 3.1 (L) 3.5 - 5.0 g/dL    Globulin 4.3 (H) 2.0 - 4.0 g/dL    A-G Ratio 0.7 (L) 1.1 - 2.2     CBC WITH AUTOMATED DIFF    Collection Time: 09/13/17 12:01 PM   Result Value Ref Range    WBC 5.6 3.6 - 11.0 K/uL    RBC 3.76 (L) 3.80 - 5.20 M/uL    HGB 9.7 (L) 11.5 - 16.0 g/dL    HCT 28.3 (L) 35.0 - 47.0 %    MCV 75.3 (L) 80.0 - 99.0 FL    MCH 25.8 (L) 26.0 - 34.0 PG    MCHC 34.3 30.0 - 36.5 g/dL    RDW 21.4 (H) 11.5 - 14.5 %    PLATELET 099 632 - 450 K/uL    NEUTROPHILS 64 %    LYMPHOCYTES 22 %    MONOCYTES 13 %    EOSINOPHILS 0 %    BASOPHILS 0 %    METAMYELOCYTES 1 %    ABS. NEUTROPHILS 3.6 K/UL    ABS. LYMPHOCYTES 1.2 K/UL    ABS. MONOCYTES 0.7 K/UL    ABS. EOSINOPHILS 0.0 K/UL    ABS.  BASOPHILS 0.0 K/UL    RBC COMMENTS ANISOCYTOSIS  2+        RBC COMMENTS MICROCYTOSIS  PRESENT        RBC COMMENTS HYPOCHROMIA  PRESENT        DF MANUAL     TYPE & SCREEN    Collection Time: 09/13/17 12:01 PM   Result Value Ref Range    Crossmatch Expiration 09/16/2017     ABO/Rh(D) [place] POSITIVE     Antibody screen NEG    GLUCOSE, POC    Collection Time: 09/13/17 12:07 PM   Result Value Ref Range    Glucose (POC) 138 (H) 65 - 100 mg/dL    Performed by Tameka Ley    GLUCOSE, POC    Collection Time: 09/13/17  3:38 PM   Result Value Ref Range    Glucose (POC) 144 (H) 65 - 100 mg/dL    Performed by Elena Fuentes

## 2017-09-13 NOTE — IP AVS SNAPSHOT
[address] [facility] 
[phone] Patient: Yara Wolf MRN: [id number] KNS:[id number] You are allergic to the following Allergen Reactions Codeine Itching Duratuss (Phenylephrine-Guaifenesin) Nausea and Vomiting Lisinopril-Hydrochlorothiazide Itching Motrin (Ibuprofen) Nausea and Vomiting With 800mg Tape (Adhesive) Other (comments) TEARS HER SKIN Recent Documentation Height Weight BMI OB Status Smoking Status 1.676 m 87 kg 30.96 kg/m2 Hysterectomy Never Smoker Unresulted Labs Order Current Status TYPE & SCREEN Preliminary result Emergency Contacts Name Discharge Info Relation Home Work Mobile Deedee Zvaala DISCHARGE CAREGIVER [3] Daughter [21] [phone] Brian Felipe DISCHARGE CAREGIVER [3] Daughter [21] [phone] Deedee Weston  Child [2] [phone] About your hospitalization You were admitted on:  September 13, 2017 You last received care in the:  [facility] 2 GENERAL SURGERY You were discharged on:  September 27, 2017 Unit phone number:  [phone] Why you were hospitalized Your primary diagnosis was:  Not on File Your diagnoses also included:  Metastatic Breast Cancer (Hcc), Neoplastic Malignant Related Fatigue, 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), Cyclical Vomiting With Nausea, Localized Edema, Protein Calorie Malnutrition (Hcc) Providers Seen During Your Hospitalizations Provider Role Specialty Primary office phone Rocael Chiu MD Attending Provider Hematology and Oncology [phone] La Pulido MD Attending Provider General Surgery [phone] Your Primary Care Physician (PCP) Primary Care Physician Office Phone Office Fax Dafne Verma [phone] Follow-up Information Follow up With Details Comments Contact Info [id number] [address]O [facility]   [address] [place] [address] 
[phone] Maria M Bynum MD   [address] [address] 
[phone] Current Discharge Medication List  
  
START taking these medications Dose & Instructions Dispensing Information Comments Morning Noon Evening Bedtime HYDROcodone-acetaminophen 5-325 mg per tablet Commonly known as:  Marimar Julian Your last dose was: Your next dose is:    
   
   
 Dose:  1-2 Tab Take 1-2 Tabs by mouth every four (4) hours as needed for Pain. Max Daily Amount: 12 Tabs. Quantity:  30 Tab Refills:  0  
     
   
   
   
  
 L. acidoph & paracasei- S therm- Bifido 8 billion cell Cap cap Commonly known as:  JEREMIAH-Q/RISAQUAD Your last dose was: Your next dose is:    
   
   
 Dose:  1 Cap Take 1 Cap by mouth daily. Quantity:  30 Cap Refills:  0 Any substitution ok. CONTINUE these medications which have NOT CHANGED Dose & Instructions Dispensing Information Comments Morning Noon Evening Bedtime  
 albuterol 2.5 mg /3 mL (0.083 %) nebulizer solution Commonly known as:  PROVENTIL VENTOLIN Your last dose was: Your next dose is:    
   
   
 Dose:  2.5 mg  
3 mL by Nebulization route every four (4) hours as needed for Wheezing. Quantity:  24 Each Refills:  0  
     
   
   
   
  
 amLODIPine 10 mg tablet Commonly known as:  Remonia Grates Your last dose was: Your next dose is: TAKE 1 TABLET BY MOUTH ONCE DAILY Quantity:  30 Tab Refills:  10  
     
   
   
   
  
 aspirin 325 mg tablet Commonly known as:  ASPIRIN Your last dose was: Your next dose is:    
   
   
 Dose:  325 mg Take 1 Tab by mouth daily. Quantity:  30 Tab Refills:  0  
     
   
   
   
  
 atorvastatin 80 mg tablet Commonly known as:  LIPITOR Your last dose was: Your next dose is:    
   
   
 Dose:  80 mg Take 1 Tab by mouth daily. Indications: hypercholesterolemia Quantity:  30 Tab Refills:  0  
     
   
   
   
  
 benazepril 40 mg tablet Commonly known as:  LOTENSIN Your last dose was: Your next dose is:    
   
   
 Dose:  40 mg Take 40 mg by mouth every morning. Refills:  0 Blood Sugar Diagnostic, Disc Strp Commonly known as:  Ascensia Breeze 2 Your last dose was: Your next dose is:    
   
   
 Check your blood sugar twice daily. E11.42 Quantity:  100 Strip Refills:  6  
     
   
   
   
  
 diclofenac 1 % Gel Commonly known as:  VOLTAREN Your last dose was: Your next dose is:    
   
   
 Dose:  2 g Apply 2 g to affected area four (4) times daily as needed (Pain). Refills:  0 Applied to bilateral knees  
    
   
   
   
  
 dicyclomine 20 mg tablet Commonly known as:  BENTYL Your last dose was: Your next dose is:    
   
   
 Dose:  20 mg Take 1 Tab by mouth every six (6) hours as needed (abdominal cramps) for up to 20 doses. Quantity:  20 Tab Refills:  0  
     
   
   
   
  
 glipiZIDE 5 mg tablet Commonly known as:  Gaby Fuel Your last dose was: Your next dose is:    
   
   
 Dose:  2.5 mg Take 0.5 Tabs by mouth two (2) times a day. Take 1/2 every day with breakfast. If your blood sugar is above 200 take a whole tablet. Quantity:  30 Tab Refills:  3  
     
   
   
   
  
 hydrOXYzine HCl 25 mg tablet Commonly known as:  ATARAX Your last dose was: Your next dose is:    
   
   
 Dose:  25 mg Take 25 mg by mouth every eight (8) hours as needed for Itching. Refills:  0  
     
   
   
   
  
 insulin syringe-needle U-100 1/2 mL 31 gauge x 15/64\" Syrg Commonly known as:  BD INSULIN SYRINGE ULTRA-FINE Your last dose was: Your next dose is: Dose:  1 Syringe 1 Syringe by SubCUTAneous route two (2) times a day. Quantity:  100 Pen Needle Refills:  5  
     
   
   
   
  
 ipratropium 0.06 % nasal spray Commonly known as:  ATROVENT Your last dose was: Your next dose is:    
   
   
 instill 2 sprays into each nostril three times a day - IF NEEDED FOR RUNNING NOSE Refills:  0  
     
   
   
   
  
 isosorbide mononitrate ER 60 mg CR tablet Commonly known as:  IMDUR Your last dose was: Your next dose is: Take  by mouth every morning. Refills:  0  
     
   
   
   
  
 megestrol 400 mg/10 mL (10 mL) suspension Commonly known as:  MEGACE Your last dose was: Your next dose is:    
   
   
 Dose:  400 mg Take 10 mL by mouth daily. Quantity:  300 mL Refills:  2 [place] Generic drug:  Lancets Your last dose was: Your next dose is:    
   
   
  Refills:  0  
     
   
   
   
  
 NITROSTAT 0.4 mg SL tablet Generic drug:  nitroglycerin Your last dose was: Your next dose is:    
   
   
 by SubLINGual route every five (5) minutes as needed for Chest Pain. Refills:  0  
     
   
   
   
  
 omeprazole 20 mg capsule Commonly known as:  PRILOSEC Your last dose was: Your next dose is:    
   
   
 Dose:  40 mg Take 40 mg by mouth two (2) times a day. Refills:  0  
     
   
   
   
  
 ondansetron 4 mg disintegrating tablet Commonly known as:  ZOFRAN ODT Your last dose was: Your next dose is:    
   
   
 Dose:  4 mg Take 1 Tab by mouth every eight (8) hours as needed for Nausea. Quantity:  60 Tab Refills:  2  
     
   
   
   
  
 ondansetron hcl 4 mg tablet Commonly known as:  ZOFRAN (AS HYDROCHLORIDE) Your last dose was: Your next dose is:    
   
   
 Dose:  4 mg Take 1 Tab by mouth every eight (8) hours as needed for Nausea. Quantity:  20 Tab Refills:  0  
     
   
   
   
  
 VITAMIN D2 50,000 unit capsule Generic drug:  ergocalciferol Your last dose was: Your next dose is:    
   
   
 take 1 capsule by mouth every 30 DAYS Quantity:  3 Cap Refills:  2 STOP taking these medications   
 insulin glargine 100 unit/mL injection Commonly known as:  LANTUS  
   
  
 morphine CR 15 mg CR tablet Commonly known as:  MS CONTIN  
   
  
 morphine IR 15 mg tablet Commonly known as:  MS IR  
   
  
 polyethylene glycol 17 gram/dose powder Commonly known as:  Miroslava Cancel Where to Get Your Medications These medications were sent to [place] [address], [address], [address] [place] [id number] Phone:  [phone]  
  L. acidoph & paracasei- S therm- Bifido 8 billion cell Cap cap Information on where to get these meds will be given to you by the nurse or doctor. ! Ask your nurse or doctor about these medications HYDROcodone-acetaminophen 5-325 mg per tablet Discharge Instructions Discharge Instructions:  Dr. Ashlyn Em Call on next business day to arrange office appointment Monday or Tuesday of next week for staple removal -- [phone]. Activity: 
Walk regularly. No lifting more than 10 -15 pounds for 6 weeks. Light aerobic activity is okay when you feel up to it. You may resume driving in three days unless still requiring narcotics for pain. Diet: 
Boost or Ensure with all meals. And, Low-Residue Diet: 
 
When your bowels are inflamed, you need to limit bulky fiber (such as uncooked fruits and vegetables) in your diet until the the inflammation resolves. You should maintain a low-residue diet for one month. Suggestions: 
-Choose white or refined grains, and avoid whole grains. That means eating white or refined cereals, breads, crackers, rice, or pasta. -Peel the skin from fruits and vegetables before you eat or cook them. Avoid eating skins, seeds, and hulls. -You can eat frozen or canned fruit. Low-fiber fruits include applesauce, ripe bananas, and fruit juice without pulp. 
-Eat low-fiber vegetables, which include well-cooked vegetables and vegetable juice. 
-Drink or eat milk, yogurt, or other milk products, if you can digest dairy without too many problems.  
-Eat well-cooked meat, such as chicken, turkey, fish, or lean meat. You also can eat eggs and tofu. Avoid these foods: 
-Nuts, seeds, popcorn, granola, whole grains.   
-Bran, brown or wild rice, oatmeal, corn, monica crackers, barley, and whole wheat and other whole grain breads. 
-Cereals with more than 3 grams of fiber per serving. 
-Fresh and dried fruits including raisins.   
-Raw vegetables, and even some cooked vegetable including cabbage, broccoli, brussels sprouts, and cauliflower. 
-Beans, lentils, and split peas. -Crunchy peanut butter. Wound Care: No swimming or baths for 2 weeks. You should shower daily to decrease risk of wound infection. If you experience a lot of drainage, develop redness around the wound, or a fever over 101 F occurs please call the office. Medications: 
See attached \"Medication Reconciliation. \" 
 
Resume home medications as indicated on the Medical Reconciliation form. Discharge Orders None [facility] Announcement We are excited to announce that we are making your provider's discharge notes available to you in [employer]. You will see these notes when they are completed and signed by the physician that discharged you from your recent hospital stay. If you have any questions or concerns about any information you see in [employer], please call the Health Information Department where you were seen or reach out to your Primary Care Provider for more information about your plan of care. Introducing [facility]! Nelly Fields introduces [employer] patient portal. Now you can access parts of your medical record, email your doctor's office, and request medication refills online. 1. In your internet browser, go to [email or web address] 2. Click on the First Time User? Click Here link in the Sign In box. You will see the New Member Sign Up page. 3. Enter your [employer] Access Code exactly as it appears below. You will not need to use this code after youve completed the sign-up process. If you do not sign up before the expiration date, you must request a new code. · [employer] Access Code: [email or web address] Expires: 11/1/2017  3:52 PM 
 
4. Enter the last four digits of your Social Security Number (xxxx) and Date of Birth (mm/dd/yyyy) as indicated and click Submit. You will be taken to the next sign-up page. 5. Create a [employer] ID. This will be your [employer] login ID and cannot be changed, so think of one that is secure and easy to remember. 6. Create a [employer] password. You can change your password at any time. 7. Enter your Password Reset Question and Answer. This can be used at a later time if you forget your password. 8. Enter your e-mail address. You will receive e-mail notification when new information is available in [address] 9. Click Sign Up. You can now view and download portions of your medical record. 10. Click the Download Summary menu link to download a portable copy of your medical information. If you have questions, please visit the Frequently Asked Questions section of the [employer] website. Remember, [employer] is NOT to be used for urgent needs. For medical emergencies, dial 911. Now available from your iPhone and Android! General Information Please provide this summary of care documentation to your next provider. Patient Signature:  ____________________________________________________________ Date:  ____________________________________________________________  
  
Gwendloyn Finger Provider Signature:  ____________________________________________________________ Date:  ____________________________________________________________

## 2017-09-13 NOTE — H&P
History and Physical        Patient: Rachel Birch MRN: [id number]  SSN: [id number]    YOB: 1940  Age: 65 y.o. Sex: female        Reason for Admission:     Metastatic breast cancer/pain management      Subjective:      Rachel Birch is a 65 y.o. female with a diagnosis of locally advanced right sided breast cancer. She underwent a right mastectomy with axillary LN dissection on 01/19/2015. She received three cycles of adjuvant chemotherapy with TC and stopped therapy due to side effects. She has completed radiation on 08/13/2015. She has developed progressive anemia. She feels fatigued. She also has ongoing back pain. . A bone marrow biopsy confirmed the diagnosis of metastatic TNBC. She was admitted to the [facility] for uncontrolled pain and fatigue. She received RBC transfusion was discharged from the hospital. She is now receiving Abraxane and Keytruda. Ms. Silvester Holter complains of severe abdominal pain that is not relieved with current pain medications. She also reports bilateral lower extremity edema, constipation, and nausea. She is being admitted for pain management.        Review of Systems:    Constitutional: fatigue  Eyes: negative  Ears, Nose, Mouth, Throat, and Face: negative  Respiratory: negative  Cardiovascular: negative  Gastrointestinal: abdominal pain, nausea, constipation  Integument/chest wall: negative  Hematologic/Lymphatic: right sleeve in place for lymphadema  Musculoskeletal: back pain and joint pain   Neurological: negative      Past Medical History:   Diagnosis Date    Arrhythmia     bradycardia in 30's /pacemaker inserted    Arthritis     RT KNEE    Asthma Dx age 75    Bradycardia 2009    Cardiology saw her during hospital stay; Now off coreg;  Sees Dr. Nayeli Cuevas    Breast cancer [facility])     Rt mastectomy 2/19/15    CAD (coronary artery disease)     Dr Shashank Egan    Diabetes [facility])     Now seeing Dr. Dooley Hand Diverticulitis     MALU (degenerative joint disease), lumbar     GERD (gastroesophageal reflux disease)     H. pylori infection 2008    Dr. Rosa Parker attack [facility]) April 2006    Hypercholesterolemia     Hypertension     Morbid obesity ([address]     Neuropathy, arm 2009    Right; [facility] [id number] SEN (obstructive sleep apnea)     uses CPAP    Rectal bleeding 2009    Hospitalized; Had colonoscopy and EGD (ok), gastric emptying study (normal), doppler mesenteric arteries (ok)    Sciatica     Has seen Dr. Juan Carlos Hartmann    Stroke [facility]) 2009 & 2012    no residual problems     Past Surgical History:   Procedure Laterality Date    HX APPENDECTOMY  1979    HX BREAST LUMPECTOMY  12/12/2013    RIGHT BREAST DUCTAL EXCISION performed by Omayra Adrian MD at [facility] MAIN OR    HX CATARACT REMOVAL  2007    HX CHOLECYSTECTOMY  1979    HX COLONOSCOPY      HX HEART CATHETERIZATION      angioplasty    HX HYSTERECTOMY      fibroids    HX KNEE ARTHROSCOPY  1997    right    HX MASTECTOMY Right 2/19/2015    RIGHT BREAST MODIFIED RADICAL MASTECTOMY RIGHT SENTINEL NODE BIOPSY, RIGHT PORT A CATH INSERTION performed by Deshawn Adhikari MD at [facility] AMBULATORY OR    HX PACEMAKER      HX SHOULDER ARTHROSCOPY  2004    left      Family History   Problem Relation Age of Onset    Stroke Father     Cancer Sister      breast cancer    Hypertension Sister     Cancer Mother      Unknown type    Cancer Brother      Colon    Hypertension Brother     Anesth Problems Neg Hx      Social History   Substance Use Topics    Smoking status: Never Smoker    Smokeless tobacco: Never Used    Alcohol use No      Prior to Admission medications    Medication Sig Start Date End Date Taking?  Authorizing Provider   insulin glargine (LANTUS) 100 unit/mL injection 18 units in the morning and 18 units at bedtime 8/18/17   Mar Ching MD   insulin syringe-needle U-100 (BD INSULIN SYRINGE ULTRA-FINE) 1/2 mL 31 gauge x 15/64\" syrg 1 Syringe by SubCUTAneous route two (2) times a day. 8/18/17   Natalio Li MD   VITAMIN D2 50,000 unit capsule take 1 capsule by mouth every 30 DAYS 8/16/17   Manuel Caro NP   hydrOXYzine HCl (ATARAX) 25 mg tablet Take 25 mg by mouth every eight (8) hours as needed for Itching. Historical Provider   morphine IR (MS IR) 15 mg tablet Take 1 Tab by mouth every four (4) hours as needed for Pain. Max Daily Amount: 90 mg. 8/11/17   Manuel Caro NP   morphine CR (MS CONTIN) 15 mg CR tablet Take 1 Tab by mouth every twelve (12) hours. Max Daily Amount: 30 mg. 8/11/17   Manuel Caro NP   megestrol (MEGACE) 400 mg/10 mL (10 mL) suspension Take 10 mL by mouth daily. 8/11/17   Manuel Caro NP   diclofenac (VOLTAREN) 1 % gel Apply 2 g to affected area four (4) times daily as needed (Pain). Historical Provider   dicyclomine (BENTYL) 20 mg tablet Take 1 Tab by mouth every six (6) hours as needed (abdominal cramps) for up to 20 doses. 6/10/17   Demi Henley MD   ondansetron hcl (ZOFRAN, AS HYDROCHLORIDE,) 4 mg tablet Take 1 Tab by mouth every eight (8) hours as needed for Nausea. 6/10/17   Demi Henley MD   atorvastatin (LIPITOR) 80 mg tablet Take 1 Tab by mouth daily. Indications: hypercholesterolemia 5/31/17   Bea Bhatti MD   aspirin (ASPIRIN) 325 mg tablet Take 1 Tab by mouth daily. 5/31/17   Bea Bhatti MD   ipratropium (ATROVENT) 0.06 % nasal spray instill 2 sprays into each nostril three times a day - IF NEEDED FOR RUNNING NOSE 3/15/17   Historical Provider   glipiZIDE (GLUCOTROL) 5 mg tablet Take 0.5 Tabs by mouth two (2) times a day. Take 1/2 every day with breakfast. If your blood sugar is above 200 take a whole tablet. 5/5/17   Natalio Li MD   polyethylene glycol [facility]) 17 gram/dose powder Take 17 g by mouth two (2) times a day. Continue while taking pain medication 3/8/17   Clementine Cheadle, MD   isosorbide mononitrate ER (IMDUR) 60 mg CR tablet Take  by mouth every morning.  6/6/16   Historical Provider   Blood Sugar Diagnostic, Disc (ASCENSIA BREEZE 2) strp Check your blood sugar twice daily. Y61.95 6/20/16   Natalio Li MD   ondansetron (ZOFRAN ODT) 4 mg disintegrating tablet Take 1 Tab by mouth every eight (8) hours as needed for Nausea. 6/1/16   Clementine Cheadle, MD   albuterol (PROVENTIL VENTOLIN) 2.5 mg /3 mL (0.083 %) nebulizer solution 3 mL by Nebulization route every four (4) hours as needed for Wheezing. 9/20/15   Cecilia Kumar DO   MICROLET LANCET misc  12/16/14   Historical Provider   benazepril (LOTENSIN) 40 mg tablet Take 40 mg by mouth every morning. Historical Provider   omeprazole (PRILOSEC) 20 mg capsule Take 40 mg by mouth two (2) times a day. Todd Andersen MD   nitroglycerin (NITROSTAT) 0.4 mg SL tablet by SubLINGual route every five (5) minutes as needed for Chest Pain. Phys MD Ganesh   amlodipine (NORVASC) 10 mg tablet TAKE 1 TABLET BY MOUTH ONCE DAILY 4/9/11   Khushbu Chowdhury MD          Allergies   Allergen Reactions    Codeine Itching    Duratuss [Phenylephrine-Guaifenesin] Nausea and Vomiting    Lisinopril-Hydrochlorothiazide Itching    Motrin [Ibuprofen] Nausea and Vomiting     With 800mg    Tape [Adhesive] Other (comments)     TEARS HER SKIN         Objective:     Vitals:    09/13/17 1059   BP: 173/73   Pulse: 60   Resp: 24   Temp: 98.5 °F (36.9 °C)   SpO2: 99%   Weight: 199 lb 3.2 oz (90.4 kg)        Physical Exam:    GENERAL: alert, cooperative, obese  EYE: negative  LYMPHATIC: negative  THROAT & NECK: normal and no erythema or exudates noted.    LUNG: clear to auscultation bilaterally  HEART: regular rate and rhythm  ABDOMEN: soft, non-tender  EXTREMITIES: right arm edema - has sleeve in place, bilateral LE edema  SKIN: negative  NEUROLOGIC: negative        CT Results (most recent):    Results from Hospital Encounter encounter on 07/17/17   CT BX BONE MARROW NDL/TROCAR   Narrative CT-GUIDED BONE MARROW BIOPSY    : Elin Garcia, M.D. INDICATION:  Multiple myeloma. Thrombocytopenia. ESTIMATED BLOOD LOSS: None. COMPARISON:  None. FINDINGS:   The patient's relevant allergies, medications, laboratory results, and history  were reviewed. The patient was identified by name and date of birth. The  procedure, benefits, risks, and alternatives (including not having the  procedure) were discussed. All of the patient's questions were answered. Informed verbal and written consent was obtained. CT dose reduction was achieved  through use of a standardized protocol tailored for this examination and  automatic exposure control for dose modulation. The patient was evaluated and felt to be an appropriate candidate for conscious  sedation. Please see preprocedure assessment and nursing record for full  documentation. Sedation was achieved with Versed and Fentanyl, and continuous  monitoring was performed. The patient was positioned prone on the CT gantry. A focused CT was performed  over the posterior iliac bones. The skin site was marked. The skin was prepped  and draped using usual sterile technique. Skin and subcutaneous tissues were  anesthetized with buffered lidocaine. Under CT guidance, an 11-gauge core bone  biopsy needle was advanced into the left iliac bone. Approximately 5 cc of  marrow blood was aspirated. A single core biopsy was obtained from the lesion  and submitted to the on-site cytopathology technologist and sample adequacy  confirmed. The core biopsy measured 20 mm. There were no immediate  complications. The patient was recovered in the recovery room without incident. Postprocedure diagnosis: Multiple myeloma, thrombocytopenia. Impression IMPRESSION:  Successful CT guided bone marrow biopsy.             Lab Results   Component Value Date/Time    WBC 5.6 09/13/2017 12:01 PM    Hemoglobin (POC) 9.2 09/20/2015 05:25 PM    HGB 9.7 09/13/2017 12:01 PM    Hematocrit (POC) 27 09/20/2015 05:25 PM    HCT 28.3 09/13/2017 12:01 PM    PLATELET 214 47/90/7465 12:01 PM    MCV 75.3 09/13/2017 12:01 PM       Lab Results   Component Value Date/Time    Sodium 138 09/13/2017 12:01 PM    Potassium 5.0 09/13/2017 12:01 PM    Chloride 111 09/13/2017 12:01 PM    CO2 16 09/13/2017 12:01 PM    Anion gap 11 09/13/2017 12:01 PM    Glucose 135 09/13/2017 12:01 PM    BUN 36 09/13/2017 12:01 PM    Creatinine 1.49 09/13/2017 12:01 PM    BUN/Creatinine ratio 24 09/13/2017 12:01 PM    GFR est AA 41 09/13/2017 12:01 PM    GFR est non-AA 34 09/13/2017 12:01 PM    Calcium 8.7 09/13/2017 12:01 PM         Assessment: 1. Metastatic breast carcinoma - dx 7/17/2017     Bone marrow involvement  Diffuse skeletal metastasis    ECOG PS 2  Intent of treatment - palliative    Receiving palliative chemotherapy   Abraxane + Keytruda     Symptom management form reviewed with patient. 2. Right breast carcinoma: dx 11/17/2014  pT3 N3a M0 (Stage IIIC) infiltrating ductal carcinoma, Tumor size 7.5 cm, LN 17/19 +ve with extracapsular extension in 2 nodes, grade 3, ki 67 35%, ER -ve, [place] -ve, Her 2 -ve    ECOG PS 0  Intent of therapy - curative       S/P right sided mastectomy with axillary LN dissection  Received adjuvant chemotherapy   Taxotere, Cytoxan, s/p 3 Cycles    Therapy was discontinued due to severe side effects. Completed radiation to the chest wall and axilla  Lymphedema - followed by lymphedema clinic at [facility]      3. Abdominal pain    > Unresponsive to out-patient management  > Palliative Medicine consult  > Morphine 2-4 mg IV as needed      4. Anemia     From bone marrow involvement  Monitor      5. Constipation    > Abdominal XR   > Miralax daily  > Enema      6.  Nausea    > Zofran and compazine as needed        Plan:       > Admit for pain management  > Palliative medicine consult  > Hospitalist consult for diabetes management  > Morphine IV as needed  > Abdominal XR  > Enema  > Continue zofran and compazine as needed      Signed by: Brian Maza NP                     September 13, 2017         Attending Physician Note:     I have reviewed the history, physical examination, assessment and the plan of the care of the patient. I saw the patient with Jasper Multani and I participated in the examination and critical decision making. I agree with the note as stated above. Ms. Claire Singh is a lady with metastatic breast ca. She is admitted for pain control. She also is suffering with constipation. She feels poorly. Abdomen is slightly tender to palpate. No rebound or guarding. No cervical or axillary adenopathy. Chest is clear and heart sounds are regular. No focal neurological deficits.        Signed by: Ruthie Gomez MD                     September 14, 2017

## 2017-09-13 NOTE — PROGRESS NOTES
1500: pt complaining of constipation. Unable to eat d/t abdominal pain from constipation, then states she doesn't want to take pills d/t not having any food on stomach. MD in pts room, MD to order enema and abd xray. 1515: pt given fleets enema. Pt had large soft bowel movement after. Pt felt some relief of stomach discomfort after having BM and stated it was improving. 1540: pt continuing to have nausea, IV compazine administered. Instructed pt not to eat or drink anything until after abd XRay results and this RN communicates with MD regarding continuation of nausea. 1706: pt off unit at this time. Pt taken by transport via wheelchair to get XRay abd.   0092: pt back on floor. 1800: abd xray resulted. Called MD on call for [address], spoke with MD Yanelis Jaramillo, reviewed xray results showing concern for early obstruction. Verbal orders received to make NPO and see how pt feels after being kept NPO, stated if does not improve pt may need NG tube placed. 1812: MD Fletcher Aas hospitalist in to see pt at this time. 1855: asked pt about getting the NG tube placed, as per MD if pt refuses to chart and if not see how pt does with bowel rest. Pt stated she needs to speak with her family about making the decision. Pt states she can get it done but she needs to be \"numbed\" prior. 1925: Bedside and Verbal shift change report given to Althea Michael (oncoming nurse) by Tim Torres (offgoing nurse). Report given with SBAR, Kardex, Intake/Output, MAR and Recent Results.

## 2017-09-13 NOTE — CONSULTS
Palliative Medicine Consult  [place]: [phone] ([phone])    Patient Name: Gloria Hoskins  YOB: 1940    Date of Initial Consult: 9/13/17  Reason for Consult: Uncontrolled abdominal pain, advanced care planning  Requesting Provider: Javier Lucas NP   Primary Care Physician: Sandrita Tom MD      SUMMARY:   Gloria Hoskins is a 65 y.o. with a past history of DM, HTN arrhythmia, CAD, MI and strokes in 2009 and 2013,diverticulosis, SEN,  stage IIIC infiltrating ductal carcinoma of the right breast, s/p right mastectomy with axillary LN dissection, chemotherapy completed April 2015, XRT completed August 2015, bone marrow biopsy July 2017 showed bone marrow extensively involved by metastatic carcinoma, breast primary. She was admitted on 9/13/2017 from Dr. Princess Rock office with a diagnosis of metastatic breast cancer, anemia. Current medical issues leading to Palliative Medicine involvement include: symptom management. Per :   08/11/17  Morphine IR 15mg #60/10 day supply [facility]  08/11/17  Morphine ER 15mg  #60/30 day supply [facility]  08/02/17  Tramadol 50mg  #60/15 day supply [facility]  Pt has been getting different opioids from multiple providers in the past year for different pain. Psychosocial: pt lives with her daughter and Janis Schneider in [place], daughter drives her to appointments   PALLIATIVE DIAGNOSES:   1. Abdominal pain: pt seen multiple times (7/2/17 and 6/10/17 and 5/6/17 at [facility]) for abdominal pain) 7/13/17 gastric empty study normal, 7/2/17 abd CT neg for intestinal findings, 5/29/17 abd CT showed colonic diverticulosis but no evidence of acute diverticulitis, 3/14/17 abd CT diverticulosis without diverticulitis. (Note pt has had previous right colectomy, and gallbladder and appendix have been surgically removed). DDx: adhesions, gastroparesis, constipation, diverticulitis  2. Pain due to neoplasm (spine mets)  3. Fatigue   4. Nausea and vomiting  5.  Lymphedema right arm  6. Bilateral LE edema  7. Care decisions: not addressed today     PLAN:   1. Met with pt, obtained history (see below). Pt only c/o abdominal pain, when asked she stated that her back pain was under control. 2. KUB to r/o constipation or obstruction. 3. Bentyl 20mg q. 6 hours scheduled for abdominal cramps. 4. NPO if she continues to vomit  5. Agree with morphine 2-4mg IV prn.   6. Extensive chart review, labs, radiology, medication review. 7. Initial consult note routed to primary continuity provider  8. Communicated plan of care with: Palliative IDT, RN, Oncology NP Kathy       GOALS OF CARE / TREATMENT PREFERENCES:   [====Goals of Care====]  GOALS OF CARE:  Patient / health care proxy stated goals:     Pain management      TREATMENT PREFERENCES:   Code Status: Full Code    Advance Care Planning:  Advance Care Planning 5/30/2017   Patient's Healthcare Decision Maker is: Legal Next of Dimitry 68   Primary Decision Maker Name Rachel    Primary Decision Maker Phone Number [phone]   Primary Decision Maker Relationship to Patient Adult child   Secondary Decision Maker Name -   Confirm Advance Directive None   Patient Would Like to Complete Advance Directive -   Does the patient have other document types -       Other:    The palliative care team has discussed with patient / health care proxy about goals of care / treatment preferences for patient.  [====Goals of Care====]         HISTORY:     History obtained from: chart, patient    CHIEF COMPLAINT: abdominal pain    HPI/SUBJECTIVE:    The patient is:   [x] Verbal and participatory  [] Non-participatory due to:     (pt is poor historian)  Pt was directly admitted from Oncology office today for complaints of severe abdominal pain that has not responded to her current pain medications: Morphine ER 15mg bid, and Morphine IR 15mg q. 4 hours prn.  Pt reports to me that she has been experiencing diffuse, crampy, constant abdominal pain for \"months\", however, today she started vomiting. Endorses constipation, but states BM daily, LBM was \"normal\" yesterday. Asking for a suppository. Chart review shows:    9/1/17, 8/25/17, 8/24/17 seen in infusion clinic with c/o severe abdominal pain and given IV hydration. 7/2/17 pt seen in ED for generalized abdominal pain x 1 day with vomiting, following colonoscopy and endoscopy and biopsy 5 days prior; first stated pain started the day after her biopsy, then stated pain started 7/2/17, then stated she \"has been having problems with stomach pain for 3 months\" but the pain that day (7/2) was different. She had a normal BM on 7/2. She takes Norco for pain. abd CT neg. No etiology found. 6/10/17 seen in ED diffuse 10/10 abd pain radiating to back x ~2 weeks. Pt also reports chills, vomiting, and increased urinary frequency due to diuretics. Abd pain is constant and she reports experiencing abd \"contractions\" every 7-8 minutes. . Pt reports being diagnosed with diverticulitis via CT scan at [facility] on 5/6/17 and was placed on Flagyl and Keflex. Abdominal CT showed colonic diverticulosis. 1/26/17; seen in ED cough and SOB. 10/17/16: pt reports [facility] admission for CP with no abnormal findings. 9/18/16: admitted to [facility] for atypical CP 10/10 (left retrosternal) and abd pain. No abnormal findings. 6/30/16: ED visit for GLF; right knee gave out, c/o 10/10 right sided head, shoulder, lower back, knee, and hip pain. All xrays neg.       Clinical Pain Assessment (nonverbal scale for severity on nonverbal patients):   [++++ Clinical Pain Assessment++++]  [++++Pain Severity++++]: Pain: 10  [++++Pain Character++++]: crampy  [++++Pain Duration++++]:  months  [++++Pain Effect++++]: nausea and vomiting  [++++Pain Factors++++]:   [++++Pain Frequency++++]: constant  [++++Pain Location++++]:   Diffuse abdomen  [++++ Clinical Pain Assessment++++]  Duration: for how long has pt been experiencing pain (e.g., 2 days, 1 month, years)  Frequency: how often pain is an issue (e.g., several times per day, once every few days, constant)     FUNCTIONAL ASSESSMENT:     Palliative Performance Scale (PPS):  PPS: 30       PSYCHOSOCIAL/SPIRITUAL SCREENING:     Advance Care Planning:  Advance Care Planning 5/30/2017   Patient's Healthcare Decision Maker is: Legal Next of Dimitry Cancino   Primary Decision Maker Name Herman Marie    Primary Decision Maker Phone Number [phone]   Primary Decision Maker Relationship to Patient Adult child   Secondary Decision Maker Name -   Confirm Advance Directive None   Patient Would Like to Complete Advance Directive -   Does the patient have other document types -        Any spiritual / Taoist concerns:  [] Yes /  [x] No    Caregiver Burnout:  [] Yes /  [] No /  [x] No Caregiver Present      Anticipatory grief assessment:   [x] Normal  / [] Maladaptive       ESAS Anxiety: Anxiety: 0    ESAS Depression: Depression: 3        REVIEW OF SYSTEMS:     Positive and pertinent negative findings in ROS are noted above in HPI. The following systems were [x] reviewed / [] unable to be reviewed as noted in HPI  Other findings are noted below. Systems: constitutional, ears/nose/mouth/throat, respiratory, gastrointestinal, genitourinary, musculoskeletal, integumentary, neurologic, psychiatric, endocrine. Positive findings noted below. Modified ESAS Completed by: provider   Fatigue: 8 Drowsiness: 0   Depression: 3 Pain: 10   Anxiety: 0 Nausea: 10   Anorexia: 8 Dyspnea: 0                    PHYSICAL EXAM:     From RN flowsheet:  Wt Readings from Last 3 Encounters:   09/13/17 199 lb 3.2 oz (90.4 kg)   09/13/17 199 lb (90.3 kg)   09/06/17 194 lb 7 oz (88.2 kg)     Blood pressure 173/73, pulse 60, temperature 98.5 °F (36.9 °C), resp. rate 24, weight 199 lb 3.2 oz (90.4 kg), SpO2 99 %.                          Last bowel movement, if known:     Constitutional:WD, WN, lying in bed with eyes closed  Eyes: pupils equal, anicteric  ENMT: no nasal discharge, moist mucous membranes  Cardiovascular: regular rhythm, distal pulses intact  Respiratory: breathing not labored, symmetric  Gastrointestinal: soft  tender, hypo bowel sounds, large well healed scar RUQ from cholecystectomy, scar RLQ from colectomy  Musculoskeletal: no deformity, no tenderness to palpation  Skin: warm, dry  Neurologic: following commands, moving all extremities  Psychiatric: depressed affect, no hallucinations          HISTORY:     Active Problems:    Metastatic breast cancer ([facility] [address] (8/11/2017)      Past Medical History:   Diagnosis Date    Arrhythmia     bradycardia in 30's /pacemaker inserted    Arthritis     RT KNEE    Asthma Dx age 75    Bradycardia 2009    Cardiology saw her during hospital stay; Now off coreg;  Sees Dr. Chávez Sit [facility])     Rt mastectomy 2/19/15    CAD (coronary artery disease)     Dr Álvaro Lopez Diabetes [facility])     Now seeing Dr. Giovana Cheng Diverticulitis     DJD (degenerative joint disease), lumbar     GERD (gastroesophageal reflux disease)     H. pylori infection 2008    Dr. Asya Aldana attack [facility]) April 2006    Hypercholesterolemia     Hypertension     Morbid obesity ([facility] [address]     Neuropathy, arm 2009    Right; Has had MRI and EMG at [facility]    SEN (obstructive sleep apnea)     uses CPAP    Rectal bleeding 2009    Hospitalized;  Had colonoscopy and EGD (ok), gastric emptying study (normal), doppler mesenteric arteries (ok)    Sciatica     Has seen Dr. Jamar Pompa    Stroke [facility]) 2009 & 2012    no residual problems      Past Surgical History:   Procedure Laterality Date    HX APPENDECTOMY  1979    HX BREAST LUMPECTOMY  12/12/2013    RIGHT BREAST DUCTAL EXCISION performed by [facility] at [facility] MA[facility]    HX CATARACT REMOVAL  2007    HX CHOLECYSTECTOMY  1979    HX COLONOSCOPY      HX HEART CATHETERIZATION      angioplasty    HX HYSTERECTOMY      fibroids    HX KNEE ARTHROSCOPY  1997    right  HX MASTECTOMY Right 2/19/2015    RIGHT BREAST MODIFIED RADICAL MASTECTOMY RIGHT SENTINEL NODE BIOPSY, RIGHT PORT A CATH INSERTION performed by Zan Mancuso MD at [facility] AM[facility]UL[facility]ORY OR    HX PACEMAKER      HX SHOULDER ARTHROSCOPY  2004    left      Family History   Problem Relation Age of Onset    Stroke Father     Cancer Sister      breast cancer    Hypertension Sister     Cancer Mother      Unknown type    Cancer Brother      Colon    Hypertension Brother     Anesth Problems Neg Hx       History reviewed, no pertinent family history.   Social History   Substance Use Topics    Smoking status: Never Smoker    Smokeless tobacco: Never Used    Alcohol use No     Allergies   Allergen Reactions    Codeine Itching    Duratuss [Phenylephrine-Guaifenesin] Nausea and Vomiting    Lisinopril-Hydrochlorothiazide Itching    Motrin [Ibuprofen] Nausea and Vomiting     With 800mg    Tape [Adhesive] Other (comments)     TEARS HER SKIN      Current Facility-Administered Medications   Medication Dose Route Frequency    prochlorperazine (COMPAZINE) tablet 10 mg  10 mg Oral Q6H PRN    morphine injection 2 mg  2 mg IntraVENous Q2H PRN    morphine injection 4 mg  4 mg IntraVENous Q2H PRN    enoxaparin (LOVENOX) injection 40 mg  40 mg SubCUTAneous DAILY    aspirin (ASPIRIN) tablet 325 mg  325 mg Oral DAILY    atorvastatin (LIPITOR) tablet 80 mg  80 mg Oral DAILY    glipiZIDE (GLUCOTROL) tablet 2.5 mg  2.5 mg Oral BID    polyethylene glycol (MIRALAX) packet 17 g  17 g Oral Q12H    pantoprazole (PROTONIX) tablet 40 mg  40 mg Oral Q12H    norflurane-pentafluoropropane (PAIN EASE) topical spray 1 Spray  1 Spray Topical ONCE    isosorbide mononitrate ER (IMDUR) tablet 60 mg  60 mg Oral 7am    [START ON 9/14/2017] amLODIPine (NORVASC) tablet 10 mg  10 mg Oral DAILY    insulin glargine (LANTUS) injection 18 Units  18 Units SubCUTAneous QHS    [START ON 9/14/2017] insulin glargine (LANTUS) injection 18 Units  18 Units SubCUTAneous ACB    insulin lispro (HUMALOG) injection   SubCUTAneous AC&HS    glucose chewable tablet 16 g  4 Tab Oral PRN    dextrose (D50W) injection syrg 12.5-25 g  12.5-25 g IntraVENous PRN    glucagon (GLUCAGEN) injection 1 mg  1 mg IntraMUSCular PRN    ondansetron (ZOFRAN) injection 4 mg  4 mg IntraVENous Q6H PRN    prochlorperazine (COMPAZINE) with saline injection 10 mg  10 mg IntraVENous Q6H PRN    sodium phosphate (FLEET'S) enema 118 mL  1 Enema Rectal NOW    dicyclomine (BENTYL) tablet 20 mg  20 mg Oral AC&HS          LAB AND IMAGING FINDINGS:     Lab Results   Component Value Date/Time    WBC 5.6 09/13/2017 12:01 PM    HGB 9.7 09/13/2017 12:01 PM    PLATELET 212 64/40/6468 12:01 PM     Lab Results   Component Value Date/Time    Sodium 138 09/13/2017 12:01 PM    Potassium 5.0 09/13/2017 12:01 PM    Chloride 111 09/13/2017 12:01 PM    CO2 16 09/13/2017 12:01 PM    BUN 36 09/13/2017 12:01 PM    Creatinine 1.49 09/13/2017 12:01 PM    Calcium 8.7 09/13/2017 12:01 PM    Magnesium 2.1 06/10/2017 02:56 PM      Lab Results   Component Value Date/Time    AST (SGOT) 11 09/13/2017 12:01 PM    Alk.  phosphatase 509 09/13/2017 12:01 PM    Protein, total 7.4 09/13/2017 12:01 PM    Albumin 3.1 09/13/2017 12:01 PM    Globulin 4.3 09/13/2017 12:01 PM     Lab Results   Component Value Date/Time    INR 1.1 06/10/2017 02:56 PM    Prothrombin time 10.8 06/10/2017 02:56 PM    aPTT 33.2 07/03/2010 09:00 AM      Lab Results   Component Value Date/Time    Iron 42 05/11/2015 05:00 AM    TIBC 215 05/11/2015 05:00 AM    Iron % saturation 20 05/11/2015 05:00 AM    Ferritin 417 05/11/2015 05:00 AM      No results found for: PH, PCO2, PO2  No components found for: [place]   Lab Results   Component Value Date/Time     01/26/2017 01:16 PM    CK - MB 2.3 01/26/2017 01:16 PM                Total time: 90 min  Counseling / coordination time, spent as noted above: 75 min  > 50% counseling / coordination?: yes    Prolonged service was provided for  []30 min   []75 min in face to face time in the presence of the patient, spent as noted above. Time Start:   Time End:   Note: this can only be billed with 01070 (initial) or 96828 (follow up). If multiple start / stop times, list each separately.

## 2017-09-14 NOTE — H&P
Progress Note        Patient: Renea Cho MRN: [id number]  SSN: [id number]    YOB: 1940  Age: 65 y.o. Sex: female        Reason for Admission:     Metastatic breast cancer/pain management    Subjective:      Renea Cho is a 65 y.o. female with a diagnosis of locally advanced right sided breast cancer. She underwent a right mastectomy with axillary LN dissection on 01/19/2015. She received three cycles of adjuvant chemotherapy with TC and stopped therapy due to side effects. She has completed radiation on 08/13/2015. She has developed progressive anemia. She feels fatigued. She also has ongoing back pain. . A bone marrow biopsy confirmed the diagnosis of metastatic TNBC. She was admitted to the [facility] [place] for uncontrolled pain and fatigue. She received RBC transfusion was discharged from the hospital. She is now receiving Abraxane and Keytruda. Ms. Nick Davis was admitted yesterday for abdominal pain with nausea. A Abdominal XRay showed a partial bowel obstruction. An NG was placed for decompression. She says she is feeling better today.      Review of Systems:    Constitutional: fatigue  Eyes: negative  Ears, Nose, Mouth, Throat, and Face: negative  Respiratory: negative  Cardiovascular: negative  Gastrointestinal: abdominal pain and nausea  Integument/chest wall: negative  Hematologic/Lymphatic: right sleeve in place for lymphadema  Musculoskeletal: back pain and joint pain   Neurological: negative      Past Medical History:   Diagnosis Date    Arrhythmia     bradycardia in 30's /pacemaker inserted    Arthritis     RT KNEE    Asthma Dx age 75    Bradycardia 2009    Cardiology saw her during hospital stay; Now off coreg;  Sees Dr. Jewels Fletcher    Breast cancer [facility])     Rt mastectomy 2/19/15    CAD (coronary artery disease)     Dr Lucas Taveras Diabetes [facility])     Now seeing Dr. Don Ray Diverticulitis     DJD (degenerative joint disease), lumbar     GERD (gastroesophageal reflux disease)     H. pylori infection 2008    Dr. Michael Ruiz attack [facility]) April 2006    Hypercholesterolemia     Hypertension     Morbid obesity ([address]     Neuropathy, arm 2009    Right; [facility] [phone] SEN (obstructive sleep apnea)     uses CPAP    Rectal bleeding 2009    Hospitalized; Had colonoscopy and EGD (ok), gastric emptying study (normal), doppler mesenteric arteries (ok)    Sciatica     Has seen Dr. Mcconnell Agent    Stroke [facility]) 2009 & 2012    no residual problems     Past Surgical History:   Procedure Laterality Date    HX APPENDECTOMY  1979    HX BREAST LUMPECTOMY  12/12/2013    RIGHT BREAST DUCTAL EXCISION performed by Rickie Diego MD at [facility] MA[facility] OR    HX CATARACT REMOVAL  2007    HX CHOLECYSTECTOMY  1979    HX COLONOSCOPY      HX HEART CATHETERIZATION      angioplasty    HX HYSTERECTOMY      fibroids    HX KNEE ARTHROSCOPY  1997    right    HX MASTECTOMY Right 2/19/2015    RIGHT BREAST MODIFIED RADICAL MASTECTOMY RIGHT SENTINEL NODE BIOPSY, RIGHT PORT A CATH INSERTION performed by Claudio Brittle, MD at [facility] AMBULATORY OR    HX PACEMAKER      HX SHOULDER ARTHROSCOPY  2004    left      Family History   Problem Relation Age of Onset    Stroke Father     Cancer Sister      breast cancer    Hypertension Sister     Cancer Mother      Unknown type    Cancer Brother      Colon    Hypertension Brother     Anesth Problems Neg Hx      Social History   Substance Use Topics    Smoking status: Never Smoker    Smokeless tobacco: Never Used    Alcohol use No      Prior to Admission medications    Medication Sig Start Date End Date Taking? Authorizing Provider   insulin glargine (LANTUS) 100 unit/mL injection 18 units in the morning and 18 units at bedtime 8/18/17   Bradley Dubois MD   insulin syringe-needle U-100 (BD INSULIN SYRINGE ULTRA-FINE) 1/2 mL 31 gauge x 15/64\" syrg 1 Syringe by SubCUTAneous route two (2) times a day.  8/18/17 Hilda Gonzalez MD   VITAMIN D2 50,000 unit capsule take 1 capsule by mouth every 30 DAYS 8/16/17   Augustine Spears NP   hydrOXYzine HCl (ATARAX) 25 mg tablet Take 25 mg by mouth every eight (8) hours as needed for Itching. Historical Provider   morphine IR (MS IR) 15 mg tablet Take 1 Tab by mouth every four (4) hours as needed for Pain. Max Daily Amount: 90 mg. 8/11/17   Augustine Spears NP   morphine CR (MS CONTIN) 15 mg CR tablet Take 1 Tab by mouth every twelve (12) hours. Max Daily Amount: 30 mg. 8/11/17   Augustine Spears NP   megestrol (MEGACE) 400 mg/10 mL (10 mL) suspension Take 10 mL by mouth daily. 8/11/17   Augustine Spears NP   diclofenac (VOLTAREN) 1 % gel Apply 2 g to affected area four (4) times daily as needed (Pain). Historical Provider   dicyclomine (BENTYL) 20 mg tablet Take 1 Tab by mouth every six (6) hours as needed (abdominal cramps) for up to 20 doses. 6/10/17   Maral Kerns MD   ondansetron hcl (ZOFRAN, AS HYDROCHLORIDE,) 4 mg tablet Take 1 Tab by mouth every eight (8) hours as needed for Nausea. 6/10/17   Maral Kerns MD   atorvastatin (LIPITOR) 80 mg tablet Take 1 Tab by mouth daily. Indications: hypercholesterolemia 5/31/17   Samaria Hyde MD   aspirin (ASPIRIN) 325 mg tablet Take 1 Tab by mouth daily. 5/31/17   Samaria Hyde MD   ipratropium (ATROVENT) 0.06 % nasal spray instill 2 sprays into each nostril three times a day - IF NEEDED FOR RUNNING NOSE 3/15/17   Historical Provider   glipiZIDE (GLUCOTROL) 5 mg tablet Take 0.5 Tabs by mouth two (2) times a day. Take 1/2 every day with breakfast. If your blood sugar is above 200 take a whole tablet. 5/5/17   Hilda Gonzalez MD   polyethylene glycol [facility]) 17 gram/dose powder Take 17 g by mouth two (2) times a day. Continue while taking pain medication 3/8/17   Marylu France MD   isosorbide mononitrate ER (IMDUR) 60 mg CR tablet Take  by mouth every morning.  6/6/16   Historical Provider   Blood Sugar Diagnostic, Disc (ASCENSIA BREEZE 2) strp Check your blood sugar twice daily. C13.04 6/20/16   Ritchie Morgan MD   ondansetron (ZOFRAN ODT) 4 mg disintegrating tablet Take 1 Tab by mouth every eight (8) hours as needed for Nausea. 6/1/16   Starr Weiss MD   albuterol (PROVENTIL VENTOLIN) 2.5 mg /3 mL (0.083 %) nebulizer solution 3 mL by Nebulization route every four (4) hours as needed for Wheezing. 9/20/15   Eliana Minium, DO   MICROLET LANCET misc  12/16/14   Historical Provider   benazepril (LOTENSIN) 40 mg tablet Take 40 mg by mouth every morning. Historical Provider   omeprazole (PRILOSEC) 20 mg capsule Take 40 mg by mouth two (2) times a day. Todd Andersen MD   nitroglycerin (NITROSTAT) 0.4 mg SL tablet by SubLINGual route every five (5) minutes as needed for Chest Pain. Todd Andersen MD   amlodipine (NORVASC) 10 mg tablet TAKE 1 TABLET BY MOUTH ONCE DAILY 4/9/11   Salma Valenzuela MD          Allergies   Allergen Reactions    Codeine Itching    Duratuss [Phenylephrine-Guaifenesin] Nausea and Vomiting    Lisinopril-Hydrochlorothiazide Itching    Motrin [Ibuprofen] Nausea and Vomiting     With 800mg    Tape [Adhesive] Other (comments)     TEARS HER SKIN         Objective:     Vitals:    09/14/17 0111 09/14/17 0830 09/14/17 1330 09/14/17 1554   BP: 126/69 97/56 (!) 76/62 91/62   Pulse: 60 (!) 59 60 69   Resp: 17 18 18    Temp: 97.7 °F (36.5 °C) 97.7 °F (36.5 °C) 97.3 °F (36.3 °C)    SpO2: 100% 99% 100%    Weight:            Physical Exam:    GENERAL: alert, cooperative, obese  EYE: negative  LYMPHATIC: negative  THROAT & NECK: normal and no erythema or exudates noted.    LUNG: clear to auscultation bilaterally  HEART: regular rate and rhythm  ABDOMEN: soft, non-tender  EXTREMITIES: right arm edema - has sleeve in place, bilateral LE edema  SKIN: negative  NEUROLOGIC: negative        CT Results (most recent):    Results from Hospital Encounter encounter on 07/17/17   CT BX BONE MARROW NDL/TROCAR   Narrative CT-GUIDED BONE MARROW BIOPSY    : Javier Hill M.D. INDICATION:  Multiple myeloma. Thrombocytopenia. ESTIMATED BLOOD LOSS: None. COMPARISON:  None. FINDINGS:   The patient's relevant allergies, medications, laboratory results, and history  were reviewed. The patient was identified by name and date of birth. The  procedure, benefits, risks, and alternatives (including not having the  procedure) were discussed. All of the patient's questions were answered. Informed verbal and written consent was obtained. CT dose reduction was achieved  through use of a standardized protocol tailored for this examination and  automatic exposure control for dose modulation. The patient was evaluated and felt to be an appropriate candidate for conscious  sedation. Please see preprocedure assessment and nursing record for full  documentation. Sedation was achieved with Versed and Fentanyl, and continuous  monitoring was performed. The patient was positioned prone on the CT gantry. A focused CT was performed  over the posterior iliac bones. The skin site was marked. The skin was prepped  and draped using usual sterile technique. Skin and subcutaneous tissues were  anesthetized with buffered lidocaine. Under CT guidance, an 11-gauge core bone  biopsy needle was advanced into the left iliac bone. Approximately 5 cc of  marrow blood was aspirated. A single core biopsy was obtained from the lesion  and submitted to the on-site cytopathology technologist and sample adequacy  confirmed. The core biopsy measured 20 mm. There were no immediate  complications. The patient was recovered in the recovery room without incident. Postprocedure diagnosis: Multiple myeloma, thrombocytopenia. Impression IMPRESSION:  Successful CT guided bone marrow biopsy.             Lab Results   Component Value Date/Time    WBC 6.2 09/14/2017 05:55 AM    Hemoglobin (POC) 9.2 09/20/2015 05:25 PM    HGB 9.9 09/14/2017 05:55 AM    Hematocrit (POC) 27 09/20/2015 05:25 PM    HCT 28.8 09/14/2017 05:55 AM    PLATELET 460 06/57/4026 05:55 AM    MCV 75.2 09/14/2017 05:55 AM       Lab Results   Component Value Date/Time    Sodium 138 09/14/2017 05:55 AM    Potassium 5.1 09/14/2017 05:55 AM    Chloride 114 09/14/2017 05:55 AM    CO2 14 09/14/2017 05:55 AM    Anion gap 10 09/14/2017 05:55 AM    Glucose 95 09/14/2017 05:55 AM    BUN 40 09/14/2017 05:55 AM    Creatinine 1.79 09/14/2017 05:55 AM    BUN/Creatinine ratio 22 09/14/2017 05:55 AM    GFR est AA 33 09/14/2017 05:55 AM    GFR est non-AA 27 09/14/2017 05:55 AM    Calcium 7.0 09/14/2017 05:55 AM         Assessment:     1. Abdominal pain with probable ileus    > managed by Hospitalist   > NG in place  > nausea medication  > fluids      2. Metastatic breast carcinoma - dx 7/17/2017     Bone marrow involvement  Diffuse skeletal metastasis    ECOG PS 2  Intent of treatment - palliative    Receiving palliative chemotherapy   Abraxane + Keytruda     Symptom management form reviewed with patient. 3. Right breast carcinoma: dx 11/17/2014  pT3 N3a M0 (Stage IIIC) infiltrating ductal carcinoma, Tumor size 7.5 cm, LN 17/19 +ve with extracapsular extension in 2 nodes, grade 3, ki 67 35%, ER -ve, IL -ve, Her 2 -ve    ECOG PS 0  Intent of therapy - curative       S/P right sided mastectomy with axillary LN dissection  Received adjuvant chemotherapy   Taxotere, Cytoxan, s/p 3 Cycles    Therapy was discontinued due to severe side effects. Completed radiation to the chest wall and axilla  Lymphedema - [facility]      4. Anemia     From bone marrow involvement  Monitor      6. Nausea    > NG in place      Plan:       1. Management of ileus per Hospitalist  2. Pain control  3.  Supportive care      Signed by: Edgar Guerra NP                     September 14, 2017        Attending Physician Note:     I have reviewed the history, physical examination, assessment and the plan of the care of the patient. I saw the patient with Severiano Krill and I participated in the examination and critical decision making. I agree with the note as stated above. Ms. Carie Brenner is a women with metastatic breast ca. She is admitted with small bowel ileus. Pain is manageable today.         Signed by: Shwetha Saravia MD                     September 15, 2017

## 2017-09-14 NOTE — PROGRESS NOTES
Hospitalist Progress Note    NAME: Alfredito Thomas   :  1940   MRN:  [id number]     Interim Hospital Summary: 65 y.o. female whom presented on 2017 with      Assessment / Plan:    JULES  NAGMA  -pre renal from third spacing and fluid loss via NG  -increase rate and change IVF to bicarb. Recheck labs in AM    Ileus vs partial SBO  Abdominal pain  Hx diverticulitis s/p partial colectomy 2008  S/p open cholecystectomy  -noted large NG output. She feels better.    -Will leave NG to LIWS.   KUB looks better. Hold on CT, probably ileus. Wait for output to taper off then clamp tube    DM type 2  -Stop glipizide, per Dr. Nette Girard note and patient, she takes only if BS >200.  -restart some lantus with SSI prn     Right breast CA dx  with bone mets dx   Right arm lymphedema  -pT3 N3a M0 (Stage IIIC) infiltrating ductal carcinoma, s/p right mastectomy, XRT  -Receiving palliative chemotherapy Abraxane + Keytruda     HTN  CAD  Hx right hemispheric CVA   Chronic diastolic chf EF 13-93%  Pacemaker for bradycardia  -hold norvasc, imdur, lipitor due to npo. Nitroglycerin past or hydralazine IV prn SBP >170.  -change aspirin to suppository  -not currently on bumex, given IVF last week per patient     SEN  -CPAP qhs     Chronic microcytic anemia      Hx PE   Chronic back pain due to bone mets  Obesity  Body mass index is 32.15 kg/(m^2). Code status: Full  Prophylaxis: Lovenox         Subjective:     Chief Complaint / Reason for Physician Visit  Follow up of abdominal pain, metastatic cancer and ileus  Chart reviewed in detail. Discussed with RN events overnight. Feels better. NG putting out a lot this morning.   Already 2.5 liters     Review of Systems:  Symptom Y/N Comments  Symptom Y/N Comments   Fever/Chills    Chest Pain     Poor Appetite    Edema     Cough    Abdominal Pain y    Sputum    Joint Pain     SOB/WELCH    Pruritis/Rash Nausea/vomit    Tolerating PT/OT     Diarrhea    Tolerating Diet     Constipation    Other       Could NOT obtain due to:      PO intake: No data found. Objective:     VITALS:   Last 24hrs VS reviewed since prior progress note. Most recent are:  Patient Vitals for the past 24 hrs:   Temp Pulse Resp BP SpO2   09/14/17 0830 97.7 °F (36.5 °C) (!) 59 18 97/56 99 %   09/14/17 0111 97.7 °F (36.5 °C) 60 17 126/69 100 %   09/13/17 2006 97.9 °F (36.6 °C) 62 18 138/79 -       Intake/Output Summary (Last 24 hours) at 09/14/17 1217  Last data filed at 09/14/17 1213   Gross per 24 hour   Intake             1720 ml   Output             4020 ml   Net            -2300 ml        PHYSICAL EXAM:  General: WD, WN. Alert, cooperative, no acute distress    EENT:  EOMI. Anicteric sclerae. MMM  Resp:  CTA bilaterally, no wheezing or rales. No accessory muscle use  CV:  Regular  rhythm,  No edema  GI:  Slightly distended, mild diffuse tenderness.  +Bowel sounds  Neurologic:  Alert and oriented X 3, normal speech,   Psych:   Fair insight. Not anxious nor agitated  Skin:  No rashes. No jaundice    Reviewed most current lab test results and cultures  YES  Reviewed most current radiology test results   YES  Review and summation of old records today    NO  Reviewed patient's current orders and MAR    YES  PMH/ reviewed - no change compared to H&P  ________________________________________________________________________  Care Plan discussed with:    Comments   Patient x    Family      RN x    Care Manager     Consultant                        Multidiciplinary team rounds were held today with , nursing, pharmacist and clinical coordinator. Patient's plan of care was discussed; medications were reviewed and discharge planning was addressed.      ________________________________________________________________________  Total NON critical care TIME:  35   Minutes    Total CRITICAL CARE TIME Spent:   Minutes non procedure based      Comments   >50% of visit spent in counseling and coordination of care x     This includes time during multidisciplinary rounds if indicated above   ________________________________________________________________________  Nupur Herrera MD     Procedures: see electronic medical records for all procedures/Xrays and details which were not copied into this note but were reviewed prior to creation of Plan. LABS:  I reviewed today's most current labs and imaging studies.   Pertinent labs include:  Recent Labs      09/14/17   0555  09/13/17   1201   WBC  6.2  5.6   HGB  9.9*  9.7*   HCT  28.8*  28.3*   PLT  233  218     Recent Labs      09/14/17   0555  09/13/17   1201   NA  138  138   K  5.1  5.0   CL  114*  111*   CO2  14*  16*   GLU  95  135*   BUN  40*  36*   CREA  1.79*  1.49*   CA  7.0*  8.7   ALB  2.5*  3.1*   TBILI  0.5  0.4   SGOT  11*  11*   ALT  11*  12

## 2017-09-14 NOTE — PROGRESS NOTES
Patient vomited 100ml emesis after coming back from Xray, patient's NG tube flushed with 30ml NS, reconnected to suction. Drain was flowing like a faucet and 2,000 ml was collected. Patient is now on her 3rd cannister. At this time, the flow slowed down. Patient stated that she felt so much better. Dr. Lance Tyler notified. Orders given.

## 2017-09-14 NOTE — PROGRESS NOTES
Bedside shift change report given to Meir Varela  (oncoming nurse) by Mortimer Rice, RN  (offgoing nurse). Report included the following information SBAR and MAR.

## 2017-09-14 NOTE — CONSULTS
Palliative Medicine Consult  [place]: [phone] ([phone])    Patient Name: Tushar Simmons  YOB: 1940    Date of Initial Consult: 9/13/17  Reason for Consult: Uncontrolled abdominal pain, advanced care planning  Requesting Provider: Nicholas Shrestha NP   Primary Care Physician: Francisco Kumar MD      SUMMARY:   Tushar Simmons is a 65 y.o. with a past history of DM, HTN arrhythmia, CAD, MI and strokes in 2009 and 2013,diverticulosis, SEN,  stage IIIC infiltrating ductal carcinoma of the right breast, s/p right mastectomy with axillary LN dissection, chemotherapy completed April 2015, XRT completed August 2015, bone marrow biopsy July 2017 showed bone marrow extensively involved by metastatic carcinoma, breast primary. She was admitted on 9/13/2017 from Dr. Ping Wallace office with a diagnosis of metastatic breast cancer, anemia. Current medical issues leading to Palliative Medicine involvement include: symptom management. Per :   08/11/17  Morphine IR 15mg #60/10 day supply [facility]  08/11/17  Morphine ER 15mg  #60/30 day supply [facility]  08/02/17  Tramadol 50mg  #60/15 day supply [facility]  Pt has been getting different opioids from multiple providers in the past year for different pain. Psychosocial: pt lives with her daughter and Criss Cadena in [place], daughter drives her to appointments   PALLIATIVE DIAGNOSES:   1. Abdominal pain: pt seen multiple times (7/2/17 and 6/10/17 and 5/6/17 at [facility]) for abdominal pain) 7/13/17 gastric empty study normal, 7/2/17 abd CT neg for intestinal findings, 5/29/17 abd CT showed colonic diverticulosis but no evidence of acute diverticulitis, 3/14/17 abd CT diverticulosis without diverticulitis. (Note pt has had previous right colectomy, and gallbladder and appendix have been surgically removed). DDx: adhesions, gastroparesis, constipation, diverticulitis  2. Pain due to neoplasm (spine mets)  3. Fatigue   4. Nausea and vomiting  5.  Lymphedema right arm  6. Bilateral LE edema  7. Care decisions: not addressed today     PLAN:   1. Met with pt; nausea, vomiting and abdominal pain are much better since NGT placement. We talked about the AMD; pt reports she has one from a previous admission, just has not filled it out. Will consider completing it before discharge, when she is feeling better. 2. Initial consult note routed to primary continuity provider  3. Communicated plan of care with: Palliative IDT, RN, Oncology NP Kathy       GOALS OF CARE / TREATMENT PREFERENCES:   [====Goals of Care====]  GOALS OF CARE:  Patient / health care proxy stated goals:     Pain management      TREATMENT PREFERENCES:   Code Status: Full Code    Advance Care Planning:  Advance Care Planning 9/13/2017   Patient's Healthcare Decision Maker is: Legal Next of Dimitry Cancino   Primary Decision Maker Name Rachel    Primary Decision Maker Phone Number [phone]   Primary Decision Maker Relationship to Patient Adult child   Secondary Decision Maker Name -   Confirm Advance Directive None   Patient Would Like to Complete Advance Directive -   Does the patient have other document types -       Other:    The palliative care team has discussed with patient / health care proxy about goals of care / treatment preferences for patient.  [====Goals of Care====]         HISTORY:     History obtained from: chart, patient    CHIEF COMPLAINT: abdominal pain    HPI/SUBJECTIVE:    The patient is:   9[x] Verbal and participatory  [] Non-participatory due to:     (pt is poor historian)  9/13:  Pt was directly admitted from Oncology office today for complaints of severe abdominal pain that has not responded to her current pain medications: Morphine ER 15mg bid, and Morphine IR 15mg q. 4 hours prn. Pt reports to me that she has been experiencing diffuse, crampy, constant abdominal pain for \"months\", however, today she started vomiting. Endorses constipation, but states BM daily, LBM was \"normal\" yesterday.   Asking for a suppository. 9/14: pain and nausea better since NGT placed. Still having abdominal pain, but not as bad. Chart review shows:    9/1/17, 8/25/17, 8/24/17 seen in infusion clinic with c/o severe abdominal pain and given IV hydration. 7/2/17 pt seen in ED for generalized abdominal pain x 1 day with vomiting, following colonoscopy and endoscopy and biopsy 5 days prior; first stated pain started the day after her biopsy, then stated pain started 7/2/17, then stated she \"has been having problems with stomach pain for 3 months\" but the pain that day (7/2) was different. She had a normal BM on 7/2. She takes Norco for pain. abd CT neg. No etiology found. 6/10/17 seen in ED diffuse 10/10 abd pain radiating to back x ~2 weeks. Pt also reports chills, vomiting, and increased urinary frequency due to diuretics. Abd pain is constant and she reports experiencing abd \"contractions\" every 7-8 minutes. . Pt reports being diagnosed with diverticulitis via CT scan at [facility] on 5/6/17 and was placed on Flagyl and Keflex. Abdominal CT showed colonic diverticulosis. 1/26/17; seen in ED cough and SOB. 10/17/16: pt reports [facility] admission for CP with no abnormal findings. 9/18/16: admitted to [facility] for atypical CP 10/10 (left retrosternal) and abd pain. No abnormal findings. 6/30/16: ED visit for GLF; right knee gave out, c/o 10/10 right sided head, shoulder, lower back, knee, and hip pain. All xrays neg.       Clinical Pain Assessment (nonverbal scale for severity on nonverbal patients):   [++++ Clinical Pain Assessment++++]  [++++Pain Severity++++]: Pain: 5  [++++Pain Character++++]: crampy  [++++Pain Duration++++]:  months  [++++Pain Effect++++]: nausea and vomiting  [++++Pain Factors++++]:   [++++Pain Frequency++++]: constant  [++++Pain Location++++]:   Diffuse abdomen  [++++ Clinical Pain Assessment++++]  Duration: for how long has pt been experiencing pain (e.g., 2 days, 1 month, years)  Frequency: how often pain is an issue (e.g., several times per day, once every few days, constant)     FUNCTIONAL ASSESSMENT:     Palliative Performance Scale (PPS):  PPS: 40       PSYCHOSOCIAL/SPIRITUAL SCREENING:     Advance Care Planning:  Advance Care Planning 9/13/2017   Patient's Healthcare Decision Maker is: Legal Next of Dimitry Cancino   Primary Decision Maker Name Shreya Crow    Primary Decision Maker Phone Number [phone]   Primary Decision Maker Relationship to Patient Adult child   Secondary Decision Maker Name -   Confirm Advance Directive None   Patient Would Like to Complete Advance Directive -   Does the patient have other document types -        Any spiritual / Yarsanism concerns:  [] Yes /  [x] No    Caregiver Burnout:  [] Yes /  [] No /  [x] No Caregiver Present      Anticipatory grief assessment:   [x] Normal  / [] Maladaptive       ESAS Anxiety: Anxiety: 0    ESAS Depression: Depression: 3        REVIEW OF SYSTEMS:     Positive and pertinent negative findings in ROS are noted above in HPI. The following systems were [x] reviewed / [] unable to be reviewed as noted in HPI  Other findings are noted below. Systems: constitutional, ears/nose/mouth/throat, respiratory, gastrointestinal, genitourinary, musculoskeletal, integumentary, neurologic, psychiatric, endocrine. Positive findings noted below. Modified ESAS Completed by: provider   Fatigue: 6 Drowsiness: 0   Depression: 3 Pain: 5   Anxiety: 0 Nausea: 5   Anorexia: 8 Dyspnea: 0     Constipation: No     Stool Occurrence(s): 1        PHYSICAL EXAM:     From RN flowsheet:  Wt Readings from Last 3 Encounters:   09/13/17 199 lb 3.2 oz (90.4 kg)   09/13/17 199 lb (90.3 kg)   09/06/17 194 lb 7 oz (88.2 kg)     Blood pressure 121/71, pulse 60, temperature 98.4 °F (36.9 °C), resp. rate 18, weight 199 lb 3.2 oz (90.4 kg), SpO2 100 %.     Pain Scale 1: Numeric (0 - 10)  Pain Intensity 1: 8     Pain Location 1: Abdomen  Pain Orientation 1: Lower  Pain Description 1: Aching  Pain Intervention(s) 1: Medication (see MAR)  Last bowel movement, if known:     Constitutional:WD, WN, sitting on side of bed  Eyes: pupils equal, anicteric  ENMT: no nasal discharge, moist mucous membranes, NGT  Cardiovascular: regular rhythm, distal pulses intact  Respiratory: breathing not labored, symmetric  Skin: warm, dry  Neurologic: following commands, moving all extremities  Psychiatric: depressed affect, no hallucinations          HISTORY:     Active Problems:    Metastatic breast cancer ([facility] [address] (8/11/2017)      Neoplastic malignant related fatigue (9/13/2017)      Pain due to malignant neoplasm metastatic to bone (HCC) (2/64/3325)      Cyclical vomiting with nausea (9/13/2017)      Past Medical History:   Diagnosis Date    Arrhythmia     bradycardia in 30's /pacemaker inserted    Arthritis     RT KNEE    Asthma Dx age 75    Bradycardia 2009    Cardiology saw her during hospital stay; Now off coreg;  Sees Dr. Montie Aase [facility])     Rt mastectomy 2/19/15    CAD (coronary artery disease)     Dr Michelle Salazar    Diabetes [facility])     Now seeing Dr. Willa Steele Diverticulitis     DJD (degenerative joint disease), lumbar     GERD (gastroesophageal reflux disease)     H. pylori infection 2008    Dr. Lachelle Heard attack [facility]) April 2006    Hypercholesterolemia     Hypertension     Morbid obesity ([facility] [address]     Neuropathy, arm 2009    Right; Has had MRI and EMG at [facility]    SEN (obstructive sleep apnea)     uses CPAP    Rectal bleeding 2009    Hospitalized;  Had colonoscopy and EGD (ok), gastric emptying study (normal), doppler mesenteric arteries (ok)    Sciatica     Has seen Dr. Todd Ziegler    Stroke [facility]) 2009 & 2012    no residual problems      Past Surgical History:   Procedure Laterality Date    HX APPENDECTOMY  1979    HX BREAST LUMPECTOMY  12/12/2013    RIGHT BREAST DUCTAL EXCISION performed by Aj Olivo MD at [facility] MAIN OR    HX CATARACT REMOVAL  2007    HX CHOLECYSTECTOMY  1979    HX COLONOSCOPY      HX HEART CATHETERIZATION      angioplasty    HX HYSTERECTOMY      fibroids    HX KNEE ARTHROSCOPY  1997    right    HX MASTECTOMY Right 2/19/2015    RIGHT BREAST MODIFIED RADICAL MASTECTOMY RIGHT SENTINEL NODE BIOPSY, RIGHT PORT A CATH INSERTION performed by Delphine Calderón MD at [facility] AMBULATORY OR    HX PACEMAKER      HX SHOULDER ARTHROSCOPY  2004    left      Family History   Problem Relation Age of Onset    Stroke Father     Cancer Sister      breast cancer    Hypertension Sister     Cancer Mother      Unknown type    Cancer Brother      Colon    Hypertension Brother     Anesth Problems Neg Hx       History reviewed, no pertinent family history.   Social History   Substance Use Topics    Smoking status: Never Smoker    Smokeless tobacco: Never Used    Alcohol use No     Allergies   Allergen Reactions    Codeine Itching    Duratuss [Phenylephrine-Guaifenesin] Nausea and Vomiting    Lisinopril-Hydrochlorothiazide Itching    Motrin [Ibuprofen] Nausea and Vomiting     With 800mg    Tape [Adhesive] Other (comments)     TEARS HER SKIN      Current Facility-Administered Medications   Medication Dose Route Frequency    sodium bicarbonate (8.4%) 150 mEq in sterile water 1,000 mL infusion   IntraVENous CONTINUOUS    insulin glargine (LANTUS) injection 10 Units  10 Units SubCUTAneous QHS    insulin lispro (HUMALOG) injection   SubCUTAneous Q6H    prochlorperazine (COMPAZINE) tablet 10 mg  10 mg Oral Q6H PRN    morphine injection 2 mg  2 mg IntraVENous Q2H PRN    morphine injection 4 mg  4 mg IntraVENous Q2H PRN    ondansetron (ZOFRAN) injection 4 mg  4 mg IntraVENous Q6H PRN    prochlorperazine (COMPAZINE) with saline injection 10 mg  10 mg IntraVENous Q6H PRN    enoxaparin (LOVENOX) injection 30 mg  30 mg SubCUTAneous DAILY    glucose chewable tablet 16 g  4 Tab Oral PRN    glucagon (GLUCAGEN) injection 1 mg  1 mg IntraMUSCular PRN    hydrALAZINE (APRESOLINE) 20 mg/mL injection 10 mg  10 mg IntraVENous Q4H PRN    famotidine (PF) (PEPCID) injection 20 mg  20 mg IntraVENous DAILY    aspirin (ASA) suppository 300 mg  300 mg Rectal DAILY    dextrose 10% infusion 125-250 mL  125-250 mL IntraVENous PRN          LAB AND IMAGING FINDINGS:     Lab Results   Component Value Date/Time    WBC 2.8 09/15/2017 05:58 AM    HGB 9.5 09/15/2017 05:58 AM    PLATELET 318 19/73/9711 05:58 AM     Lab Results   Component Value Date/Time    Sodium 136 09/15/2017 05:58 AM    Potassium 5.0 09/15/2017 05:58 AM    Chloride 101 09/15/2017 05:58 AM    CO2 25 09/15/2017 05:58 AM    BUN 60 09/15/2017 05:58 AM    Creatinine 3.00 09/15/2017 05:58 AM    Calcium 7.2 09/15/2017 05:58 AM    Magnesium 1.6 09/15/2017 05:58 AM      Lab Results   Component Value Date/Time    AST (SGOT) 17 09/15/2017 05:58 AM    Alk. phosphatase 438 09/15/2017 05:58 AM    Protein, total 7.1 09/15/2017 05:58 AM    Albumin 2.7 09/15/2017 05:58 AM    Globulin 4.4 09/15/2017 05:58 AM     Lab Results   Component Value Date/Time    INR 1.1 06/10/2017 02:56 PM    Prothrombin time 10.8 06/10/2017 02:56 PM    aPTT 33.2 07/03/2010 09:00 AM      Lab Results   Component Value Date/Time    Iron 42 05/11/2015 05:00 AM    TIBC 215 05/11/2015 05:00 AM    Iron % saturation 20 05/11/2015 05:00 AM    Ferritin 417 05/11/2015 05:00 AM      No results found for: PH, PCO2, PO2  No components found for: [place]   Lab Results   Component Value Date/Time     01/26/2017 01:16 PM    CK - MB 2.3 01/26/2017 01:16 PM                Total time: 30 min  Counseling / coordination time, spent as noted above: 25 min  > 50% counseling / coordination?: yes    Prolonged service was provided for  []30 min   []75 min in face to face time in the presence of the patient, spent as noted above. Time Start:   Time End:   Note: this can only be billed with 24827 (initial) or 58565 (follow up).   If multiple start / stop times, list each separately.

## 2017-09-15 NOTE — PROGRESS NOTES
9/15/2017  7:40 PM    Transfer off floor to OR; daughter present in room. Please notify Brenda Osorio ([phone]) post-op with update. IV fluids stopped for transfer; NG tube disconnected from suction.

## 2017-09-15 NOTE — HOME CARE
Patient is open to [facility] services for PT, OT, &ST under the orders of Dr. Sandrita Tom. Please enter resumption of care orders as needed.     Rinku Gould RN, BSN  [facility] Liaison  [phone]

## 2017-09-15 NOTE — CONSULTS
Nephrology Consult Note     Fernando Cochran     [facility]. [facility]              Phone - [phone]   Patient: Rosalina Godoy   YOB: 1940    Date- 9/15/2017  MRN: [id number]   CONSULTING PHYSICIAN:            REASON FOR CONSULTATION: ACUTE RENAL FAILURE  ADMIT DATE:9/13/2017 PATIENT PCP:Saul Johnson MD     ASSESSMENT:   JULES -  secondary to multiple possibilities. 1.Pre renal factors - poor po intake, HIGH NG tube out put  2. Intra reanl factors - ATN due to FLEET enema  3. Post renal factors - urinary retention can't be ruled out  ·   · Non anion gap meta acidosis - improved  · CKD 3 baseline cr. 1.0-1.2  · H/o hyponatremia  · ILLEUS  · Right breast cancer with bone mets  · Active Problems:  ·   Metastatic breast cancer ([facility] [address] (8/11/2017)  ·   ·   Neoplastic malignant related fatigue (9/13/2017)  ·   ·   Pain due to malignant neoplasm metastatic to bone (HCC) (6/05/8131)  ·   ·   Cyclical vomiting with nausea (9/13/2017)  ·   ·   PLAN:   Continue ivf bicarb  Check bladder scan - if high volume place moore cath  Agree with checking CT ABDO  Avoid hypotension  Avoid iv dye, nephrotoxics  Check bmp this pm. If bicarb > 25- change ivf to nacl  Follow bmp in ma  Check urine na, cr.,     [x] High complexity decision making was performed  [x] Patient is at high-risk of decompensation with multiple organ involvement    Subjective:   HPI: Rosalina Godoy is a 65 y.o.  female. She is admitted with abdominal pain. She is found to have arf with cr 3.0  Cr. Trends     Ref.  Range 8/16/2016 10:56 9/15/2016 21:48 9/16/2016 04:52 9/17/2016 04:42 1/26/2017 13:16 5/29/2017 18:40 5/30/2017 00:05 5/30/2017 03:53 5/31/2017 04:50 6/10/2017 14:56 7/2/2017 18:18 8/23/2017 09:14 8/24/2017 14:42 8/25/2017 15:16 8/30/2017 11:28 9/6/2017 11:54 9/13/2017 12:01 9/14/2017 05:55 9/15/2017 05:58   Creatinine LL 0.94 0.96 0.79 0.98 1.13 (H) 1.09 (H) 0.98 0.97 0.94 1.17 (H) 0.85 2.57 (H) 1.79 (H) 1.46 (H) 1.31 (H) 1.26 (H) 1.49 (H) 1.79 (H) 3.00 (H)   Her cr was 1.49 on admission 9-13-17  She has been on benazepril at home. She didn't get benazepril yesterday. She had episode of hypotension yesteday  She has received FLEET enema on 9-13-17  She didn't get any iv contrast  No abx prior to admission  She is having vomiting. She is dx with ILLEUS and had NG tube placed. She had 2800 ml out put in last 24 hours  She had 300 ml documented urine out put  She is not on any diuretics at home  Her Urine on admission. Showed proteinuria with small L. Esterase. No c/o sob, fever. C/o leg edema  She reports on and off vomiting at home since January 2017  Na 124 on 8-23-17    She has been admitted with hyponatremia in past  No c/o chest pain    Review of Systems:  A 11 point review of system was performed today. Pertinent positives and negatives are mentioned in the HPI. The reminder of the ROS is negative and noncontributory. Past Medical Hx:   Past Medical History:   Diagnosis Date    Arrhythmia     bradycardia in 30's /pacemaker inserted    Arthritis     RT KNEE    Asthma Dx age 75    Bradycardia 2009    Cardiology saw her during hospital stay; Now off coreg;  Sees Dr. Rajiv Valerio    Breast cancer [facility])     Rt mastectomy 2/19/15    CAD (coronary artery disease)     Dr Eloy Lewis Diabetes [facility])     Now seeing Dr. Delaney Rose Diverticulitis     DJD (degenerative joint disease), lumbar     GERD (gastroesophageal reflux disease)     H. pylori infection 2008    Dr. Reji Whitehead attack [facility]) April 2006    Hypercholesterolemia     Hypertension     Morbid obesity ([address]     Neuropathy, arm 2009    Right; Has had MRI and [facility] at [facility]    SEN (obstructive sleep apnea)     uses CPAP    Rectal bleeding 2009    Hospitalized;  Had colonoscopy and EGD (ok), gastric emptying study (normal), doppler mesenteric arteries (ok)    Sciatica     Has seen Dr. Andi Crowley Stroke [facility]) 2009 & 2012    no residual problems        Past Surgical Hx:     Past Surgical History:   Procedure Laterality Date    HX APPENDECTOMY  1979    HX BREAST LUMPECTOMY  12/12/2013    RIGHT BREAST DUCTAL EXCISION performed by Willi Plasencia MD at [facility] MAIN OR    HX CATARACT REMOVAL  2007    HX CHOLECYSTECTOMY  1979    HX COLONOSCOPY      HX HEART CATHETERIZATION      angioplasty    HX HYSTERECTOMY      fibroids    HX KNEE ARTHROSCOPY  1997    right    HX MASTECTOMY Right 2/19/2015    RIGHT BREAST MODIFIED RADICAL MASTECTOMY RIGHT SENTINEL NODE BIOPSY, RIGHT PORT A CATH INSERTION performed by Satnam Romero MD at [facility] AMBULATORY OR    HX PACEMAKER      HX SHOULDER ARTHROSCOPY  2004    left      Social Hx:  reports that she has never smoked. She has never used smokeless tobacco. She reports that she does not drink alcohol or use illicit drugs. Family History   Problem Relation Age of Onset    Stroke Father     Cancer Sister      breast cancer    Hypertension Sister     Cancer Mother      Unknown type    Cancer Brother      Colon    Hypertension Brother     Anesth Problems Neg Hx      Medications:  Prior to Admission medications    Medication Sig Start Date End Date Taking? Authorizing Provider   insulin glargine (LANTUS) 100 unit/mL injection 18 units in the morning and 18 units at bedtime 8/18/17   Alica Schaumann, MD   insulin syringe-needle U-100 (BD INSULIN SYRINGE ULTRA-FINE) 1/2 mL 31 gauge x 15/64\" syrg 1 Syringe by SubCUTAneous route two (2) times a day. 8/18/17   Alica Schaumann, MD   VITAMIN D2 50,000 unit capsule take 1 capsule by mouth every 30 DAYS 8/16/17   Catarino Browning NP   hydrOXYzine HCl (ATARAX) 25 mg tablet Take 25 mg by mouth every eight (8) hours as needed for Itching. Historical Provider   morphine IR (MS IR) 15 mg tablet Take 1 Tab by mouth every four (4) hours as needed for Pain.  Max Daily Amount: 90 mg. 8/11/17   Catarino Browning NP   morphine CR (MS CONTIN) 15 mg CR tablet Take 1 Tab by mouth every twelve (12) hours. Max Daily Amount: 30 mg. 8/11/17   Floyd Dominguez NP   megestrol (MEGACE) 400 mg/10 mL (10 mL) suspension Take 10 mL by mouth daily. 8/11/17   Floyd Dominguez NP   diclofenac (VOLTAREN) 1 % gel Apply 2 g to affected area four (4) times daily as needed (Pain). Historical Provider   dicyclomine (BENTYL) 20 mg tablet Take 1 Tab by mouth every six (6) hours as needed (abdominal cramps) for up to 20 doses. 6/10/17   Shanelle Dominguez MD   ondansetron hcl (ZOFRAN, AS HYDROCHLORIDE,) 4 mg tablet Take 1 Tab by mouth every eight (8) hours as needed for Nausea. 6/10/17   Shanelle Dominguez MD   atorvastatin (LIPITOR) 80 mg tablet Take 1 Tab by mouth daily. Indications: hypercholesterolemia 5/31/17   Sandy Haynes MD   aspirin (ASPIRIN) 325 mg tablet Take 1 Tab by mouth daily. 5/31/17   Sandy Haynes MD   ipratropium (ATROVENT) 0.06 % nasal spray instill 2 sprays into each nostril three times a day - IF NEEDED FOR RUNNING NOSE 3/15/17   Historical Provider   glipiZIDE (GLUCOTROL) 5 mg tablet Take 0.5 Tabs by mouth two (2) times a day. Take 1/2 every day with breakfast. If your blood sugar is above 200 take a whole tablet. 5/5/17   Maddie Eid MD   polyethylene glycol [facility]) 17 gram/dose powder Take 17 g by mouth two (2) times a day. Continue while taking pain medication 3/8/17   Mireya Bingham MD   isosorbide mononitrate ER (IMDUR) 60 mg CR tablet Take  by mouth every morning. 6/6/16   Historical Provider   Blood Sugar Diagnostic, Disc (ASCENSIA BREEZE 2) strp Check your blood sugar twice daily. T60.35 6/20/16   Maddie Eid MD   ondansetron (ZOFRAN ODT) 4 mg disintegrating tablet Take 1 Tab by mouth every eight (8) hours as needed for Nausea.  6/1/16   Mireya Bingham MD   albuterol (PROVENTIL VENTOLIN) 2.5 mg /3 mL (0.083 %) nebulizer solution 3 mL by Nebulization route every four (4) hours as needed for Wheezing. 9/20/15   DO AMY Capellan LANCET misc  12/16/14   Historical Provider   benazepril (LOTENSIN) 40 mg tablet Take 40 mg by mouth every morning. Historical Provider   omeprazole (PRILOSEC) 20 mg capsule Take 40 mg by mouth two (2) times a day. Todd Andersen MD   nitroglycerin (NITROSTAT) 0.4 mg SL tablet by SubLINGual route every five (5) minutes as needed for Chest Pain.     Todd Andersen MD   amlodipine (NORVASC) 10 mg tablet TAKE 1 TABLET BY MOUTH ONCE DAILY 4/9/11   Pinky Spann MD     Allergies   Allergen Reactions    Codeine Itching    Duratuss [Phenylephrine-Guaifenesin] Nausea and Vomiting    Lisinopril-Hydrochlorothiazide Itching    Motrin [Ibuprofen] Nausea and Vomiting     With 800mg    Tape [Adhesive] Other (comments)     TEARS HER SKIN      Objective:    Vitals:    Vitals:    09/14/17 1554 09/14/17 2034 09/15/17 0037 09/15/17 0800   BP: 91/62 108/66 120/70 121/71   Pulse: 69 68 60 60   Resp:  17 18 18   Temp:  98.4 °F (36.9 °C) 97.2 °F (36.2 °C) 98.4 °F (36.9 °C)   SpO2:  100%  100%   Weight:         I&O's:  09/14 0701 - 09/15 0700  In: 990.8 [I.V.:960.8]  Out: 1197 [Urine:300]  Physical Exam:  General:Alert, No distress,   Eyes:No scleral icterus, No conjunctival pallor  Neck:Supple,no mass palpable,no thyromegaly  Lungs:Clears to auscultation Bilaterally, normal respiratory effort  CVS:RRR, S1 S2 normal,  No rub,  Abdomen:Soft,tenderness diffuse +, No hepatosplenomegaly  Extremities: + LE edema  Skin:No rash or lesions, Warm and DRY   Psych: appropriate affect    :  No moore   Musculoskeletal : no redness, no joint tenderness  NEURO: Normal Speech, Non focal    CODE STATUS:  full  Care Plan discussed with:  Pt and nurse     Chart reviewed.      TOTAL TIME: 76 Minutes   y Reviewed previous records   y Discussion with patient and/or family and questions answered   y >50% of visit spent in counseling and coordination of care        ECG[de-identified] Rev:no  Xray/CT/US/MRI REV:yes  Lab Data Personally Reviewed: (see below)  Recent Labs      09/15/17   0558  09/14/17   0555  09/13/17   1201   NA  136  138  138   K  5.0  5.1  5.0   CL  101  114*  111*   CO2  25  14*  16*   GLU  78  95  135*   BUN  60*  40*  36*   CREA  3.00*  1.79*  1.49*   CA  7.2*  7.0*  8.7   MG  1.6   --    --    ALB  2.7*  2.5*  3.1*   SGOT  17  11*  11*   ALT  14  11*  12     Recent Labs      09/15/17   0558  09/14/17   0555  09/13/17   1201   WBC  2.8*  6.2  5.6   HGB  9.5*  9.9*  9.7*   HCT  27.3*  28.8*  28.3*   PLT  222  233  218     Lab Results   Component Value Date/Time    Color DARK YELLOW 09/14/2017 07:59 AM    Appearance CLOUDY 09/14/2017 07:59 AM    Specific gravity 1.024 09/14/2017 07:59 AM    Specific gravity 1.010 05/29/2017 06:40 PM    pH (UA) 5.0 09/14/2017 07:59 AM    Protein 100 09/14/2017 07:59 AM    Glucose NEGATIVE  09/14/2017 07:59 AM    Ketone TRACE 09/14/2017 07:59 AM    Bilirubin NEGATIVE  08/23/2017 11:54 AM    Urobilinogen 0.2 09/14/2017 07:59 AM    Nitrites NEGATIVE  09/14/2017 07:59 AM    Leukocyte Esterase SMALL 09/14/2017 07:59 AM    Epithelial cells MANY 09/14/2017 07:59 AM    Bacteria 1+ 09/14/2017 07:59 AM    WBC 5-10 09/14/2017 07:59 AM    RBC 5-10 09/14/2017 07:59 AM     Lab Results   Component Value Date/Time    Culture result: NO GROWTH 2 DAYS 08/23/2017 11:54 AM    Culture result: NO GROWTH 6 DAYS 05/12/2015 06:59 PM    Culture result:  05/11/2015 12:23 AM     Negative for Campylobacter Species by Antigen detection    Culture result:  05/11/2015 12:23 AM     NO ROUTINE ENTERIC PATHOGENS ISOLATED INCLUDING SALMONELLA, SHIGELLA, YERSINIA, VIBRIO OR SHIGA TOXIN PRODUCING E. COLI     Prior to Admission Medications   Prescriptions Last Dose Informant Patient Reported? Taking? Blood Sugar Diagnostic, Disc (ASCENSIA BREEZE 2) strp   No No   Sig: Check your blood sugar twice daily.  E11.42   MICROLET LANCET misc   Yes No   VITAMIN D2 50,000 unit capsule   No No   Sig: take 1 capsule by mouth every 30 DAYS   albuterol (PROVENTIL VENTOLIN) 2.5 mg /3 mL (0.083 %) nebulizer solution   No No   Sig: 3 mL by Nebulization route every four (4) hours as needed for Wheezing. amlodipine (NORVASC) 10 mg tablet   No No   Sig: TAKE 1 TABLET BY MOUTH ONCE DAILY   aspirin (ASPIRIN) 325 mg tablet   No No   Sig: Take 1 Tab by mouth daily. atorvastatin (LIPITOR) 80 mg tablet  Self No No   Sig: Take 1 Tab by mouth daily. Indications: hypercholesterolemia   benazepril (LOTENSIN) 40 mg tablet   Yes No   Sig: Take 40 mg by mouth every morning. diclofenac (VOLTAREN) 1 % gel   Yes No   Sig: Apply 2 g to affected area four (4) times daily as needed (Pain). dicyclomine (BENTYL) 20 mg tablet  Self No No   Sig: Take 1 Tab by mouth every six (6) hours as needed (abdominal cramps) for up to 20 doses. glipiZIDE (GLUCOTROL) 5 mg tablet  Self No No   Sig: Take 0.5 Tabs by mouth two (2) times a day. Take 1/2 every day with breakfast. If your blood sugar is above 200 take a whole tablet. hydrOXYzine HCl (ATARAX) 25 mg tablet   Yes No   Sig: Take 25 mg by mouth every eight (8) hours as needed for Itching. insulin glargine (LANTUS) 100 unit/mL injection   No No   Si units in the morning and 18 units at bedtime   insulin syringe-needle U-100 (BD INSULIN SYRINGE ULTRA-FINE) 1/2 mL 31 gauge x 15/64\" syrg   No No   Si Syringe by SubCUTAneous route two (2) times a day. ipratropium (ATROVENT) 0.06 % nasal spray   Yes No   Sig: instill 2 sprays into each nostril three times a day - IF NEEDED FOR RUNNING NOSE   isosorbide mononitrate ER (IMDUR) 60 mg CR tablet   Yes No   Sig: Take  by mouth every morning. megestrol (MEGACE) 400 mg/10 mL (10 mL) suspension   No No   Sig: Take 10 mL by mouth daily. morphine CR (MS CONTIN) 15 mg CR tablet   No No   Sig: Take 1 Tab by mouth every twelve (12) hours. Max Daily Amount: 30 mg.   morphine IR (MS IR) 15 mg tablet   No No   Sig: Take 1 Tab by mouth every four (4) hours as needed for Pain. Max Daily Amount: 90 mg. nitroglycerin (NITROSTAT) 0.4 mg SL tablet   Yes No   Sig: by SubLINGual route every five (5) minutes as needed for Chest Pain. omeprazole (PRILOSEC) 20 mg capsule   Yes No   Sig: Take 40 mg by mouth two (2) times a day. ondansetron (ZOFRAN ODT) 4 mg disintegrating tablet   No No   Sig: Take 1 Tab by mouth every eight (8) hours as needed for Nausea. ondansetron hcl (ZOFRAN, AS HYDROCHLORIDE,) 4 mg tablet   No No   Sig: Take 1 Tab by mouth every eight (8) hours as needed for Nausea. polyethylene glycol (MIRALAX) 17 gram/dose powder   No No   Sig: Take 17 g by mouth two (2) times a day. Continue while taking pain medication      Facility-Administered Medications: None       Medications list Personally Reviewed   [x]      Yes     []               No    Thank you for allowing us to participate in the care this patient. We will follow patient with you. Signed By: Joette Nyhan, MD  [facility]  [facility] CenterFIDELIA HartleyAlexandra V[place] [address], [facility], [address]  Phone - [phone]         Fax - [phone] [facility] Office  [address] [place] [address]  Phone - [phone]        Fax - [phone]     www. [email or web address]

## 2017-09-15 NOTE — PROGRESS NOTES
Bedside and Verbal shift change report given to Maximus Mohamud (oncoming nurse) by Lakshmi Ervin (offgoing nurse). Report included the following information SBAR, Kardex, Intake/Output, MAR and Recent Results.

## 2017-09-15 NOTE — CONSULTS
Surgery Consult  Consulted by: Dr. Larry Olivas  PCP: Pita Ojeda MD    Thank you for allowing me to participate in your patient's care. Please call me with any questions. Assessment:   SBO. Focal transition point in right mid abd, near prior anastomosis. Possible that this could be due to metastatic breast cancer (carcinomatosis) but I do not see evidence for this. More likely secondary to adhesions from prior surgery h/o open cholecystectomy, hysterectomy, appendectomy. Comorbid breast cancer. Body mass index is 32.15 kg/(m^2). Plan:   Exam concerning. Needs lysis of adhesions. Hopefully this can be done laparoscopically. Discussed the risk of surgery including bleeding, infection, injury to adjacent structures, and the risks of general anesthetic. The patient understands the risks; any and all questions were answered to the patient's satisfaction. Problem List:   Active Problems:    Metastatic breast cancer ([address] (2017)      Neoplastic malignant related fatigue (2017)      Pain due to malignant neoplasm metastatic to bone (HCC) ()      Cyclical vomiting with nausea (2017)        Subjective:      Lincoln Sosa is a 65 y.o. female who is seen in consultation at the request of Dr. Larry Olivas for SBO. Patient tells me she has been having intermittent abd pain for weeks. Came to ER 2 days ago for severe abd pain. Sharp in the [address]. Followed by a lot of nausea and vomiting. NG was placed with a lot of output. A little better but still hurting. No f/c.  H/o above operations. Current tx for metastatic breast cancer. Objective:   Blood pressure 128/47, pulse 76, temperature 98 °F (36.7 °C), resp. rate 18, weight 90.4 kg (199 lb 3.2 oz), SpO2 99 %.   Temp (24hrs), Av °F (36.7 °C), Min:97.2 °F (36.2 °C), Max:98.4 °F (36.9 °C)      Physical Exam:  PHYSICAL EXAM:  Gen:  [x]     A&O     [x]      No acute distress    [x]     non-toxic     []     ill apearing []     Critical        HEENT:   [x]     anicteric    []      scleral icterus    [x]     moist mucosa     []     dry mucosa    RESP:   [x]     CTA bilaterally, no wheezing/rhonchi/rales/crackles    []     wheezing     []     rhonchi     []     crackles     []     use of accessory muscles    CARD:  [x]     regular rate and rhythm     [x]     No murmurs/rubs/gallops    []     irregular rhythm     []     Murmur     []     Rubs     []     Gallops    ABD:     Firm, not rigid. Distended. TTP R>L.  +BS. Right subcostal scar, lower midline, McBurney. No obvious hernia. SKIN:   [x]     normal      []Rashes      []Ulcers     EXT:  [x]      No CCE     []2+ pulses throughout    []      Clubbing     []Cyanosis     []Edema     []diminished pulses    NEUR:  [x]     Strength normal     []weakness   []LUE     []RUE     []LLE     []RLE    [x]     follows commands          PSYCH:   insight []Poor   [x]good                      []     depressed     []     anxious     []     agitated    Past Medical History:   Diagnosis Date    Arrhythmia     bradycardia in 30's /pacemaker inserted    Arthritis     RT KNEE    Asthma Dx age 75    Bradycardia 2009    Cardiology saw her during hospital stay; Now off coreg;  Sees Dr. Loree Whitfield    Breast cancer [facility])     Rt mastectomy 2/19/15    CAD (coronary artery disease)     Dr Alessandra Deng Diabetes [facility])     Now seeing Dr. Bernard Watson Diverticulitis     DJD (degenerative joint disease), lumbar     GERD (gastroesophageal reflux disease)     H. pylori infection 2008    Dr. Sangeeta Burnette attack [facility]) April 2006    Hypercholesterolemia     Hypertension     Morbid obesity ([address]     Neuropathy, arm 2009    Right; Has had MRI and EMG at [address] [place] [address]    SEN (obstructive sleep apnea)     uses CPAP    Rectal bleeding 2009    Hospitalized;  Had colonoscopy and EGD (ok), gastric emptying study (normal), doppler mesenteric arteries (ok)    Sciatica     Has seen Dr. Erin Hanson  Stroke [facility]) 2009 & 2012    no residual problems     Past Surgical History:   Procedure Laterality Date    HX APPENDECTOMY  1979    HX BREAST LUMPECTOMY  12/12/2013    RIGHT BREAST DUCTAL EXCISION performed by Omayra Adrian MD at [facility] MAIN OR    HX CATARACT REMOVAL  2007    HX CHOLECYSTECTOMY  1979    HX COLONOSCOPY      HX HEART CATHETERIZATION      angioplasty    HX HYSTERECTOMY      fibroids    HX KNEE ARTHROSCOPY  1997    right    HX MASTECTOMY Right 2/19/2015    RIGHT BREAST MODIFIED RADICAL MASTECTOMY RIGHT SENTINEL NODE BIOPSY, RIGHT PORT A CATH INSERTION performed by Deshawn Adhikari MD at [facility] AMBULATORY OR    HX PACEMAKER      HX SHOULDER ARTHROSCOPY  2004    left      Family History   Problem Relation Age of Onset    Stroke Father     Cancer Sister      breast cancer    Hypertension Sister     Cancer Mother      Unknown type    Cancer Brother      Colon    Hypertension Brother     Anesth Problems Neg Hx      Social History     Social History    Marital status:      Spouse name: N/A    Number of children: N/A    Years of education: N/A     Social History Main Topics    Smoking status: Never Smoker    Smokeless tobacco: Never Used    Alcohol use No    Drug use: No    Sexual activity: No     Other Topics Concern    Not on file     Social History Narrative    Lives in [place] with oldest daughter and great-grandson 8. Occupation retired, worked at [employer] as a , [address], plays with [employer], [employer] and sings in the choir      Prior to Admission medications    Medication Sig Start Date End Date Taking? Authorizing Provider   insulin glargine (LANTUS) 100 unit/mL injection 18 units in the morning and 18 units at bedtime 8/18/17   Mar Ching MD   insulin syringe-needle U-100 (BD INSULIN SYRINGE ULTRA-FINE) 1/2 mL 31 gauge x 15/64\" syrg 1 Syringe by SubCUTAneous route two (2) times a day.  8/18/17   Mar Ching MD   VITAMIN D2 50,000 unit capsule take 1 capsule by mouth every 30 DAYS 8/16/17   Pablo Salts, NP   hydrOXYzine HCl (ATARAX) 25 mg tablet Take 25 mg by mouth every eight (8) hours as needed for Itching. Historical Provider   morphine IR (MS IR) 15 mg tablet Take 1 Tab by mouth every four (4) hours as needed for Pain. Max Daily Amount: 90 mg. 8/11/17   Pablo Salts, NP   morphine CR (MS CONTIN) 15 mg CR tablet Take 1 Tab by mouth every twelve (12) hours. Max Daily Amount: 30 mg. 8/11/17   Pablo Salts, NP   megestrol (MEGACE) 400 mg/10 mL (10 mL) suspension Take 10 mL by mouth daily. 8/11/17   Pablo Salts, NP   diclofenac (VOLTAREN) 1 % gel Apply 2 g to affected area four (4) times daily as needed (Pain). Historical Provider   dicyclomine (BENTYL) 20 mg tablet Take 1 Tab by mouth every six (6) hours as needed (abdominal cramps) for up to 20 doses. 6/10/17   Elvie Mitchell MD   ondansetron hcl (ZOFRAN, AS HYDROCHLORIDE,) 4 mg tablet Take 1 Tab by mouth every eight (8) hours as needed for Nausea. 6/10/17   Elvie Mitchell MD   atorvastatin (LIPITOR) 80 mg tablet Take 1 Tab by mouth daily. Indications: hypercholesterolemia 5/31/17   Abelino Bentley MD   aspirin (ASPIRIN) 325 mg tablet Take 1 Tab by mouth daily. 5/31/17   Abelino Bentley MD   ipratropium (ATROVENT) 0.06 % nasal spray instill 2 sprays into each nostril three times a day - IF NEEDED FOR RUNNING NOSE 3/15/17   Historical Provider   glipiZIDE (GLUCOTROL) 5 mg tablet Take 0.5 Tabs by mouth two (2) times a day. Take 1/2 every day with breakfast. If your blood sugar is above 200 take a whole tablet. 5/5/17   Venita Yañez MD   polyethylene glycol [facility]) 17 gram/dose powder Take 17 g by mouth two (2) times a day. Continue while taking pain medication 3/8/17   Glendy Jackson MD   isosorbide mononitrate ER (IMDUR) 60 mg CR tablet Take  by mouth every morning.  6/6/16   Historical Provider   Blood Sugar Diagnostic, Disc (ASCENSIA BREEZE 2) strp Check your blood sugar twice daily. E11.42 6/20/16   Neymar Hassan MD   ondansetron (ZOFRAN ODT) 4 mg disintegrating tablet Take 1 Tab by mouth every eight (8) hours as needed for Nausea. 6/1/16   Tamy Dorsey MD   albuterol (PROVENTIL VENTOLIN) 2.5 mg /3 mL (0.083 %) nebulizer solution 3 mL by Nebulization route every four (4) hours as needed for Wheezing. 9/20/15   Marcelina Smith DO   MICROLET LANCET misc  12/16/14   Historical Provider   benazepril (LOTENSIN) 40 mg tablet Take 40 mg by mouth every morning. Historical Provider   omeprazole (PRILOSEC) 20 mg capsule Take 40 mg by mouth two (2) times a day. Todd Andersen MD   nitroglycerin (NITROSTAT) 0.4 mg SL tablet by SubLINGual route every five (5) minutes as needed for Chest Pain.     Todd Andersen MD   amlodipine (NORVASC) 10 mg tablet TAKE 1 TABLET BY MOUTH ONCE DAILY 4/9/11   So Hassan MD     ALLERGIES:    Allergies   Allergen Reactions    Codeine Itching    Duratuss [Phenylephrine-Guaifenesin] Nausea and Vomiting    Lisinopril-Hydrochlorothiazide Itching    Motrin [Ibuprofen] Nausea and Vomiting     With 800mg    Tape [Adhesive] Other (comments)     TEARS HER SKIN       Review of Systems:  (unchecked were asked but negative)  Constitutional: [] Fever/chills   [] Sweats   [x] Loss of appetite   [] Fatigue/weakness   [] Weight loss  Head & Neck:   [] Headaches   [] Visual loss   [] Hearing loss   [] Thyroid problems  Cardiovascular:   [] Stroke   [] Calf pain when walking   [] Chest pain   [] Rapid heart beat       [x] Heart attack   [] Stents   [] Congestive heart failure   [] Leg swelling   [] Murmur  Respiratory:   [] Sleep apnea   [] Cough/congestion   [] Shortness of breath   [] Wheezing/asthma      [] COPD/emphysema  Gastrointestinal:   [] Frequent indigestion   [] Trouble swallowing   [x] Nausea   [x] Vomiting   [x] Bloating   [x] Abd pain       [] Diarrhea   [] Constipation   [] Blood in stool   [] Ulcers   [] Intestinal disease   [] Hepatitis Genitourinary:   [] Painful urination   [] Difficulty urinating   [] Frequent urination       [] Enlarged prostate   [] Vasectomy   [] Blood in urine   [] Dialysis  Musculoskeletal:  [] Muscle aches   [] Back pain   [] Joint pain  Neurologic:    [] Seizures   [] Dizziness   [] Numbness  Hematologic:   [] Nosebleeds   [] Easy bruising   [] Anemia   [] Easy bleeding  Psychiatric:    [] Depression   [] Anxiety   [] Bipolar disorder   [] Schizophrenia                                  []     Unable to obtain  ROS due to  []     mental status change  []     sedated   []     intubated    LABS:  Recent Labs      09/15/17   0558  09/14/17   0555   WBC  2.8*  6.2   HGB  9.5*  9.9*   HCT  27.3*  28.8*   PLT  222  233     Recent Labs      09/15/17   1445  09/15/17   0558  09/14/17   0555   NA  137  136  138   K  4.5  5.0  5.1   CL  98  101  114*   CO2  29  25  14*   BUN  58*  60*  40*   CREA  2.69*  3.00*  1.79*   GLU  73  78  95   CA  6.7*  7.2*  7.0*   MG   --   1.6   --      Recent Labs      09/15/17   0558  09/14/17   0555  09/13/17   1201   SGOT  17  11*  11*   AP  438*  414*  509*   TP  7.1  6.2*  7.4   ALB  2.7*  2.5*  3.1*   GLOB  4.4*  3.7  4.3*   LPSE   --   142   --      No results for input(s): INR, PTP, APTT in the last 72 hours.     No lab exists for component: INREXT      Signed By: Alphonso Sim MD     September 15, 2017

## 2017-09-15 NOTE — PROGRESS NOTES
Hospitalist Progress Note    NAME: Brenna Mcgowan   :  1940   MRN:  [id number]     Interim Hospital Summary: 65 y.o. female whom presented on 2017 with      Assessment / Plan:    JULES  Metabolic acidosis  -pre renal from third spacing and fluid loss via NG. Increased IVF rate and changed to bicarb yesterday but she put out 2800cc via her NGT. -Cr higher today, suspect more pre renal.   Will consult Renal for help with fluid management. Ileus vs partial SBO  Abdominal pain  Hx diverticulitis s/p partial colectomy 2008  S/p open cholecystectomy  -large NG output x 24 hours. Continue to follow  -will get CT abdomen non contrast    DM type 2  -decrease lantus. SSI prn     Right breast CA dx  with bone mets dx   Right arm lymphedema  -pT3 N3a M0 (Stage IIIC) infiltrating ductal carcinoma, s/p right mastectomy, XRT  -Receiving palliative chemotherapy Abraxane + Keytruda     HTN  CAD  Hx right hemispheric CVA   Chronic diastolic chf EF 01-16%  Pacemaker for bradycardia  -hold norvasc, imdur, lipitor due to npo. Nitroglycerin past or hydralazine IV prn SBP >170.  -change aspirin to suppository  -not currently on bumex, given IVF last week per patient  -transfer to telemetry due to worsening status      SEN  -CPAP qhs     Chronic microcytic anemia      Hx PE   Chronic back pain due to bone mets  Obesity  Body mass index is 32.15 kg/(m^2). Code status: Full  Prophylaxis: Lovenox         Subjective:     Chief Complaint / Reason for Physician Visit  Follow up of abdominal pain, metastatic cancer and ileus  Chart reviewed in detail. Discussed with RN events overnight. NG 2800cc out yesterday. Still with abdominal discomfort.      Review of Systems:  Symptom Y/N Comments  Symptom Y/N Comments   Fever/Chills    Chest Pain     Poor Appetite    Edema     Cough    Abdominal Pain y    Sputum    Joint Pain     SOB/WELCH    Pruritis/Rash Nausea/vomit    Tolerating PT/OT     Diarrhea    Tolerating Diet     Constipation    Other       Could NOT obtain due to:      PO intake: No data found. Objective:     VITALS:   Last 24hrs VS reviewed since prior progress note. Most recent are:  Patient Vitals for the past 24 hrs:   Temp Pulse Resp BP SpO2   09/15/17 0800 98.4 °F (36.9 °C) 60 18 121/71 100 %   09/15/17 0037 97.2 °F (36.2 °C) 60 18 120/70 -   09/14/17 2034 98.4 °F (36.9 °C) 68 17 108/66 100 %   09/14/17 1554 - 69 - 91/62 -   09/14/17 1330 97.3 °F (36.3 °C) 60 18 (!) 76/62 100 %       Intake/Output Summary (Last 24 hours) at 09/15/17 0833  Last data filed at 09/14/17 2356   Gross per 24 hour   Intake           990.83 ml   Output             2920 ml   Net         -1929.17 ml        PHYSICAL EXAM:  General: WD, WN. Alert, cooperative, no acute distress    EENT:  EOMI. Anicteric sclerae. MMM  Resp:  CTA bilaterally, no wheezing or rales. No accessory muscle use  CV:  Regular  rhythm,  No edema  GI:  Slightly distended, mild diffuse tenderness.  +Bowel sounds  Neurologic:  Alert and oriented X 3, normal speech,   Psych:   Fair insight. Not anxious nor agitated  Skin:  No rashes. No jaundice    Reviewed most current lab test results and cultures  YES  Reviewed most current radiology test results   YES  Review and summation of old records today    NO  Reviewed patient's current orders and MAR    YES  PMH/ reviewed - no change compared to H&P  ________________________________________________________________________  Care Plan discussed with:    Comments   Patient x    Family      RN x    Care Manager     Consultant  x                      Multidiciplinary team rounds were held today with , nursing, pharmacist and clinical coordinator. Patient's plan of care was discussed; medications were reviewed and discharge planning was addressed.      ________________________________________________________________________  Total NON critical care TIME:  35   Minutes    Total CRITICAL CARE TIME Spent:   Minutes non procedure based      Comments   >50% of visit spent in counseling and coordination of care x     This includes time during multidisciplinary rounds if indicated above   ________________________________________________________________________  Aram Soriano MD     Procedures: see electronic medical records for all procedures/Xrays and details which were not copied into this note but were reviewed prior to creation of Plan. LABS:  I reviewed today's most current labs and imaging studies.   Pertinent labs include:  Recent Labs      09/15/17   0558  09/14/17   0555 09/13/17   1201   WBC  2.8*  6.2  5.6   HGB  9.5*  9.9*  9.7*   HCT  27.3*  28.8*  28.3*   PLT  222  233  218     Recent Labs      09/15/17   0558  09/14/17   0555  09/13/17   1201   NA  136  138  138   K  5.0  5.1  5.0   CL  101  114*  111*   CO2  25  14*  16*   GLU  78  95  135*   BUN  60*  40*  36*   CREA  3.00*  1.79*  1.49*   CA  7.2*  7.0*  8.7   MG  1.6   --    --    ALB  2.7*  2.5*  3.1*   TBILI  0.6  0.5  0.4   SGOT  17  11*  11*   ALT  14  11*  12

## 2017-09-15 NOTE — PROGRESS NOTES
Consult called to Dr. Watkins [facility] office to Rebecca No at [phone]

## 2017-09-15 NOTE — CONSULTS
Fernando Cochran     NAME:Tamiko Hills  RMD:[id number]   ALETHA:[birth year]   -                Pt seen and examined  Detailed note to follow    arf - prerenal + fleet enema - high phosphate load  ckd  Breast cancer  Ileus  NAGM acidosis  CKD 3 basline cr. 1.0-1.2    Juwan [facility]  [facility]  [address], [address]  [place], [address] [facility]  Phone - [phone]         Fax - [phone] [facility] Office  [address] [place] [address]  Phone - [phone]        Fax - [phone]     [email or web address]

## 2017-09-15 NOTE — PROGRESS NOTES
TRANSFER - OUT REPORT:    Verbal report given to Solomon Mcintosh RN(name) on Lincoln Sosa  being transferred to [facility], RN(unit) for routine progression of care       Report consisted of patients Situation, Background, Assessment and   Recommendations(SBAR). Information from the following report(s) SBAR was reviewed with the receiving nurse. Lines:   Venous Access Device Smart Port 08/22/17 Upper chest (subclavicular area, right (Active)       Venous Access Device power port 09/13/17 Upper chest (subclavicular area, right (Active)   Central Line Being Utilized Yes 9/15/2017 12:40 AM   Criteria for Appropriate Use Irritant/vesicant medication 9/15/2017 12:40 AM   Site Assessment Clean, dry, & intact 9/15/2017 12:40 AM   Date of Last Dressing Change 09/13/17 9/15/2017 12:40 AM   Dressing Status Clean, dry, & intact 9/15/2017 12:40 AM   Dressing Type Tape;Transparent 9/15/2017 12:40 AM   Action Taken Open ports on tubing capped 9/14/2017  8:30 AM   Date Accessed (Medial Site) 09/13/17 9/13/2017 12:00 PM   Access Time (Medial Site) 1140 9/13/2017 12:00 PM   Access Needle Size (Site #1) 20 G 9/13/2017 12:00 PM   Access Needle Length (Medial Site) 1 inch 9/13/2017 12:00 PM   Positive Blood Return (Medial Site) Yes 9/15/2017 12:40 AM   Action Taken (Medial Site) Blood drawn;Flushed 9/13/2017 12:00 PM   Positive Blood Return (Lateral Site) Yes 9/14/2017  8:30 AM   Alcohol Cap Used Yes 9/14/2017  8:30 AM        Opportunity for questions and clarification was provided.       Patient transported with:   Quu

## 2017-09-15 NOTE — ANESTHESIA PREPROCEDURE EVALUATION
Anesthetic History     PONV          Review of Systems / Medical History  Patient summary reviewed, nursing notes reviewed and pertinent labs reviewed    Pulmonary        Sleep apnea    Asthma        Neuro/Psych       CVA  TIA     Cardiovascular    Hypertension        Dysrhythmias   Pacemaker and CAD    Exercise tolerance: <4 METS     GI/Hepatic/Renal     GERD           Endo/Other    Diabetes: well controlled    Obesity, arthritis and anemia     Other Findings   Comments: SBO  Metastatic breast CA  Multiple myeloma   Recent blood transfusion         Physical Exam    Airway  Mallampati: II  TM Distance: 4 - 6 cm  Neck ROM: normal range of motion   Mouth opening: Normal     Cardiovascular  Regular rate and rhythm,  S1 and S2 normal,  no murmur, click, rub, or gallop             Dental  No notable dental hx       Pulmonary      Decreased breath sounds: bibasilar           Abdominal  GI exam deferred       Other Findings            Anesthetic Plan    ASA: 3  Anesthesia type: general    Monitoring Plan: BIS      Induction: Intravenous  Anesthetic plan and risks discussed with: Patient

## 2017-09-15 NOTE — PROGRESS NOTES
CM received a phone call from [facility] that patient is currently open to them for OT/PT and Speech services. Home OT and PT have been attempting to get patient approved for home hospital bed without success. [facility] has requested that inpatient OT/PT evaluate and document need for hospital bed at home.     Nursing notified of request.    Karen Daigle RN, BSN, [facility] Case Manager  [phone]

## 2017-09-15 NOTE — PROGRESS NOTES
Progress Note        Patient: Vilma Rosa MRN: [id number]  SSN: [id number]    YOB: 1940  Age: 65 y.o. Sex: female        Reason for Admission:     Metastatic breast cancer/abdominal pain    Subjective:      Vilma Rosa is a 65 y.o. female with a diagnosis of locally advanced right sided breast cancer. She underwent a right mastectomy with axillary LN dissection on 01/19/2015. She received three cycles of adjuvant chemotherapy with TC and stopped therapy due to side effects. She has completed radiation on 08/13/2015. She has developed progressive anemia. She feels fatigued. She also has ongoing back pain. . A bone marrow biopsy confirmed the diagnosis of metastatic TNBC. She was admitted to the [facility] for uncontrolled pain and fatigue. She received RBC transfusion was discharged from the hospital. She is now receiving Abraxane and Keytruda. Ms. Dionne Mar was admitted Wednesday for abdominal pain with nausea. An Abdominal XRay showed a partial bowel obstruction. A NG was placed for decompression. This morning she continues to have output from her NG and an abdominal CT scan has been ordered.      Review of Systems:    Constitutional: fatigue  Eyes: negative  Ears, Nose, Mouth, Throat, and Face: negative  Respiratory: negative  Cardiovascular: negative  Gastrointestinal: abdominal pain and nausea  Integument/chest wall: negative  Hematologic/Lymphatic: right sleeve in place for lymphadema  Musculoskeletal: back pain and joint pain   Neurological: negative      Past Medical History:   Diagnosis Date    Arrhythmia     bradycardia in 30's /pacemaker inserted    Arthritis     RT KNEE    Asthma Dx age 75    Bradycardia 2009    Cardiology saw her during hospital stay; Now off coreg;  Sees Dr. Suki Griffith    Breast cancer [facility])     Rt mastectomy 2/19/15    CAD (coronary artery disease)     Dr Rosalind Styles    Diabetes [facility])     Now seeing Dr. Rafita Valerio Diverticulitis     MALU (degenerative joint disease), lumbar     GERD (gastroesophageal reflux disease)     H. pylori infection 2008    Dr. Laura Ballesteros attack [facility]) April 2006    Hypercholesterolemia     Hypertension     Morbid obesity ([address]     Neuropathy, arm 2009    Right; Has had MRI and EMG at [facility] [id number] SEN (obstructive sleep apnea)     uses CPAP    Rectal bleeding 2009    Hospitalized; Had colonoscopy and EGD (ok), gastric emptying study (normal), doppler mesenteric arteries (ok)    Sciatica     Has seen Dr. Jacob Lemus    Stroke [facility]) 2009 & 2012    no residual problems     Past Surgical History:   Procedure Laterality Date    HX APPENDECTOMY  1979    HX BREAST LUMPECTOMY  12/12/2013    RIGHT BREAST DUCTAL EXCISION performed by Hilario Barry MD at [facility] MA[facility] OR    HX CATARACT REMOVAL  2007    HX CHOLECYSTECTOMY  1979    HX COLONOSCOPY      HX HEART CATHETERIZATION      angioplasty    HX HYSTERECTOMY      fibroids    HX KNEE ARTHROSCOPY  1997    right    HX MASTECTOMY Right 2/19/2015    RIGHT BREAST MODIFIED RADICAL MASTECTOMY RIGHT SENTINEL NODE BIOPSY, RIGHT PORT A CATH INSERTION performed by Fiordaliza Johnson MD at [facility] OR    HX PACEMAKER      HX SHOULDER ARTHROSCOPY  2004    left      Family History   Problem Relation Age of Onset    Stroke Father     Cancer Sister      breast cancer    Hypertension Sister     Cancer Mother      Unknown type    Cancer Brother      Colon    Hypertension Brother     Anesth Problems Neg Hx      Social History   Substance Use Topics    Smoking status: Never Smoker    Smokeless tobacco: Never Used    Alcohol use No      Prior to Admission medications    Medication Sig Start Date End Date Taking?  Authorizing Provider   insulin glargine (LANTUS) 100 unit/mL injection 18 units in the morning and 18 units at bedtime 8/18/17   Onelia Rodgers MD   insulin syringe-needle U-100 (BD INSULIN SYRINGE ULTRA-FINE) 1/2 mL 31 gauge x 15/64\" syrg 1 Syringe by SubCUTAneous route two (2) times a day. 8/18/17   Herman Barkley MD   VITAMIN D2 50,000 unit capsule take 1 capsule by mouth every 30 DAYS 8/16/17   Jocelyn Robbins NP   hydrOXYzine HCl (ATARAX) 25 mg tablet Take 25 mg by mouth every eight (8) hours as needed for Itching. Historical Provider   morphine IR (MS IR) 15 mg tablet Take 1 Tab by mouth every four (4) hours as needed for Pain. Max Daily Amount: 90 mg. 8/11/17   Jocelyn Robbins NP   morphine CR (MS CONTIN) 15 mg CR tablet Take 1 Tab by mouth every twelve (12) hours. Max Daily Amount: 30 mg. 8/11/17   Jocelyn Robbins NP   megestrol (MEGACE) 400 mg/10 mL (10 mL) suspension Take 10 mL by mouth daily. 8/11/17   Jocelyn Robbins NP   diclofenac (VOLTAREN) 1 % gel Apply 2 g to affected area four (4) times daily as needed (Pain). Historical Provider   dicyclomine (BENTYL) 20 mg tablet Take 1 Tab by mouth every six (6) hours as needed (abdominal cramps) for up to 20 doses. 6/10/17   Hermelindo Daly MD   ondansetron hcl (ZOFRAN, AS HYDROCHLORIDE,) 4 mg tablet Take 1 Tab by mouth every eight (8) hours as needed for Nausea. 6/10/17   Hermelindo Daly MD   atorvastatin (LIPITOR) 80 mg tablet Take 1 Tab by mouth daily. Indications: hypercholesterolemia 5/31/17   Marlene Conrad MD   aspirin (ASPIRIN) 325 mg tablet Take 1 Tab by mouth daily. 5/31/17   Marlene Conrad MD   ipratropium (ATROVENT) 0.06 % nasal spray instill 2 sprays into each nostril three times a day - IF NEEDED FOR RUNNING NOSE 3/15/17   Historical Provider   glipiZIDE (GLUCOTROL) 5 mg tablet Take 0.5 Tabs by mouth two (2) times a day. Take 1/2 every day with breakfast. If your blood sugar is above 200 take a whole tablet. 5/5/17   Herman Barkley MD   polyethylene glycol [facility]) 17 gram/dose powder Take 17 g by mouth two (2) times a day. Continue while taking pain medication 3/8/17   Lory Castorena MD   isosorbide mononitrate ER (IMDUR) 60 mg CR tablet Take  by mouth every morning.  6/6/16   Historical Provider   Blood Sugar Diagnostic, Disc (ASCENSIA BREEZE 2) strp Check your blood sugar twice daily. E84.05 6/20/16   Jeronimo May MD   ondansetron (ZOFRAN ODT) 4 mg disintegrating tablet Take 1 Tab by mouth every eight (8) hours as needed for Nausea. 6/1/16   Michelle Rose MD   albuterol (PROVENTIL VENTOLIN) 2.5 mg /3 mL (0.083 %) nebulizer solution 3 mL by Nebulization route every four (4) hours as needed for Wheezing. 9/20/15   Jorge Brown DO   MICROLET LANCET misc  12/16/14   Historical Provider   benazepril (LOTENSIN) 40 mg tablet Take 40 mg by mouth every morning. Historical Provider   omeprazole (PRILOSEC) 20 mg capsule Take 40 mg by mouth two (2) times a day. Todd Andersen MD   nitroglycerin (NITROSTAT) 0.4 mg SL tablet by SubLINGual route every five (5) minutes as needed for Chest Pain. Todd Andersen MD   amlodipine (NORVASC) 10 mg tablet TAKE 1 TABLET BY MOUTH ONCE DAILY 4/9/11   Lois Martin MD          Allergies   Allergen Reactions    Codeine Itching    Duratuss [Phenylephrine-Guaifenesin] Nausea and Vomiting    Lisinopril-Hydrochlorothiazide Itching    Motrin [Ibuprofen] Nausea and Vomiting     With 800mg    Tape [Adhesive] Other (comments)     TEARS HER SKIN         Objective:     Vitals:    09/14/17 1554 09/14/17 2034 09/15/17 0037 09/15/17 0800   BP: 91/62 108/66 120/70 121/71   Pulse: 69 68 60 60   Resp:  17 18 18   Temp:  98.4 °F (36.9 °C) 97.2 °F (36.2 °C) 98.4 °F (36.9 °C)   SpO2:  100%  100%   Weight:            Physical Exam:    GENERAL: alert, cooperative, obese  EYE: negative  LYMPHATIC: negative  THROAT & NECK: normal and no erythema or exudates noted.    LUNG: clear to auscultation bilaterally  HEART: regular rate and rhythm  ABDOMEN: soft, non-tender  EXTREMITIES: right arm edema - has sleeve in place, bilateral LE edema  SKIN: negative  NEUROLOGIC: negative        CT Results (most recent):    Results from Hospital Encounter encounter on 07/17/17   CT BX BONE MARROW NDL/TROCAR   Narrative CT-GUIDED BONE MARROW BIOPSY    : Shant Woodward M.D. INDICATION:  Multiple myeloma. Thrombocytopenia. ESTIMATED BLOOD LOSS: None. COMPARISON:  None. FINDINGS:   The patient's relevant allergies, medications, laboratory results, and history  were reviewed. The patient was identified by name and date of birth. The  procedure, benefits, risks, and alternatives (including not having the  procedure) were discussed. All of the patient's questions were answered. Informed verbal and written consent was obtained. CT dose reduction was achieved  through use of a standardized protocol tailored for this examination and  automatic exposure control for dose modulation. The patient was evaluated and felt to be an appropriate candidate for conscious  sedation. Please see preprocedure assessment and nursing record for full  documentation. Sedation was achieved with Versed and Fentanyl, and continuous  monitoring was performed. The patient was positioned prone on the CT gantry. A focused CT was performed  over the posterior iliac bones. The skin site was marked. The skin was prepped  and draped using usual sterile technique. Skin and subcutaneous tissues were  anesthetized with buffered lidocaine. Under CT guidance, an 11-gauge core bone  biopsy needle was advanced into the left iliac bone. Approximately 5 cc of  marrow blood was aspirated. A single core biopsy was obtained from the lesion  and submitted to the on-site cytopathology technologist and sample adequacy  confirmed. The core biopsy measured 20 mm. There were no immediate  complications. The patient was recovered in the recovery room without incident. Postprocedure diagnosis: Multiple myeloma, thrombocytopenia. Impression IMPRESSION:  Successful CT guided bone marrow biopsy.             Lab Results   Component Value Date/Time    WBC 2.8 09/15/2017 05:58 AM    Hemoglobin (POC) 9.2 09/20/2015 05:25 PM HGB 9.5 09/15/2017 05:58 AM    Hematocrit (POC) 27 09/20/2015 05:25 PM    HCT 27.3 09/15/2017 05:58 AM    PLATELET 677 83/64/0110 05:58 AM    MCV 74.2 09/15/2017 05:58 AM       Lab Results   Component Value Date/Time    Sodium 136 09/15/2017 05:58 AM    Potassium 5.0 09/15/2017 05:58 AM    Chloride 101 09/15/2017 05:58 AM    CO2 25 09/15/2017 05:58 AM    Anion gap 10 09/15/2017 05:58 AM    Glucose 78 09/15/2017 05:58 AM    BUN 60 09/15/2017 05:58 AM    Creatinine 3.00 09/15/2017 05:58 AM    BUN/Creatinine ratio 20 09/15/2017 05:58 AM    GFR est AA 18 09/15/2017 05:58 AM    GFR est non-AA 15 09/15/2017 05:58 AM    Calcium 7.2 09/15/2017 05:58 AM         Assessment:     1. Abdominal pain with probable ileus    > managed by Hospitalist   > NPO  > Abdominal CT scan today  > NG in place  > nausea medication  > fluids    Discussed with Dr. Amada Watson - hospitalist    2. Metastatic breast carcinoma - dx 7/17/2017     Bone marrow involvement  Diffuse skeletal metastasis    ECOG PS 2  Intent of treatment - palliative    Receiving palliative chemotherapy   Abraxane + Keytruda s/p 1 cycle    Symptom management form reviewed with patient. 3. Right breast carcinoma: dx 11/17/2014  pT3 N3a M0 (Stage IIIC) infiltrating ductal carcinoma, Tumor size 7.5 cm, LN 17/19 +ve with extracapsular extension in 2 nodes, grade 3, ki 67 35%, ER -ve, DE -ve, Her 2 -ve    ECOG PS 0  Intent of therapy - curative       S/P right sided mastectomy with axillary LN dissection  Received adjuvant chemotherapy   Taxotere, Cytoxan, s/p 3 Cycles    Therapy was discontinued due to severe side effects. Completed radiation to the chest wall and axilla  Lymphedema - followed by lymphedema clinic at [facility]      4. Anemia     From bone marrow involvement  Monitor    5. Acute renal failure    > consult nephrology      Plan:       1. Management of ileus per Hospitalist - CT scan planned for today  2. Pain control  3. Nephrology consult - ARF  4. Transferring to Telemetry   5. Supportive care      Signed by: Kimberley Dailey NP                     September 15, 2017               Attending Physician Note:     I have reviewed the history, physical examination, assessment and the plan of the care of the patient. I saw the patient with Reema Ambrocio and I participated in the examination and critical decision making. I agree with the note as stated above. Ms. Alessandro Mauricio is a women with metastatic breast carcinoma. She is admitted to the hospital with abdominal obstruction. She is getting a CT done. She will be seen by Gen Surg.        Signed by: Bogdan Patel MD                     September 15, 2017

## 2017-09-16 NOTE — PROGRESS NOTES
Problem: Mobility Impaired (Adult and Pediatric)  Goal: *Acute Goals and Plan of Care (Insert Text)  Physical Therapy Goals  Initiated 9/16/2017  1. Patient will move from supine to sit and sit to supine , scoot up and down and roll side to side in bed with independence within 7 day(s). 2. Patient will transfer from bed to chair and chair to bed with modified independence using the least restrictive device within 7 day(s). 3. Patient will perform sit to stand with independence within 7 day(s). 4. Patient will ambulate with modified independence for 300 feet with the least restrictive device within 7 day(s). 5. Patient will ascend/descend 4 stairs with dual handrail(s) with supervision/set-up within 7 day(s). PHYSICAL THERAPY EVALUATION  Patient: Enrike Girard (60 y.o. female)  Date: 9/16/2017  Primary Diagnosis: Metastatic Breast Ca  Metastatic breast cancer ([facility] [address]  SMALL BOWEL OBSTRUCTION  Procedure(s) (LRB):  LYSIS OF ADHESIONS LAPAROSCOPIC CONVERT TO OPEN (N/A) 1 Day Post-Op   Precautions:   Fall      ASSESSMENT :  Based on the objective data described below, the patient presents with generalized weakness, impaired balance, decreased activity tolerance s/p open abdominal surgery and history of metastatic breast CA. Patient reports mod-I mobility PTA with use of SPC vs RW and undergoing HH therapy. Mobilized for the first time since procedure with CGA-min A largely due to pain at surgical site and slowed demeanor due to such. Patient with PCA although RN unhooked PTA; she c/o 10/10 pain once session completed. Patient safely utilized RW with expected slowed gait although improved over time with cueing. She will need to make some functional gains prior to return to home with her daughter with [facility] therapy recommended pending progress; patient denies desire for rehab ahead. RN notified of above with VSS throughout on RA and NG tube intact and placed back onto suction.       Patient will benefit from skilled intervention to address the above impairments. Patients rehabilitation potential is considered to be Good  Factors which may influence rehabilitation potential include:   [ ]         None noted  [ ]         Mental ability/status  [ ]         Medical condition  [ ]         Home/family situation and support systems  [ ]         Safety awareness  [X]         Pain tolerance/management  [ ]         Other:        PLAN :  Recommendations and Planned Interventions:  [X]           Bed Mobility Training             [ ]    Neuromuscular Re-Education  [X]           Transfer Training                   [ ]    Orthotic/Prosthetic Training  [X]           Gait Training                         [ ]    Modalities  [X]           Therapeutic Exercises           [ ]    Edema Management/Control  [X]           Therapeutic Activities            [X]    Patient and Family Training/Education  [ ]           Other (comment):     Frequency/Duration: Patient will be followed by physical therapy  5 times a week to address goals. Discharge Recommendations: Home Health and family support pending progress  Further Equipment Recommendations for Discharge: has a RW        SUBJECTIVE:   Patient stated I'm gonna do it honey.       OBJECTIVE DATA SUMMARY:   HISTORY:    Past Medical History:   Diagnosis Date    Arrhythmia       bradycardia in 29's /pacemaker inserted    Arthritis       RT KNEE    Asthma Dx age 75    Bradycardia 2009     Cardiology saw her during hospital stay; Now off coreg;  Sees Dr. Jewell Hughes    Breast cancer [facility])       Rt mastectomy 2/19/15    CAD (coronary artery disease)       Dr Lennie Meyer Diabetes [facility])       Now seeing Dr. Willa Steele Diverticulitis      DJD (degenerative joint disease), lumbar      GERD (gastroesophageal reflux disease)      H. pylori infection 2008     Dr. Lachelle Heard attack [facility]) April 2006    Hypercholesterolemia      Hypertension      Morbid [facility] [address] [address]      Neuropathy, arm 2009     Right; Has had MRI and EMG at [facility]    SEN (obstructive sleep apnea)       uses CPAP    Rectal bleeding 2009     Hospitalized; Had colonoscopy and EGD (ok), gastric emptying study (normal), doppler mesenteric arteries (ok)    Sciatica       Has seen Dr. Praful Ramires    Stroke [facility]) 2009 & 2012     no residual problems     Past Surgical History:   Procedure Laterality Date    HX APPENDECTOMY   1979    HX BREAST LUMPECTOMY   12/12/2013     RIGHT BREAST DUCTAL EXCISION performed by Dominique Flood MD at [facility] MAIN OR    HX CATARACT REMOVAL   2007    HX CHOLECYSTECTOMY   1979    HX COLONOSCOPY        HX HEART CATHETERIZATION         angioplasty    HX HYSTERECTOMY         fibroids    HX KNEE ARTHROSCOPY   1997     right    HX MASTECTOMY Right 2/19/2015     RIGHT BREAST MODIFIED RADICAL MASTECTOMY RIGHT SENTINEL NODE BIOPSY, RIGHT PORT A CATH INSERTION performed by Wil Sanon MD at [address] HX PACEMAKER        HX SHOULDER ARTHROSCOPY   2004     left     Prior Level of Function/Home Situation: Mod-I with use of a RW outdoors, SPC indoors as she underwent HH therapy twice a week. She wears an up-to-date sleeve for lymphedema on her RUE. She currently resides with her daughter and would like to return to her household soon. Her daughter is able to assist her as needed. Denies falls in the past year. Not on home O2.    Personal factors and/or comorbidities impacting plan of care:      Home Situation  Home Environment: Private residence  # Steps to Enter: 2  Rails to Enter: Yes  Hand Rails : Bilateral  Wheelchair Ramp: No  One/Two Story Residence: One story  Living Alone: No  Support Systems: Child(magdiel), Family member(s)  Patient Expects to be Discharged to[de-identified] Private residence  Current DME Used/Available at Home: [place] beach, straight, Walker, rolling, Other (comment), Shower chair, Grab bars, Commode, bedside (lymphadema sleeve )  Tub or Shower Type: Shower EXAMINATION/PRESENTATION/DECISION MAKING:   Critical Behavior:  Neurologic State: Alert  Orientation Level: Oriented X4  Cognition: Appropriate decision making  Safety/Judgement: Awareness of environment  Hearing: Auditory  Auditory Impairment: None  Range Of Motion:  AROM: Generally decreased, functional                       Strength:    Strength: Generally decreased, functional                    Tone & Sensation:   Tone: Normal              Sensation: Impaired               Coordination:  Coordination: Within functional limits  Vision:   Corrective Lenses: Glasses (bifocals )  Functional Mobility:  Bed Mobility:  Rolling: Minimum assistance  Supine to Sit: Minimum assistance     Scooting: Minimum assistance  Transfers:  Sit to Stand: Minimum assistance (VC's for hand placement)  Stand to Sit: Minimum assistance (VC's for hand placement)                       Balance:   Sitting: Intact; Without support  Standing: With support; Impaired (RW)  Standing - Static: Good;Constant support  Standing - Dynamic : Fair  Ambulation/Gait Training:  Distance (ft): 190 Feet (ft)  Assistive Device: Walker, rolling;Gait belt  Ambulation - Level of Assistance: Contact guard assistance     Gait Description (WDL): Exceptions to WDL  Gait Abnormalities: Decreased step clearance              Speed/Domonique: Slow  Step Length: Right shortened;Left shortened                          Speed improved over time with simple VC's; likely NOT ready for SPC use anytime soon      Functional Measure:  Barthel Index:      Bathin  Bladder: 0 (moore)  Bowels: 10  Groomin  Dressin  Feeding: 10  Mobility: 0  Stairs: 0  Toilet Use: 5  Transfer (Bed to Chair and Back): 10  Total: 45         Barthel and G-code impairment scale:  Percentage of impairment CH  0% CI  1-19% CJ  20-39% CK  40-59% CL  60-79% CM  80-99% CN  100%   Barthel Score 0-100 100 99-80 79-60 59-40 20-39 1-19    0   Barthel Score 0-20 20 17-19 13-16 9-12 5-8 1-4 0      The Barthel ADL Index: Guidelines  1. The index should be used as a record of what a patient does, not as a record of what a patient could do. 2. The main aim is to establish degree of independence from any help, physical or verbal, however minor and for whatever reason. 3. The need for supervision renders the patient not independent. 4. A patient's performance should be established using the best available evidence. Asking the patient, friends/relatives and nurses are the usual sources, but direct observation and common sense are also important. However direct testing is not needed. 5. Usually the patient's performance over the preceding 24-48 hours is important, but occasionally longer periods will be relevant. 6. Middle categories imply that the patient supplies over 50 per cent of the effort. 7. Use of aids to be independent is allowed. Varsha Galan, Barthel, D.W. (7647). Functional evaluation: the Barthel Index. [facility] (14)2. Nadja Sharpe luis e ORAL Gaytan, Eboni Wells., Sebas Floyd., [place], [address] [place] [address] (1999). Measuring the change indisability after inpatient rehabilitation; comparison of the responsiveness of the Barthel Index and Functional [place] Measure. Journal of Neurology, Neurosurgery, and Psychiatry, 66(4), 318-628. Lashon Miller, N.J.A, ADONIS Quinones, & Cherelle Haas MRONI. (2004.) Assessment of post-stroke quality of life in cost-effectiveness studies: The usefulness of the Barthel Index and the EuroQoL-5D. Quality of Life Research, 13, 856-30            G codes: In compliance with CMSs Claims Based Outcome Reporting, the following G-code set was chosen for this patient based on their primary functional limitation being treated: The outcome measure chosen to determine the severity of the functional limitation was the Barthel with a score of 45/100 which was correlated with the impairment scale.       · Mobility - Walking and Moving Around:               - CURRENT STATUS: CK - 40%-59% impaired, limited or restricted               - GOAL STATUS:           CI - 1%-19% impaired, limited or restricted               - D/C STATUS:                       ---------------To be determined---------------    Pain:  Pain Scale 1: Numeric (0 - 10)  Pain Intensity 1: 3              Activity Tolerance: WNL      Please refer to the flowsheet for vital signs taken during this treatment. After treatment:   [X]         Patient left in no apparent distress sitting up in chair  [ ]         Patient left in no apparent distress in bed  [X]         Call bell left within reach  [X]         Nursing notified  [X]         Caregiver present  [ ]         Bed alarm activated      COMMUNICATION/EDUCATION:   The patients plan of care was discussed with: Registered Nurse.  [X]         Fall prevention education was provided and the patient/caregiver indicated understanding. [X]         Patient/family have participated as able in goal setting and plan of care. [X]         Patient/family agree to work toward stated goals and plan of care. [ ]         Patient understands intent and goals of therapy, but is neutral about his/her participation. [ ]         Patient is unable to participate in goal setting and plan of care.      Thank you for this referral.  Rakesh Sosa, PT, DPT    Time Calculation: 30 mins

## 2017-09-16 NOTE — PROGRESS NOTES
Admit Date: 2017    POD 1 Day Post-Op    Procedure:  Procedure(s):  LYSIS OF ADHESIONS LAPAROSCOPIC CONVERT TO OPEN    Subjective:     Patient has no new complaints. No flatus or bm    Objective:     Blood pressure 113/83, pulse 76, temperature 98 °F (36.7 °C), resp. rate 18, height 5' 6\" (1.676 m), weight 191 lb 9.6 oz (86.9 kg), SpO2 100 %.     Temp (24hrs), Av.2 °F (36.8 °C), Min:97.6 °F (36.4 °C), Max:98.7 °F (37.1 °C)      Physical Exam:  GENERAL: alert, cooperative, no distress, appears stated age, LUNG: nl effort, HEART: regular rate and rhythm, ABDOMEN: NG, EXTREMITIES:  extremities normal, atraumatic, no cyanosis or edema    Labs:   Recent Results (from the past 24 hour(s))   GLUCOSE, POC    Collection Time: 09/15/17  5:49 PM   Result Value Ref Range    Glucose (POC) 72 65 - 100 mg/dL    Performed by Angel Moscoso \"Anu\"    GLUCOSE, POC    Collection Time: 09/15/17  7:00 PM   Result Value Ref Range    Glucose (POC) 94 65 - 100 mg/dL    Performed by Angel Moscoso \"Anu\"    GLUCOSE, POC    Collection Time: 09/15/17 11:02 PM   Result Value Ref Range    Glucose (POC) 56 (L) 65 - 100 mg/dL    Performed by Gio Deras*    GLUCOSE, POC    Collection Time: 09/15/17 11:29 PM   Result Value Ref Range    Glucose (POC) 126 (H) 65 - 100 mg/dL    Performed by Charmayne Seal    RENAL FUNCTION PANEL    Collection Time: 17  3:54 AM   Result Value Ref Range    Sodium 136 136 - 145 mmol/L    Potassium 3.8 3.5 - 5.1 mmol/L    Chloride 98 97 - 108 mmol/L    CO2 28 21 - 32 mmol/L    Anion gap 10 5 - 15 mmol/L    Glucose 83 65 - 100 mg/dL    BUN 52 (H) 6 - 20 MG/DL    Creatinine 2.90 (H) 0.55 - 1.02 MG/DL    BUN/Creatinine ratio 18 12 - 20      GFR est AA 19 (L) >60 ml/min/1.73m2    GFR est non-AA 16 (L) >60 ml/min/1.73m2    Calcium 6.2 (LL) 8.5 - 10.1 MG/DL    Phosphorus 4.8 (H) 2.6 - 4.7 MG/DL    Albumin 2.2 (L) 3.5 - 5.0 g/dL   HGB & HCT    Collection Time: 17  3:54 AM   Result Value Ref Range    HGB 7.9 (L) 11.5 - 16.0 g/dL    HCT 22.3 (L) 35.0 - 47.0 %   GLUCOSE, POC    Collection Time: 09/16/17  6:18 AM   Result Value Ref Range    Glucose (POC) 75 65 - 100 mg/dL    Performed by Sae Kuhn    GLUCOSE, POC    Collection Time: 09/16/17  6:20 AM   Result Value Ref Range    Glucose (POC) 77 65 - 100 mg/dL    Performed by Sae Kuhn    GLUCOSE, POC    Collection Time: 09/16/17  7:04 AM   Result Value Ref Range    Glucose (POC) 123 (H) 65 - 100 mg/dL    Performed by Ophelia Shaikh (PCT)    GLUCOSE, POC    Collection Time: 09/16/17 12:52 PM   Result Value Ref Range    Glucose (POC) 86 65 - 100 mg/dL    Performed by Opehlia Shaikh (PCT)        Data Review reviewed  I & O and Lab    Assessment:     Active Problems:    Metastatic breast cancer ([facility] [address] (8/11/2017)      Neoplastic malignant related fatigue (9/13/2017)      Pain due to malignant neoplasm metastatic to bone ([address] (4/76/8487)      Cyclical vomiting with nausea (9/13/2017)        Plan/Recommendations/Medical Decision Making:     Continue present treatment    Franny Denise MD, [facility]

## 2017-09-16 NOTE — ROUTINE PROCESS
TRANSFER - IN REPORT:    Verbal report received from RAIZA Seaman RN(name) on Favio Frye  being received from OR(unit) for routine post - op      Report consisted of patients Situation, Background, Assessment and   Recommendations(SBAR). Information from the following report(s) OR Summary was reviewed with the receiving nurse. Opportunity for questions and clarification was provided. Assessment completed upon patients arrival to unit and care assumed.

## 2017-09-16 NOTE — PROGRESS NOTES
[facility] - Progress Note    Subjective:   Daily Progress Note    Admission Date: 2017     Complaint: A+O, c/o abd discomfort    Current Facility-Administered Medications   Medication Dose Route Frequency    cefOXitin (MEFOXIN) 2 g in 0.9% sodium chloride (MBP/ADV) 100 mL  2 g IntraVENous Q8H    acetaminophen (OFIRMEV) infusion 1,000 mg  1,000 mg IntraVENous Q6H    HYDROmorphone (PF) 15 mg/30 ml (DILAUDID) PCA   IntraVENous CONTINUOUS    0.9% sodium chloride infusion  125 mL/hr IntraVENous CONTINUOUS    insulin lispro (HUMALOG) injection   SubCUTAneous Q6H    ondansetron (ZOFRAN) injection 4 mg  4 mg IntraVENous Q6H PRN    prochlorperazine (COMPAZINE) with saline injection 10 mg  10 mg IntraVENous Q6H PRN    enoxaparin (LOVENOX) injection 30 mg  30 mg SubCUTAneous DAILY    glucose chewable tablet 16 g  4 Tab Oral PRN    glucagon (GLUCAGEN) injection 1 mg  1 mg IntraMUSCular PRN    hydrALAZINE (APRESOLINE) 20 mg/mL injection 10 mg  10 mg IntraVENous Q4H PRN    famotidine (PF) (PEPCID) injection 20 mg  20 mg IntraVENous DAILY    dextrose 10% infusion 125-250 mL  125-250 mL IntraVENous PRN       Review of Systems  Pertinent items are noted in HPI. Objective:     Visit Vitals    /83 (BP 1 Location: Left arm, BP Patient Position: At rest)    Pulse 76    Temp 98 °F (36.7 °C)    Resp 18    Ht 5' 6\" (1.676 m)    Wt 86.9 kg (191 lb 9.6 oz)    SpO2 100%    BMI 30.93 kg/m2     Temp (24hrs), Av.2 °F (36.8 °C), Min:97.6 °F (36.4 °C), Max:98.7 °F (37.1 °C)           Physical Exam:   General: Alert, cooperative, no distress, appears stated age. Head:  Normocephalic, without obvious abnormality, atraumatic. Lungs: Clear to auscultation bilaterally.    Heart: Regular rate and rhythm, S1, S2 normal, no S3, no rubs  Abdomen: NG suction, post op  Extremities: minimal edema    Neurologic: Grossly nonfocal       Data Review:     LABS:   Recent Results (from the past 24 hour(s))   GLUCOSE, POC    Collection Time: 09/15/17 12:24 PM   Result Value Ref Range    Glucose (POC) 104 (H) 65 - 100 mg/dL    Performed by Thaddeus Kaur    CK    Collection Time: 09/15/17  2:45 PM   Result Value Ref Range    CK 70 26 - 607 U/L   METABOLIC PANEL, BASIC    Collection Time: 09/15/17  2:45 PM   Result Value Ref Range    Sodium 137 136 - 145 mmol/L    Potassium 4.5 3.5 - 5.1 mmol/L    Chloride 98 97 - 108 mmol/L    CO2 29 21 - 32 mmol/L    Anion gap 10 5 - 15 mmol/L    Glucose 73 65 - 100 mg/dL    BUN 58 (H) 6 - 20 MG/DL    Creatinine 2.69 (H) 0.55 - 1.02 MG/DL    BUN/Creatinine ratio 22 (H) 12 - 20      GFR est AA 21 (L) >60 ml/min/1.73m2    GFR est non-AA 17 (L) >60 ml/min/1.73m2    Calcium 6.7 (L) 8.5 - 10.1 MG/DL   CREATININE, UR, RANDOM    Collection Time: 09/15/17  3:00 PM   Result Value Ref Range    Creatinine, urine 61.30 mg/dL   SODIUM, UR, RANDOM    Collection Time: 09/15/17  3:00 PM   Result Value Ref Range    Sodium urine, random 58 MMOL/L   OSMOLALITY, UR    Collection Time: 09/15/17  3:00 PM   Result Value Ref Range    Osmolality,urine 406 MOSM/kg H2O   GLUCOSE, POC    Collection Time: 09/15/17  5:49 PM   Result Value Ref Range    Glucose (POC) 72 65 - 100 mg/dL    Performed by Mariposa Batres \"Anu\"    GLUCOSE, POC    Collection Time: 09/15/17  7:00 PM   Result Value Ref Range    Glucose (POC) 94 65 - 100 mg/dL    Performed by Mariposa Batres \"Anu\"    GLUCOSE, POC    Collection Time: 09/15/17 11:02 PM   Result Value Ref Range    Glucose (POC) 56 (L) 65 - 100 mg/dL    Performed by Cecilia Razo*    GLUCOSE, POC    Collection Time: 09/15/17 11:29 PM   Result Value Ref Range    Glucose (POC) 126 (H) 65 - 100 mg/dL    Performed by Chelle Zimmerman    RENAL FUNCTION PANEL    Collection Time: 09/16/17  3:54 AM   Result Value Ref Range    Sodium 136 136 - 145 mmol/L    Potassium 3.8 3.5 - 5.1 mmol/L    Chloride 98 97 - 108 mmol/L    CO2 28 21 - 32 mmol/L    Anion gap 10 5 - 15 mmol/L    Glucose 83 65 - 100 mg/dL    BUN 52 (H) 6 - 20 MG/DL    Creatinine 2.90 (H) 0.55 - 1.02 MG/DL    BUN/Creatinine ratio 18 12 - 20      GFR est AA 19 (L) >60 ml/min/1.73m2    GFR est non-AA 16 (L) >60 ml/min/1.73m2    Calcium 6.2 (LL) 8.5 - 10.1 MG/DL    Phosphorus 4.8 (H) 2.6 - 4.7 MG/DL    Albumin 2.2 (L) 3.5 - 5.0 g/dL   HGB & HCT    Collection Time: 09/16/17  3:54 AM   Result Value Ref Range    HGB 7.9 (L) 11.5 - 16.0 g/dL    HCT 22.3 (L) 35.0 - 47.0 %   GLUCOSE, POC    Collection Time: 09/16/17  6:18 AM   Result Value Ref Range    Glucose (POC) 75 65 - 100 mg/dL    Performed by Jose Lick    GLUCOSE, POC    Collection Time: 09/16/17  6:20 AM   Result Value Ref Range    Glucose (POC) 77 65 - 100 mg/dL    Performed by [place] Lick    GLUCOSE, POC    Collection Time: 09/16/17  7:04 AM   Result Value Ref Range    Glucose (POC) 123 (H) 65 - 100 mg/dL    Performed by Phuong Lemus (PCT)          Assessment:     Active Problems:    Metastatic breast cancer ([facility] [address] (8/11/2017)      Neoplastic malignant related fatigue (9/13/2017)      Pain due to malignant neoplasm metastatic to bone ([facility] [address] (5/64/3953)      Cyclical vomiting with nausea (9/13/2017)    JULES - 3rd spacing, ng osses, possible ATN  Metabolic acidosis - better  Ileus, SBO - NG suction continues  S/P lysis of adhesions 9/15/17  Breast cancer wth bone mets since 7/2017  DM   HTN   history of CVA  Obesity, SEN  History of PE 2015      Plan:     Extra fluid bolus today as creat rising some  Continue present IVF  Serial labs

## 2017-09-16 NOTE — PROGRESS NOTES
Progress Note        Patient: Brenna Mcgowan MRN: [id number]  SSN: [id number]    YOB: 1940  Age: 65 y.o. Sex: female        Reason for Admission:     Metastatic breast cancer/abdominal pain    Subjective:      Brenna Mcgowan is a 65 y.o. female with a diagnosis of locally advanced right sided breast cancer. She underwent a right mastectomy with axillary LN dissection on 01/19/2015. She received three cycles of adjuvant chemotherapy with TC and stopped therapy due to side effects. She has completed radiation on 08/13/2015. She has developed progressive anemia. She feels fatigued. She also has ongoing back pain. . A bone marrow biopsy confirmed the diagnosis of metastatic TNBC. She was admitted to the [facility] for uncontrolled pain and fatigue. She received RBC transfusion was discharged from the hospital. She is now receiving Abraxane and Keytruda. Ms. Malorie Mcgee was admitted Wednesday for abdominal pain with nausea. An Abdominal XRay showed a partial bowel obstruction. A NG was placed for decompression.  CT showed a transition point, she had a open lap with lysis of adhesions on 9/15      Review of Systems:    Constitutional: fatigue  Eyes: negative  Ears, Nose, Mouth, Throat, and Face: negative  Respiratory: negative  Cardiovascular: negative  Gastrointestinal: abdominal pain , no nausea, no BM   Integument/chest wall: negative  Hematologic/Lymphatic: right sleeve in place for lymphadema  Musculoskeletal: back pain and joint pain   Neurological: negative      Past Medical History:   Diagnosis Date    Arrhythmia     bradycardia in 30's /pacemaker inserted    Arthritis     RT KNEE    Asthma Dx age 75    Bradycardia 2009    Cardiology saw her during hospital stay; Now off coreg;  Sees Dr. Tovar Less    Breast cancer [facility])     Rt mastectomy 2/19/15    CAD (coronary artery disease)     Dr Denver Monroy    Diabetes [facility])     Now seeing Dr. Alison Obrien Diverticulitis     MALU (degenerative joint disease), lumbar     GERD (gastroesophageal reflux disease)     H. pylori infection 2008    Dr. Linda Osuna attack [facility]) April 2006    Hypercholesterolemia     Hypertension     Morbid obesity ([address]     Neuropathy, arm 2009    Right; Has had MRI and EMG at [facility] [id number] SEN (obstructive sleep apnea)     uses CPAP    Rectal bleeding 2009    Hospitalized; Had colonoscopy and EGD (ok), gastric emptying study (normal), doppler mesenteric arteries (ok)    Sciatica     Has seen Dr. Shelley Soto    Stroke [facility]) 2009 & 2012    no residual problems     Past Surgical History:   Procedure Laterality Date    HX APPENDECTOMY  1979    HX BREAST LUMPECTOMY  12/12/2013    RIGHT BREAST DUCTAL EXCISION performed by Ileana Hernandez MD at [facility] MAIN OR    HX CATARACT REMOVAL  2007    HX CHOLECYSTECTOMY  1979    HX COLONOSCOPY      HX HEART CATHETERIZATION      angioplasty    HX HYSTERECTOMY      fibroids    HX KNEE ARTHROSCOPY  1997    right    HX MASTECTOMY Right 2/19/2015    RIGHT BREAST MODIFIED RADICAL MASTECTOMY RIGHT SENTINEL NODE BIOPSY, RIGHT PORT A CATH INSERTION performed by Rosalva Velasquez MD at [facility] AMBULATORY OR    HX PACEMAKER      HX SHOULDER ARTHROSCOPY  2004    left      Family History   Problem Relation Age of Onset    Stroke Father     Cancer Sister      breast cancer    Hypertension Sister     Cancer Mother      Unknown type    Cancer Brother      Colon    Hypertension Brother     Anesth Problems Neg Hx      Social History   Substance Use Topics    Smoking status: Never Smoker    Smokeless tobacco: Never Used    Alcohol use No      Prior to Admission medications    Medication Sig Start Date End Date Taking?  Authorizing Provider   insulin glargine (LANTUS) 100 unit/mL injection 18 units in the morning and 18 units at bedtime 8/18/17   Chele Garces MD   insulin syringe-needle U-100 (BD INSULIN SYRINGE ULTRA-FINE) 1/2 mL 31 gauge x 15/64\" syrg 1 Syringe by SubCUTAneous route two (2) times a day. 8/18/17   Radha Lopez MD   VITAMIN D2 50,000 unit capsule take 1 capsule by mouth every 30 DAYS 8/16/17   Sigifredo Rodgers NP   hydrOXYzine HCl (ATARAX) 25 mg tablet Take 25 mg by mouth every eight (8) hours as needed for Itching. Historical Provider   morphine IR (MS IR) 15 mg tablet Take 1 Tab by mouth every four (4) hours as needed for Pain. Max Daily Amount: 90 mg. 8/11/17   Sigifredo Rodgers NP   morphine CR (MS CONTIN) 15 mg CR tablet Take 1 Tab by mouth every twelve (12) hours. Max Daily Amount: 30 mg. 8/11/17   [facility] Ana Maria, JAMIN   megestrol (MEGACE) 400 mg/10 mL (10 mL) suspension Take 10 mL by mouth daily. 8/11/17   [facility] Ana Maria, JAMIN   diclofenac (VOLTAREN) 1 % gel Apply 2 g to affected area four (4) times daily as needed (Pain). Historical Provider   dicyclomine (BENTYL) 20 mg tablet Take 1 Tab by mouth every six (6) hours as needed (abdominal cramps) for up to 20 doses. 6/10/17   Scotty Wright MD   ondansetron hcl (ZOFRAN, AS HYDROCHLORIDE,) 4 mg tablet Take 1 Tab by mouth every eight (8) hours as needed for Nausea. 6/10/17   Scotty Wright MD   atorvastatin (LIPITOR) 80 mg tablet Take 1 Tab by mouth daily. Indications: hypercholesterolemia 5/31/17   Lyndsey Fairchild MD   aspirin (ASPIRIN) 325 mg tablet Take 1 Tab by mouth daily. 5/31/17   Lyndsey Fairchild MD   ipratropium (ATROVENT) 0.06 % nasal spray instill 2 sprays into each nostril three times a day - IF NEEDED FOR RUNNING NOSE 3/15/17   Historical Provider   glipiZIDE (GLUCOTROL) 5 mg tablet Take 0.5 Tabs by mouth two (2) times a day. Take 1/2 every day with breakfast. If your blood sugar is above 200 take a whole tablet. 5/5/17   Radha Lopez MD   polyethylene glycol [facility]) 17 gram/dose powder Take 17 g by mouth two (2) times a day. Continue while taking pain medication 3/8/17   Magy Chester MD   isosorbide mononitrate ER (IMDUR) 60 mg CR tablet Take  by mouth every morning.  6/6/16   Historical Provider   Blood Sugar Diagnostic, Disc (ASCENSIA BREEZE 2) strp Check your blood sugar twice daily. A28.45 6/20/16   Khalida Horner MD   ondansetron (ZOFRAN ODT) 4 mg disintegrating tablet Take 1 Tab by mouth every eight (8) hours as needed for Nausea. 6/1/16   Hedy Styles MD   albuterol (PROVENTIL VENTOLIN) 2.5 mg /3 mL (0.083 %) nebulizer solution 3 mL by Nebulization route every four (4) hours as needed for Wheezing. 9/20/15   Constantine Pierre DO   MICROLET LANCET misc  12/16/14   Historical Provider   benazepril (LOTENSIN) 40 mg tablet Take 40 mg by mouth every morning. Historical Provider   omeprazole (PRILOSEC) 20 mg capsule Take 40 mg by mouth two (2) times a day. Todd Andersen MD   nitroglycerin (NITROSTAT) 0.4 mg SL tablet by SubLINGual route every five (5) minutes as needed for Chest Pain. Todd Andersen MD   amlodipine (NORVASC) 10 mg tablet TAKE 1 TABLET BY MOUTH ONCE DAILY 4/9/11   Natalie Ross MD          Allergies   Allergen Reactions    Codeine Itching    Duratuss [Phenylephrine-Guaifenesin] Nausea and Vomiting    Lisinopril-Hydrochlorothiazide Itching    Motrin [Ibuprofen] Nausea and Vomiting     With 800mg    Tape [Adhesive] Other (comments)     TEARS HER SKIN         Objective:     Vitals:    09/16/17 0045 09/16/17 0100 09/16/17 0115 09/16/17 0330   BP: 105/80 (!) 118/39 112/50 113/83   Pulse: 73 72 70 76   Resp: 18 18 18 18   Temp:    98 °F (36.7 °C)   SpO2: 98% 97% 97% 100%   Weight:       Height:            Physical Exam:    GENERAL: alert, cooperative, obese, NG in place  EYE: negative  LYMPHATIC: negative  THROAT & NECK: normal and no erythema or exudates noted.    LUNG: clear to auscultation bilaterally  HEART: regular rate and rhythm  ABDOMEN: soft, distended, did not allow palpation  EXTREMITIES: right arm edema - has sleeve in place, bilateral LE edema  SKIN: negative  NEUROLOGIC: negative        CT Results (most recent):    Results from Hospital Encounter encounter on 09/13/17   CT ABD PELV WO CONT   Narrative CT ABDOMEN AND PELVIS WITHOUT CONTRAST. 9/15/2017 12:44 PM     INDICATION: Abdominal pain, vomiting. Ileus versus small bowel obstruction. Metastatic breast carcinoma. Cyclical vomiting with nausea. COMPARISON: 7/2/2017. TECHNIQUE: CT of the abdomen and pelvis was performed after the administration  of oral contrast. Evaluation of the solid organs is less sensitive without IV  contrast. CT dose reduction was achieved through use of a standardized protocol  tailored for this examination and automatic exposure control for dose  modulation. FINDINGS:  Abdomen: Post right mastectomy. Aside from minimal basilar atelectasis, the lung  bases are clear. The heart size is normal. A pacemaker is in place. Mild aortic  valvular calcifications and mild coronary artery calcifications. An NG tube is  in appropriate position the stomach. Mild intra and extrahepatic biliary ductal  dilation is likely physiologic post cholecystectomy. Incidental note is made of  a small right upper pole renal cyst. The unenhanced liver, pancreas, spleen,  adrenals, and kidneys are otherwise normal.    Pelvis: The small bowel is diffusely dilated, with an abrupt transition point in  the right mid abdomen ileum on images 2-52 and 601-62. No pneumatosis,  mesenteric venous gas, or free air. The distal ileum is decompressed. The  patient is post right hemicolectomy and ileocolonic anastomosis. Sigmoid  diverticulosis is moderate. The colon is largely decompressed. There is trace  free fluid in the deep pelvis. No abdominopelvic lymphadenopathy. Post  hysterectomy. Impression IMPRESSION: Small bowel obstruction, with mid ileal transition point in the  right midabdomen. The findings were called to Dr. Lory Castorena on 9/15/2017 2:46 PM by Dr. Shant Woodward.   [id number]            Lab Results   Component Value Date/Time    WBC 2.8 09/15/2017 05:58 AM    Hemoglobin (POC) 9.2 09/20/2015 05:25 PM HGB 7.9 09/16/2017 03:54 AM    Hematocrit (POC) 27 09/20/2015 05:25 PM    HCT 22.3 09/16/2017 03:54 AM    PLATELET 234 55/53/3393 05:58 AM    MCV 74.2 09/15/2017 05:58 AM       Lab Results   Component Value Date/Time    Sodium 136 09/16/2017 03:54 AM    Potassium 3.8 09/16/2017 03:54 AM    Chloride 98 09/16/2017 03:54 AM    CO2 28 09/16/2017 03:54 AM    Anion gap 10 09/16/2017 03:54 AM    Glucose 83 09/16/2017 03:54 AM    BUN 52 09/16/2017 03:54 AM    Creatinine 2.90 09/16/2017 03:54 AM    BUN/Creatinine ratio 18 09/16/2017 03:54 AM    GFR est AA 19 09/16/2017 03:54 AM    GFR est non-AA 16 09/16/2017 03:54 AM    Calcium 6.2 09/16/2017 03:54 AM         Assessment:     1. Abdominal pain with probable ileus    CT reviewed, s/p lap and lysis of adhesions on 9/15  NG in place  Pain contolled      2. Metastatic breast carcinoma - dx 7/17/2017     Bone marrow involvement  Diffuse skeletal metastasis    ECOG PS 2  Intent of treatment - palliative    Receiving palliative chemotherapy   Abraxane + Keytruda s/p 1 cycle    Symptom management form reviewed with patient. 3. Right breast carcinoma: dx 11/17/2014  pT3 N3a M0 (Stage IIIC) infiltrating ductal carcinoma, Tumor size 7.5 cm, LN 17/19 +ve with extracapsular extension in 2 nodes, grade 3, ki 67 35%, ER -ve, GA -ve, Her 2 -ve    ECOG PS 0  Intent of therapy - curative       S/P right sided mastectomy with axillary LN dissection  Received adjuvant chemotherapy   Taxotere, Cytoxan, s/p 3 Cycles    Therapy was discontinued due to severe side effects. Completed radiation to the chest wall and axilla  Lymphedema - followed by lymphedema clinic at [facility]      4. Anemia     From bone marrow involvement  Monitor    5.  Acute renal failure  Noted input from Nephrology  ATN  IVF- stable creatinine        Signed by: Kaya Kong MD                     September 16, 2017

## 2017-09-16 NOTE — PROGRESS NOTES
0010: TRANSFER - IN REPORT:    Verbal report received from PACU(name) on Yulia Khanna  being received from [employer]) for routine post - op      Report consisted of patients Situation, Background, Assessment and   Recommendations(SBAR). Information from the following report(s) SBAR, Kardex, OR Summary, Procedure Summary, Intake/Output, MAR, Recent Results and Cardiac Rhythm Paced was reviewed with the receiving nurse. Opportunity for questions and clarification was provided. Assessment will be completed upon patients arrival to unit and care assumed. 0030: Pt received from PACU per bed. Is alert and oriented. Oriented to room and call bell at side. Primary Nurse Toan Hager RN and HELGA Simons RN performed a dual skin assessment on this patient No impairment noted. Alan score is 12. NG connected to wall suction. Draining very little yellowish fluid. Abd drsg dry and intact. Ascencio draining yellow urine. In no acute distress @ present. 0500: Critical lab value Calcium 6.2 received. Call placed to Dr. Kika Ayala. No orders received.

## 2017-09-16 NOTE — PROGRESS NOTES
Hospitalist Progress Note    NAME: Alfredito Thomas   :  1940   MRN:  [id number]     Interim Hospital Summary: 65 y.o. female whom presented on 2017 with      Assessment / Plan:    DM type 2  -stop lantus due to low BG. Follow closely. SSI prn    JULES  Metabolic acidosis, resolved  -pre renal from third spacing and fluid loss via NG.    -Cr trending down. Continue IVFs    SBO  Abdominal pain  Hx diverticulitis s/p partial colectomy 2008  S/p open cholecystectomy  -s/p lysis of adhesions 9/15/2017  -NG to suction  -empiric cefoxitin  -appreciate Dr Hawk's help    Right breast CA dx  with bone mets dx   Right arm lymphedema  -pT3 N3a M0 (Stage IIIC) infiltrating ductal carcinoma, s/p right mastectomy, XRT    HTN  CAD  Hx right hemispheric CVA   Chronic diastolic chf EF 67-10%  Pacemaker for bradycardia  -holding norvasc, imdur, lipitor and ASA due to npo  -Nitroglycerin past or hydralazine IV prn SBP >170     SEN  -CPAP qhs     Chronic microcytic anemia      Hx PE   Chronic back pain due to bone mets  Obesity  Body mass index is 30.93 kg/(m^2). Code status: Full  Prophylaxis: Lovenox         Subjective:     Chief Complaint / Reason for Physician Visit  Follow up of abdominal pain, metastatic cancer and ileus  Chart reviewed in detail. Discussed with RN events overnight. Had surgery last night    Review of Systems:  Symptom Y/N Comments  Symptom Y/N Comments   Fever/Chills    Chest Pain     Poor Appetite    Edema     Cough    Abdominal Pain y    Sputum    Joint Pain     SOB/WELCH    Pruritis/Rash     Nausea/vomit    Tolerating PT/OT     Diarrhea    Tolerating Diet     Constipation    Other       Could NOT obtain due to:      PO intake:   Patient Vitals for the past 72 hrs:   % Diet Eaten   09/15/17 1909 0 %   09/15/17 1500 0 %     Objective:     VITALS:   Last 24hrs VS reviewed since prior progress note.  Most recent are:  Patient Vitals for the past 24 hrs:   Temp Pulse Resp BP SpO2   09/16/17 0330 98 °F (36.7 °C) 76 18 113/83 100 %   09/16/17 0115 - 70 18 112/50 97 %   09/16/17 0100 - 72 18 (!) 118/39 97 %   09/16/17 0045 - 73 18 105/80 98 %   09/16/17 0032 98.1 °F (36.7 °C) 68 18 107/84 96 %   09/16/17 0015 - 69 20 111/49 100 %   09/16/17 0001 - 70 25 115/49 100 %   09/15/17 2345 - 66 21 115/47 100 %   09/15/17 2330 98.7 °F (37.1 °C) 70 24 121/50 100 %   09/15/17 2325 - 68 20 116/48 100 %   09/15/17 2320 - 69 19 118/41 100 %   09/15/17 2315 - 74 - 112/47 100 %   09/15/17 2310 - 71 25 111/51 100 %   09/15/17 2305 - 69 28 114/56 100 %   09/15/17 2300 - 73 17 119/48 100 %   09/15/17 2255 - 75 17 128/55 100 %   09/15/17 2250 - 67 19 125/52 100 %   09/15/17 2244 98.6 °F (37 °C) 70 23 129/59 100 %   09/15/17 1953 97.6 °F (36.4 °C) 84 16 127/54 -   09/15/17 1937 98.2 °F (36.8 °C) 72 18 120/57 100 %   09/15/17 1339 98 °F (36.7 °C) 76 18 128/47 99 %   09/15/17 0800 98.4 °F (36.9 °C) 60 18 121/71 100 %       Intake/Output Summary (Last 24 hours) at 09/16/17 0728  Last data filed at 09/16/17 6787   Gross per 24 hour   Intake          3611.67 ml   Output             4360 ml   Net          -748.33 ml        PHYSICAL EXAM:  General: WD, WN. Alert, cooperative, no acute distress    EENT:  EOMI. Anicteric sclerae. MMM  (+) NGT   Resp:  CTA bilaterally, no wheezing or rales. No accessory muscle use  CV:  Regular  rhythm,  No edema  GI:  Slightly distended, mild diffuse tenderness.  +Bowel sounds  Neurologic:  Alert and oriented X 3, normal speech,   Psych:   Fair insight. Not anxious nor agitated  Skin:  No rashes.   No jaundice    Reviewed most current lab test results and cultures  YES  Reviewed most current radiology test results   YES  Review and summation of old records today    NO  Reviewed patient's current orders and MAR    YES  PMH/SH reviewed - no change compared to H&P  ________________________________________________________________________  Care Plan discussed with:    Comments   Patient x    Family      RN x    Care Manager     Consultant  x                      Multidiciplinary team rounds were held today with , nursing, pharmacist and clinical coordinator. Patient's plan of care was discussed; medications were reviewed and discharge planning was addressed. ________________________________________________________________________  Total NON critical care TIME:   35   Minutes    Total CRITICAL CARE TIME Spent:   Minutes non procedure based      Comments   >50% of visit spent in counseling and coordination of care x     This includes time during multidisciplinary rounds if indicated above   ________________________________________________________________________  Rg Purdy MD     Procedures: see electronic medical records for all procedures/Xrays and details which were not copied into this note but were reviewed prior to creation of Plan. LABS:  I reviewed today's most current labs and imaging studies.   Pertinent labs include:  Recent Labs      09/16/17   0354  09/15/17   0558  09/14/17   0555  09/13/17   1201   WBC   --   2.8*  6.2  5.6   HGB  7.9*  9.5*  9.9*  9.7*   HCT  22.3*  27.3*  28.8*  28.3*   PLT   --   222  233  218     Recent Labs      09/16/17   0354  09/15/17   1445  09/15/17   0558  09/14/17   0555  09/13/17   1201   NA  136  137  136  138  138   K  3.8  4.5  5.0  5.1  5.0   CL  98  98  101  114*  111*   CO2  28  29  25  14*  16*   GLU  83  73  78  95  135*   BUN  52*  58*  60*  40*  36*   CREA  2.90*  2.69*  3.00*  1.79*  1.49*   CA  6.2*  6.7*  7.2*  7.0*  8.7   MG   --    --   1.6   --    --    PHOS  4.8*   --    --    --    --    ALB  2.2*   --   2.7*  2.5*  3.1*   TBILI   --    --   0.6  0.5  0.4   SGOT   --    --   17  11*  11*   ALT   --    --   14  11*  12

## 2017-09-16 NOTE — BRIEF OP NOTE
BRIEF OPERATIVE NOTE    Date of Procedure: 9/15/2017   Preoperative Diagnosis: SMALL BOWEL OBSTRUCTION  Postoperative Diagnosis: SMALL BOWEL OBSTRUCTION    Procedure(s):  LYSIS OF ADHESIONS LAPAROSCOPIC CONVERT TO OPEN  Surgeon(s) and Role:     * Werner Pedraza MD - Primary         Assistant Staff:       Surgical Staff:  Circ-1: Julio César Harvey RN  Scrub Tech-1: Italo Agarwal  Surg Asst-1: [facility]  Event Time In   Incision Start 2101   Incision Close 2233     Anesthesia: General   Estimated Blood Loss: 75mL  Specimens: * No specimens in log *   Findings: adhesions throughout but a discrete band in the RLQ with transition point. There was bruising of the compressed bowel but it was viable. Complications: none immediate.     Implants: * No implants in log *

## 2017-09-16 NOTE — ANESTHESIA POSTPROCEDURE EVALUATION
Post-Anesthesia Evaluation and Assessment    Patient: Chitra Miles MRN: [id number]  SSN: [id number]    YOB: 1940  Age: 65 y.o. Sex: female       Cardiovascular Function/Vital Signs  Visit Vitals    BP (P) 121/50 (BP 1 Location: Left arm, BP Patient Position: At rest)    Pulse 70    Temp (P) 37.1 °C (98.7 °F)    Resp 24    Wt 90.4 kg (199 lb 3.2 oz)    SpO2 100%    BMI 32.15 kg/m2       Patient is status post general anesthesia for Procedure(s):  LYSIS OF ADHESIONS LAPAROSCOPIC CONVERT TO OPEN. Nausea/Vomiting: None    Postoperative hydration reviewed and adequate. Pain:  Pain Scale 1: Numeric (0 - 10) (09/15/17 2330)  Pain Intensity 1: 4 (09/15/17 2330)   Managed    Neurological Status:   Neuro (WDL): Exceptions to WDL (09/15/17 2244)  Neuro  Neurologic State: Drowsy; Eyes open to stimulus; Eyes open to voice;Sleeping (09/15/17 2244)  Orientation Level: Oriented to person;Oriented to place;Oriented to situation (09/15/17 2244)  Cognition: Follows commands (09/15/17 2244)  Speech: Clear (09/15/17 2244)  LUE Motor Response: Purposeful (09/15/17 2244)  LLE Motor Response: Purposeful (09/15/17 2244)  RUE Motor Response: Purposeful (09/15/17 2244)  RLE Motor Response: Purposeful (09/15/17 2244)   At baseline    Mental Status and Level of Consciousness: Arousable    Pulmonary Status:   O2 Device: (P) Nasal cannula (09/15/17 2330)   Adequate oxygenation and airway patent    Complications related to anesthesia: None    Post-anesthesia assessment completed.  No concerns    Signed By: Dianna Quezada MD     September 15, 2017

## 2017-09-16 NOTE — OP NOTES
[facility]   [address] [facility], [address]   OP NOTE       Name:  Saida Gomez   MR#:  [id number]   :  1940   Account #:  [de-identified]    Surgery Date:  09/15/2017   Date of Adm:  2017       PREOPERATIVE DIAGNOS IS:  Small bowel obstruction. POSTOPERATIVE DIAGNOS IS:  Small bowel obstruction. PROCEDURES PERFORMED:  Laparoscopic converted open lysis of   adhesions. ESTIMATED BLOOD LOSS:  75 mL. SPECIMENS REMOVED:  None. ANESTHESIA:  General endotracheal.    SURGEON: Tammy Valdez MD    INDICATIONS: The patient is a 74-year-old female who has been   having intermittent abdominal pain. She presented to the hospital with   1 day of nausea, vomiting and severe pain. CAT scan suggested small   bowel obstruction. There was a discrete transition point, and the   patient is continuing to have pain. I was consulted today and I took her   to the operating room. FINDINGS: There was a discrete band in the right lower quadrant   which was causing the transition point. There was bruising of the small   bowel at the transition point, but it did appear to be viable. There were   adhesions throughout the abdominal cavity. There was a right upper   quadrant enterocolostomy. DESCRIPTION OF PROCEDURE: After obtaining informed consent,   the patient was taken to the operating room and placed supine on the   operating table. An operative time-out was performed and general   endotracheal anesthesia was induced. A Ascencio catheter was placed. Preoperative antibiotics were administered, and the abdomen was   prepped and draped in the usual sterile fashion. The abdomen was   then entered in the upper midline using a 5 mm optical trocar. The   abdomen was insufflated without incident. There was a free space in   this area. Under direct vision, 3 other 5 mm ports were placed and   using these ports, the abdomen was explored.  Adhesions were taken   down off of the anterior abdominal wall, and the anterior abdominal   wall was cleared completely. Work was then carried out towards the   right side because of the CAT scan findings. The adhesions proved to   be too dense and because the point of transition was posterior,   I decided to convert to open. Ports were removed, and an upper   midline incision was made with a blade. The abdomen was entered,   the fascia was divided with electrocautery. Lysis of adhesions was   carried out laterally towards the right. The small bowel was then run   from the ligament of Treitz distally. There were significant adhesions   between the omentum and the small bowel as well as the transverse   colon and small bowel. These were all taken down. The small bowel   was then further run distally and interloop adhesions were taken down   with estela. Finally, I was able to palpate the transition point in the   right lower quadrant and the band of impressive fatty tissue was   divided with electrocautery. This freed the small bowel and the small   bowel was run all the way distally to the point where it entered the right   quadrant. The prior anastomosis was covered with omentum and I did   not take this down as this was not the point of transition. The   previously collapsed small bowel now filled and the compress point   was inspected and while there was some bruising, the small bowel was   certainly viable. No resection was required. I then placed Seprafilm   throughout the abdominal cavity and closed the fascia from above and   below using #1 looped PDS sutures. The deep dermal tissues were   reapproximated with buried interrupted 3-0 Vicryl sutures. The skin   was then closed with staples at the midline incision and at all of the   prior port sites. The incision was dressed with Xeroform, gauze and   tape. The patient was recovered from general anesthesia and taken to   the recovery area in satisfactory condition.  All instrument, sponge, and   needle counts were reported as correct.         Abiola Steen MD      DD / Lilibeth Fraga   D:  09/15/2017   22:56   T:  09/15/2017   23:23   Job #:  [id number]

## 2017-09-17 NOTE — PROGRESS NOTES
Progress Note        Patient: Dave Vigil MRN: [id number]  SSN: [id number]    YOB: 1940  Age: 65 y.o. Sex: female        Reason for Admission:     Metastatic breast cancer/abdominal pain    Subjective:      Dave Vigil is a 65 y.o. female with a diagnosis of locally advanced right sided breast cancer. She underwent a right mastectomy with axillary LN dissection on 01/19/2015. She received three cycles of adjuvant chemotherapy with TC and stopped therapy due to side effects. She has completed radiation on 08/13/2015. She has developed progressive anemia. She feels fatigued. She also has ongoing back pain. . A bone marrow biopsy confirmed the diagnosis of metastatic TNBC. She was admitted to the [facility] [place] for uncontrolled pain and fatigue. She received RBC transfusion was discharged from the hospital. She is now receiving Abraxane and Keytruda. Ms. Claire Singh was admitted Wednesday for abdominal pain with nausea. An Abdominal XRay showed a partial bowel obstruction. A NG was placed for decompression. CT showed a transition point, she had a open lap with lysis of adhesions on 9/15    Today stable, minimal NG output, has no pain no fevers. Hb stable, no bleeding. Frustrated with NG and states \" she cant have a BM as long as she has that tube\".     Review of Systems:    Constitutional: fatigue  Eyes: negative  Ears, Nose, Mouth, Throat, and Face: negative  Respiratory: negative  Cardiovascular: negative  Gastrointestinal: abdominal pain , no nausea, no BM   Integument/chest wall: negative  Hematologic/Lymphatic: right sleeve in place for lymphadema  Musculoskeletal: back pain and joint pain   Neurological: negative      Past Medical History:   Diagnosis Date    Arrhythmia     bradycardia in 30's /pacemaker inserted    Arthritis     RT KNEE    Asthma Dx age 75    Bradycardia 2009    Cardiology saw her during hospital stay; Now off coreg;  Sees Dr. Celine Conti [facility]) Rt mastectomy 2/19/15    CAD (coronary artery disease)     Dr Lucy Musa    Diabetes [facility])     Now seeing Dr. Segura Dense Diverticulitis     DJD (degenerative joint disease), lumbar     GERD (gastroesophageal reflux disease)     H. pylori infection 2008    Dr. Nataliya Garsia attack [facility]) April 2006    Hypercholesterolemia     Hypertension     Morbid obesity ([address]     Neuropathy, arm 2009    Right; Has had MRI and EMG at [facility] [id number] SEN (obstructive sleep apnea)     uses CPAP    Rectal bleeding 2009    Hospitalized; Had colonoscopy and EGD (ok), gastric emptying study (normal), doppler mesenteric arteries (ok)    Sciatica     Has seen Dr. Juice Ferguson    Stroke [facility]) 2009 & 2012    no residual problems     Past Surgical History:   Procedure Laterality Date    HX APPENDECTOMY  1979    HX BREAST LUMPECTOMY  12/12/2013    RIGHT BREAST DUCTAL EXCISION performed by Raquel Norman MD at [facility]"" MAIN OR    HX CATARACT REMOVAL  2007    HX CHOLECYSTECTOMY  1979    HX COLONOSCOPY      HX HEART CATHETERIZATION      angioplasty    HX HYSTERECTOMY      fibroids    HX KNEE ARTHROSCOPY  1997    right    HX MASTECTOMY Right 2/19/2015    RIGHT BREAST MODIFIED RADICAL MASTECTOMY RIGHT SENTINEL NODE BIOPSY, RIGHT PORT A CATH INSERTION performed by Paulette Rosario MD at \[facility]\"" AMBULATORY OR    HX PACEMAKER      HX SHOULDER ARTHROSCOPY  2004    left      Family History   Problem Relation Age of Onset    Stroke Father     Cancer Sister      breast cancer    Hypertension Sister     Cancer Mother      Unknown type    Cancer Brother      Colon    Hypertension Brother     Anesth Problems Neg Hx      Social History   Substance Use Topics    Smoking status: Never Smoker    Smokeless tobacco: Never Used    Alcohol use No      Prior to Admission medications    Medication Sig Start Date End Date Taking?  Authorizing Provider   insulin glargine (LANTUS) 100 unit/mL injection 18 units in the morning and 18 units at bedtime 8/18/17   Mar Ching MD   insulin syringe-needle U-100 (BD INSULIN SYRINGE ULTRA-FINE) 1/2 mL 31 gauge x 15/64\" syrg 1 Syringe by SubCUTAneous route two (2) times a day. 8/18/17   Mar Ching MD   VITAMIN D2 50,000 unit capsule take 1 capsule by mouth every 30 DAYS 8/16/17   Asa Martinez NP   hydrOXYzine HCl (ATARAX) 25 mg tablet Take 25 mg by mouth every eight (8) hours as needed for Itching. Historical Provider   morphine IR (MS IR) 15 mg tablet Take 1 Tab by mouth every four (4) hours as needed for Pain. Max Daily Amount: 90 mg. 8/11/17   Asa Martinez NP   morphine CR (MS CONTIN) 15 mg CR tablet Take 1 Tab by mouth every twelve (12) hours. Max Daily Amount: 30 mg. 8/11/17   Asa Martinez NP   megestrol (MEGACE) 400 mg/10 mL (10 mL) suspension Take 10 mL by mouth daily. 8/11/17   Asa Martinez NP   diclofenac (VOLTAREN) 1 % gel Apply 2 g to affected area four (4) times daily as needed (Pain). Historical Provider   dicyclomine (BENTYL) 20 mg tablet Take 1 Tab by mouth every six (6) hours as needed (abdominal cramps) for up to 20 doses. 6/10/17   Kings Hernandez MD   ondansetron hcl (ZOFRAN, AS HYDROCHLORIDE,) 4 mg tablet Take 1 Tab by mouth every eight (8) hours as needed for Nausea. 6/10/17   Kings Hernandez MD   atorvastatin (LIPITOR) 80 mg tablet Take 1 Tab by mouth daily. Indications: hypercholesterolemia 5/31/17   Kavitha Kessler MD   aspirin (ASPIRIN) 325 mg tablet Take 1 Tab by mouth daily. 5/31/17   Kavitha Kessler MD   ipratropium (ATROVENT) 0.06 % nasal spray instill 2 sprays into each nostril three times a day - IF NEEDED FOR RUNNING NOSE 3/15/17   Historical Provider   glipiZIDE (GLUCOTROL) 5 mg tablet Take 0.5 Tabs by mouth two (2) times a day. Take 1/2 every day with breakfast. If your blood sugar is above 200 take a whole tablet. 5/5/17   Mar Ching MD   polyethylene glycol [facility]) 17 gram/dose powder Take 17 g by mouth two (2) times a day.  Continue while taking pain medication 3/8/17   Lory Castorena MD   isosorbide mononitrate ER (IMDUR) 60 mg CR tablet Take  by mouth every morning. 6/6/16   Historical Provider   Blood Sugar Diagnostic, Disc (ASCENSIA BREEZE 2) strp Check your blood sugar twice daily. S48.34 6/20/16   Herman Barkley MD   ondansetron (ZOFRAN ODT) 4 mg disintegrating tablet Take 1 Tab by mouth every eight (8) hours as needed for Nausea. 6/1/16   Lory Castorena MD   albuterol (PROVENTIL VENTOLIN) 2.5 mg /3 mL (0.083 %) nebulizer solution 3 mL by Nebulization route every four (4) hours as needed for Wheezing. 9/20/15   Saul Goins DO   MICROLET LANCET misc  12/16/14   Historical Provider   benazepril (LOTENSIN) 40 mg tablet Take 40 mg by mouth every morning. Historical Provider   omeprazole (PRILOSEC) 20 mg capsule Take 40 mg by mouth two (2) times a day. Todd Andersen MD   nitroglycerin (NITROSTAT) 0.4 mg SL tablet by SubLINGual route every five (5) minutes as needed for Chest Pain. Todd Andersen MD   amlodipine (NORVASC) 10 mg tablet TAKE 1 TABLET BY MOUTH ONCE DAILY 4/9/11   Mamta Cazares MD          Allergies   Allergen Reactions    Codeine Itching    Duratuss [Phenylephrine-Guaifenesin] Nausea and Vomiting    Lisinopril-Hydrochlorothiazide Itching    Motrin [Ibuprofen] Nausea and Vomiting     With 800mg    Tape [Adhesive] Other (comments)     TEARS HER SKIN         Objective:     Vitals:    09/17/17 0027 09/17/17 0400 09/17/17 0416 09/17/17 0737   BP: 132/76  110/67 106/68   Pulse: 95  91 100   Resp: 18  18 18   Temp: 98.5 °F (36.9 °C)  98.4 °F (36.9 °C) 98.4 °F (36.9 °C)   SpO2: 100%  98% 97%   Weight:  197 lb 11.2 oz (89.7 kg)     Height:  5' 6\" (1.676 m)          Physical Exam:    GENERAL: alert, cooperative, obese, NG in place  EYE: negative  LYMPHATIC: negative  THROAT & NECK: normal and no erythema or exudates noted.    LUNG: clear to auscultation bilaterally  HEART: regular rate and rhythm  ABDOMEN: soft, distended, did not allow palpation  EXTREMITIES: right arm edema - has sleeve in place, bilateral LE edema  SKIN: negative  NEUROLOGIC: negative        CT Results (most recent):    Results from Hospital Encounter encounter on 09/13/17   CT ABD PELV WO CONT   Narrative CT ABDOMEN AND PELVIS WITHOUT CONTRAST. 9/15/2017 12:44 PM     INDICATION: Abdominal pain, vomiting. Ileus versus small bowel obstruction. Metastatic breast carcinoma. Cyclical vomiting with nausea. COMPARISON: 7/2/2017. TECHNIQUE: CT of the abdomen and pelvis was performed after the administration  of oral contrast. Evaluation of the solid organs is less sensitive without IV  contrast. CT dose reduction was achieved through use of a standardized protocol  tailored for this examination and automatic exposure control for dose  modulation. FINDINGS:  Abdomen: Post right mastectomy. Aside from minimal basilar atelectasis, the lung  bases are clear. The heart size is normal. A pacemaker is in place. Mild aortic  valvular calcifications and mild coronary artery calcifications. An NG tube is  in appropriate position the stomach. Mild intra and extrahepatic biliary ductal  dilation is likely physiologic post cholecystectomy. Incidental note is made of  a small right upper pole renal cyst. The unenhanced liver, pancreas, spleen,  adrenals, and kidneys are otherwise normal.    Pelvis: The small bowel is diffusely dilated, with an abrupt transition point in  the right mid abdomen ileum on images 2-52 and [id number]. No pneumatosis,  mesenteric venous gas, or free air. The distal ileum is decompressed. The  patient is post right hemicolectomy and ileocolonic anastomosis. Sigmoid  diverticulosis is moderate. The colon is largely decompressed. There is trace  free fluid in the deep pelvis. No abdominopelvic lymphadenopathy. Post  hysterectomy. Impression IMPRESSION: Small bowel obstruction, with mid ileal transition point in the  right midabdomen.     The findings were called to Dr. George Yarbrough on 9/15/2017 2:46 PM by Dr. Alexandra Prado. [id number]            Lab Results   Component Value Date/Time    WBC 5.7 09/17/2017 04:08 AM    Hemoglobin (POC) 9.2 09/20/2015 05:25 PM    HGB 8.0 09/17/2017 04:08 AM    Hematocrit (POC) 27 09/20/2015 05:25 PM    HCT 23.2 09/17/2017 04:08 AM    PLATELET 882 17/08/7932 04:08 AM    MCV 76.1 09/17/2017 04:08 AM       Lab Results   Component Value Date/Time    Sodium 139 09/17/2017 04:08 AM    Potassium 4.1 09/17/2017 04:08 AM    Chloride 103 09/17/2017 04:08 AM    CO2 26 09/17/2017 04:08 AM    Anion gap 10 09/17/2017 04:08 AM    Glucose 72 09/17/2017 04:08 AM    BUN 48 09/17/2017 04:08 AM    Creatinine 2.61 09/17/2017 04:08 AM    BUN/Creatinine ratio 18 09/17/2017 04:08 AM    GFR est AA 21 09/17/2017 04:08 AM    GFR est non-AA 18 09/17/2017 04:08 AM    Calcium 6.6 09/17/2017 04:08 AM         Assessment:     1. Abdominal pain with probable ileus    CT reviewed, s/p lap and lysis of adhesions on 9/15  NG in place  Pain contolled  Diet and NG disposition per surgery    2. Metastatic breast carcinoma - dx 7/17/2017     Bone marrow involvement  Diffuse skeletal metastasis    ECOG PS 2  Intent of treatment - palliative    Receiving palliative chemotherapy   Abraxane + Keytruda s/p 1 cycle    Symptom management form reviewed with patient. 3. Right breast carcinoma: dx 11/17/2014  pT3 N3a M0 (Stage IIIC) infiltrating ductal carcinoma, Tumor size 7.5 cm, LN 17/19 +ve with extracapsular extension in 2 nodes, grade 3, ki 67 35%, ER -ve, WA -ve, Her 2 -ve    ECOG PS 0  Intent of therapy - curative       S/P right sided mastectomy with axillary LN dissection  Received adjuvant chemotherapy   Taxotere, Cytoxan, s/p 3 Cycles    Therapy was discontinued due to severe side effects. Completed radiation to the chest wall and axilla  Lymphedema - followed [place]      4.  Anemia     From bone marrow involvement  Monitor    5.  Acute renal failure  Noted input from Nephrology  ATN  IVF-improving creatinine        Signed by: Taylor Grossman MD                     September 17, 2017

## 2017-09-17 NOTE — PROGRESS NOTES
[facility] - Progress Note    Subjective:   Daily Progress Note    Admission Date: 2017     Complaint: feeling better today; wants NG tube out; no abd pain currently    Current Facility-Administered Medications   Medication Dose Route Frequency    HYDROmorphone (PF) 15 mg/30 ml (DILAUDID) PCA   IntraVENous CONTINUOUS    0.9% sodium chloride infusion  75 mL/hr IntraVENous CONTINUOUS    insulin lispro (HUMALOG) injection   SubCUTAneous Q6H    ondansetron (ZOFRAN) injection 4 mg  4 mg IntraVENous Q6H PRN    prochlorperazine (COMPAZINE) with saline injection 10 mg  10 mg IntraVENous Q6H PRN    enoxaparin (LOVENOX) injection 30 mg  30 mg SubCUTAneous DAILY    glucose chewable tablet 16 g  4 Tab Oral PRN    glucagon (GLUCAGEN) injection 1 mg  1 mg IntraMUSCular PRN    hydrALAZINE (APRESOLINE) 20 mg/mL injection 10 mg  10 mg IntraVENous Q4H PRN    famotidine (PF) (PEPCID) injection 20 mg  20 mg IntraVENous DAILY    dextrose 10% infusion 125-250 mL  125-250 mL IntraVENous PRN       Review of Systems  Pertinent items are noted in HPI. Objective:     Visit Vitals    /68 (BP 1 Location: Left arm, BP Patient Position: At rest)    Pulse 100    Temp 98.4 °F (36.9 °C)    Resp 18    Ht 5' 6\" (1.676 m)    Wt 89.7 kg (197 lb 11.2 oz)    SpO2 97%    BMI 31.91 kg/m2     Temp (24hrs), Av.4 °F (36.9 °C), Min:98.1 °F (36.7 °C), Max:98.6 °F (37 °C)           Physical Exam:   General: Alert, cooperative, no distress, appears stated age. Head:  Normocephalic, without obvious abnormality, atraumatic. Lungs: Clear to auscultation bilaterally.    Heart: Regular rate and rhythm, S1, S2 normal, no S3, no rubs  Abdomen: no bowel sounds  Extremities: minimal edema    Neurologic: Grossly nonfocal       Data Review:     LABS:   Recent Results (from the past 24 hour(s))   GLUCOSE, POC    Collection Time: 17 12:52 PM   Result Value Ref Range    Glucose (POC) 86 65 - 100 mg/dL Performed by Dominick Sotomayor (PCT)    GLUCOSE, POC    Collection Time: 09/16/17  4:32 PM   Result Value Ref Range    Glucose (POC) 92 65 - 100 mg/dL    Performed by Jessica Styles    GLUCOSE, POC    Collection Time: 09/16/17  7:07 PM   Result Value Ref Range    Glucose (POC) 87 65 - 100 mg/dL    Performed by Dominick Sotomayor (PCT)    GLUCOSE, POC    Collection Time: 09/16/17 11:48 PM   Result Value Ref Range    Glucose (POC) 85 65 - 100 mg/dL    Performed by Sherry Huerta    RENAL FUNCTION PANEL    Collection Time: 09/17/17  4:08 AM   Result Value Ref Range    Sodium 139 136 - 145 mmol/L    Potassium 4.1 3.5 - 5.1 mmol/L    Chloride 103 97 - 108 mmol/L    CO2 26 21 - 32 mmol/L    Anion gap 10 5 - 15 mmol/L    Glucose 72 65 - 100 mg/dL    BUN 48 (H) 6 - 20 MG/DL    Creatinine 2.61 (H) 0.55 - 1.02 MG/DL    BUN/Creatinine ratio 18 12 - 20      GFR est AA 21 (L) >60 ml/min/1.73m2    GFR est non-AA 18 (L) >60 ml/min/1.73m2    Calcium 6.6 (L) 8.5 - 10.1 MG/DL    Phosphorus 5.6 (H) 2.6 - 4.7 MG/DL    Albumin 2.0 (L) 3.5 - 5.0 g/dL   CBC WITH AUTOMATED DIFF    Collection Time: 09/17/17  4:08 AM   Result Value Ref Range    WBC 5.7 3.6 - 11.0 K/uL    RBC 3.05 (L) 3.80 - 5.20 M/uL    HGB 8.0 (L) 11.5 - 16.0 g/dL    HCT 23.2 (L) 35.0 - 47.0 %    MCV 76.1 (L) 80.0 - 99.0 FL    MCH 26.2 26.0 - 34.0 PG    MCHC 34.5 30.0 - 36.5 g/dL    RDW 21.3 (H) 11.5 - 14.5 %    PLATELET 005 814 - 716 K/uL    NEUTROPHILS 82 %    BAND NEUTROPHILS 1 %    LYMPHOCYTES 8 %    MONOCYTES 8 %    EOSINOPHILS 1 %    BASOPHILS 0 %    ABS. NEUTROPHILS 4.6 K/UL    ABS. LYMPHOCYTES 0.5 K/UL    ABS. MONOCYTES 0.5 K/UL    ABS. EOSINOPHILS 0.1 K/UL    ABS.  BASOPHILS 0.0 K/UL    DF MANUAL      RBC COMMENTS ANISOCYTOSIS  2+       GLUCOSE, POC    Collection Time: 09/17/17  8:16 AM   Result Value Ref Range    Glucose (POC) 78 65 - 100 mg/dL    Performed by Jeremi Farley, POC    Collection Time: 09/17/17  9:24 AM   Result Value Ref Range    Glucose (POC) 121 (H) 65 - 100 mg/dL    Performed by Hanny Underwood          Assessment:     Active Problems:    Metastatic breast cancer ([facility] [address] (8/11/2017)      Neoplastic malignant related fatigue (9/13/2017)      Pain due to malignant neoplasm metastatic to bone ([facility] [address] (0/10/9536)      Cyclical vomiting with nausea (9/13/2017)    JULES - 3rd spacing, NG losses, possible ATN  Metabolic acidosis - better  Ileus, SBO - NG suction continues  S/P lysis of adhesions 9/15/17  Breast cancer wth bone mets since 7/2017  DM   HTN   history of CVA  Obesity, SEN  History of PE 2015      Plan:     Creat starting to improve - decrease IVF and follow  Serial labs

## 2017-09-17 NOTE — PROGRESS NOTES
Occupational Therapy 1311    Attempted to see patient for OT evaluation however pt declined participation citing increased pain and fatigue following bathing/toileting with PCT. She reported needing a significant amount of assist for bathing but is typically MOD IND with ADLs. Family member present in room and reported pt lives alone and has assist PRN from family. Pt hoping to discharge home with hospital bed and CM note indicates she will likely go home with hospice care. Nursing then entered room and reported the NG tube can be removed. Deferred further interview to allow nursing to work with patient and will follow up tomorrow for evaluation.     Wesly Melendez OTR/L

## 2017-09-17 NOTE — PROGRESS NOTES
Hospitalist Progress Note    NAME: Armin Agarwal   :  1940   MRN:  [id number]     Interim Hospital Summary: 65 y.o. female whom presented on 2017 with      Assessment / Plan:    DM type 2  -holding lantus due to low BG. Follow closely. SSI prn    JULES  Metabolic acidosis, resolved  -pre renal from third spacing and fluid loss via NG.    -Cr trending down. Continue IVFs    SBO  Abdominal pain  Hx diverticulitis s/p partial colectomy 2008  S/p open cholecystectomy  -s/p lysis of adhesions 9/15/2017  -little NG output. NG and diet per surgery. Allow some ice chips    Right breast CA dx  with bone mets dx   Right arm lymphedema  -pT3 N3a M0 (Stage IIIC) infiltrating ductal carcinoma, s/p right mastectomy, XRT    HTN  CAD  Hx right hemispheric CVA   Chronic diastolic chf EF 37-99%  Pacemaker for bradycardia  -holding norvasc, imdur, lipitor and ASA due to npo  -Nitroglycerin past or hydralazine IV prn SBP >170     SEN  -on CPAP. Hold with NGT     Chronic microcytic anemia      Hx PE   Chronic back pain due to bone mets  Obesity  Body mass index is 31.91 kg/(m^2). Code status: Full  Prophylaxis: Lovenox         Subjective:     Chief Complaint / Reason for Physician Visit  Follow up of abdominal pain, metastatic cancer and ileus  Chart reviewed in detail. Discussed with RN events overnight. Mouth dry and wants ice.      Review of Systems:  Symptom Y/N Comments  Symptom Y/N Comments   Fever/Chills    Chest Pain     Poor Appetite    Edema     Cough    Abdominal Pain y  improved   Sputum    Joint Pain     SOB/WELCH    Pruritis/Rash     Nausea/vomit    Tolerating PT/OT     Diarrhea    Tolerating Diet     Constipation    Other       Could NOT obtain due to:      PO intake:   Patient Vitals for the past 72 hrs:   % Diet Eaten   09/15/17 1909 0 %   09/15/17 1500 0 %     Objective:     VITALS:   Last 24hrs VS reviewed since prior progress note. Most recent are:  Patient Vitals for the past 24 hrs:   Temp Pulse Resp BP SpO2   09/17/17 0737 98.4 °F (36.9 °C) 100 18 106/68 97 %   09/17/17 0416 98.4 °F (36.9 °C) 91 18 110/67 98 %   09/17/17 0027 98.5 °F (36.9 °C) 95 18 132/76 100 %   09/16/17 1930 98.6 °F (37 °C) 96 18 108/71 98 %   09/16/17 1600 98.2 °F (36.8 °C) 92 18 118/60 97 %   09/16/17 1200 98.1 °F (36.7 °C) 86 18 118/52 99 %       Intake/Output Summary (Last 24 hours) at 09/17/17 0938  Last data filed at 09/17/17 0537   Gross per 24 hour   Intake          2889.58 ml   Output             1340 ml   Net          1549.58 ml        PHYSICAL EXAM:  General: WD, WN. Alert, cooperative, no acute distress    EENT:  EOMI. Anicteric sclerae. MMM  (+) NGT   Resp:  CTA bilaterally, no wheezing or rales. No accessory muscle use  CV:  Regular  rhythm,  No edema  GI:  Not distended, mild diffuse tenderness.  +Bowel sounds  Neurologic:  Alert and oriented X 3, normal speech,   Psych:   Fair insight. Not anxious nor agitated  Skin:  No rashes. No jaundice    Reviewed most current lab test results and cultures  YES  Reviewed most current radiology test results   YES  Review and summation of old records today    NO  Reviewed patient's current orders and MAR    YES  PMH/SH reviewed - no change compared to H&P  ________________________________________________________________________  Care Plan discussed with:    Comments   Patient x    Family      RN x    Care Manager     Consultant                        Multidiciplinary team rounds were held today with , nursing, pharmacist and clinical coordinator. Patient's plan of care was discussed; medications were reviewed and discharge planning was addressed.      ________________________________________________________________________  Total NON critical care TIME:   25   Minutes    Total CRITICAL CARE TIME Spent:   Minutes non procedure based      Comments   >50% of visit spent in counseling and coordination of care x     This includes time during multidisciplinary rounds if indicated above   ________________________________________________________________________  Aldair Garsia MD     Procedures: see electronic medical records for all procedures/Xrays and details which were not copied into this note but were reviewed prior to creation of Plan. LABS:  I reviewed today's most current labs and imaging studies.   Pertinent labs include:  Recent Labs      09/17/17   0408 09/16/17   0354  09/15/17   0558   WBC  5.7   --   2.8*   HGB  8.0*  7.9*  9.5*   HCT  23.2*  22.3*  27.3*   PLT  175   --   222     Recent Labs      09/17/17   0408  09/16/17   0354  09/15/17   1445  09/15/17   0558   NA  139  136  137  136   K  4.1  3.8  4.5  5.0   CL  103  98  98  101   CO2  26  28  29  25   GLU  72  83  73  78   BUN  48*  52*  58*  60*   CREA  2.61*  2.90*  2.69*  3.00*   CA  6.6*  6.2*  6.7*  7.2*   MG   --    --    --   1.6   PHOS  5.6*  4.8*   --    --    ALB  2.0*  2.2*   --   2.7*   TBILI   --    --    --   0.6   SGOT   --    --    --   17   ALT   --    --    --   14

## 2017-09-17 NOTE — PROGRESS NOTES
Admit Date: 2017    POD 2 Days Post-Op    Procedure:  Procedure(s):  LYSIS OF ADHESIONS LAPAROSCOPIC CONVERT TO OPEN    Subjective:     Patient has no new complaints. Flatus and BM, hungry    Objective:     Blood pressure 106/68, pulse 100, temperature 98.4 °F (36.9 °C), resp. rate 18, height 5' 6\" (1.676 m), weight 197 lb 11.2 oz (89.7 kg), SpO2 97 %.     Temp (24hrs), Av.4 °F (36.9 °C), Min:98.2 °F (36.8 °C), Max:98.6 °F (37 °C)      Physical Exam:  GENERAL: alert, cooperative, no distress, appears stated age, LUNG: nl effort, HEART: regular rate and rhythm, ABDOMEN: +BS, EXTREMITIES:  extremities normal, atraumatic, no cyanosis or edema    Labs:   Recent Results (from the past 24 hour(s))   GLUCOSE, POC    Collection Time: 17  4:32 PM   Result Value Ref Range    Glucose (POC) 92 65 - 100 mg/dL    Performed by Jocelyn Garcia    GLUCOSE, POC    Collection Time: 17  7:07 PM   Result Value Ref Range    Glucose (POC) 87 65 - 100 mg/dL    Performed by Florence Velasco (PCT)    GLUCOSE, POC    Collection Time: 17 11:48 PM   Result Value Ref Range    Glucose (POC) 85 65 - 100 mg/dL    Performed by Christina Mirza    RENAL FUNCTION PANEL    Collection Time: 17  4:08 AM   Result Value Ref Range    Sodium 139 136 - 145 mmol/L    Potassium 4.1 3.5 - 5.1 mmol/L    Chloride 103 97 - 108 mmol/L    CO2 26 21 - 32 mmol/L    Anion gap 10 5 - 15 mmol/L    Glucose 72 65 - 100 mg/dL    BUN 48 (H) 6 - 20 MG/DL    Creatinine 2.61 (H) 0.55 - 1.02 MG/DL    BUN/Creatinine ratio 18 12 - 20      GFR est AA 21 (L) >60 ml/min/1.73m2    GFR est non-AA 18 (L) >60 ml/min/1.73m2    Calcium 6.6 (L) 8.5 - 10.1 MG/DL    Phosphorus 5.6 (H) 2.6 - 4.7 MG/DL    Albumin 2.0 (L) 3.5 - 5.0 g/dL   CBC WITH AUTOMATED DIFF    Collection Time: 17  4:08 AM   Result Value Ref Range    WBC 5.7 3.6 - 11.0 K/uL    RBC 3.05 (L) 3.80 - 5.20 M/uL    HGB 8.0 (L) 11.5 - 16.0 g/dL    HCT 23.2 (L) 35.0 - 47.0 %    MCV 76.1 (L) 80.0 - 99.0 FL    MCH 26.2 26.0 - 34.0 PG    MCHC 34.5 30.0 - 36.5 g/dL    RDW 21.3 (H) 11.5 - 14.5 %    PLATELET 287 730 - 605 K/uL    NEUTROPHILS 82 %    BAND NEUTROPHILS 1 %    LYMPHOCYTES 8 %    MONOCYTES 8 %    EOSINOPHILS 1 %    BASOPHILS 0 %    ABS. NEUTROPHILS 4.6 K/UL    ABS. LYMPHOCYTES 0.5 K/UL    ABS. MONOCYTES 0.5 K/UL    ABS. EOSINOPHILS 0.1 K/UL    ABS. BASOPHILS 0.0 K/UL    DF MANUAL      RBC COMMENTS ANISOCYTOSIS  2+       GLUCOSE, POC    Collection Time: 09/17/17  8:16 AM   Result Value Ref Range    Glucose (POC) 78 65 - 100 mg/dL    Performed by Jeremi Farley, POC    Collection Time: 09/17/17  9:24 AM   Result Value Ref Range    Glucose (POC) 121 (H) 65 - 100 mg/dL    Performed by Pascale Rm        Data Review reviewed  I & O and lab    Assessment:     Active Problems:    Metastatic breast cancer ([facility] [address] (8/11/2017)      Neoplastic malignant related fatigue (9/13/2017)      Pain due to malignant neoplasm metastatic to bone (HCC) (6/46/8214)      Cyclical vomiting with nausea (9/13/2017)        Plan/Recommendations/Medical Decision Making:     Continue present treatment  Discontinue nasogastric tube  Diet  clear  Dr Schilling Lips returns in AM to resume surgical management  Siobhan Rachel  [facility]

## 2017-09-17 NOTE — PROGRESS NOTES
1920:     PCU SHIFT NURSING NOTE      Bedside and Verbal shift change report given to BANDAR Contreras RN (oncoming nurse) by SHEYLA Chawla (offgoing nurse). Report included the following information SBAR, Kardex, OR Summary, Procedure Summary, Intake/Output, MAR, Recent Results and Cardiac Rhythm paced. Shift Summary:       Admission Date 9/13/2017   Admission Diagnosis Metastatic Breast Ca  Metastatic breast cancer ([facility] [address]  SMALL BOWEL OBSTRUCTION   Consults IP CONSULT TO PALLIATIVE CARE - PROVIDER  IP CONSULT TO NEPHROLOGY  IP CONSULT TO GENERAL SURGERY        Consults   [x]PT   [x]OT   []Speech   []Case Management      [] Palliative      Cardiac Monitoring Order   [x]Yes   []No     IV drips   []Yes    Drip:                            Dose:  Drip:                            Dose:  Drip:                            Dose:   [x]No     GI Prophylaxis   []Yes   []No         DVT Prophylaxis   SCDs:  Sequential Compression Device: Bilateral          Osmar stockings:         [x] Medication   []Contraindicated   []None      Activity Level Activity Level: Up with Assistance     Activity Assistance: Partial (two people)   Purposeful Rounding every 1-2 hour? [x]Yes   Morales Score  Total Score: 3   Bed Alarm (If score 3 or >)   []Yes   [] Refused (See signed refusal form in chart)   Alan Score  Alan Score: 18   Alan Score (if score 14 or less)   []PMT consult   []Wound Care consult      []Specialty bed   [] Nutrition consult          Needs prior to discharge:   Home O2 required:    []Yes   [x]No    If yes, how much O2 required?     Other:    Last Bowel Movement: Last Bowel Movement Date: 09/11/17      Influenza Vaccine Received Flu Vaccine for Current Season (usually Sept-March): Not Flu Season        Pneumonia Vaccine           Diet Active Orders   Diet    DIET NPO      LDAs             Venous Access Device Smart Port 08/22/17 Upper chest (subclavicular area, right (Active)       Venous Access Device power port 09/13/17 Upper chest (subclavicular area, right (Active)   Central Line Being Utilized No 9/16/2017  7:30 PM   Criteria for Appropriate Use Limited/no vessel suitable for conventional peripheral access 9/16/2017  7:30 PM   Site Assessment Clean 9/16/2017  7:30 PM   Date of Last Dressing Change 09/13/17 9/16/2017  7:30 PM   Dressing Status Clean, dry, & intact 9/16/2017  7:30 PM   Dressing Type Transparent 9/16/2017  7:30 PM   Action Taken Blood drawn 9/16/2017  3:30 AM   Date Accessed (Medial Site) 09/13/17 9/15/2017  7:30 AM   Access Time (Medial Site) 1140 9/13/2017 12:00 PM   Access Needle Size (Site #1) 20 G 9/13/2017 12:00 PM   Access Needle Length (Medial Site) 1 inch 9/13/2017 12:00 PM   Positive Blood Return (Medial Site) Yes 9/15/2017 12:40 AM   Action Taken (Medial Site) Blood drawn;Flushed 9/13/2017 12:00 PM   Positive Blood Return (Lateral Site) Yes 9/14/2017  8:30 AM   Alcohol Cap Used Yes 9/14/2017  8:30 AM      Peripheral IV 09/15/17 Left Arm (Active)   Site Assessment Clean 9/16/2017  7:30 PM   Phlebitis Assessment 0 9/16/2017  7:30 PM   Infiltration Assessment 0 9/16/2017  7:30 PM   Dressing Status Clean, dry, & intact 9/16/2017  7:30 PM   Dressing Type Transparent 9/16/2017  7:30 PM   Hub Color/Line Status Green; Infusing 9/16/2017  7:30 PM          Nasogastric Tube 09/13/17 (Active)   Site Assessment Clean 9/16/2017  7:30 PM   Dressing Status Clean, dry, & intact 9/16/2017  7:30 PM   G Port Status Continuous Suction 9/16/2017  7:30 PM   External Insertion Raffaele (cms) 80 cms 9/16/2017  7:30 PM   Action Taken Placement verified (comment) 9/16/2017  7:30 PM   Drainage Description Randi Eli 9/16/2017  7:30 PM   Water Flush Volume (mL) 80 mL 9/15/2017  8:33 AM   Drainage Chamber Level (ml) 100 ml 9/16/2017  7:30 PM   Output (ml) 40 ml 9/16/2017  7:30 PM                Urinary Catheter Urinary Catheter 09/15/17 Ascencio-Criteria for Appropriate Use: Surgical procedure    Intake & Output   Date 09/16/17 0700 - 09/17/17 3879 09/17/17 0700 - 09/18/17 0659   Shift 0700-1859 5815-0922 24 Hour Total 4671-3690 8982-5503 24 Hour Total   I  N  T  A  K  E   Shift Total  (mL/kg)         O  U  T  P  U  T   Urine  (mL/kg/hr) 800  (0.8)  800         Urine Output (mL) (Urinary Catheter 09/15/17 Ascencio) 800  800       Emesis/NG output  40 40         Output (ml) (Nasogastric Tube 09/13/17)  40 40       Shift Total  (mL/kg) 800  (9.2) 40  (0.5) 840  (9.7)      NET -800 -40 -840      Weight (kg) 86.9 86.9 86.9 86.9 86.9 86.9         Readmission Risk Assessment Tool Score High Risk            47       Total Score        3 Has Seen PCP in Last 6 Months (Yes=3, No=0)    4 IP Visits Last 12 Months (1-3=4, 4=9, >4=11)    5 Pt. Coverage (Medicare=5 , Medicaid, or Self-Pay=4)    35 Charlson Comorbidity Score (Age + Comorbid Conditions)        Criteria that do not apply:    . Living with Significant Other. Assisted Living. LTAC. SNF.  or   Rehab    Patient Length of Stay (>5 days = 3)       Expected Length of Stay 3d 9h   Actual Length of Stay 4

## 2017-09-18 NOTE — PROGRESS NOTES
Bedside and Verbal shift change report given to Venita Young RN (oncoming nurse) by Zachariah Boogie RN (offgoing nurse). Report included the following information SBAR, Kardex, Procedure Summary, Intake/Output, MAR, Accordion, Recent Results, Med Rec Status, Cardiac Rhythm NSR and Alarm Parameters .

## 2017-09-18 NOTE — PROGRESS NOTES
Problem: Mobility Impaired (Adult and Pediatric)  Goal: *Acute Goals and Plan of Care (Insert Text)  Physical Therapy Goals  Initiated 9/16/2017  1. Patient will move from supine to sit and sit to supine , scoot up and down and roll side to side in bed with independence within 7 day(s). 2. Patient will transfer from bed to chair and chair to bed with modified independence using the least restrictive device within 7 day(s). 3. Patient will perform sit to stand with independence within 7 day(s). 4. Patient will ambulate with modified independence for 300 feet with the least restrictive device within 7 day(s). 5. Patient will ascend/descend 4 stairs with dual handrail(s) with supervision/set-up within 7 day(s). PHYSICAL THERAPY TREATMENT  Patient: Abhay Caal (24 y.o. female)  Date: 9/18/2017  Diagnosis: Metastatic Breast Ca  Metastatic breast cancer ([facility] [address]  SMALL BOWEL OBSTRUCTION <principal problem not specified>  Procedure(s) (LRB):  LYSIS OF ADHESIONS LAPAROSCOPIC CONVERT TO OPEN (N/A) 3 Days Post-Op  Precautions: Fall      ASSESSMENT:  Pt sitting on EOB with PCT after voiding. Pt c.o nausea despite medication prior to session(ongoing). Pt agreeable to work with PT within room. Deferred further ambulation d/t feeling sick and the hallway being cold. Pt's room very warm. Pt requiring Susy for bed mobility and sit<>stand transfers. Completed subsequent reps for increased independence with improve weight bearing and push from UEs vs pulling on RW. Progressed to CGA by 4th rep with use of RW for support. Ambulated to doorway and took side steps at EOB before returning to supine(forward flexed posture and decreased step clearance) with HOB elevated to reduce aspiration risk 2/2 vomiting. Pt will need HHPT at discharge with assist from family for safe mobility and ADLs.     Progression toward goals:  [X]    Improving appropriately and progressing toward goals  [ ]    Improving slowly and progressing toward goals  [ ]    Not making progress toward goals and plan of care will be adjusted       PLAN:  Patient continues to benefit from skilled intervention to address the above impairments. Continue treatment per established plan of care. Discharge Recommendations:  Home Health   Further Equipment Recommendations for Discharge:  None at this time       SUBJECTIVE:   Patient stated I have been able to eat but then I throw it back up.       OBJECTIVE DATA SUMMARY:   Critical Behavior:  Neurologic State: Alert, Appropriate for age  Orientation Level: Oriented X4  Cognition: Appropriate for age attention/concentration, Follows commands  Safety/Judgement: Awareness of environment, Fall prevention, Insight into deficits  Functional Mobility Training:  Bed Mobility:  Rolling: Contact guard assistance  Supine to Sit: Minimum assistance  Sit to Supine: Minimum assistance (A for elastic.[email or web address])           Transfers:  Sit to Stand: Additional time;Minimum assistance; Adaptive equipment;Assist x1  Stand to Sit: Minimum assistance;Assist x1;Additional time                             Balance:  Sitting: Intact  Standing: Impaired; With support  Standing - Static: Good;Constant support  Standing - Dynamic : Fair  Ambulation/Gait Training:  Distance (ft): 15 Feet (ft)  Assistive Device: Walker, rolling;Gait belt  Ambulation - Level of Assistance: Contact guard assistance; Additional time        Gait Abnormalities: Decreased step clearance              Speed/Domonique: Slow  Step Length: Right shortened;Left shortened                 Activity Tolerance:   Fair-limited by nausea/vomiting  Please refer to the flowsheet for vital signs taken during this treatment.   After treatment:   [ ]    Patient left in no apparent distress sitting up in chair  [X]    Patient left in no apparent distress in bed  [X]    Call bell left within reach  [X]    Nursing notified  [ ]    Caregiver present  [ ]    Bed alarm activated      COMMUNICATION/COLLABORATION: The patients plan of care was discussed with: Registered Nurse     Remigio Johnson, PT   Time Calculation: 18 mins

## 2017-09-18 NOTE — PROGRESS NOTES
Nutrition Services      Nutrition Screen:  Wt Readings from Last 10 Encounters:   09/18/17 90.4 kg (199 lb 3.2 oz)   09/13/17 90.3 kg (199 lb)   09/06/17 88.2 kg (194 lb 7 oz)   08/30/17 85.5 kg (188 lb 6.4 oz)   08/23/17 84.2 kg (185 lb 9.6 oz)   08/22/17 82.1 kg (181 lb)   08/18/17 83.9 kg (185 lb)   08/11/17 83.9 kg (185 lb)   08/04/17 87.5 kg (193 lb)   07/17/17 100.7 kg (222 lb)     Body mass index is 32.15 kg/(m^2). Supplements:                        _____ ordered ______  declined. __ __  Pt is nutritionally stable at this time, will rescreen in 7 days. __ __    Pt is at nutritional risk and will be rescreened in  days. __x __  Pt is at moderate or high nutritional risk, will refer to RD for assessment.        Devin Green  Dietetic Technician, Registered

## 2017-09-18 NOTE — CARDIO/PULMONARY
CP Rehab Note: chart review    Admit: abdominal pain/bone marrow biopsy     Mhx: HTN, hypercholesterolemia, MI, stroke, neuropathy, DM, CAD, breast cancer, mastectomy, bradycardia, pacemaker, EF 55-60% on 5/30/17. Never smoked. 9/15/17 - LYSIS OF ADHESIONS LAPAROSCOPIC CONVERT TO OPEN    Patient with nausea/vomiting. CP Rehab deferred at this time.

## 2017-09-18 NOTE — NURSE NAVIGATOR
9/18/17 9:30am    Madi Dael 64 yo    , Son/Dtr live w/ pt, has dtr she's been staying w/    Dx: Metastatic Breast Cancer    ONN met pt in room, was C/O of nausea. Vomited green liquid during vs, requested assistance up to [facility]. Pt states plan is to return to her home, as it's easier to get in and out of house than at her dtr's. In need of a hospital bed. States dtr that lives w/ her is on disability and home to assist her w/ care needs. Son also lives w/ her. Attempted to discuss advance care planning needs, however due to her care needs presently was unable to do so. Will return tomorrow to see pt.      Total Time Spent 30 minutes    Vanna [place] RN Oncology Nurse Navigator

## 2017-09-18 NOTE — CONSULTS
Palliative Medicine Consult  [place]: [phone])    Patient Name: Yara Wolf  YOB: 1940    Date of Initial Consult: 9/13/17  Reason for Consult: Uncontrolled abdominal pain, advanced care planning  Requesting Provider: Cornelia Marin NP   Primary Care Physician: Junie Maldonado MD      SUMMARY:   Yara Wolf is a 65 y.o. with a past history of DM, HTN arrhythmia, CAD, MI and strokes in 2009 and 2013,diverticulosis, SEN,  stage IIIC infiltrating ductal carcinoma of the right breast, s/p right mastectomy with axillary LN dissection, chemotherapy completed April 2015, XRT completed August 2015, bone marrow biopsy July 2017 showed bone marrow extensively involved by metastatic carcinoma, breast primary. She was admitted on 9/13/2017 from Dr. Randall Iqbal office with a diagnosis of metastatic breast cancer, anemia. Current medical issues leading to Palliative Medicine involvement include: symptom management. Pt was admitted for severe fatigue and uncontrolled nausea and vomiting. She was found to have a partial bowel obstruction, and on 9/15 she was taken to the OR for lysis of adhesions. The NGT was d/cynthia yesterday, developed severe nausea last evening, and vomiting today. Psychosocial: pt lives with her daughter and Delvin Simpson in [place], daughter drives her to appointments   PALLIATIVE DIAGNOSES:   1. Abdominal pain: pt seen multiple times (7/2/17 and 6/10/17 and 5/6/17 at [facility]) for abdominal pain) 7/13/17 gastric empty study normal, 7/2/17 abd CT neg for intestinal findings, 5/29/17 abd CT showed colonic diverticulosis but no evidence of acute diverticulitis, 3/14/17 abd CT diverticulosis without diverticulitis. (Note pt has had previous right colectomy, and gallbladder and appendix have been surgically removed). DDx: adhesions, gastroparesis, constipation, diverticulitis  2. Pain due to neoplasm (spine mets)  3. Fatigue   4.  Nausea and vomiting  5. Lymphedema right arm  6. Bilateral LE edema  7. Care decisions: not addressed today  8. Acute post-op ileus. PLAN:   1. Acute post-op ileus, starting Reglan today. 2. Initial consult note routed to primary continuity provider  3. Communicated plan of care with: Palliative IDT, RN        GOALS OF CARE / TREATMENT PREFERENCES:   [====Goals of Care====]  GOALS OF CARE:  Patient / health care proxy stated goals:     Pain management      TREATMENT PREFERENCES:   Code Status: Full Code    Advance Care Planning:  Advance Care Planning 9/13/2017   Patient's Healthcare Decision Maker is: Legal Next of Dimitry 68   Primary Decision Maker Name [facility] RADHA    Primary Decision Maker Phone Number [phone]   Primary Decision Maker Relationship to Patient Adult child   Secondary Decision Maker Name -   Confirm Advance Directive None   Patient Would Like to Complete Advance Directive -   Does the patient have other document types -       Other:    The palliative care team has discussed with patient / health care proxy about goals of care / treatment preferences for patient.  [====Goals of Care====]         HISTORY:     History obtained from: chart, patient    CHIEF COMPLAINT: abdominal pain    HPI/SUBJECTIVE:    The patient is:   9[x] Verbal and participatory  [] Non-participatory due to:     (pt is poor historian)  9/13:  Pt was directly admitted from Oncology office today for complaints of severe abdominal pain that has not responded to her current pain medications: Morphine ER 15mg bid, and Morphine IR 15mg q. 4 hours prn. Pt reports to me that she has been experiencing diffuse, crampy, constant abdominal pain for \"months\", however, today she started vomiting. Endorses constipation, but states BM daily, LBM was \"normal\" yesterday. Asking for a suppository. 9/14: pain and nausea better since NGT placed. Still having abdominal pain, but not as bad.    9/18:  Miserable, did not want to talk.        Per : 08/11/17  Morphine IR 15mg #60/10 day supply [facility]  08/11/17  Morphine ER 15mg  #60/30 day supply [facility]  08/02/17  Tramadol 50mg  #60/15 day supply [facility]  Pt has been getting different opioids from multiple providers in the past year for different pain. Clinical Pain Assessment (nonverbal scale for severity on nonverbal patients):   [++++ Clinical Pain Assessment++++]  [++++Pain Severity++++]: Pain: 5  [++++Pain Character++++]: crampy  [++++Pain Duration++++]:  months  [++++Pain Effect++++]: nausea and vomiting  [++++Pain Factors++++]:   [++++Pain Frequency++++]: constant  [++++Pain Location++++]:   Diffuse abdomen  [++++ Clinical Pain Assessment++++]  Duration: for how long has pt been experiencing pain (e.g., 2 days, 1 month, years)  Frequency: how often pain is an issue (e.g., several times per day, once every few days, constant)     FUNCTIONAL ASSESSMENT:     Palliative Performance Scale (PPS):  PPS: 40       PSYCHOSOCIAL/SPIRITUAL SCREENING:     Advance Care Planning:  Advance Care Planning 9/13/2017   Patient's Healthcare Decision Maker is: Legal Next melissa Moraes 68   Primary Decision Maker Name Rachel    Primary Decision Maker Phone Number [phone]   Primary Decision Maker Relationship to Patient Adult child   Secondary Decision Maker Name -   Confirm Advance Directive None   Patient Would Like to Complete Advance Directive -   Does the patient have other document types -        Any spiritual / Mandaeism concerns:  [] Yes /  [x] No    Caregiver Burnout:  [] Yes /  [] No /  [x] No Caregiver Present      Anticipatory grief assessment:   [x] Normal  / [] Maladaptive       ESAS Anxiety: Anxiety: 0    ESAS Depression: Depression: 3        REVIEW OF SYSTEMS:     Positive and pertinent negative findings in ROS are noted above in HPI. The following systems were [x] reviewed / [] unable to be reviewed as noted in HPI  Other findings are noted below.   Systems: constitutional, ears/nose/mouth/throat, respiratory, gastrointestinal, genitourinary, musculoskeletal, integumentary, neurologic, psychiatric, endocrine. Positive findings noted below. Modified ESAS Completed by: provider   Fatigue: 6 Drowsiness: 0   Depression: 3 Pain: 5   Anxiety: 0 Nausea: 5   Anorexia: 8 Dyspnea: 0     Constipation: No     Stool Occurrence(s): 1        PHYSICAL EXAM:     From RN flowsheet:  Wt Readings from Last 3 Encounters:   09/18/17 199 lb 3.2 oz (90.4 kg)   09/13/17 199 lb (90.3 kg)   09/06/17 194 lb 7 oz (88.2 kg)     Blood pressure 133/75, pulse (!) 104, temperature 98 °F (36.7 °C), resp. rate 16, height 5' 6\" (1.676 m), weight 199 lb 3.2 oz (90.4 kg), SpO2 100 %.     Pain Scale 1: Numeric (0 - 10)  Pain Intensity 1: 5  Pain Onset 1: surgery  Pain Location 1: Abdomen  Pain Orientation 1: Lower  Pain Description 1: Aching  Pain Intervention(s) 1: Encouraged PCA  Last bowel movement, if known:     Constitutional:WD, WN, lying in bed  Eyes: pupils equal, anicteric  ENMT: no nasal discharge, moist mucous membranes   Cardiovascular: tachy, regular rhythm, distal pulses intact  Respiratory: breathing not labored, symmetric  Skin: warm, dry  Neurologic: following commands, moving all extremities  Psychiatric: depressed affect, no hallucinations          HISTORY:     Active Problems:    Metastatic breast cancer (HCC) (8/11/2017)      Neoplastic malignant related fatigue (9/13/2017)      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 Nando Rae    Breast cancer [facility])     Rt mastectomy 2/19/15    CAD (coronary artery disease)     Dr Enid Cherry    Diabetes [facility])     Now seeing Dr. Kimberley Lackey Diverticulitis     DJD (degenerative joint disease), lumbar     GERD (gastroesophageal reflux disease)     H. pylori infection 2008    Dr. Sangeeta Burnette attack [facility]) April 2006    Hypercholesterolemia     Hypertension     Morbid obesity ([address]     Neuropathy, arm 2009    Right; [facility] [phone] SEN (obstructive sleep apnea)     uses CPAP    Rectal bleeding 2009    Hospitalized; Had colonoscopy and EGD (ok), gastric emptying study (normal), doppler mesenteric arteries (ok)    Sciatica     Has seen Dr. Erin Hanson    Stroke [facility]) 2009 & 2012    no residual problems      Past Surgical History:   Procedure Laterality Date    HX APPENDECTOMY  1979    HX BREAST LUMPECTOMY  12/12/2013    RIGHT BREAST DUCTAL EXCISION performed by Angelica Llanos MD at [facility]"" MAIN OR    HX CATARACT REMOVAL  2007    HX CHOLECYSTECTOMY  1979    HX COLONOSCOPY      HX HEART CATHETERIZATION      angioplasty    HX HYSTERECTOMY      fibroids    HX KNEE ARTHROSCOPY  1997    right    HX MASTECTOMY Right 2/19/2015    RIGHT BREAST MODIFIED RADICAL MASTECTOMY RIGHT SENTINEL NODE BIOPSY, RIGHT PORT A CATH INSERTION performed by Farzana Jarvis MD at [facility] AMBULATORY OR    HX PACEMAKER      HX SHOULDER ARTHROSCOPY  2004    left      Family History   Problem Relation Age of Onset    Stroke Father     Cancer Sister      breast cancer    Hypertension Sister     Cancer Mother      Unknown type    Cancer Brother      Colon    Hypertension Brother     Anesth Problems Neg Hx       History reviewed, no pertinent family history.   Social History   Substance Use Topics    Smoking status: Never Smoker    Smokeless tobacco: Never Used    Alcohol use No     Allergies   Allergen Reactions    Codeine Itching    Duratuss [Phenylephrine-Guaifenesin] Nausea and Vomiting    Lisinopril-Hydrochlorothiazide Itching    Motrin [Ibuprofen] Nausea and Vomiting     With 800mg    Tape [Adhesive] Other (comments)     TEARS HER SKIN      Current Facility-Administered Medications   Medication Dose Route Frequency    0.45% sodium chloride 1,000 mL with sodium bicarbonate (8.4%) 75 mEq infusion   IntraVENous CONTINUOUS    metoclopramide HCl (REGLAN) 10 mg in 0.9% sodium chloride 50 mL IVPB  10 mg IntraVENous Q6H    ondansetron (ZOFRAN) injection 4 mg  4 mg IntraVENous Q6H PRN    HYDROmorphone (PF) (DILAUDID) injection 0.5 mg  0.5 mg IntraVENous Q2H PRN    HYDROcodone-acetaminophen (NORCO) 5-325 mg per tablet 1-2 Tab  1-2 Tab Oral Q4H PRN    insulin lispro (HUMALOG) injection   SubCUTAneous Q6H    ondansetron (ZOFRAN) injection 4 mg  4 mg IntraVENous Q6H PRN    enoxaparin (LOVENOX) injection 30 mg  30 mg SubCUTAneous DAILY    glucose chewable tablet 16 g  4 Tab Oral PRN    glucagon (GLUCAGEN) injection 1 mg  1 mg IntraMUSCular PRN    hydrALAZINE (APRESOLINE) 20 mg/mL injection 10 mg  10 mg IntraVENous Q4H PRN    famotidine (PF) (PEPCID) injection 20 mg  20 mg IntraVENous DAILY    dextrose 10% infusion 125-250 mL  125-250 mL IntraVENous PRN          LAB AND IMAGING FINDINGS:     Lab Results   Component Value Date/Time    WBC 5.0 09/18/2017 03:05 AM    HGB 7.9 09/18/2017 03:05 AM    PLATELET 336 91/90/0487 03:05 AM     Lab Results   Component Value Date/Time    Sodium 141 09/18/2017 03:05 AM    Potassium 4.0 09/18/2017 03:05 AM    Chloride 107 09/18/2017 03:05 AM    CO2 22 09/18/2017 03:05 AM    BUN 43 09/18/2017 03:05 AM    Creatinine 2.29 09/18/2017 03:05 AM    Calcium 7.4 09/18/2017 03:05 AM    Magnesium 1.6 09/15/2017 05:58 AM    Phosphorus 4.4 09/18/2017 03:05 AM      Lab Results   Component Value Date/Time    AST (SGOT) 17 09/15/2017 05:58 AM    Alk.  phosphatase 438 09/15/2017 05:58 AM    Protein, total 7.1 09/15/2017 05:58 AM    Albumin 1.9 09/18/2017 03:05 AM    Globulin 4.4 09/15/2017 05:58 AM     Lab Results   Component Value Date/Time    INR 1.1 06/10/2017 02:56 PM    Prothrombin time 10.8 06/10/2017 02:56 PM    aPTT 33.2 07/03/2010 09:00 AM      Lab Results Component Value Date/Time    Iron 42 05/11/2015 05:00 AM    TIBC 215 05/11/2015 05:00 AM    Iron % saturation 20 05/11/2015 05:00 AM    Ferritin 417 05/11/2015 05:00 AM      No results found for: PH, PCO2, PO2  No components found for: [place]   Lab Results   Component Value Date/Time    CK 70 09/15/2017 02:45 PM    CK - MB 2.3 01/26/2017 01:16 PM                Total time: 30 min  Counseling / coordination time, spent as noted above: 25 min  > 50% counseling / coordination?: no    Prolonged service was provided for  []30 min   []75 min in face to face time in the presence of the patient, spent as noted above. Time Start:   Time End:   Note: this can only be billed with 21009 (initial) or 89464 (follow up). If multiple start / stop times, list each separately.

## 2017-09-18 NOTE — PROGRESS NOTES
Problem: Self Care Deficits Care Plan (Adult)  Goal: *Acute Goals and Plan of Care (Insert Text)  Occupational Therapy Goals  Initiated 9/18/2017  1. Patient will perform lower body dressing with minimal assistance/contact guard assist using adaptive equipment as needed within 7 day(s). 2. Patient will perform toilet transfers with supervision/set-up using RW within 7 day(s). 3. Patient will perform all aspects of toileting with supervision/set-up within 7 day(s). 4. Patient will participate in upper extremity therapeutic exercise/activities with independence for 10 minutes within 7 day(s). 5. Patient will utilize energy conservation techniques during functional activities with verbal and visual cues within 7 day(s). OCCUPATIONAL THERAPY EVALUATION  Patient: Favio Frye (29 y.o. female)  Date: 9/18/2017  Primary Diagnosis: Metastatic Breast Ca  Metastatic breast cancer ([facility] [address]  SMALL BOWEL OBSTRUCTION  Procedure(s) (LRB):  LYSIS OF ADHESIONS LAPAROSCOPIC CONVERT TO OPEN (N/A) 3 Days Post-Op   Precautions:  Fall      ASSESSMENT :  Based on the objective data described below, the patient presents with decreased strength, endurance, mobility, balance and safety and increased nausea and vomiting despite medications. She currently requires up to max A for LE ADLs and toileting and mod A for functional mobility. Her daughter lives with her and pt [facility]. Per chart possible hospice at discharge. If pt returns home recommend [facility]. Recommend hospital bed to elevate head to reduce risk of aspiration due to frequent vomiting and electric mechanics to assist with safe mobility due to poor activity tolerance. Patient will benefit from skilled intervention to address the above impairments.   Patients rehabilitation potential is considered to be Guarded  Factors which may influence rehabilitation potential include:   [ ]             None noted  [ ]             Mental ability/status  [X]             Medical condition  [ ]             Home/family situation and support systems  [ ]             Safety awareness  [X]             Pain tolerance/management  [ ]             Other:        PLAN :  Recommendations and Planned Interventions:  [X]               Self Care Training                  [X]        Therapeutic Activities  [X]               Functional Mobility Training    [ ]        Cognitive Retraining  [X]               Therapeutic Exercises           [X]        Endurance Activities  [X]               Balance Training                   [ ]        Neuromuscular Re-Education  [ ]               Visual/Perceptual Training     [X]   Home Safety Training  [X]               Patient Education                 [X]        Family Training/Education  [ ]               Other (comment):     Frequency/Duration: Patient will be followed by occupational therapy 3 times a week to address goals. Discharge Recommendations: Home Health vs hospice  Further Equipment Recommendations for Discharge: hospital bed, wheelchair       SUBJECTIVE:   Patient stated I am so sick to my stomach.       OBJECTIVE DATA SUMMARY:   HISTORY:   Past Medical History:   Diagnosis Date    Arrhythmia       bradycardia in 29's /pacemaker inserted    Arthritis       RT KNEE    Asthma Dx age 75    Bradycardia 2009     Cardiology saw her during hospital stay; Now off coreg;  Sees Dr. Chuck Wan    Breast cancer [facility])       Rt mastectomy 2/19/15    CAD (coronary artery disease)       Dr Aileen Payton Diabetes [facility])       Now seeing Dr. Noe Hunter Diverticulitis      DJD (degenerative joint disease), lumbar      GERD (gastroesophageal reflux disease)      H. pylori infection 2008     Dr. Lynsey Crouch attack [facility]) April 2006    Hypercholesterolemia      Hypertension      Morbid obesity ([address]      Neuropathy, arm 2009     Right; Has had [facility] and [facility]    SEN (obstructive sleep apnea)       uses CPAP    Rectal bleeding 2009     Hospitalized; Had colonoscopy and EGD (ok), gastric emptying study (normal), doppler mesenteric arteries (ok)    Sciatica       Has seen Dr. Sharda Moncada    Stroke [facility]) 2009 & 2012     no residual problems     Past Surgical History:   Procedure Laterality Date    HX APPENDECTOMY   1979    HX BREAST LUMPECTOMY   12/12/2013     RIGHT BREAST DUCTAL EXCISION performed by uGs May MD at [facility]"" MAIN OR    HX CATARACT REMOVAL   2007    HX CHOLECYSTECTOMY   1979    HX COLONOSCOPY        HX HEART CATHETERIZATION         angioplasty    HX HYSTERECTOMY         fibroids    HX KNEE ARTHROSCOPY   1997     right    HX MASTECTOMY Right 2/19/2015     RIGHT BREAST MODIFIED RADICAL MASTECTOMY RIGHT SENTINEL NODE BIOPSY, RIGHT PORT A CATH INSERTION performed by Javier Krause MD at \[facility]\"" AMBULATORY OR    HX PACEMAKER        HX SHOULDER ARTHROSCOPY   2004     left        Prior Level of Function/Home Situation: Per pt mod I with ADLs  Home Situation  Home Environment: Private residence  # Steps to Enter: 2  Rails to Enter: Yes  Hand Rails : Bilateral  Wheelchair Ramp: No  One/Two Story Residence: One story  Living Alone: No  Support Systems: Child(magdiel), Family member(s)  Patient Expects to be Discharged to[de-identified] Private residence  Current DME Used/Available at Home: Cane, straight, Glucometer, Raised toilet seat, Walker, rolling  Tub or Shower Type: Shower  [X]  Right hand dominant             [ ]  Left hand dominant     EXAMINATION OF PERFORMANCE DEFICITS:  Cognitive/Behavioral Status:  Neurologic State: Alert; Appropriate for age  Orientation Level: Oriented X4  Cognition: Appropriate for age attention/concentration; Follows commands  Perception: Appears intact  Perseveration: No perseveration noted  Safety/Judgement: Awareness of environment; Fall prevention; Insight into deficits     Hearing:   Auditory  Auditory Impairment: None     Vision/Perceptual: Acuity: Impaired near vision    Corrective Lenses: Glasses (bifocals)     Range of Motion:  AROM: Generally decreased, functional                          Strength:  Strength: Generally decreased, functional                 Coordination:  Coordination: Generally decreased, functional  Fine Motor Skills-Upper: Left Impaired;Right Intact    Gross Motor Skills-Upper: Left Impaired;Right Intact     Tone & Sensation:  Tone: Normal  Sensation: Impaired (R hand)                       Balance:  Sitting: Intact  Standing: Impaired; With support (RW)  Standing - Static: Fair;Constant support  Standing - Dynamic : Fair     Functional Mobility and Transfers for ADLs:  Bed Mobility:        Transfers:  Sit to Stand: Additional time;Assist x1; Moderate assistance (pt very nauseous and vomiting)  Stand to Sit: Minimum assistance; Additional time;Assist x1  Toilet Transfer : Moderate assistance; Additional time;Assist x1 (using RW to amb to [facility] beside chair)     ADL Assessment:  Feeding: Independent (liquids right now)     Oral Facial Hygiene/Grooming: Setup; Additional time (seated in chair with frequent rest breaks)     Bathing: Moderate assistance; Additional time;Assist x1 (A for buttocks, sabina area and LEs)     Upper Body Dressing: Setup; Additional time (seated in chair with frequent rest breaks)     Lower Body Dressing: Total assistance (pt too nauseous to reach feet)     Toileting: Maximum assistance; Additional time;Assist x1 (A for cleanliness with hygiene and clothing)                 ADL Intervention and task modifications:  Patient was educated on the benefits of maintaining activity tolerance, functional mobility, and independence with self care tasks during acute stay.  Encouraged patient to be out of bed for all meals, perform daily ADLs (as approved by RN/MD regarding bathing etc), performing functional mobility to/from bathroom, and increasing time OOB daily with assist. Patient educated about the importance of maintaining activity tolerance to ensure safe return home and to baseline. Patient verbalized understanding of education. Cognitive Retraining  Safety/Judgement: Awareness of environment; Fall prevention; Insight into deficits     Functional Measure:  Barthel Index:      Bathin  Bladder: 5  Bowels: 10  Groomin  Dressin  Feeding: 10  Mobility: 0  Stairs: 0  Toilet Use: 5  Transfer (Bed to Chair and Back): 5  Total: 40         Barthel and G-code impairment scale:  Percentage of impairment CH  0% CI  1-19% CJ  20-39% CK  40-59% CL  60-79% CM  80-99% CN  100%   Barthel Score 0-100 100 99-80 79-60 59-40 20-39 1-19    0   Barthel Score 0-20 20 17-19 13-16 9-12 5-8 1-4 0      The Barthel ADL Index: Guidelines  1. The index should be used as a record of what a patient does, not as a record of what a patient could do. 2. The main aim is to establish degree of independence from any help, physical or verbal, however minor and for whatever reason. 3. The need for supervision renders the patient not independent. 4. A patient's performance should be established using the best available evidence. Asking the patient, friends/relatives and nurses are the usual sources, but direct observation and common sense are also important. However direct testing is not needed. 5. Usually the patient's performance over the preceding 24-48 hours is important, but occasionally longer periods will be relevant. 6. Middle categories imply that the patient supplies over 50 per cent of the effort. 7. Use of aids to be independent is allowed. Clara Abdalla., Barthel, D.W. (1501). Functional evaluation: the Barthel Index. [address] [facility] (14)2. Nani Busing luis e CatherineHuntsville Memorial Hospitalregulo, JShaunJShaunM.F, Cheryl Roberto., Raymundo Perry., Justice, [address] (). Measuring the change indisability after inpatient rehabilitation; comparison of the responsiveness of the Barthel Index and Functional [place] Measure.  Journal of Neurology, Neurosurgery, and Psychiatry, 66(2), 959-713. STEFAN Cowan.ROSAURA, ADONIS Quinones, & Liza Hobbs M.A. (2004.) Assessment of post-stroke quality of life in cost-effectiveness studies: The usefulness of the Barthel Index and the EuroQoL-5D. Quality of Life Research, 13, 623-72         G codes: In compliance with CMSs Claims Based Outcome Reporting, the following G-code set was chosen for this patient based on their primary functional limitation being treated: The outcome measure chosen to determine the severity of the functional limitation was the Barthel Index with a score of 40/100 which was correlated with the impairment scale. · Self Care:               - CURRENT STATUS:    CL - 60%-79% impaired, limited or restricted               - GOAL STATUS:           CK - 40%-59% impaired, limited or restricted               - D/C STATUS:                       ---------------To be determined---------------      Occupational Therapy Evaluation Charge Determination   History Examination Decision-Making   LOW Complexity : Brief history review  HIGH Complexity : 5 or more performance deficits relating to physical, cognitive , or psychosocial skils that result in activity limitations and / or participation restrictions MEDIUM Complexity : Patient may present with comorbidities that affect occupational performnce. Miniml to moderate modification of tasks or assistance (eg, physical or verbal ) with assesment(s) is necessary to enable patient to complete evaluation       Based on the above components, the patient evaluation is determined to be of the following complexity level: LOW   Pain:  Pain Scale 1: Numeric (0 - 10)  Pain Intensity 1: 10              Activity Tolerance:   Poor  Please refer to the flowsheet for vital signs taken during this treatment.   After treatment:   [X] Patient left in no apparent distress sitting up in chair  [ ] Patient left in no apparent distress in bed  [X] Call bell left within reach  [X] Nursing notified  [ ] Caregiver present  [ ] Bed alarm activated      COMMUNICATION/EDUCATION:   The patients plan of care was discussed with: Registered Nurse.  [X] Home safety education was provided and the patient/caregiver indicated understanding. [X] Patient/family have participated as able in goal setting and plan of care. [X] Patient/family agree to work toward stated goals and plan of care. [ ] Patient understands intent and goals of therapy, but is neutral about his/her participation. [ ] Patient is unable to participate in goal setting and plan of care. This patients plan of care is appropriate for delegation to [facility].      Thank you for this referral.  Delia Bloom OT  Time Calculation: 25 mins

## 2017-09-18 NOTE — PROGRESS NOTES
Hospitalist Progress Note    NAME: Yulia Khanna   :  1940   MRN:  [id number]     Interim Hospital Summary: 65 y.o. female whom presented on 2017 with      Assessment / Plan:    DM type 2  -holding lantus due to relatively low BG. Follow closely. SSI prn    HTN / CAD / PPM  Chronic diastolic chf EF 27-10%  -holding norvasc, imdur, lipitor and ASA due to npo. Restart at some point  -Nitroglycerin paste or hydralazine IV prn SBP >853    JULES  Metabolic acidosis, resolved  -pre renal from third spacing and fluid loss via NG.    -Cr trending down. Continue IVFs    SBO  Abdominal pain  Hx diverticulitis s/p partial colectomy 2008  S/p open cholecystectomy  -s/p lysis of adhesions 9/15/2017  -NGT removed. On clears but patient still with NV    Right breast CA dx  with bone mets dx   Right arm lymphedema  -pT3 N3a M0 (Stage IIIC) infiltrating ductal carcinoma, s/p right mastectomy, XRT     SEN  -on CPAP     Chronic microcytic anemia   Hx right hemispheric CVA   Hx PE   Chronic back pain due to bone mets  Obesity  Body mass index is 32.15 kg/(m^2). Code status: Full  Prophylaxis: Lovenox         Subjective:     Chief Complaint / Reason for Physician Visit  Follow up of abdominal pain, metastatic cancer and ileus  Chart reviewed in detail. Discussed with RN events overnight. Feels nauseated.   Intermittently vomiting since NG removed yesterday    Review of Systems:  Symptom Y/N Comments  Symptom Y/N Comments   Fever/Chills    Chest Pain     Poor Appetite    Edema     Cough    Abdominal Pain y     Sputum    Joint Pain     SOB/WELCH    Pruritis/Rash     Nausea/vomit yy   Tolerating PT/OT     Diarrhea    Tolerating Diet     Constipation    Other       Could NOT obtain due to:      PO intake:   Patient Vitals for the past 72 hrs:   % Diet Eaten   17 0902 5 %   09/15/17 1909 0 %   09/15/17 1500 0 %     Objective:     VITALS:   Last 24hrs VS reviewed since prior progress note. Most recent are:  Patient Vitals for the past 24 hrs:   Temp Pulse Resp BP SpO2   09/18/17 0732 - 97 - - 98 %   09/18/17 0730 98.1 °F (36.7 °C) (!) 101 16 114/56 98 %   09/17/17 2245 98 °F (36.7 °C) 96 16 102/64 99 %   09/17/17 1951 98 °F (36.7 °C) (!) 113 20 114/53 99 %   09/17/17 1510 98.4 °F (36.9 °C) (!) 106 18 102/71 100 %       Intake/Output Summary (Last 24 hours) at 09/18/17 1138  Last data filed at 09/18/17 1058   Gross per 24 hour   Intake              180 ml   Output              750 ml   Net             -570 ml        PHYSICAL EXAM:  General: WD, WN. Alert, cooperative, no acute distress    EENT:  EOMI. Anicteric sclerae. Resp:  CTA bilaterally, no wheezing or rales. No accessory muscle use  CV:  Regular  rhythm,  No edema  GI:  slight distended, mild diffuse tenderness.  +hypoactive Bowel sounds  Neurologic:  Alert and oriented X 3, normal speech,   Psych:   Fair insight. Not anxious nor agitated  Skin:  No rashes. No jaundice    Reviewed most current lab test results and cultures  YES  Reviewed most current radiology test results   YES  Review and summation of old records today    NO  Reviewed patient's current orders and MAR    YES  PMH/ reviewed - no change compared to H&P  ________________________________________________________________________  Care Plan discussed with:    Comments   Patient x    Family      RN x    Care Manager     Consultant                        Multidiciplinary team rounds were held today with , nursing, pharmacist and clinical coordinator. Patient's plan of care was discussed; medications were reviewed and discharge planning was addressed.      ________________________________________________________________________  Total NON critical care TIME:   25   Minutes    Total CRITICAL CARE TIME Spent:   Minutes non procedure based      Comments   >50% of visit spent in counseling and coordination of care x     This includes time during multidisciplinary rounds if indicated above   ________________________________________________________________________  Heather Acevedo MD     Procedures: see electronic medical records for all procedures/Xrays and details which were not copied into this note but were reviewed prior to creation of Plan. LABS:  I reviewed today's most current labs and imaging studies.   Pertinent labs include:  Recent Labs      09/18/17   0305 09/17/17   0408  09/16/17   0354   WBC  5.0  5.7   --    HGB  7.9*  8.0*  7.9*   HCT  23.7*  23.2*  22.3*   PLT  183  175   --      Recent Labs      09/18/17   0305 09/17/17 0408  09/16/17   0354   NA  141  139  136   K  4.0  4.1  3.8   CL  107  103  98   CO2  22  26  28   GLU  103*  72  83   BUN  43*  48*  52*   CREA  2.29*  2.61*  2.90*   CA  7.4*  6.6*  6.2*   PHOS  4.4  5.6*  4.8*   ALB  1.9*  2.0*  2.2*

## 2017-09-18 NOTE — PROGRESS NOTES
Nephrology Progress Note     Fernando Cochran     [facility]. [email or web address]                  Phone - [phone]   Patient: Gloria Hoskins   YOB: 1940    Date- 9/18/2017     CC: Follow up for ARF        Subjective: Interval History:   -  Cr. Slowly improving  Weekend events noted  bp stable  On clear liquid diet  No getting any ivf  ROS-c/o nausea  No sob  Voiding well   Assessment:   · JULES -  secondary to multiple possibilities. · 1. Pre renal factors - poor po intake, HIGH NG tube out put  · 2. Intra reanl factors - ATN due to FLEET enema  · 3. Post renal factors - urinary retention can't be ruled out  ·    · Non anion gap meta acidosis - improved  · CKD 3 baseline cr. 1.0-1.2  · H/o hyponatremia  · ILLEUS   · S/p lysis of adhesions on 9-15-17  · H/o PE  · anemia  · Right breast cancer with bone mets     Plan:   Continue to monitor BMP  No ivf needed unless she started having vomiting   Hold norvasc and lotensin  Anemia management per [address] [place] [address] discussed with: pt   Review of Systems: Pertinent items are noted in HPI. Objective:   Vitals:    09/17/17 2245 09/18/17 0455 09/18/17 0730 09/18/17 0732   BP: 102/64  114/56    Pulse: 96  (!) 101 97   Resp: 16  16    Temp: 98 °F (36.7 °C)  98.1 °F (36.7 °C)    SpO2: 99%  98% 98%   Weight:  90.4 kg (199 lb 3.2 oz)     Height:  5' 6\" (1.676 m)       Last 3 Recorded Weights in this Encounter    09/16/17 0032 09/17/17 0400 09/18/17 0455   Weight: 86.9 kg (191 lb 9.6 oz) 89.7 kg (197 lb 11.2 oz) 90.4 kg (199 lb 3.2 oz)       Intake/Output Summary (Last 24 hours) at 09/18/17 1043  Last data filed at 09/18/17 0902   Gross per 24 hour   Intake              180 ml   Output              600 ml   Net             -420 ml     Physical Exam:   GEN: NAD  NECK- Supple, no thyromegaly  RESP: Clear b/l, no wheezing, No accessory muscle use  CVS: RRR,S1,S2    EXT: trace Edema   NEURO: non focal, normal speech        Chart reviewed. Pertinent Notes reviewed. Medications list  reviewed       Data Review :  Recent Labs      09/18/17   0305  09/17/17   0408  09/16/17   0354   NA  141  139  136   K  4.0  4.1  3.8   CL  107  103  98   CO2  22  26  28   GLU  103*  72  83   BUN  43*  48*  52*   CREA  2.29*  2.61*  2.90*   CA  7.4*  6.6*  6.2*   PHOS  4.4  5.6*  4.8*   ALB  1.9*  2.0*  2.2*     Recent Labs      09/18/17   0305  09/17/17   0408  09/16/17   0354   WBC  5.0  5.7   --    HGB  7.9*  8.0*  7.9*   HCT  23.7*  23.2*  22.3*   PLT  183  175   --      Lab Results   Component Value Date/Time    Specimen Description: URINE 11/11/2010 03:00 PM    Specimen Description: URINE 07/03/2010 08:50 AM    Specimen Description: STOOL 05/31/2009 01:30 AM     Current Facility-Administered Medications   Medication    HYDROmorphone (PF) 15 mg/30 ml (DILAUDID) PCA    0.9% sodium chloride infusion    insulin lispro (HUMALOG) injection    ondansetron (ZOFRAN) injection 4 mg    prochlorperazine (COMPAZINE) with saline injection 10 mg    enoxaparin (LOVENOX) injection 30 mg    glucose chewable tablet 16 g    glucagon (GLUCAGEN) injection 1 mg    hydrALAZINE (APRESOLINE) 20 mg/mL injection 10 mg    famotidine (PF) (PEPCID) injection 20 mg    dextrose 10% infusion 125-250 mL       Augie Fontanez MD  [facility]  [email or web address]  [address], [place], [address]  Phone - [phone]         Fax - [phone]  [facility] Office  [address] [place] [address]  Phone - [phone]        Fax - [phone]

## 2017-09-18 NOTE — PROGRESS NOTES
Progress Note        Patient: Chitra Miles MRN: [id number]  SSN: [id number]    YOB: 1940  Age: 65 y.o. Sex: female        Reason for Admission:     Metastatic breast cancer/abdominal pain    Subjective:      Chitra Miles is a 65 y.o. female with a diagnosis of locally advanced right sided breast cancer. She underwent a right mastectomy with axillary LN dissection on 01/19/2015. She received three cycles of adjuvant chemotherapy with TC and stopped therapy due to side effects. She has completed radiation on 08/13/2015. She has developed progressive anemia. She feels fatigued. She also has ongoing back pain. . A bone marrow biopsy confirmed the diagnosis of metastatic TNBC. She was admitted to [place] [facility] for uncontrolled pain and fatigue. She received RBC transfusion was discharged from the hospital. She is now receiving Abraxane and Keytruda. Ms. Josafat Jameson was admitted last Wednesday for abdominal pain with nausea. An Abdominal XRay showed a partial bowel obstruction. A NG was placed for decompression. She continued to have output from her NG and an abdominal CT was ordered. She was taken to the ED on Friday for lysis of adhesions. Today she is not feeling well and is complaining of nausea.          Review of Systems:    Constitutional: fatigue  Eyes: negative  Ears, Nose, Mouth, Throat, and Face: negative  Respiratory: negative  Cardiovascular: negative  Gastrointestinal: abdominal pain and nausea  Integument/chest wall: negative  Hematologic/Lymphatic: right sleeve in place for lymphadema  Musculoskeletal: back pain and joint pain   Neurological: negative      Past Medical History:   Diagnosis Date    Arrhythmia     bradycardia in 30's /pacemaker inserted    Arthritis     RT KNEE    Asthma Dx age 75    Bradycardia 2009    Cardiology saw her during hospital stay; Now off coreg;  [facility])     Rt mastectomy 2/19/15    CAD (coronary artery disease)     Dr Clara Murphy Diabetes [facility])     Now seeing Dr. Jane Willson Diverticulitis     DJD (degenerative joint disease), lumbar     GERD (gastroesophageal reflux disease)     H. pylori infection 2008    Dr. Tom Garcia attack [facility]) April 2006    Hypercholesterolemia     Hypertension     Morbid obesity ([address]     Neuropathy, arm 2009    Right; Has had MRI and [facility]    SEN (obstructive sleep apnea)     uses CPAP    Rectal bleeding 2009    Hospitalized; Had colonoscopy and EGD (ok), gastric emptying study (normal), doppler mesenteric arteries (ok)    Sciatica     Has seen Dr. Lesa Clement    Stroke [facility]) 2009 & 2012    no residual problems     Past Surgical History:   Procedure Laterality Date    HX APPENDECTOMY  1979    HX BREAST LUMPECTOMY  12/12/2013    RIGHT BREAST DUCTAL EXCISION performed by Nalini Ca MD at [facility] MAIN OR    HX CATARACT REMOVAL  2007    HX CHOLECYSTECTOMY  1979    HX COLONOSCOPY      HX HEART CATHETERIZATION      angioplasty    HX HYSTERECTOMY      fibroids    HX KNEE ARTHROSCOPY  1997    right    HX MASTECTOMY Right 2/19/2015    RIGHT BREAST MODIFIED RADICAL MASTECTOMY RIGHT SENTINEL NODE BIOPSY, RIGHT PORT A CATH INSERTION performed by Sheridan Boogie MD at [facility] AMBULATORY OR    HX PACEMAKER      HX SHOULDER ARTHROSCOPY  2004    left      Family History   Problem Relation Age of Onset    Stroke Father     Cancer Sister      breast cancer    Hypertension Sister     Cancer Mother      Unknown type    Cancer Brother      Colon    Hypertension Brother     Anesth Problems Neg Hx      Social History   Substance Use Topics    Smoking status: Never Smoker    Smokeless tobacco: Never Used    Alcohol use No      Prior to Admission medications    Medication Sig Start Date End Date Taking?  Authorizing Provider   insulin glargine (LANTUS) 100 unit/mL injection 18 units in the morning and 18 units at bedtime 8/18/17   Clarene Baumgarten, MD insulin syringe-needle U-100 (BD INSULIN SYRINGE ULTRA-FINE) 1/2 mL 31 gauge x 15/64\" syrg 1 Syringe by SubCUTAneous route two (2) times a day. 8/18/17   Jeronimo May MD   VITAMIN D2 50,000 unit capsule take 1 capsule by mouth every 30 DAYS 8/16/17   Mayito Nj NP   hydrOXYzine HCl (ATARAX) 25 mg tablet Take 25 mg by mouth every eight (8) hours as needed for Itching. Historical Provider   morphine IR (MS IR) 15 mg tablet Take 1 Tab by mouth every four (4) hours as needed for Pain. Max Daily Amount: 90 mg. 8/11/17   Mayito Nj NP   morphine CR (MS CONTIN) 15 mg CR tablet Take 1 Tab by mouth every twelve (12) hours. Max Daily Amount: 30 mg. 8/11/17   Mayito Nj NP   megestrol (MEGACE) 400 mg/10 mL (10 mL) suspension Take 10 mL by mouth daily. 8/11/17   Mayito Nj NP   diclofenac (VOLTAREN) 1 % gel Apply 2 g to affected area four (4) times daily as needed (Pain). Historical Provider   dicyclomine (BENTYL) 20 mg tablet Take 1 Tab by mouth every six (6) hours as needed (abdominal cramps) for up to 20 doses. 6/10/17   Emily Menjivar MD   ondansetron hcl (ZOFRAN, AS HYDROCHLORIDE,) 4 mg tablet Take 1 Tab by mouth every eight (8) hours as needed for Nausea. 6/10/17   Emily Menjivar MD   atorvastatin (LIPITOR) 80 mg tablet Take 1 Tab by mouth daily. Indications: hypercholesterolemia 5/31/17   Rehan Barajas MD   aspirin (ASPIRIN) 325 mg tablet Take 1 Tab by mouth daily. 5/31/17   Rehan Barajas MD   ipratropium (ATROVENT) 0.06 % nasal spray instill 2 sprays into each nostril three times a day - IF NEEDED FOR RUNNING NOSE 3/15/17   Historical Provider   glipiZIDE (GLUCOTROL) 5 mg tablet Take 0.5 Tabs by mouth two (2) times a day. Take 1/2 every day with breakfast. If your blood sugar is above 200 take a whole tablet. 5/5/17   Jeronimo May MD   polyethylene glycol [facility] 17 gram/dose powder Take 17 g by mouth two (2) times a day.  Continue while taking pain medication 3/8/17   Michelle Rose MD isosorbide mononitrate ER (IMDUR) 60 mg CR tablet Take  by mouth every morning. 6/6/16   Historical Provider   Blood Sugar Diagnostic, Disc (ASCENSIA BREEZE 2) strp Check your blood sugar twice daily. B60.32 6/20/16   João Melgar MD   ondansetron (ZOFRAN ODT) 4 mg disintegrating tablet Take 1 Tab by mouth every eight (8) hours as needed for Nausea. 6/1/16   Lindy Talley MD   albuterol (PROVENTIL VENTOLIN) 2.5 mg /3 mL (0.083 %) nebulizer solution 3 mL by Nebulization route every four (4) hours as needed for Wheezing. 9/20/15   Quemado [place], DO   MICROLET LANCET misc  12/16/14   Historical Provider   benazepril (LOTENSIN) 40 mg tablet Take 40 mg by mouth every morning. Historical Provider   omeprazole (PRILOSEC) 20 mg capsule Take 40 mg by mouth two (2) times a day. Todd Andersen MD   nitroglycerin (NITROSTAT) 0.4 mg SL tablet by SubLINGual route every five (5) minutes as needed for Chest Pain. Todd Andersen MD   amlodipine (NORVASC) 10 mg tablet TAKE 1 TABLET BY MOUTH ONCE DAILY 4/9/11   Rafael Scott MD          Allergies   Allergen Reactions    Codeine Itching    Duratuss [Phenylephrine-Guaifenesin] Nausea and Vomiting    Lisinopril-Hydrochlorothiazide Itching    Motrin [Ibuprofen] Nausea and Vomiting     With 800mg    Tape [Adhesive] Other (comments)     TEARS HER SKIN         Objective:     Vitals:    09/18/17 0730 09/18/17 0732 09/18/17 1144 09/18/17 1155   BP: 114/56  133/75    Pulse: (!) 101 97 (!) 105 (!) 104   Resp: 16  16    Temp: 98.1 °F (36.7 °C)  98 °F (36.7 °C)    SpO2: 98% 98% 100% 100%   Weight:       Height:            Physical Exam:    GENERAL: alert, cooperative, obese  EYE: negative  LYMPHATIC: negative  THROAT & NECK: normal and no erythema or exudates noted.    LUNG: clear to auscultation bilaterally  HEART: regular rate and rhythm  ABDOMEN: soft, non-tender  EXTREMITIES: right arm edema - has sleeve in place, bilateral LE edema  SKIN: negative  NEUROLOGIC: negative        CT Results (most recent):    Results from [place] encounter on 09/13/17   CT ABD PELV WO CONT   Narrative CT ABDOMEN AND PELVIS WITHOUT CONTRAST. 9/15/2017 12:44 PM     INDICATION: Abdominal pain, vomiting. Ileus versus small bowel obstruction. Metastatic breast carcinoma. Cyclical vomiting with nausea. COMPARISON: 7/2/2017. TECHNIQUE: CT of the abdomen and pelvis was performed after the administration  of oral contrast. Evaluation of the solid organs is less sensitive without IV  contrast. CT dose reduction was achieved through use of a standardized protocol  tailored for this examination and automatic exposure control for dose  modulation. FINDINGS:  Abdomen: Post right mastectomy. Aside from minimal basilar atelectasis, the lung  bases are clear. The heart size is normal. A pacemaker is in place. Mild aortic  valvular calcifications and mild coronary artery calcifications. An NG tube is  in appropriate position the stomach. Mild intra and extrahepatic biliary ductal  dilation is likely physiologic post cholecystectomy. Incidental note is made of  a small right upper pole renal cyst. The unenhanced liver, pancreas, spleen,  adrenals, and kidneys are otherwise normal.    Pelvis: The small bowel is diffusely dilated, with an abrupt transition point in  the right mid abdomen ileum on images 2-52 and 601-62. No pneumatosis,  mesenteric venous gas, or free air. The distal ileum is decompressed. The  patient is post right hemicolectomy and ileocolonic anastomosis. Sigmoid  diverticulosis is moderate. The colon is largely decompressed. There is trace  free fluid in the deep pelvis. No abdominopelvic lymphadenopathy. Post  hysterectomy. Impression IMPRESSION: Small bowel obstruction, with mid ileal transition point in the  right midabdomen. The findings were called to Dr. Bogdan Patel on 9/15/2017 2:46 PM by Dr. Briana Webber.   [address] [place] [address] Results   Component Value Date/Time    WBC 5.0 09/18/2017 03:05 AM    Hemoglobin (POC) 9.2 09/20/2015 05:25 PM    HGB 7.9 09/18/2017 03:05 AM    Hematocrit (POC) 27 09/20/2015 05:25 PM    HCT 23.7 09/18/2017 03:05 AM    PLATELET 406 85/73/8338 03:05 AM    MCV 78.0 09/18/2017 03:05 AM       Lab Results   Component Value Date/Time    Sodium 141 09/18/2017 03:05 AM    Potassium 4.0 09/18/2017 03:05 AM    Chloride 107 09/18/2017 03:05 AM    CO2 22 09/18/2017 03:05 AM    Anion gap 12 09/18/2017 03:05 AM    Glucose 103 09/18/2017 03:05 AM    BUN 43 09/18/2017 03:05 AM    Creatinine 2.29 09/18/2017 03:05 AM    BUN/Creatinine ratio 19 09/18/2017 03:05 AM    GFR est AA 25 09/18/2017 03:05 AM    GFR est non-AA 21 09/18/2017 03:05 AM    Calcium 7.4 09/18/2017 03:05 AM         Assessment:     1. Small bowel obstrcution    > small bowel obstruction - s/p lysis of adhesions laproscopic converted to open 9/15/2017 by Dr. Richard Miller. Complaining of nausea today. - ileus   Management per Dr. Richard Miller    2. Metastatic breast carcinoma - dx 7/17/2017     Bone marrow involvement  Diffuse skeletal metastasis    ECOG PS 2  Intent of treatment - palliative    Receiving palliative chemotherapy   Abraxane + Keytruda s/p 1 cycle    Symptom management form reviewed with patient. 3. Right breast carcinoma: dx 11/17/2014  pT3 N3a M0 (Stage IIIC) infiltrating ductal carcinoma, Tumor size 7.5 cm, LN 17/19 +ve with extracapsular extension in 2 nodes, grade 3, ki 67 35%, ER -ve, MT -ve, Her 2 -ve    ECOG PS 0  Intent of therapy - curative       S/P right sided mastectomy with axillary LN dissection  Received adjuvant chemotherapy   Taxotere, Cytoxan, s/p 3 Cycles    Therapy was discontinued due to severe side effects. Completed radiation to the chest wall and axilla  Lymphedema - followed by lymphedema clinic at [facility]      4. Anemia     From bone marrow involvement  Transfuse to maintain Hgb above 8 gm.        5. Acute renal failure    > Nephrology on board      Plan: 1. Transferring to General surgery service  2. Holding all chemotherapy treatments at this time  3. Plan to follow up after discharge      Signed by: Brandon Florence NP                     September 18, 2017               Attending Physician Note:     I have reviewed the history, physical examination, assessment and the plan of the care of the patient. I saw the patient with Audrey Mittal and I participated in the examination and critical decision making. I agree with the note as stated above. Ms. Gildardo Figueroa is a women with metastatic triple negative breast ca. She is admitted with abdominal pain which we found out was due to small bowel obstruction. She underwent lysis of adhesion. She is having vomiting today. She feels poorly. She has a poor prognosis.          Signed by: Caitlin Fenton MD                     September 18, 2017

## 2017-09-18 NOTE — PROGRESS NOTES
Spiritual Care Assessment/Progress Notes    Serenity Bunn [id number]  [id number]    1940  65 y.o.  female    Patient Telephone Number: [phone] (home)   Episcopal Affiliation: Jonatan Griffin   Language: English   Extended Emergency Contact Information  Primary Emergency Contact: [address] Phone: [phone]  Mobile Phone: [phone]  Relation: Daughter  Secondary Emergency Contact: [address] Phone: [phone]  Relation: Daughter   Patient Active Problem List    Diagnosis Date Noted    Neoplastic malignant related fatigue 09/13/2017    Pain due to malignant neoplasm metastatic to bone ([address] 35/33/2520    Cyclical vomiting with nausea 09/13/2017    Anemia 08/31/2017    Metastatic breast cancer ([address] 08/11/2017    Transient ischemic attack involving right internal carotid artery 05/31/2017    Stenosis of both internal carotid arteries 05/31/2017    Left-sided weakness 05/29/2017    LLQ pain 05/29/2017    Pacemaker 40/02/6164    Diastolic CHF, chronic ([address] 05/29/2017    Type 2 diabetes mellitus with diabetic polyneuropathy, with long-term current use of insulin ([address] 12/21/2016    S/P right mastectomy 12/05/2016    Lymphedema of upper extremity following lymphadenectomy 12/18/2015    Multinodular goiter (nontoxic) 12/15/2015    Malignant neoplasm of upper-inner quadrant of right female breast ([address] 10/07/2015    Vitamin D deficiency 08/19/2015    Essential hypertension 08/07/2015    Pulmonary emboli ([address] 05/10/2015    Constipated 04/08/2015    Nausea & vomiting 03/26/2015    Pain 03/18/2015    Abdominal pain 02/20/2015    Breast cancer, right breast ([address] 11/26/2014    Hypercholesterolemia     Stroke ([address]     DJD (degenerative joint disease), lumbar     GERD (gastroesophageal reflux disease)     SEN (obstructive sleep apnea)     Sciatica     Neuropathy, arm     CAD (coronary artery disease) Date: 9/18/2017       Level of Temple/Spiritual Activity:  [x]         Involved in etienne tradition/spiritual practice    []         Not involved in etienne tradition/spiritual practice  [x]         Spiritually oriented    []         Claims no spiritual orientation    []         seeking spiritual identity  []         Feels alienated from Buddhism practice/tradition  []         Feels angry about Buddhism practice/tradition  [x]         Spirituality/Buddhism tradition is a resource for coping at this time. []         Not able to assess due to medical condition    Services Provided Today:  []         crisis intervention    []         reading Scriptures  [x]         spiritual assessment    [x]         prayer  [x]         empathic listening/emotional support  []         rites and rituals (cite in comments)  []         life review     []         Buddhism support  []         theological development   []         advocacy  []         ethical dialog     []         blessing  []         bereavement support    []         support to family  []         anticipatory grief support   []         help with AMD  []         spiritual guidance    []         meditation      Spiritual Care Needs  [x]         Emotional Support  [x]         Spiritual/Temple Care  []         Loss/Adjustment  []         Advocacy/Referral                /Ethics  []         No needs expressed at               this time  []         Other: (note in               comments)  [address]  []         Follow up visits with               pt/family  []         Provide materials  []         Schedule sacraments  []         Contact Community               Clergy  [x]         Follow up as needed  []         Other: (note in               comments)     Comments: RN was in room. Pt has been throwing up a lot according to nurse. Pt requested prayer and admitted to feeling miserable.  provided prayer.   RN informed  that they were hoping Palliative provider will be able help with symptoms.  will share information with provider as able.  will follow up as needed/able. Jaylon Nelson M.Div.   Palliative  Fellow

## 2017-09-18 NOTE — PROGRESS NOTES
PCU SHIFT NURSING NOTE      Bedside and Verbal shift change report given to [facility] NATHAN ANTOINE (oncoming nurse) by Luda Floyd RN (offgoing nurse). Report included the following information SBAR, Kardex, OR Summary, Procedure Summary, Intake/Output, MAR, Accordion, Recent Results, Med Rec Status, Cardiac Rhythm Paced and Alarm Parameters . Shift Summary:     1910 - Upon arrival patient in room sitting up on side of bed with visitors present. Mild abdominal discomfort (3/10) expressed. Patient up to [facility] and back to bed. No needs expressed at this time. Call bell in reach, PCA button in reach, bed locked and in lowest position. 2030 - Patient complaining of nausea. PRN Zofran administered and emesis bag provided. Call bell in reach and bed locked and in lowest position. 2200 - Patient still complaining of nausea. PRN Compazine given. Call light in reach, bed locked and in lowest position. 2230 - Patient sleeping. Call bell in reach, bed locked and in lowest position. 0130 - Patient complaining of nausea. PRN Zofran offered and patient refused stating that it did not work for her at 2030. Too soon for another dose of PRN Compazine. Patient up to [facility] and back to bed. Call light in reach, bed locked and in lowest position. 7874 - Patient still complaining of nausea. Requested PRN Zofran. Up to [facility]. Call bell in reach. 0330 - Patient back to bed still complaining of nausea. Patient advised that Compazine can be administered at 0400. Call bell in reach. 0530 - Patient up to [facility]. Back in bed, resting comfortably, call bell in reach.       Admission Date 9/13/2017   Admission Diagnosis Metastatic Breast Ca  Metastatic breast cancer ([facility]  SMALL BOWEL OBSTRUCTION   Consults IP CONSULT TO PALLIATIVE CARE - PROVIDER  IP CONSULT TO NEPHROLOGY  IP CONSULT TO GENERAL SURGERY        Consults   []PT   []OT   []Speech   []Case Management      [] Palliative      Cardiac Monitoring Order   []Yes []No     IV drips   []Yes    Drip:                            Dose:  Drip:                            Dose:  Drip:                            Dose:   []No     GI Prophylaxis   []Yes   []No         DVT Prophylaxis   SCDs:  Sequential Compression Device: Bilateral          Osmar stockings:         [] Medication   []Contraindicated   []None      Activity Level Activity Level: Up with Assistance     Activity Assistance: Partial (two people)   Purposeful Rounding every 1-2 hour? []Yes   Morales Score  Total Score: 3   Bed Alarm (If score 3 or >)   []Yes   [] Refused (See signed refusal form in chart)   Alan Score  Alan Score: 18   Alan Score (if score 14 or less)   []PMT consult   []Wound Care consult      []Specialty bed   [] Nutrition consult          Needs prior to discharge:   Home O2 required:    []Yes   []No    If yes, how much O2 required?     Other:    Last Bowel Movement: Last Bowel Movement Date: 09/17/17      Influenza Vaccine Received Flu Vaccine for Current Season (usually Sept-March): Not Flu Season        Pneumonia Vaccine           Diet Active Orders   Diet    DIET CLEAR LIQUID      LDAs             Venous Access Device Smart Port 08/22/17 Upper chest (subclavicular area, right (Active)       Venous Access Device power port 09/13/17 Upper chest (subclavicular area, right (Active)   Central Line Being Utilized No 9/16/2017  7:30 PM   Criteria for Appropriate Use Limited/no vessel suitable for conventional peripheral access 9/16/2017  7:30 PM   Site Assessment Clean 9/16/2017  7:30 PM   Date of Last Dressing Change 09/13/17 9/16/2017  7:30 PM   Dressing Status Clean, dry, & intact 9/16/2017  7:30 PM   Dressing Type Transparent 9/16/2017  7:30 PM   Action Taken Blood drawn 9/17/2017  4:12 AM   Date Accessed (Medial Site) 09/13/17 9/15/2017  7:30 AM   Access Time (Medial Site) 1140 9/13/2017 12:00 PM   Access Needle Size (Site #1) 20 G 9/13/2017 12:00 PM   Access Needle Length (Medial Site) 1 inch 9/13/2017 12:00 PM   Positive Blood Return (Medial Site) Yes 9/17/2017  4:12 AM   Action Taken (Medial Site) Blood drawn;Flushed 9/13/2017 12:00 PM   Positive Blood Return (Lateral Site) Yes 9/14/2017  8:30 AM   Alcohol Cap Used Yes 9/14/2017  8:30 AM            Nasogastric Tube 09/13/17 (Active)   Site Assessment Clean 9/16/2017  7:30 PM   Dressing Status Clean, dry, & intact 9/16/2017  7:30 PM   G Port Status Continuous Suction 9/16/2017  7:30 PM   External Insertion Raffaele (cms) 80 cms 9/16/2017  7:30 PM   Action Taken Placement verified (comment) 9/16/2017  7:30 PM   Drainage Description Earmon Rom 9/16/2017  7:30 PM   Water Flush Volume (mL) 80 mL 9/15/2017  8:33 AM   Drainage Chamber Level (ml) 100 ml 9/16/2017  7:30 PM   Output (ml) 40 ml 9/16/2017  7:30 PM                Urinary Catheter [REMOVED] Urinary Catheter 09/15/17 Ascencio-Criteria for Appropriate Use: Surgical procedure    Intake & Output   Date 09/16/17 1900 - 09/17/17 0659 09/17/17 0700 - 09/18/17 0659   Shift 7554-7456 24 Hour Total 5771-1593 5991-7930 24 Hour Total   I  N  T  A  K  E   I.V.  (mL/kg/hr) 2889.6 2889. 6         Volume (0.9% sodium chloride infusion) 2889.6 2889. 6       Shift Total  (mL/kg) 2889.6  (32.2) 2889.6  (32.2)      O  U  T  P  U  T   Urine  (mL/kg/hr) 500 1300 600  (0.6)  600      Urine Voided 500 500         Urine Occurrence(s)   2 x  2 x      Urine Output (mL) ([REMOVED] Urinary Catheter 09/15/17 Ascencio)  800 600  600    Emesis/NG output 40 40         Output (ml) (Nasogastric Tube 09/13/17) 40 40       Stool           Stool Occurrence(s)   1 x  1 x    Shift Total  (mL/kg) 540  (6) 1340  (14.9) 600  (6.7)  600  (6.7)   NET 2349. 6 1549.6 -600  -600   Weight (kg) 89.7 89.7 89.7 89.7 89.7         Readmission Risk Assessment Tool Score High Risk            47       Total Score        3 Has Seen PCP in Last 6 Months (Yes=3, No=0)    4 IP Visits Last 12 Months (1-3=4, 4=9, >4=11)    5 Pt.  Coverage (Medicare=5 , Medicaid, or Self-Pay=4) 35 Charlson Comorbidity Score (Age + Comorbid Conditions)        Criteria that do not apply:    . Living with Significant Other. Assisted Living. LTAC. SNF.  or   Rehab    Patient Length of Stay (>5 days = 3)       Expected Length of Stay 3d 9h   Actual Length of Stay 4

## 2017-09-18 NOTE — PROGRESS NOTES
Subjective:    Nauseated. NG came out over weekend -- she did not like it. Objective:    Blood pressure 133/75, pulse (!) 104, temperature 98 °F (36.7 °C), resp. rate 16, height 5' 6\" (1.676 m), weight 90.4 kg (199 lb 3.2 oz), SpO2 100 %. Exam - alert, uncomfortable (nauseated), CTAB, RRR, abd softly distended, quiet, approp TTP. Assessment:  Procedure(s):  LYSIS OF ADHESIONS LAPAROSCOPIC CONVERT TO OPEN 9/15/2017    Active Hospital Problems    Diagnosis Date Noted    Neoplastic malignant related fatigue 09/13/2017    Pain due to malignant neoplasm metastatic to bone (HCC) 00/63/0209    Cyclical vomiting with nausea 09/13/2017    [facility] 08/11/2017       - acute typical post-op ileus. Plan:  - no more than sips. - Cam Rocker. - Ascencio - cont for fluid status monitoring.    - DVT prophylaxis - lovenox. - AMBULATE.       Adrienne Paez MD

## 2017-09-19 NOTE — CONSULTS
Palliative Medicine Consult  [place]: [phone]    Patient Name: Brenna Mcgowan  YOB: 1940    Date of Initial Consult: 9/13/17  Reason for Consult: Uncontrolled abdominal pain, advanced care planning  Requesting Provider: Sean Polk NP   Primary Care Physician: Elmer Cannon MD      SUMMARY:   Brenna Mcgowan is a 65 y.o. with a past history of DM, HTN arrhythmia, CAD, MI and strokes in 2009 and 2013,diverticulosis, SEN,  stage IIIC infiltrating ductal carcinoma of the right breast, s/p right mastectomy with axillary LN dissection, chemotherapy completed April 2015, XRT completed August 2015, bone marrow biopsy July 2017 showed bone marrow extensively involved by metastatic carcinoma, breast primary. She was admitted on 9/13/2017 from Dr. Gumaro Deleon office with a diagnosis of metastatic breast cancer, anemia. Current medical issues leading to Palliative Medicine involvement include: symptom management. Pt was admitted for severe fatigue and uncontrolled nausea and vomiting. She was found to have a partial bowel obstruction, and on 9/15 she was taken to the OR for lysis of adhesions. The NGT was d/cynthia yesterday, developed severe nausea last evening, and vomiting today. Psychosocial: pt lives with her daughter and [address] in [place], daughter drives her to appointments   PALLIATIVE DIAGNOSES:   1. Abdominal pain: pt seen multiple times (7/2/17 and 6/10/17 and 5/6/17 at [facility]) for abdominal pain) 7/13/17 gastric empty study normal, 7/2/17 abd CT neg for intestinal findings, 5/29/17 abd CT showed colonic diverticulosis but no evidence of acute diverticulitis, 3/14/17 abd CT diverticulosis without diverticulitis. (Note pt has had previous right colectomy, and gallbladder and appendix have been surgically removed). DDx: adhesions, gastroparesis, constipation, diverticulitis  2. Pain due to neoplasm (spine mets)  3. Fatigue   4.  Nausea and vomiting  5. Lymphedema right arm  6. Bilateral LE edema  7. Care decisions: not addressed today  8. Acute post-op ileus. PLAN:   1. Schedule dilaudid 0.5mg q. 4 hours for cancer related bone pain, hold for sedation. 2. Dilaudid 0.2mg IV q. 2 hours prn breakthrough pain not controlled with scheduled dose. hold for sedation. 3. Initial consult note routed to primary continuity provider  4. Communicated plan of care with: Palliative IDT, RN        GOALS OF CARE / TREATMENT PREFERENCES:   [====Goals of Care====]  GOALS OF CARE:  Patient / health care proxy stated goals:     Pain management      TREATMENT PREFERENCES:   Code Status: Full Code    Advance Care Planning:  Advance Care Planning 9/13/2017   Patient's Healthcare Decision Maker is: Legal Next of Dimitry Cancino   Primary Decision Maker Name Rachel    Primary Decision Maker Phone Number [phone]   Primary Decision Maker Relationship to Patient Adult child   Secondary Decision Maker Name -   Confirm Advance Directive None   Patient Would Like to Complete Advance Directive -   Does the patient have other document types -       Other:    The palliative care team has discussed with patient / health care proxy about goals of care / treatment preferences for patient.  [====Goals of Care====]         HISTORY:     History obtained from: chart, patient    CHIEF COMPLAINT: abdominal pain    HPI/SUBJECTIVE:    The patient is:   9[x] Verbal and participatory  [] Non-participatory due to:     (pt is poor historian)  9/13:  Pt was directly admitted from Oncology office today for complaints of severe abdominal pain that has not responded to her current pain medications: Morphine ER 15mg bid, and Morphine IR 15mg q. 4 hours prn. Pt reports to me that she has been experiencing diffuse, crampy, constant abdominal pain for \"months\", however, today she started vomiting. Endorses constipation, but states BM daily, LBM was \"normal\" yesterday. Asking for a suppository.      9/14: pain and nausea better since NGT placed. Still having abdominal pain, but not as bad.    9/18:  Miserable, did not want to talk. 9/19: feeling much better today. Nausea and vomiting are resolved, taking small sips of water. Had a BM this am.  C/o trouble with pain medication, her back pain is usually high, 8-9/10 when she gets a dose of medication. The medication helps, just not very long. Back pain at this time is 7-8/10. Per :   08/11/17  Morphine IR 15mg #60/10 day supply [facility]  08/11/17  Morphine ER 15mg  #60/30 day supply [facility]  08/02/17  Tramadol 50mg  #60/15 day supply [facility]  Pt has been getting different opioids from multiple providers in the past year for different pain.       Clinical Pain Assessment (nonverbal scale for severity on nonverbal patients):   [++++ Clinical Pain Assessment++++]  [++++Pain Severity++++]: Pain: 7  [++++Pain Character++++]: crampy  [++++Pain Duration++++]:  months  [++++Pain Effect++++]: nausea and vomiting  [++++Pain Factors++++]:   [++++Pain Frequency++++]: constant  [++++Pain Location++++]:   Diffuse abdomen  [++++ Clinical Pain Assessment++++]  Duration: for how long has pt been experiencing pain (e.g., 2 days, 1 month, years)  Frequency: how often pain is an issue (e.g., several times per day, once every few days, constant)     FUNCTIONAL ASSESSMENT:     Palliative Performance Scale (PPS):  PPS: 40       PSYCHOSOCIAL/SPIRITUAL SCREENING:     Advance Care Planning:  Advance Care Planning 9/13/2017   Patient's Healthcare Decision Maker is: Legal Next of Kin   Primary Decision Maker Name Rachel    Primary Decision Maker Phone Number [phone]   Primary Decision Maker Relationship to Patient Adult child   Secondary Decision Maker Name -   Confirm Advance Directive None   Patient Would Like to Complete Advance Directive -   Does the patient have other document types -        Any spiritual / Jewish concerns:  [] Yes /  [x] No    Caregiver Burnout:  [] Yes /  [] No /  [x] No Caregiver Present      Anticipatory grief assessment:   [x] Normal  / [] Maladaptive       ESAS Anxiety: Anxiety: 0    ESAS Depression: Depression: 0        REVIEW OF SYSTEMS:     Positive and pertinent negative findings in ROS are noted above in HPI. The following systems were [x] reviewed / [] unable to be reviewed as noted in HPI  Other findings are noted below. Systems: constitutional, ears/nose/mouth/throat, respiratory, gastrointestinal, genitourinary, musculoskeletal, integumentary, neurologic, psychiatric, endocrine. Positive findings noted below. Modified ESAS Completed by: provider   Fatigue: 4 Drowsiness: 0   Depression: 0 Pain: 7   Anxiety: 0 Nausea: 0   Anorexia: 10 Dyspnea: 0     Constipation: No     Stool Occurrence(s): 1        PHYSICAL EXAM:     From RN flowsheet:  Wt Readings from Last 3 Encounters:   09/19/17 200 lb (90.7 kg)   09/13/17 199 lb (90.3 kg)   09/06/17 194 lb 7 oz (88.2 kg)     Blood pressure 102/80, pulse 91, temperature 98 °F (36.7 °C), resp. rate 20, height 5' 6\" (1.676 m), weight 200 lb (90.7 kg), SpO2 97 %.     Pain Scale 1: Numeric (0 - 10)  Pain Intensity 1: 2  Pain Onset 1: surgery  Pain Location 1: Abdomen  Pain Orientation 1: Lower  Pain Description 1: Aching  Pain Intervention(s) 1: Repositioned  Last bowel movement, if known:     Constitutional:WD, WN,sitting up in bed  Eyes: pupils equal, anicteric  ENMT: no nasal discharge, moist mucous membranes   Cardiovascular: tachy, regular rhythm, distal pulses intact  Respiratory: breathing not labored, symmetric  Skin: warm, dry  Neurologic: following commands, moving all extremities  Psychiatric: full affect, no hallucinations          HISTORY:     Active Problems:    Metastatic breast cancer (HCC) (8/11/2017)      Neoplastic malignant related fatigue (9/13/2017)      Pain due to malignant neoplasm metastatic to bone (HCC) (7/80/9488)      Cyclical vomiting with nausea (9/13/2017)      Past Medical History:   Diagnosis Date  Arrhythmia     bradycardia in 30's /pacemaker inserted    Arthritis     RT KNEE    Asthma Dx age 75    Bradycardia 2009    Cardiology saw her during hospital stay; Now off coreg;  Sees Dr. Crystal Estrella    Breast cancer [facility])     Rt mastectomy 2/19/15    CAD (coronary artery disease)     Dr Reta Pedroza Diabetes [facility])     Now seeing Dr. Maribel Pagan Diverticulitis     DJD (degenerative joint disease), lumbar     GERD (gastroesophageal reflux disease)     H. pylori infection 2008    Dr. Jane Coker attack [facility]) April 2006    Hypercholesterolemia     Hypertension     Morbid obesity ([address]     Neuropathy, arm 2009    Right; Has had MRI and EMG at [facility] [phone] SEN (obstructive sleep apnea)     uses CPAP    Rectal bleeding 2009    Hospitalized; Had colonoscopy and EGD (ok), gastric emptying study (normal), doppler mesenteric arteries (ok)    Sciatica     Has seen Dr. Chao Enriquez    Stroke [facility]) 2009 & 2012    no residual problems      Past Surgical History:   Procedure Laterality Date    HX APPENDECTOMY  1979    HX BREAST LUMPECTOMY  12/12/2013    RIGHT BREAST DUCTAL EXCISION performed by Faby Landin MD at [facility] MAIN OR    HX CATARACT REMOVAL  2007    HX CHOLECYSTECTOMY  1979    HX COLONOSCOPY      HX HEART CATHETERIZATION      angioplasty    HX HYSTERECTOMY      fibroids    HX KNEE ARTHROSCOPY  1997    right    HX MASTECTOMY Right 2/19/2015    RIGHT BREAST MODIFIED RADICAL MASTECTOMY RIGHT SENTINEL NODE BIOPSY, RIGHT PORT A CATH INSERTION performed by Corrina Forrester MD at [facility] AMBULATORY OR    HX PACEMAKER      HX SHOULDER ARTHROSCOPY  2004    left      Family History   Problem Relation Age of Onset    Stroke Father     Cancer Sister      breast cancer    Hypertension Sister     Cancer Mother      Unknown type    Cancer Brother      Colon    Hypertension Brother     Anesth Problems Neg Hx       History reviewed, no pertinent family history.   Social History Substance Use Topics    Smoking status: Never Smoker    Smokeless tobacco: Never Used    Alcohol use No     Allergies   Allergen Reactions    Codeine Itching    Duratuss [Phenylephrine-Guaifenesin] Nausea and Vomiting    Lisinopril-Hydrochlorothiazide Itching    Motrin [Ibuprofen] Nausea and Vomiting     With 800mg    Tape [Adhesive] Other (comments)     TEARS HER SKIN      Current Facility-Administered Medications   Medication Dose Route Frequency    metoclopramide HCl (REGLAN) injection 5 mg  5 mg IntraVENous Q6H    HYDROmorphone (PF) (DILAUDID) injection 0.5 mg  0.5 mg IntraVENous Q4H    HYDROmorphone (PF) (DILAUDID) injection 0.2 mg  0.2 mg IntraVENous Q2H PRN    0.45% sodium chloride 1,000 mL with sodium bicarbonate (8.4%) 75 mEq infusion   IntraVENous CONTINUOUS    ondansetron (ZOFRAN) injection 4 mg  4 mg IntraVENous Q6H PRN    SALINE PERIPHERAL FLUSH Q8H soln 5 mL  5 mL InterCATHeter Q8H    insulin lispro (HUMALOG) injection   SubCUTAneous Q6H    ondansetron (ZOFRAN) injection 4 mg  4 mg IntraVENous Q6H PRN    enoxaparin (LOVENOX) injection 30 mg  30 mg SubCUTAneous DAILY    glucose chewable tablet 16 g  4 Tab Oral PRN    glucagon (GLUCAGEN) injection 1 mg  1 mg IntraMUSCular PRN    hydrALAZINE (APRESOLINE) 20 mg/mL injection 10 mg  10 mg IntraVENous Q4H PRN    famotidine (PF) (PEPCID) injection 20 mg  20 mg IntraVENous DAILY    dextrose 10% infusion 125-250 mL  125-250 mL IntraVENous PRN          LAB AND IMAGING FINDINGS:     Lab Results   Component Value Date/Time    WBC 5.2 09/19/2017 02:50 AM    HGB 8.1 09/19/2017 02:50 AM    PLATELET 484 99/18/6759 02:50 AM     Lab Results   Component Value Date/Time    Sodium 139 09/19/2017 02:50 AM    Potassium 4.0 09/19/2017 02:50 AM    Chloride 104 09/19/2017 02:50 AM    CO2 24 09/19/2017 02:50 AM    BUN 54 09/19/2017 02:50 AM    Creatinine 2.50 09/19/2017 02:50 AM    Calcium 7.6 09/19/2017 02:50 AM    Magnesium 1.6 09/19/2017 02:50 AM Phosphorus 4.2 09/19/2017 02:50 AM      Lab Results   Component Value Date/Time    AST (SGOT) 17 09/15/2017 05:58 AM    Alk. phosphatase 438 09/15/2017 05:58 AM    Protein, total 7.1 09/15/2017 05:58 AM    Albumin 2.0 09/19/2017 02:50 AM    Globulin 4.4 09/15/2017 05:58 AM     Lab Results   Component Value Date/Time    INR 1.1 06/10/2017 02:56 PM    Prothrombin time 10.8 06/10/2017 02:56 PM    aPTT 33.2 07/03/2010 09:00 AM      Lab Results   Component Value Date/Time    Iron 42 05/11/2015 05:00 AM    TIBC 215 05/11/2015 05:00 AM    Iron % saturation 20 05/11/2015 05:00 AM    Ferritin 417 05/11/2015 05:00 AM      No results found for: PH, PCO2, PO2  No components found for: [place]   Lab Results   Component Value Date/Time    CK 70 09/15/2017 02:45 PM    CK - MB 2.3 01/26/2017 01:16 PM                Total time: 30 min  Counseling / coordination time, spent as noted above: 25 min  > 50% counseling / coordination?: yes    Prolonged service was provided for  []30 min   []75 min in face to face time in the presence of the patient, spent as noted above. Time Start:   Time End:   Note: this can only be billed with 67137 (initial) or 33694 (follow up). If multiple start / stop times, list each separately.

## 2017-09-19 NOTE — PROGRESS NOTES
Nephrology Progress Note     Fernando Cochran     [email or web address]                  Phone - [phone]   Patient: Kimberlyn Zuniga   YOB: 1940    Date- 9/19/2017     CC: Follow up for ARF        Subjective: Interval History:   -  Cr. Worse   Can't take po due to vomiting  Restarted ivf yesterday  No moore cath  Acidosis improved    ROS-c/o nausea this am  C/o vomiting yesterday  No sob  Voiding well   Assessment:   · JULES -  secondary to multiple possibilities. · 1. Pre renal factors - poor po intake, HIGH NG tube out put  · 2. Intra reanl factors - ATN due to FLEET enema  · 3. Post renal factors - urinary retention can't be ruled out  ·    · Non anion gap meta acidosis - improved  · CKD 3 baseline cr. 1.0-1.2  · H/o hyponatremia  · hypomagnsemia  · ILLEUS   · S/p lysis of adhesions on 9-15-17  · H/o PE  · anemia  · Right breast cancer with bone mets     Plan:   Increase ivf to 100 cc/hr  Check bladder scan   Hold norvasc and lotensin  Follow BMP  Iv mag sulfate  Care Plan discussed with: pt   Review of Systems: Pertinent items are noted in HPI. Objective:   Vitals:    09/18/17 1941 09/18/17 2302 09/19/17 0246 09/19/17 0600   BP: 90/55 108/60 130/77    Pulse: (!) 107 (!) 107 (!) 109    Resp:  20 20    Temp:  97.9 °F (36.6 °C) 98 °F (36.7 °C)    SpO2:  94% 98%    Weight:    90.7 kg (200 lb)   Height:         Last 3 Recorded Weights in this Encounter    09/17/17 0400 09/18/17 0455 09/19/17 0600   Weight: 89.7 kg (197 lb 11.2 oz) 90.4 kg (199 lb 3.2 oz) 90.7 kg (200 lb)       Intake/Output Summary (Last 24 hours) at 09/19/17 0850  Last data filed at 09/19/17 0500   Gross per 24 hour   Intake          1988.58 ml   Output             1200 ml   Net           788.58 ml     Physical Exam:   GEN: NAD  NECK- Supple, no thyromegaly  RESP: Clear b/l, no wheezing, No accessory muscle use  CVS: RRR,S1,S2    EXT: trace Edema   NEURO: non focal, normal speech        Chart reviewed. Pertinent Notes reviewed. Medications list  reviewed       Data Review :  Recent Labs      09/19/17   0250  09/18/17   0305  09/17/17   0408   NA  139  141  139   K  4.0  4.0  4.1   CL  104  107  103   CO2  24  22  26   GLU  132*  103*  72   BUN  54*  43*  48*   CREA  2.50*  2.29*  2.61*   CA  7.6*  7.4*  6.6*   MG  1.6   --    --    PHOS  4.2  4.4  5.6*   ALB  2.0*  1.9*  2.0*     Recent Labs      09/19/17   0250  09/18/17   0305  09/17/17   0408   WBC  5.2  5.0  5.7   HGB  8.1*  7.9*  8.0*   HCT  24.3*  23.7*  23.2*   PLT  177  183  175     Lab Results   Component Value Date/Time    Specimen Description: URINE 11/11/2010 03:00 PM    Specimen Description: URINE 07/03/2010 08:50 AM    Specimen Description: STOOL 05/31/2009 01:30 AM     Current Facility-Administered Medications   Medication    metoclopramide HCl (REGLAN) injection 5 mg    0.45% sodium chloride 1,000 mL with sodium bicarbonate (8.4%) 75 mEq infusion    ondansetron (ZOFRAN) injection 4 mg    HYDROmorphone (PF) (DILAUDID) injection 0.5 mg    HYDROcodone-acetaminophen (NORCO) 5-325 mg per tablet 1-2 Tab    SALINE PERIPHERAL FLUSH Q8H soln 5 mL    insulin lispro (HUMALOG) injection    ondansetron (ZOFRAN) injection 4 mg    enoxaparin (LOVENOX) injection 30 mg    glucose chewable tablet 16 g    glucagon (GLUCAGEN) injection 1 mg    hydrALAZINE (APRESOLINE) 20 mg/mL injection 10 mg    famotidine (PF) (PEPCID) injection 20 mg    dextrose 10% infusion 125-250 mL       Aiden Julian MD  [facility]  [email or web address]  [address] [facility], [address] [place]  Phone - [phone]         Fax - [phone]  [facility] Office  [address] [place] [address]  Phone - [phone]        Fax - [phone]

## 2017-09-19 NOTE — PROGRESS NOTES
TRANSFER - OUT REPORT:    Verbal report given to SKYLER EASTMAN DELFINA [facility], RN(name) on Lincoln Sosa  being transferred to [facility](unit) for routine progression of care       Report consisted of patients Situation, Background, Assessment and   Recommendations(SBAR). Information from the following report(s) SBAR, Kardex, Recent Results and Cardiac Rhythm PACED was reviewed with the receiving nurse. Lines:   Venous Access Device Smart Port 08/22/17 Upper chest (subclavicular area, right (Active)       Venous Access Device power port 09/13/17 Upper chest (subclavicular area, right (Active)   Central Line Being Utilized Yes 9/19/2017  9:39 AM   Criteria for Appropriate Use Limited/no vessel suitable for conventional peripheral access 9/19/2017  9:39 AM   Site Assessment Clean, dry, & intact 9/19/2017  9:39 AM   Date of Last Dressing Change 09/13/17 9/18/2017  7:30 PM   Dressing Status Loose 9/19/2017  2:46 AM   Dressing Type Disk with Chlorhexadine gluconate (CHG) 9/19/2017  9:39 AM   Action Taken Open ports on tubing capped 9/19/2017  2:46 AM   Date Accessed (Medial Site) 09/13/17 9/18/2017  7:30 PM   Access Time (Medial Site) 1140 9/13/2017 12:00 PM   Access Needle Size (Site #1) 20 G 9/18/2017  7:30 PM   Access Needle Length (Medial Site) 1 inch 9/18/2017  7:30 PM   Positive Blood Return (Medial Site) Yes 9/18/2017  7:30 PM   Action Taken (Medial Site) Infusing 9/19/2017  9:39 AM   Positive Blood Return (Lateral Site) Yes 9/19/2017  9:39 AM   Alcohol Cap Used Yes 9/19/2017  2:46 AM        Opportunity for questions and clarification was provided.       Patient transported with:   SMRxT

## 2017-09-19 NOTE — PROGRESS NOTES
1900-Bedside shift change report given to ANN Starr RN (oncoming nurse) by Mason Joshua RN (offgoing nurse). Report included the following information SBAR, Kardex, Procedure Summary, Intake/Output, MAR, Recent Results, Med Rec Status and Cardiac Rhythm AV Paced, or Vpaced       2021-pt c/o of nausea and abd pain 0.5mg iv diluadid and 4mg iv zofran    2115- pt. States her pain is manageable and no reports of nausea, will continue to monitor    2236 0.5mg iv dilaudid given    0214- pt given 0.5mg iv dilaudid for abd. Pain    0400- no nausea noted, pt assisted to the bsc without difficultyl     .

## 2017-09-19 NOTE — PROGRESS NOTES
TRANSFER - IN REPORT:    Verbal report received from Adair(name) on Brenda Sell  being received from PCU(unit) for routine progression of care      Report consisted of patients Situation, Background, Assessment and   Recommendations(SBAR). Information from the following report(s) SBAR was reviewed with the receiving nurse. Opportunity for questions and clarification was provided. Assessment completed upon patients arrival to unit and care assumed.

## 2017-09-19 NOTE — PROGRESS NOTES
Problem: Mobility Impaired (Adult and Pediatric)  Goal: *Acute Goals and Plan of Care (Insert Text)  Physical Therapy Goals  Initiated 9/16/2017  1. Patient will move from supine to sit and sit to supine , scoot up and down and roll side to side in bed with independence within 7 day(s). 2. Patient will transfer from bed to chair and chair to bed with modified independence using the least restrictive device within 7 day(s). 3. Patient will perform sit to stand with independence within 7 day(s). 4. Patient will ambulate with modified independence for 300 feet with the least restrictive device within 7 day(s). 5. Patient will ascend/descend 4 stairs with dual handrail(s) with supervision/set-up within 7 day(s). PHYSICAL THERAPY TREATMENT  Patient: Favio Frye (73 y.o. female)  Date: 9/19/2017  Diagnosis: Metastatic Breast Ca  Metastatic breast cancer ([facility] [address]  SMALL BOWEL OBSTRUCTION <principal problem not specified>  Procedure(s) (LRB):  LYSIS OF ADHESIONS LAPAROSCOPIC CONVERT TO OPEN (N/A) 4 Days Post-Op  Precautions: Fall      ASSESSMENT:  Pt supine in bed recently returning from x-ray. Cleared by RN for mobility and pt agreeable to therapy. No c/o nausea/vomiting today. Pt requires Min A for bed mobility, transfers and gait training today. Additional time needed to complete transfers and rest break required after ambulating short distance in the room with RW. Second portion of gait training with HHA. Pt demonstrated poor balance requiring Min A to maintain ALDA and reaching out for unstable surfaces and furniture in room. Returned to bed as pt experienced increased stomach pain and nausea returned. Pt is below her baseline level of function and unsafe to return home alone. At this point would recommend rehab at discharge as family is not able to provide 24/7 assistance. SNF level would be appropriate at this time however pt may be able to work up to tolerating three hours of therapy a day.  Spoke with daughter on the phone and she is in agreement. She would like to speak with case management regarding options. Progression toward goals:  [X]    Improving appropriately and progressing toward goals  [ ]    Improving slowly and progressing toward goals  [ ]    Not making progress toward goals and plan of care will be adjusted       PLAN:  Patient continues to benefit from skilled intervention to address the above impairments. Continue treatment per established plan of care. Discharge Recommendations:  Rehab  Further Equipment Recommendations for Discharge:  TBD       SUBJECTIVE:   Patient stated I use a walking stick at home when I'm outside because I can;t use the walker on the bricks. Tatiana Jacinto      OBJECTIVE DATA SUMMARY:   Critical Behavior:  Neurologic State: Alert, Appropriate for age  Orientation Level: Oriented X4  Cognition: Appropriate for age attention/concentration, Follows commands  Safety/Judgement: Awareness of environment, Fall prevention, Insight into deficits  Functional Mobility Training:  Bed Mobility:  Rolling: Contact guard assistance  Supine to Sit: Minimum assistance (assist with LEs)  Sit to Supine: Minimum assistance           Transfers:  Sit to Stand: Minimum assistance; Additional time;Assist x1 (verbal cues)  Stand to Sit: Contact guard assistance; Additional time;Assist x1                             Balance:  Sitting: Intact  Standing: Impaired  Standing - Static: Good;Constant support (RW)  Standing - Dynamic : Poor (w/o RW)  Ambulation/Gait Training:  Distance (ft): 50 Feet (ft)  Assistive Device: Walker, rolling;Gait belt (HHA)  Ambulation - Level of Assistance: Minimal assistance;Assist x1;Additional time        Gait Abnormalities: Decreased step clearance;Trunk sway increased              Speed/Domonique: Slow  Step Length: Left shortened;Right shortened                             Activity Tolerance:   Good  Please refer to the flowsheet for vital signs taken during this treatment.   After treatment:   [ ]    Patient left in no apparent distress sitting up in chair  [X]    Patient left in no apparent distress in bed  [X]    Call bell left within reach  [X]    Nursing notified  [ ]    Caregiver present  [ ]    Bed alarm activated      COMMUNICATION/COLLABORATION:   The patients plan of care was discussed with: Registered Nurse     Albino Parker, PT   Time Calculation: 27 mins

## 2017-09-19 NOTE — PROGRESS NOTES
Hospitalist Progress Note    NAME: Burke Burkitt   :  1940   MRN:  [id number]     Interim Hospital Summary: 65 y.o. female whom presented on 2017 with      Assessment / Plan:  DM type 2  -holding lantus due to relatively low BG and NPO  -SSI for now    HTN / CAD / PPM  Chronic diastolic chf EF 34-41%  -holding norvasc, imdur, lipitor and ASA due to npo. Restart at some point  -Nitroglycerin paste or hydralazine IV prn SBP >302    JULES  Metabolic acidosis, resolved  -pre renal from third spacing and fluid loss via NG (now removed)    -monitor cr  -on IVF at increased rate  -hold nephrotoxic agents  -nephro following    SBO  Abdominal pain  Hx diverticulitis s/p partial colectomy 2008  S/p open cholecystectomy  -s/p lysis of adhesions 9/15/2017  -NGT removed. On clears but patient still with NV  -TPN to be started tomorrow if still with ileus    Right breast CA dx  with bone mets dx   Right arm lymphedema  -pT3 N3a M0 (Stage IIIC) infiltrating ductal carcinoma, s/p right mastectomy, XRT     SEN  -on CPAP     Chronic microcytic anemia   Hx right hemispheric CVA   Hx PE   Chronic back pain due to bone mets  Obesity  Body mass index is 32.28 kg/(m^2). Code status: Full  Prophylaxis: Lovenox         Subjective:     Chief Complaint / Reason for Physician Visit  Follow up of abdominal pain, metastatic cancer and ileus  Chart reviewed in detail. Discussed with RN events overnight. No vomiting since this morning. Tolerating sips of clear. Daughter at bedside.       Review of Systems:  Symptom Y/N Comments  Symptom Y/N Comments   Fever/Chills    Chest Pain     Poor Appetite    Edema     Cough    Abdominal Pain y     Sputum    Joint Pain     SOB/WELCH    Pruritis/Rash     Nausea/vomit n   Tolerating PT/OT     Diarrhea    Tolerating Diet     Constipation    Other       Could NOT obtain due to:      PO intake:   Patient Vitals for the past 72 hrs:   % Diet Eaten   09/18/17 1700 0 %   09/18/17 0902 5 %     Objective:     VITALS:   Last 24hrs VS reviewed since prior progress note. Most recent are:  Patient Vitals for the past 24 hrs:   Temp Pulse Resp BP SpO2   09/19/17 1156 - 87 - 115/76 -   09/19/17 0939 98 °F (36.7 °C) 91 20 102/80 97 %   09/19/17 0246 98 °F (36.7 °C) (!) 109 20 130/77 98 %   09/18/17 2302 97.9 °F (36.6 °C) (!) 107 20 108/60 94 %   09/18/17 1941 - (!) 107 - 90/55 -   09/18/17 1930 98.5 °F (36.9 °C) - 20 93/62 96 %       Intake/Output Summary (Last 24 hours) at 09/19/17 1556  Last data filed at 09/19/17 0939   Gross per 24 hour   Intake             1165 ml   Output              700 ml   Net              465 ml        PHYSICAL EXAM:  General: WD, WN. Alert, cooperative, no acute distress    EENT:  EOMI. Anicteric sclerae. Resp:  CTA bilaterally, no wheezing or rales. No accessory muscle use  CV:  Regular  rhythm,  No edema  GI:  slight distended, mild diffuse tenderness.  +hypoactive BS  Neurologic:  Alert and oriented X 3, normal speech  Psych:   Fair insight. Not anxious nor agitated  Skin:  No rashes. No jaundice    Reviewed most current lab test results and cultures  YES  Reviewed most current radiology test results   YES  Review and summation of old records today    NO  Reviewed patient's current orders and MAR    YES  PMH/SH reviewed - no change compared to H&P  ________________________________________________________________________  Care Plan discussed with:    Comments   Patient x    Family  x daughter   RN x    Care Manager     Consultant                        Multidiciplinary team rounds were held today with , nursing, pharmacist and clinical coordinator. Patient's plan of care was discussed; medications were reviewed and discharge planning was addressed.      ________________________________________________________________________  Total NON critical care TIME:   35   Minutes    Total CRITICAL CARE TIME Spent: Minutes non procedure based      Comments   >50% of visit spent in counseling and coordination of care x     This includes time during multidisciplinary rounds if indicated above   ________________________________________________________________________  Elia Ceballos MD     Procedures: see electronic medical records for all procedures/Xrays and details which were not copied into this note but were reviewed prior to creation of Plan. LABS:  I reviewed today's most current labs and imaging studies.   Pertinent labs include:  Recent Labs      09/19/17 0250 09/18/17   0305 09/17/17   0408   WBC  5.2  5.0  5.7   HGB  8.1*  7.9*  8.0*   HCT  24.3*  23.7*  23.2*   PLT  177  183  175     Recent Labs      09/19/17 0250 09/18/17 0305  09/17/17   0408   NA  139  141  139   K  4.0  4.0  4.1   CL  104  107  103   CO2  24  22  26   GLU  132*  103*  72   BUN  54*  43*  48*   CREA  2.50*  2.29*  2.61*   CA  7.6*  7.4*  6.6*   MG  1.6   --    --    PHOS  4.2  4.4  5.6*   ALB  2.0*  1.9*  2.0*

## 2017-09-19 NOTE — PROGRESS NOTES
Initial Nutrition Assessment:    INTERVENTIONS/RECOMMENDATIONS:   · TPN recs:  · Day 1: D20 5% AA @ 42  · Day 2: D20 5% AA @ 63  · Day 3: D20 5% AA @ 83 + 250 ml of 20% lipids 3x per week  · 1967 kcal and 99.6 g protein    ASSESSMENT:   Chart reviewed, medically noted for Metastatic breast cancer, small bowel obstruction - s/p lysis of adhesions laproscopic 9/15, now with ileus causing nausea, and PMH shown below. Poor PO intake for >5 days. Dr. Annette Jama noting possible TPN tomorrow if ileus does not resolve. TPN recs shown above. Past Medical History:   Diagnosis Date    Arrhythmia     bradycardia in 30's /pacemaker inserted    Arthritis     RT KNEE    Asthma Dx age 75    Bradycardia 2009    Cardiology saw her during hospital stay; Now off coreg;  Sees Dr. Guy Diamond    Breast cancer [facility])     Rt mastectomy 2/19/15    CAD (coronary artery disease)     Dr Simone Sanon Diabetes [facility])     Now seeing Dr. Alison Obrien Diverticulitis     DJD (degenerative joint disease), lumbar     GERD (gastroesophageal reflux disease)     H. pylori infection 2008    Dr. Lady Islas attack [facility]) April 2006    Hypercholesterolemia     Hypertension     Morbid obesity ([address]     Neuropathy, arm 2009    Right; Has had MRI and EMG at [facility]    SEN (obstructive sleep apnea)     uses CPAP    Rectal bleeding 2009    Hospitalized;  Had colonoscopy and EGD (ok), gastric emptying study (normal), doppler mesenteric arteries (ok)    Sciatica     Has seen Dr. Kimmy Velasco    Stroke [facility]) 2009 & 2012    no residual problems       Diet Order: Other (comment) (sips of clears)  % Eaten:  Patient Vitals for the past 72 hrs:   % Diet Eaten   09/18/17 1700 0 %   09/18/17 0902 5 %     Pertinent Medications: [x]Reviewed: 1/2 NS with HCO3  Pertinent Labs: [x]Reviewed: BG<180,   Food Allergies: [x]NKFA  []Other   Last BM: 9/19  Edema:  1+      [x]RUE   []LUE   [x]RLE   [x]LLE      Pressure Ulcer:      [] Stage I   [] Stage II   [] Stage III   [] Stage IV      Wt Readings from Last 30 Encounters:   09/19/17 90.7 kg (200 lb)   09/13/17 90.3 kg (199 lb)   09/06/17 88.2 kg (194 lb 7 oz)   08/30/17 85.5 kg (188 lb 6.4 oz)   08/23/17 84.2 kg (185 lb 9.6 oz)   08/22/17 82.1 kg (181 lb)   08/18/17 83.9 kg (185 lb)   08/11/17 83.9 kg (185 lb)   08/04/17 87.5 kg (193 lb)   07/17/17 100.7 kg (222 lb)   07/07/17 92.5 kg (204 lb)   07/02/17 91.2 kg (201 lb)   06/30/17 91.2 kg (201 lb)   06/10/17 92.1 kg (203 lb)   06/06/17 95.7 kg (211 lb)   05/31/17 92.4 kg (203 lb 11.3 oz)   05/05/17 95.9 kg (211 lb 6.4 oz)   03/08/17 98 kg (216 lb)   01/26/17 97.6 kg (215 lb 2.7 oz)   12/21/16 95.4 kg (210 lb 6.4 oz)   12/05/16 98.9 kg (218 lb)   09/21/16 96 kg (211 lb 9.6 oz)   09/18/16 95.6 kg (210 lb 12.2 oz)   09/07/16 102.4 kg (225 lb 12.8 oz)   07/13/16 101.2 kg (223 lb)   06/30/16 99.3 kg (219 lb)   06/20/16 99.7 kg (219 lb 12.8 oz)   06/01/16 99.3 kg (219 lb)   04/11/16 97.1 kg (214 lb)   03/16/16 96.2 kg (212 lb)       Anthropometrics:   Height: 5' 6\" (167.6 cm) Weight: 90.7 kg (200 lb)   IBW (%IBW):   ( ) UBW (%UBW):   (  %)   Last Weight Metrics:  Weight Loss Metrics 9/19/2017/19/2017 9/13/2017 9/13/2017 9/13/2017 9/6/2017 9/1/2017 8/30/2017   Today's Wt 200 lb - 199 lb - 194 lb 7 oz - 188 lb 6.4 oz   BMI - 32.28 kg/m2 - 32.12 kg/m2 - 30.52 kg/m2 30.33 kg/m2       BMI: Body mass index is 32.28 kg/(m^2). This BMI is indicative of:   []Underweight    []Normal    []Overweight    [x] Obesity   [] Extreme Obesity (BMI>40)     Estimated Nutrition Needs (Based on):   1970 Kcals/day (BMR: 1405 x 1.4 d/t met CA) , 115 g (1.3 g/kg) Protein  Carbohydrate:  At Least 130 g/day  Fluids: 1970 mL/day (1ml/kcal) or per primary team    NUTRITION DIAGNOSES:   Problem:  Inadequate protein-energy intake      Etiology: related to illeus      Signs/Symptoms: as evidenced by minimal PO intake >5 days      NUTRITION INTERVENTIONS:  Meals/Snacks: General/healthful diet Enteral/Parenteral Nutrition: Initiate parenteral nutrition                GOAL:   initiate nutrition in 2-4 days    LEARNING NEEDS (Diet, Food/Nutrient-Drug Interaction):    [x] None Identified   [] Identified and Education Provided/Documented   [] Identified and Pt declined/was not appropriate     Cultureal, Jew, OR Ethnic Dietary Needs:    [x] None Identified   [] Identified and Addressed     [x] Interdisciplinary Care Plan Reviewed/Documented    [x] Discharge Planning:   General healthy diet    MONITORING /EVALUATION:      Food/Nutrient Intake Outcomes:  Total energy intake  Physical Signs/Symptoms Outcomes: Weight/weight change, Electrolyte and renal profile, GI profile, Glucose profile, GI    NUTRITION RISK:    [x] High              [] Moderate           []  Low  []  Minimal/Uncompromised    PT SEEN FOR:    []  MD Consult: []Calorie Count      []Diabetic Diet Education        []Diet Education     []Electrolyte Management     []General Nutrition Management and Supplements     []Management of Tube Feeding     []TPN Recommendations    []  RN Referral:  []MST score >=2     []Enteral/Parenteral Nutrition PTA     []Pregnant: Gestational DM or Multigestation     []Pressure Ulcer/Wound Care needs        []  Low BMI  [x]  DTR Referral       True Lombardi RDN  Pager [phone]  Weekend Pager [phone]

## 2017-09-19 NOTE — PROGRESS NOTES
Subjective:    Less nauseated. Unsure about flatus. BM yesterday. NG came out over weekend -- she did not like it. Objective:    Blood pressure 130/77, pulse (!) 109, temperature 98 °F (36.7 °C), resp. rate 20, height 5' 6\" (1.676 m), weight 90.7 kg (200 lb), SpO2 98 %. Exam - alert, comfortable, CTAB, RRR, abd softly distended, quiet, approp TTP. Assessment:  Procedure(s):  LYSIS OF ADHESIONS LAPAROSCOPIC CONVERT TO OPEN 9/15/2017    Active Hospital Problems    Diagnosis Date Noted    Neoplastic malignant related fatigue 09/13/2017    Pain due to malignant neoplasm metastatic to bone (HCC) 11/42/8996    Cyclical vomiting with nausea 09/13/2017    Metastatic breast cancer ([facility] [address] 08/11/2017       - acute typical post-op ileus for ex-lap with extensive MARTINEZ. Plan:  - no more than sips. - if still with ileus tomorrow, will get PICC and start TPN. - Yuko Razo. - DVT prophylaxis - Lovenox. - MOBILIZE.       Paul Abdalla MD

## 2017-09-20 NOTE — PROGRESS NOTES
Problem: Mobility Impaired (Adult and Pediatric)  Goal: *Acute Goals and Plan of Care (Insert Text)  Physical Therapy Goals  Initiated 9/16/2017  1. Patient will move from supine to sit and sit to supine , scoot up and down and roll side to side in bed with independence within 7 day(s). 2. Patient will transfer from bed to chair and chair to bed with modified independence using the least restrictive device within 7 day(s). 3. Patient will perform sit to stand with independence within 7 day(s). 4. Patient will ambulate with modified independence for 300 feet with the least restrictive device within 7 day(s). 5. Patient will ascend/descend 4 stairs with dual handrail(s) with supervision/set-up within 7 day(s). PHYSICAL THERAPY TREATMENT  Patient: Madi Dale (29 y.o. female)  Date: 9/20/2017  Diagnosis: Metastatic breast cancer ([facility] [address] SMALL BOWEL OBSTRUCTION      Procedure(s) (LRB):  LYSIS OF ADHESIONS LAPAROSCOPIC CONVERT TO OPEN (N/A) 5 Days Post-Op      Precautions: Fall  Chart, physical therapy assessment, plan of care and goals were reviewed. ASSESSMENT: pt progressing towards goals, no LOB or SOB, did well with transfers and ther-ex, not ready to be home alone and will need SNF rehab for increased strength and endurance, good motivation, vc's for safety and proper RW use. Progression toward goals:  [ ]      Improving appropriately and progressing toward goals  [X]      Improving slowly and progressing toward goals  [ ]      Not making progress toward goals and plan of care will be adjusted       PLAN:  Patient continues to benefit from skilled intervention to address the above impairments. Continue treatment per established plan of care.   Discharge Recommendations:  Tenzin Diaz  Further Equipment Recommendations for Discharge:  Has straight cane and rolling walker       OBJECTIVE DATA SUMMARY:      Critical Behavior:  Neurologic State: Alert  Orientation Level: Oriented X4  Cognition: Appropriate for age attention/concentration  Safety/Judgement: Awareness of environment, Fall prevention, Insight into deficits      Functional Mobility Training:  Bed Mobility:     Transfers:  Sit to Stand: Contact guard assistance;Assist x1;Additional time  Stand to Sit: Contact guard assistance  Bed to Chair: Contact guard assistance  Interventions: Tactile cues; Verbal cues  Level of Assistance: Contact guard assistance      Balance:  Sitting: Intact; Without support  Standing: Intact; With support  Standing - Static: Good;Constant support  Standing - Dynamic : Fair      Ambulation/Gait Training:  Distance (ft): 100 Feet (ft)  Assistive Device: Gait belt;Walker, rolling  Ambulation - Level of Assistance: Contact guard assistance; Additional time  Gait Abnormalities: Decreased step clearance;Trunk sway increased  Right Side Weight Bearing: Full  Left Side Weight Bearing: Full  Base of Support: Widened  Stance:  (equal)  Speed/Domonique: Pace decreased (<100 feet/min)  Step Length: Left shortened;Right shortened    Therapeutic Exercises:   sitting  EXERCISE   Sets   Reps   Active Active Assist   Passive   Comments   Ankle pumps 1 10 [x] [] [] bilat   Heel raises 1 10 [x] [] [] \"   Toe tap 1 10 [x] [] [] \"   Knee ext 1 10 [x] [] [] \"   Hip flex 1 10 [x] [] [] \"      Pain:  Pain Scale 1: Numeric (0 - 10)  Pain Intensity 1: 0     Activity Tolerance: fair     After treatment:   [X] Patient left in no apparent distress sitting up in chair  [ ] Patient left in no apparent distress in bed  [X] Call bell left within reach  [X] Nursing notified  [ ] Caregiver present  [ ] Bed alarm activated      COMMUNICATION/COLLABORATION:   The patients plan of care was discussed with: Registered Nurse     Johnny Garcia PTA   Time Calculation: 25 mins

## 2017-09-20 NOTE — PROGRESS NOTES
Hospitalist Progress Note    NAME: Tushar Simmons   :  1940   MRN:  [id number]     Interim Hospital Summary: 65 y.o. female whom presented on 2017 with      Assessment / Plan:  DM type 2  -holding lantus due to relatively low BG and NPO  -stable on SSI     HTN / CAD / PPM  Chronic diastolic chf EF 02-62%  -holding norvasc, imdur, lipitor and ASA due to npo. Restart when clear for PO per surgery  -Nitroglycerin paste or hydralazine IV prn SBP >170    JULSE, improving  Metabolic acidosis, resolved  -pre renal from third spacing and fluid loss via NG (now removed)    -monitor cr  -on IVF  -hold nephrotoxic agents  -nephro following    SBO  Abdominal pain  Hx diverticulitis s/p partial colectomy 2008  S/p open cholecystectomy  -s/p lysis of adhesions 9/15/2017  -NGT removed. On clears, diet advance per surgery    Right breast CA dx  with bone mets dx   Right arm lymphedema  -pT3 N3a M0 (Stage IIIC) infiltrating ductal carcinoma, s/p right mastectomy, XRT     SEN  -on CPAP     Chronic microcytic anemia, transfuse prn. Will send type and screen today, likely need transfusion in the AM if Hgb continues to decline    Hx right hemispheric CVA   Hx PE   Chronic back pain due to bone mets  Obesity  Body mass index is 32.28 kg/(m^2). Code status: Full  Prophylaxis: Lovenox         Subjective:     Chief Complaint / Reason for Physician Visit  Follow up of abdominal pain, metastatic cancer and ileus  Chart reviewed in detail. Discussed with RN events overnight. No further vomiting. Tolerating sips of clear. Daughters at bedside.       Review of Systems:  Symptom Y/N Comments  Symptom Y/N Comments   Fever/Chills    Chest Pain     Poor Appetite    Edema     Cough    Abdominal Pain y     Sputum    Joint Pain     SOB/WELCH    Pruritis/Rash     Nausea/vomit n   Tolerating PT/OT     Diarrhea    Tolerating Diet     Constipation    Other       Could NOT obtain due to:      PO intake:   Patient Vitals for the past 72 hrs:   % Diet Eaten   09/18/17 1700 0 %   09/18/17 0902 5 %     Objective:     VITALS:   Last 24hrs VS reviewed since prior progress note. Most recent are:  Patient Vitals for the past 24 hrs:   Temp Pulse Resp BP SpO2   09/20/17 1120 98.4 °F (36.9 °C) 86 16 111/61 99 %   09/20/17 0949 98.5 °F (36.9 °C) (!) 105 16 119/81 100 %   09/20/17 0313 98.7 °F (37.1 °C) 91 18 127/60 98 %   09/19/17 2348 98.5 °F (36.9 °C) 89 18 132/61 96 %   09/19/17 1938 - 97 18 98/60 98 %   09/19/17 1643 98.1 °F (36.7 °C) 100 20 133/70 99 %       Intake/Output Summary (Last 24 hours) at 09/20/17 1609  Last data filed at 09/20/17 0624   Gross per 24 hour   Intake                0 ml   Output             1100 ml   Net            -1100 ml        PHYSICAL EXAM:  General: WD, WN. Alert, cooperative, no acute distress    EENT:  EOMI. Anicteric sclerae. Resp:  CTA bilaterally, no wheezing or rales. No accessory muscle use  CV:  Regular  rhythm,  No edema  GI:  slight distended, mild diffuse tenderness.  +hypoactive BS  Neurologic:  Alert and oriented X 3, normal speech  Psych:   Fair insight. Not anxious nor agitated  Skin:  No rashes. No jaundice    Reviewed most current lab test results and cultures  YES  Reviewed most current radiology test results   YES  Review and summation of old records today    NO  Reviewed patient's current orders and MAR    YES  PMH/SH reviewed - no change compared to H&P  ________________________________________________________________________  Care Plan discussed with:    Comments   Patient x    Family  x daughters   RN x    Care Manager     Consultant                        Multidiciplinary team rounds were held today with , nursing, pharmacist and clinical coordinator. Patient's plan of care was discussed; medications were reviewed and discharge planning was addressed. ________________________________________________________________________  Total NON critical care TIME:   35   Minutes    Total CRITICAL CARE TIME Spent:   Minutes non procedure based      Comments   >50% of visit spent in counseling and coordination of care x     This includes time during multidisciplinary rounds if indicated above   ________________________________________________________________________  Robyn Mcmahan MD     Procedures: see electronic medical records for all procedures/Xrays and details which were not copied into this note but were reviewed prior to creation of Plan. LABS:  I reviewed today's most current labs and imaging studies.   Pertinent labs include:  Recent Labs      09/20/17   0400  09/19/17   0250  09/18/17   0305   WBC  6.0  5.2  5.0   HGB  7.3*  8.1*  7.9*   HCT  21.9*  24.3*  23.7*   PLT  151  177  183     Recent Labs      09/20/17   0400  09/19/17   0250  09/18/17   0305   NA  139  139  141   K  3.9  4.0  4.0   CL  104  104  107   CO2  25  24  22   GLU  86  132*  103*   BUN  46*  54*  43*   CREA  1.90*  2.50*  2.29*   CA  7.7*  7.6*  7.4*   MG   --   1.6   --    PHOS  2.7  4.2  4.4   ALB  1.8*  2.0*  1.9*

## 2017-09-20 NOTE — PROGRESS NOTES
Pt is a 65 y.o.  female, admitted for generalized abdominal pain. Pt was alert and oriented, with family by bedside. Pt reported that she resides alone, in her 2 story home (6 steps into main entrance). Pt reported that before admission she was independent with ADLs, and she does not drive. Pt reported that she is active with her PCP: seen July 2017, and she uses [employer] [facility]. Pt reported that she uses DME at home: walker and cane. Pt reported that she had HH in the summer, for therapy and speech. Pt reported no SNF. Pt/family had concerns with pt returning home alone. Pt/family are interested in SNF services, upon d/c, and recommendations by therapy have suggested SNF. CM provided t/family with SNF list.  Pt/family selected [facility], upon d/c. CM will send a referral to [place], via [employer] in regards to pt d/c. CM will continue to follow up with pt and make referrals as deemed necessary. Pt will have transportation home. Care Management Interventions  PCP Verified by CM: Yes  Mode of Transport at Discharge:  Other (see comment) (patients will transport )  Transition of Care Consult (CM Consult): Discharge Planning  Discharge Durable Medical Equipment: No  Physical Therapy Consult: Yes  Occupational Therapy Consult: Yes  Speech Therapy Consult: No  Current Support Network: Lives Alone, Own Home  Confirm Follow Up Transport: Family  Discharge Location  Discharge Placement: [facility]/ [address], MSW   [phone]

## 2017-09-20 NOTE — PROGRESS NOTES
End of Shift Nursing Note    Bedside shift change report given to Maximus Mohamud (oncoming nurse) by Ritu Quarles RN (offgoing nurse). Report included the following information SBAR, Kardex and Recent Results. Zone Phone:   [phone]    Significant changes during shift:    0   Non-emergent issues for physician to address:   No flatus, no gas     Number times ambulated in hallway past shift: 0      Number of times OOB to chair past shift: 3    POD #: 7     Vital Signs:    Temp: 98.7 °F (37.1 °C)     Pulse (Heart Rate): 91     BP: 127/60     Resp Rate: 18     O2 Sat (%): 98 %    Lines & Drains:     Urinary Catheter? No   Placement Date: 0   Medical Necessity: 0  Central Line? No   Placement Date: 0   Medical Necessity: 0  PICC Line? No   Placement Date: 0   Medical Necessity: 0    NG tube [] in [] removed [] not applicable   Drains [] in [] removed [] not applicable     Skin Integrity:      Wounds: yes   Dressings Present: yes    Wound Concerns: no      GI:    Current diet:       Nausea: NO  Vomiting: NO  Bowel Sounds: YES  Flatus: NO  Last Bowel Movement: several days ago   Appearance: 0    Respiratory:  Supplemental O2: No      Device: 0   via 0 Liters/min     Incentive Spirometer: YES  Volume: 750  Coughing and Deep Breathing: YES  Oral Care: YES  Understanding (patient/family education): YES   Getting out of bed: YES  Head of bed elevation: YES    Patient Safety:    Falls Score: 3  Mobility Score: 1  Bed Alarm On? Yes  Sitter? No      Opportunity for questions and clarification was given to oncoming nurse. Patient bed is in low position, side rails are up x 2, door & observation blinds open as needed, call bell within reach and patient not in distress.     Gay Alarcon RN

## 2017-09-20 NOTE — PROGRESS NOTES
Palliative Medicine Consult  [place]: [phone])    Patient Name: Abhay Caal  YOB: 1940    Date of Initial Consult: 9/13/17  Reason for Consult: Uncontrolled abdominal pain, advanced care planning  Requesting Provider: Josafat Torres NP   Primary Care Physician: Arlon Fleischer, MD      SUMMARY:   Abhay Caal is a 65 y.o. with a past history of DM, HTN arrhythmia, CAD, MI and strokes in 2009 and 2013,diverticulosis, SEN,  stage IIIC infiltrating ductal carcinoma of the right breast, s/p right mastectomy with axillary LN dissection, chemotherapy completed April 2015, XRT completed August 2015, bone marrow biopsy July 2017 showed bone marrow extensively involved by metastatic carcinoma, breast primary. She was admitted on 9/13/2017 from Dr. Amna Weaver office with a diagnosis of metastatic breast cancer, anemia. Current medical issues leading to Palliative Medicine involvement include: symptom management. Pt was admitted for severe fatigue and uncontrolled nausea and vomiting. She was found to have a partial bowel obstruction, and on 9/15 she was taken to the OR for lysis of adhesions. The NGT was d/cynthia yesterday, developed severe nausea last evening, and vomiting today. Psychosocial: pt lives with her daughter and Jose Alfredo Jean in [place], daughter drives her to appointments   PALLIATIVE DIAGNOSES:   1. Abdominal pain: surgery 9/15/17 for lysis of adhesions  2. Pain due to neoplasm (spine mets)  3. Fatigue   4. Nausea and vomiting  5. Lymphedema right arm  6. Bilateral LE edema  7. Care decisions  8. Acute post-op ileus. PLAN:   1. Continue scheduled dilaudid 0.5mg q. 4 hours for cancer related bone pain, hold for sedation. Will transition to oral when eating food. 2. Dilaudid 0.2mg IV q. 2 hours prn breakthrough pain not controlled with scheduled dose. hold for sedation. 3. Initial consult note routed to primary continuity provider  4.  Communicated plan of care with: Palliative IDT, RN        GOALS OF CARE / TREATMENT PREFERENCES:   [====Goals of Care====]  GOALS OF CARE:  Patient / health care proxy stated goals:     Pain management      TREATMENT PREFERENCES:   Code Status: Full Code    Advance Care Planning:  Advance Care Planning 9/13/2017   Patient's Healthcare Decision Maker is: Legal Next of Dimitry Cancino   Primary Decision Maker Name Aracely Barrett    Primary Decision Maker Phone Number [phone]   Primary Decision Maker Relationship to Patient Adult child   Secondary Decision Maker Name -   Confirm Advance Directive None   Patient Would Like to Complete Advance Directive -   Does the patient have other document types -       Other:    The palliative care team has discussed with patient / health care proxy about goals of care / treatment preferences for patient.  [====Goals of Care====]         HISTORY:     History obtained from: chart, patient    CHIEF COMPLAINT: abdominal pain    HPI/SUBJECTIVE:    The patient is:   [x] Verbal and participatory  [] Non-participatory due to:     (pt is poor historian)  9/13:  Pt was directly admitted from Oncology office today for complaints of severe abdominal pain that has not responded to her current pain medications: Morphine ER 15mg bid, and Morphine IR 15mg q. 4 hours prn. Pt reports to me that she has been experiencing diffuse, crampy, constant abdominal pain for \"months\", however, today she started vomiting. Endorses constipation, but states BM daily, LBM was \"normal\" yesterday. Asking for a suppository. 9/14: pain and nausea better since NGT placed. Still having abdominal pain, but not as bad.    9/18:  Miserable, did not want to talk. 9/19: feeling much better today. Nausea and vomiting are resolved, taking small sips of water. Had a BM this am.  C/o trouble with pain medication, her back pain is usually high, 8-9/10 when she gets a dose of medication. The medication helps, just not very long.  Back pain at this time is 7-8/10.    9/20: \"I'm hungry. \"  No nausea or vomiting, pain well controlled with scheduled doses, has not required any prn doses. BM today. Per :   08/11/17  Morphine IR 15mg #60/10 day supply [facility]  08/11/17  Morphine ER 15mg  #60/30 day supply [facility]  08/02/17  Tramadol 50mg  #60/15 day supply [facility]  Pt has been getting different opioids from multiple providers in the past year for different pain. Clinical Pain Assessment (nonverbal scale for severity on nonverbal patients):   [++++ Clinical Pain Assessment++++]  [++++Pain Severity++++]: Pain: 3  [++++Pain Character++++]: crampy  [++++Pain Duration++++]:  months  [++++Pain Effect++++]: nausea and vomiting  [++++Pain Factors++++]:   [++++Pain Frequency++++]: constant  [++++Pain Location++++]:   Diffuse abdomen  [++++ Clinical Pain Assessment++++]  Duration: for how long has pt been experiencing pain (e.g., 2 days, 1 month, years)  Frequency: how often pain is an issue (e.g., several times per day, once every few days, constant)     FUNCTIONAL ASSESSMENT:     Palliative Performance Scale (PPS):  PPS: 50       PSYCHOSOCIAL/SPIRITUAL SCREENING:     Advance Care Planning:  Advance Care Planning 9/13/2017   Patient's Healthcare Decision Maker is: Legal Next melissa Moraes 68   Primary Decision Maker Name Rachel    Primary Decision Maker Phone Number [phone]   Primary Decision Maker Relationship to Patient Adult child   Secondary Decision Maker Name -   Confirm Advance Directive None   Patient Would Like to Complete Advance Directive -   Does the patient have other document types -        Any spiritual / Hoahaoism concerns:  [] Yes /  [x] No    Caregiver Burnout:  [] Yes /  [] No /  [x] No Caregiver Present      Anticipatory grief assessment:   [x] Normal  / [] Maladaptive       ESAS Anxiety: Anxiety: 0    ESAS Depression: Depression: 0        REVIEW OF SYSTEMS:     Positive and pertinent negative findings in ROS are noted above in HPI.   The following systems were [x] reviewed / [] unable to be reviewed as noted in HPI  Other findings are noted below. Systems: constitutional, ears/nose/mouth/throat, respiratory, gastrointestinal, genitourinary, musculoskeletal, integumentary, neurologic, psychiatric, endocrine. Positive findings noted below. Modified ESAS Completed by: provider   Fatigue: 2 Drowsiness: 0   Depression: 0 Pain: 3   Anxiety: 0 Nausea: 0   Anorexia: 0 Dyspnea: 0     Constipation: No     Stool Occurrence(s): 1        PHYSICAL EXAM:     From RN flowsheet:  Wt Readings from Last 3 Encounters:   09/19/17 200 lb (90.7 kg)   09/13/17 199 lb (90.3 kg)   09/06/17 194 lb 7 oz (88.2 kg)     Blood pressure 111/61, pulse 86, temperature 98.4 °F (36.9 °C), resp. rate 16, height 5' 6\" (1.676 m), weight 200 lb (90.7 kg), SpO2 99 %.     Pain Scale 1: Numeric (0 - 10)  Pain Intensity 1: 0  Pain Onset 1: surgery  Pain Location 1: Abdomen  Pain Orientation 1: Lower  Pain Description 1: Aching  Pain Intervention(s) 1: Repositioned  Last bowel movement, if known:     Constitutional:WD, WN,sitting up in bed, wearing compression sleeve right arm  Eyes: pupils equal, anicteric  ENMT: no nasal discharge, moist mucous membranes   Cardiovascular:  regular rhythm, distal pulses intact  Respiratory: breathing not labored, symmetric  Skin: warm, dry  Neurologic: following commands, moving all extremities  Psychiatric: full affect, no hallucinations          HISTORY:     Active Problems:    Metastatic breast cancer (HCC) (8/11/2017)      Neoplastic malignant related fatigue (9/13/2017)      Pain due to malignant neoplasm metastatic to bone (HCC) (6/70/5179)      Cyclical vomiting with nausea (9/13/2017)      Past Medical History:   Diagnosis Date    Arrhythmia     bradycardia in 30's /pacemaker inserted    Arthritis     RT KNEE    Asthma Dx age 75    Bradycardia 2009    Cardiology saw her during hospital stay; Now off coreg;  Sees Dr. Luther Agarwal [facility])     Rt mastectomy 2/19/15    CAD (coronary artery disease)     Dr Amari Zabala    Diabetes [facility])     Now seeing Dr. Thang Hansen Diverticulitis     DJD (degenerative joint disease), lumbar     GERD (gastroesophageal reflux disease)     H. pylori infection 2008    Dr. Lary Olivares attack [facility]) April 2006    Hypercholesterolemia     Hypertension     Morbid obesity ([address]     Neuropathy, arm 2009    Right; [facility] [address] [phone] SEN (obstructive sleep apnea)     uses CPAP    Rectal bleeding 2009    Hospitalized; Had colonoscopy and EGD (ok), gastric emptying study (normal), doppler mesenteric arteries (ok)    Sciatica     Has seen Dr. Amor Moss    Stroke [facility]) 2009 & 2012    no residual problems      Past Surgical History:   Procedure Laterality Date    HX APPENDECTOMY  1979    HX BREAST LUMPECTOMY  12/12/2013    RIGHT BREAST DUCTAL EXCISION performed by Yenni Damico MD at [facility] MAIN OR    HX CATARACT REMOVAL  2007    HX CHOLECYSTECTOMY  1979    HX COLONOSCOPY      HX HEART CATHETERIZATION      angioplasty    HX HYSTERECTOMY      fibroids    HX KNEE ARTHROSCOPY  1997    right    HX MASTECTOMY Right 2/19/2015    RIGHT BREAST MODIFIED RADICAL MASTECTOMY RIGHT SENTINEL NODE BIOPSY, RIGHT PORT A CATH INSERTION performed by Galen Billy MD at [facility] AMBULATORY OR    HX PACEMAKER      HX SHOULDER ARTHROSCOPY  2004    left      Family History   Problem Relation Age of Onset    Stroke Father     Cancer Sister      breast cancer    Hypertension Sister     Cancer Mother      Unknown type    Cancer Brother      Colon    Hypertension Brother     Anesth Problems Neg Hx       History reviewed, no pertinent family history.   Social History   Substance Use Topics    Smoking status: Never Smoker    Smokeless tobacco: Never Used    Alcohol use No     Allergies   Allergen Reactions    Codeine Itching    Duratuss [Phenylephrine-Guaifenesin] Nausea and Vomiting    Lisinopril-Hydrochlorothiazide Itching    Motrin [Ibuprofen] Nausea and Vomiting     With 800mg    Tape [Adhesive] Other (comments)     TEARS HER SKIN      Current Facility-Administered Medications   Medication Dose Route Frequency    metoclopramide HCl (REGLAN) injection 5 mg  5 mg IntraVENous Q6H    HYDROmorphone (PF) (DILAUDID) injection 0.5 mg  0.5 mg IntraVENous Q4H    HYDROmorphone (PF) (DILAUDID) injection 0.2 mg  0.2 mg IntraVENous Q2H PRN    hydrALAZINE (APRESOLINE) 20 mg/mL injection 10 mg  10 mg IntraVENous Q4H PRN    0.45% sodium chloride 1,000 mL with sodium bicarbonate (8.4%) 75 mEq infusion   IntraVENous CONTINUOUS    ondansetron (ZOFRAN) injection 4 mg  4 mg IntraVENous Q6H PRN    SALINE PERIPHERAL FLUSH Q8H soln 5 mL  5 mL InterCATHeter Q8H    insulin lispro (HUMALOG) injection   SubCUTAneous Q6H    enoxaparin (LOVENOX) injection 30 mg  30 mg SubCUTAneous DAILY    glucose chewable tablet 16 g  4 Tab Oral PRN    glucagon (GLUCAGEN) injection 1 mg  1 mg IntraMUSCular PRN    famotidine (PF) (PEPCID) injection 20 mg  20 mg IntraVENous DAILY    dextrose 10% infusion 125-250 mL  125-250 mL IntraVENous PRN          LAB AND IMAGING FINDINGS:     Lab Results   Component Value Date/Time    WBC 6.0 09/20/2017 04:00 AM    HGB 7.3 09/20/2017 04:00 AM    PLATELET 331 75/99/9894 04:00 AM     Lab Results   Component Value Date/Time    Sodium 139 09/20/2017 04:00 AM    Potassium 3.9 09/20/2017 04:00 AM    Chloride 104 09/20/2017 04:00 AM    CO2 25 09/20/2017 04:00 AM    BUN 46 09/20/2017 04:00 AM    Creatinine 1.90 09/20/2017 04:00 AM    Calcium 7.7 09/20/2017 04:00 AM    Magnesium 1.6 09/19/2017 02:50 AM    Phosphorus 2.7 09/20/2017 04:00 AM      Lab Results   Component Value Date/Time    AST (SGOT) 17 09/15/2017 05:58 AM    Alk.  phosphatase 438 09/15/2017 05:58 AM    Protein, total 7.1 09/15/2017 05:58 AM    Albumin 1.8 09/20/2017 04:00 AM    Globulin 4.4 09/15/2017 05:58 AM     Lab Results   Component Value Date/Time    INR 1.1 06/10/2017 02:56 PM    Prothrombin time 10.8 06/10/2017 02:56 PM    aPTT 33.2 07/03/2010 09:00 AM      Lab Results   Component Value Date/Time    Iron 42 05/11/2015 05:00 AM    TIBC 215 05/11/2015 05:00 AM    Iron % saturation 20 05/11/2015 05:00 AM    Ferritin 417 05/11/2015 05:00 AM      No results found for: PH, PCO2, PO2  No components found for: [place]   Lab Results   Component Value Date/Time    CK 70 09/15/2017 02:45 PM    CK - MB 2.3 01/26/2017 01:16 PM                Total time: 30 min  Counseling / coordination time, spent as noted above: 25 min  > 50% counseling / coordination?: yes    Prolonged service was provided for  []30 min   []75 min in face to face time in the presence of the patient, spent as noted above. Time Start:   Time End:   Note: this can only be billed with 57982 (initial) or 51441 (follow up). If multiple start / stop times, list each separately.

## 2017-09-20 NOTE — PROGRESS NOTES
Problem: Self Care Deficits Care Plan (Adult)  Goal: *Acute Goals and Plan of Care (Insert Text)  Occupational Therapy Goals  Initiated 9/18/2017  1. Patient will perform lower body dressing with minimal assistance/contact guard assist using adaptive equipment as needed within 7 day(s). 2. Patient will perform toilet transfers with supervision/set-up using RW within 7 day(s). 3. Patient will perform all aspects of toileting with supervision/set-up within 7 day(s). 4. Patient will participate in upper extremity therapeutic exercise/activities with independence for 10 minutes within 7 day(s). 5. Patient will utilize energy conservation techniques during functional activities with verbal and visual cues within 7 day(s). OCCUPATIONAL THERAPY TREATMENT  Patient: Fede Henderson (17 y.o. female)  Date: 9/20/2017  Diagnosis: Metastatic Breast Ca  Metastatic breast cancer ([facility] [address]  SMALL BOWEL OBSTRUCTION <principal problem not specified>  Procedure(s) (LRB):  LYSIS OF ADHESIONS LAPAROSCOPIC CONVERT TO OPEN (N/A) 5 Days Post-Op  Precautions: Fall      ASSESSMENT:  Pt was supine in bed and cleared to be seen by therapy. Pt was instructed with log rolling, and was min assist for bed mobility and she was able to stand with CGA and with use of RW pt transferred to toilet. Pt was able to toilet self and needed verbal cues to use her RW to transfer to the sink to wash her hands. Pt was CGA to SBA for washing hands and then she washed the handles on the walker. Pt was CGA to SBA for transfer back to bed and was min to mod assist for sit to supine and left in bed with call bell with in reach. Recommend that pt return home with family and home care OT and PT at discharge.   Progression toward goals:  [X]       Improving appropriately and progressing toward goals  [ ]       Improving slowly and progressing toward goals  [ ]       Not making progress toward goals and plan of care will be adjusted       PLAN:  Patient continues to benefit from skilled intervention to address the above impairments. Continue treatment per established plan of care. Discharge Recommendations:  Home Health  Further Equipment Recommendations for Discharge:  tbd       SUBJECTIVE:   Patient stated I need to have my walked wiped down.       OBJECTIVE DATA SUMMARY:   Cognitive/Behavioral Status:  Neurologic State: Alert  Orientation Level: Oriented X4  Cognition: Appropriate for age attention/concentration              Functional Mobility and Transfers for ADLs:           Transfers:  Sit to Stand: Contact guard assistance;Assist x1;Additional time        Balance:  Sitting: Intact; Without support  Standing: Intact; With support  Standing - Static: Good;Constant support  Standing - Dynamic : Fair     ADL Intervention:     Pt is mod to max assist for ADLs, needs most assist for LB                  Pain:  Pain Scale 1: Numeric (0 - 10)  Pain Intensity 1: 0              Activity Tolerance:   vss  Please refer to the flowsheet for vital signs taken during this treatment.   After treatment:   [X] Patient left in no apparent distress sitting up in chair  [ ] Patient left in no apparent distress in bed  [X] Call bell left within reach  [X] Nursing notified  [ ] Caregiver present  [ ] Bed alarm activated      COMMUNICATION/COLLABORATION:   The patients plan of care was discussed with: Physical Therapist and Registered Nurse     Marleen Benavides OT  Time Calculation: 29 mins

## 2017-09-20 NOTE — PROGRESS NOTES
Problem: Falls - Risk of  Goal: *Absence of Falls  Document Iliana Fall Risk and appropriate interventions in the flowsheet.    Outcome: Progressing Towards Goal  Fall Risk Interventions:  Mobility Interventions: Patient to call before getting OOB           Medication Interventions: Patient to call before getting OOB     Elimination Interventions: Patient to call for help with toileting needs

## 2017-09-20 NOTE — PROGRESS NOTES
OT note:  OT attempted to see pt for tx and she had just gotten back to bed. Pt wanted to work with therapy later but was too tired to do so now. Pt asked politely if OT could come back to see her today and will follow up with pt later if able.

## 2017-09-20 NOTE — PROGRESS NOTES
Nephrology Progress Note     Fernando Cochran     www. [facility]                  Phone - [phone]   Patient: Yara Wolf   YOB: 1940    Date- 9/20/2017     CC: Follow up for ARF        Subjective: Interval History:   -  Cr better  Voiding well  No vomiting  No sob  Leg edema minimal  No fever   Assessment:   · JULES -  secondary to multiple possibilities. · 1. Pre renal factors - poor po intake, HIGH NG tube out put - most LIKELY  · 2. Intra reanl factors - ATN due to FLEET enema  ·     · Non anion gap meta acidosis - improved  · CKD 3 baseline cr. 1.0-1.2  · H/o hyponatremia  · hypomagnsemia  · ILLEUS   · S/p lysis of adhesions on 9-15-17  · H/o PE  · anemia  · Right breast cancer with bone mets     Plan:   DECREASE IVF AT 75 ML/HR  ANEMIA Management per hematology  Hold norvasc and lotensin  Follow BMP    Care Plan discussed with: pt   Review of Systems: Pertinent items are noted in HPI. Objective:   Vitals:    09/19/17 1938 09/19/17 2348 09/20/17 0313 09/20/17 0949   BP: 98/60 132/61 127/60 119/81   Pulse: 97 89 91 (!) 105   Resp: 18 18 18 16   Temp:  98.5 °F (36.9 °C) 98.7 °F (37.1 °C) 98.5 °F (36.9 °C)   SpO2: 98% 96% 98% 100%   Weight:       Height:         Last 3 Recorded Weights in this Encounter    09/17/17 0400 09/18/17 0455 09/19/17 0600   Weight: 89.7 kg (197 lb 11.2 oz) 90.4 kg (199 lb 3.2 oz) 90.7 kg (200 lb)       Intake/Output Summary (Last 24 hours) at 09/20/17 1018  Last data filed at 09/20/17 2183   Gross per 24 hour   Intake                0 ml   Output             1100 ml   Net            -1100 ml     Physical Exam:   GEN:  NAD  NECK:  Supple, no thyromegaly  RESP: CTA  b/l, no  wheezing, decreased at base  CVS: RRR,S1,S2   NEURO: non focal, normal speech  ABDO:  soft , non tender, No hepatosplenomegaly  EXT: Edema +nt          Chart reviewed. Pertinent Notes reviewed.      Medications list  reviewed       Data Review :  Recent Labs 09/20/17   0400  09/19/17   0250  09/18/17   0305   NA  139  139  141   K  3.9  4.0  4.0   CL  104  104  107   CO2  25  24  22   GLU  86  132*  103*   BUN  46*  54*  43*   CREA  1.90*  2.50*  2.29*   CA  7.7*  7.6*  7.4*   MG   --   1.6   --    PHOS  2.7  4.2  4.4   ALB  1.8*  2.0*  1.9*     Recent Labs      09/20/17   0400  09/19/17   0250  09/18/17   0305   WBC  6.0  5.2  5.0   HGB  7.3*  8.1*  7.9*   HCT  21.9*  24.3*  23.7*   PLT  151  177  183     Lab Results   Component Value Date/Time    Specimen Description: URINE 11/11/2010 03:00 PM    Specimen Description: URINE 07/03/2010 08:50 AM    Specimen Description: STOOL 05/31/2009 01:30 AM     Current Facility-Administered Medications   Medication    metoclopramide HCl (REGLAN) injection 5 mg    HYDROmorphone (PF) (DILAUDID) injection 0.5 mg    HYDROmorphone (PF) (DILAUDID) injection 0.2 mg    hydrALAZINE (APRESOLINE) 20 mg/mL injection 10 mg    0.45% sodium chloride 1,000 mL with sodium bicarbonate (8.4%) 75 mEq infusion    ondansetron (ZOFRAN) injection 4 mg    SALINE PERIPHERAL FLUSH Q8H soln 5 mL    insulin lispro (HUMALOG) injection    enoxaparin (LOVENOX) injection 30 mg    glucose chewable tablet 16 g    glucagon (GLUCAGEN) injection 1 mg    famotidine (PF) (PEPCID) injection 20 mg    dextrose 10% infusion 125-250 mL       Augie Fontanez MD  [facility]  [email or web address]  [address], [facility] [place], [address] [place]  Phone - [phone]         Fax - [phone]  [facility] Office  [address] [address]  Phone - [phone]        Fax - [phone]

## 2017-09-20 NOTE — PROGRESS NOTES
Bedside shift change report given to Mark Hatch (oncoming nurse) by Axel May (offgoing nurse). Report included the following information SBAR, Kardex, Intake/Output, MAR, Recent Results and Cardiac Rhythm nsr.

## 2017-09-21 NOTE — PROGRESS NOTES
Problem: Mobility Impaired (Adult and Pediatric)  Goal: *Acute Goals and Plan of Care (Insert Text)  Physical Therapy Goals  Initiated 9/16/2017  1. Patient will move from supine to sit and sit to supine , scoot up and down and roll side to side in bed with independence within 7 day(s). 2. Patient will transfer from bed to chair and chair to bed with modified independence using the least restrictive device within 7 day(s). 3. Patient will perform sit to stand with independence within 7 day(s). 4. Patient will ambulate with modified independence for 300 feet with the least restrictive device within 7 day(s). 5. Patient will ascend/descend 4 stairs with dual handrail(s) with supervision/set-up within 7 day(s). PHYSICAL THERAPY TREATMENT  Patient: Yara Wolf (75 y.o. female)  Date: 9/21/2017  Diagnosis: Metastatic breast cancer ([facility] [address] SMALL BOWEL OBSTRUCTION      Procedure(s) (LRB):  LYSIS OF ADHESIONS LAPAROSCOPIC CONVERT TO OPEN (N/A) 6 Days Post-Op      Precautions: Fall  Chart, physical therapy assessment, plan of care and goals were reviewed. ASSESSMENT: pt continues to improve, no LOB or SOB, c/o some weakness, good motivation, will do well at short term SNF, does fair with transfers and ther-ex, vc's for safety and proper RW use. Progression toward goals:  [ ]      Improving appropriately and progressing toward goals  [X]      Improving slowly and progressing toward goals  [ ]      Not making progress toward goals and plan of care will be adjusted       PLAN:  Patient continues to benefit from skilled intervention to address the above impairments. Continue treatment per established plan of care.   Discharge Recommendations:  Skilled Nursing Facility  Further Equipment Recommendations for Discharge: has rolling walker       OBJECTIVE DATA SUMMARY:      Critical Behavior:  Neurologic State: Alert, Appropriate for age  Orientation Level: Appropriate for age, Oriented X4  Cognition: Appropriate decision making, Appropriate for age attention/concentration, Appropriate safety awareness, Follows commands  Safety/Judgement: Awareness of environment, Fall prevention, Insight into deficits      Functional Mobility Training:  Bed Mobility: pt sitting in chair on arrival     Transfers:  Sit to Stand: Contact guard assistance; Additional time;Assist x1  Stand to Sit: Contact guard assistance  Interventions: Tactile cues; Verbal cues  Level of Assistance: Contact guard assistance      Balance:  Sitting: Intact; Without support  Standing: Intact; With support  Standing - Static: Good;Constant support  Standing - Dynamic : Fair     Ambulation/Gait Training:  Distance (ft): 100 Feet (ft)  Assistive Device: Gait belt;Walker, rolling  Ambulation - Level of Assistance: Contact guard assistance; Additional time  Gait Abnormalities: Decreased step clearance;Trunk sway increased  Right Side Weight Bearing: Full  Left Side Weight Bearing: Full  Base of Support: Widened  Stance:  (equal)  Speed/Domonique: Pace decreased (<100 feet/min)  Step Length: Left shortened;Right shortened     Therapeutic Exercises:   sitting  EXERCISE   Sets   Reps   Active Active Assist   Passive   Comments   Ankle pumps 1 10 [X] [ ] [ ] bilat   Heel raises 1 10 [X] [ ] [ ] \"   Toe tap 1 10 [X] [ ] [ ] \"   Knee ext 1 10 [X] [ ] [ ] \"   Hip flex 1 10 [X] [ ] [ ] \"      Pain:  Pain Scale 1: Numeric (0 - 10)  Pain Intensity 1: 0      Activity Tolerance: fair     After treatment:   [X] Patient left in no apparent distress sitting up in chair  [ ] Patient left in no apparent distress in bed  [X] Call bell left within reach  [X] Nursing notified  [ ] Caregiver present  [ ] Bed alarm activated      COMMUNICATION/COLLABORATION:   The patients plan of care was discussed with: Registered Nurse     Polina Desir PTA   Time Calculation: 25 mins

## 2017-09-21 NOTE — PROGRESS NOTES
Subjective:    No more nausea. +BM. Objective:    Blood pressure 136/72, pulse 68, temperature 98.5 °F (36.9 °C), resp. rate 16, height 5' 6\" (1.676 m), weight 87.7 kg (193 lb 6.4 oz), SpO2 99 %. Exam - alert, comfortable, CTAB, RRR, abd softly distended, NABS, approp TTP. Assessment:  Procedure(s):  LYSIS OF ADHESIONS LAPAROSCOPIC CONVERT TO OPEN 9/15/2017    Active Hospital Problems    Diagnosis Date Noted    Neoplastic malignant related fatigue 09/13/2017    Pain due to malignant neoplasm metastatic to bone (HCC) 76/37/9927    Cyclical vomiting with nausea 09/13/2017    Metastatic breast cancer ([facility] [address] 08/11/2017       - ileus resolving. Plan:  - clear liquid diet. - no dizziness with ambulation but H&H fairly low. Will leave decision on +/-transfusion to Dr. Sky Mary.     Gaston Quiroz MD

## 2017-09-21 NOTE — PROGRESS NOTES
Subjective:    No nausea. +BM. Did well with her hot tea. Objective:    Blood pressure 143/67, pulse 97, temperature 99.7 °F (37.6 °C), resp. rate 16, height 5' 6\" (1.676 m), weight 87.7 kg (193 lb 6.4 oz), SpO2 97 %. Exam - alert, comfortable, CTAB, RRR, abd softly distended, NABS, approp TTP. Assessment:  Procedure(s):  LYSIS OF ADHESIONS LAPAROSCOPIC CONVERT TO OPEN 9/15/2017    Active Hospital Problems    Diagnosis Date Noted    Neoplastic malignant related fatigue 09/13/2017    Pain due to malignant neoplasm metastatic to bone (HCC) 39/45/2771    Cyclical vomiting with nausea 09/13/2017    [facility]       - ileus resolving. Plan:  - try GI diet. - Mobilize. - no dizziness with ambulation but H&H fairly low. Will leave decision on +/-transfusion to Dr. Sky Mary, but does not seem that she needs it.       Kishor Davis MD

## 2017-09-21 NOTE — PROGRESS NOTES
Nephrology Progress Note     Fernando Cochran     www. [facility]                  Phone - [phone]   Patient: Angeline Holman   YOB: 1940    Date- 9/21/2017     CC: Follow up for ARF        Subjective: Interval History:   -  Cr. Improved to 1.5  Phos low  bp stable  No c/o sob, fever. No c/o nausea or vomiting  No c/o chest pain     Assessment:   · JULES -  secondary to multiple possibilities. · 1. Pre renal factors - poor po intake, HIGH NG tube out put - most LIKELY  · 2. Intra reanl factors - ATN due to FLEET enema  ·     · Hypophosphatemia  · Non anion gap meta acidosis - improved  · CKD 3 baseline cr. 1.0-1.2  · H/o hyponatremia  · ILLEUS  -S/p lysis of adhesions on 9-15-17  · H/o PE  · ANEMIA  · Right breast cancer with bone mets     Plan:   ivf stopped this am  Po neutraphos  ANEMIA Management per hematology  okay to add bp meds if bp increased  Follow BMP    Care Plan discussed with:patient  Review of Systems: Pertinent items are noted in HPI. Objective:   Vitals:    09/20/17 0949 09/20/17 1120 09/20/17 2000 09/21/17 0423   BP: 119/81 111/61 136/72 134/76   Pulse: (!) 105 86 68 91   Resp: 16 16 16 16   Temp: 98.5 °F (36.9 °C) 98.4 °F (36.9 °C) 98.5 °F (36.9 °C) 98.4 °F (36.9 °C)   SpO2: 100% 99% 99% 95%   Weight:   87.7 kg (193 lb 6.4 oz)    Height:         Last 3 Recorded Weights in this Encounter    09/18/17 0455 09/19/17 0600 09/20/17 2000   Weight: 90.4 kg (199 lb 3.2 oz) 90.7 kg (200 lb) 87.7 kg (193 lb 6.4 oz)       Intake/Output Summary (Last 24 hours) at 09/21/17 0842  Last data filed at 09/21/17 0616   Gross per 24 hour   Intake         69009.67 ml   Output              100 ml   Net         53321.67 ml     Physical Exam:   GEN:  NAD  NECK:  Supple, no thyromegaly  RESP: CTA  b/l, no  wheezing, decreased at base  CVS: RRR,S1,S2   NEURO: non focal, normal speech  EXT: Edema +nt          Chart reviewed. Pertinent Notes reviewed.      Medications list  reviewed       Data Review :  Recent Labs      09/21/17   0256  09/20/17   0400  09/19/17   0250   NA  143  139  139   K  3.7  3.9  4.0   CL  105  104  104   CO2  25  25  24   GLU  87  86  132*   BUN  37*  46*  54*   CREA  1.47*  1.90*  2.50*   CA  7.6*  7.7*  7.6*   MG   --    --   1.6   PHOS  2.3*  2.7  4.2   ALB  1.9*  1.8*  2.0*     Recent Labs      09/21/17   0256  09/20/17   0400  09/19/17   0250   WBC  6.2  6.0  5.2   HGB  7.6*  7.3*  8.1*   HCT  22.6*  21.9*  24.3*   PLT  150  151  177     Lab Results   Component Value Date/Time    Specimen Description: URINE 11/11/2010 03:00 PM    Specimen Description: URINE 07/03/2010 08:50 AM    Specimen Description: STOOL 05/31/2009 01:30 AM     Current Facility-Administered Medications   Medication    potassium, sodium phosphates (NEUTRA-PHOS) packet 2 Packet    metoclopramide HCl (REGLAN) injection 5 mg    HYDROmorphone (PF) (DILAUDID) injection 0.5 mg    HYDROmorphone (PF) (DILAUDID) injection 0.2 mg    hydrALAZINE (APRESOLINE) 20 mg/mL injection 10 mg    ondansetron (ZOFRAN) injection 4 mg    SALINE PERIPHERAL FLUSH Q8H soln 5 mL    insulin lispro (HUMALOG) injection    enoxaparin (LOVENOX) injection 30 mg    glucose chewable tablet 16 g    glucagon (GLUCAGEN) injection 1 mg    famotidine (PF) (PEPCID) injection 20 mg    dextrose 10% infusion 125-250 mL       Zelalem Costa MD  [facility]  [email or web address]  [address] , [facility]  [place], [address]  Phone - [phone]         Fax - [phone]  [facility] Office  [address] [address]  Phone - [phone]        Fax - [phone]

## 2017-09-21 NOTE — PROGRESS NOTES
Interdisciplinary Rounds were completed on this patient. Rounds included nursing, clinical care leader, pharmacy, and case management. Patient was doing well without problems, sitting up in chair. Patient had the following concerns: none. Goals for the day will include: mobilize.

## 2017-09-21 NOTE — PROGRESS NOTES
End of Shift Nursing Note    Bedside shift change report given to vivian (oncoming nurse) by Escobar Wallace (offgoing nurse). Report included the following information SBAR. Significant changes during shift:    Loose stool x3.      Non-emergent issues for physician to address: none         Vital Signs:    Temp: 98.4 °F (36.9 °C)     Pulse (Heart Rate): 78     BP: 111/55     Resp Rate: 16     O2 Sat (%): 98 %    Skin Integrity:      Wounds: yes   Dressings Present: yes    Wound Concerns: no      GI:    Current diet:  DIET GI LITE (POST SURGICAL)    Nausea: YES  Vomiting: NO  Bowel Sounds: YES  Flatus: YES  Last Bowel Movement: today

## 2017-09-21 NOTE — PROGRESS NOTES
Hospitalist Progress Note    NAME: Shyanne Ortiz   :  1940   MRN:  [id number]     Interim Hospital Summary: 65 y.o. female whom presented on 2017 with      Assessment / Plan:  DM type 2  -diet started, BG remains ~94. Will continue with SSI for now. Hold home meds. Anticipation of adding back home meds once she starts eating better. Pt takes Lantus 18 units BID  -stable on SSI     HTN / CAD / PPM  Chronic diastolic chf EF 39-40%  -restarting norvasc, imdur, lipitor and ASA  -Nitroglycerin paste or hydralazine IV prn SBP >170    JULES, improving  Metabolic acidosis, resolved  -pre renal from third spacing and fluid loss via NG (now removed)    - cr improving, off IVF  -hold nephrotoxic agents  -nephro following    SBO  Abdominal pain  Hx diverticulitis s/p partial colectomy 2008  S/p open cholecystectomy  -s/p lysis of adhesions 9/15/2017  -NGT removed. Tolerated clears liquid diet,, diet advance per surgery    Right breast CA dx  with bone mets dx   Right arm lymphedema  -pT3 N3a M0 (Stage IIIC) infiltrating ductal carcinoma, s/p right mastectomy, XRT  -follows with Dr Babar Franco     SEN  -on CPAP     Chronic microcytic anemia, transfuse prn.    -type and screen   -hgb improved this morning, transfuse prn    Hx right hemispheric CVA   Hx PE   Chronic back pain due to bone mets  Obesity  Body mass index is 31.22 kg/(m^2). Code status: Full  Prophylaxis: Lovenox         Subjective:     Chief Complaint / Reason for Physician Visit  Follow up of abdominal pain, metastatic cancer and ileus  Chart reviewed in detail. Discussed with RN events overnight. Pt seen at bedside with her breakfast.  Had dinner last night, tolerated clear liquid diet. No c/o nausea/vomiting.     Review of Systems:  Symptom Y/N Comments  Symptom Y/N Comments   Fever/Chills    Chest Pain     Poor Appetite    Edema     Cough    Abdominal Pain y     Sputum Joint Pain     SOB/WELCH    Pruritis/Rash     Nausea/vomit n   Tolerating PT/OT     Diarrhea    Tolerating Diet     Constipation    Other       Could NOT obtain due to:      PO intake:   Patient Vitals for the past 72 hrs:   % Diet Eaten   09/18/17 1700 0 %     Objective:     VITALS:   Last 24hrs VS reviewed since prior progress note. Most recent are:  Patient Vitals for the past 24 hrs:   Temp Pulse Resp BP SpO2   09/21/17 0852 99.7 °F (37.6 °C) 97 16 143/67 97 %   09/21/17 0423 98.4 °F (36.9 °C) 91 16 134/76 95 %   09/20/17 2000 98.5 °F (36.9 °C) 68 16 136/72 99 %   09/20/17 1120 98.4 °F (36.9 °C) 86 16 111/61 99 %   09/20/17 0949 98.5 °F (36.9 °C) (!) 105 16 119/81 100 %       Intake/Output Summary (Last 24 hours) at 09/21/17 0911  Last data filed at 09/21/17 0616   Gross per 24 hour   Intake         10953.67 ml   Output              100 ml   Net         42656.67 ml        PHYSICAL EXAM:  General: WD, WN. Alert, cooperative, no acute distress    EENT:  EOMI. Anicteric sclerae. Resp:  CTA bilaterally, no wheezing or rales. No accessory muscle use  CV:  Regular  rhythm,  No edema  GI:  slight distended, NT.  +BS  Neurologic:  Alert and oriented X 3, normal speech  Psych:   Fair insight. Not anxious nor agitated  Skin:  No rashes. No jaundice    Reviewed most current lab test results and cultures  YES  Reviewed most current radiology test results   YES  Review and summation of old records today    NO  Reviewed patient's current orders and MAR    YES  PMH/SH reviewed - no change compared to H&P  ________________________________________________________________________  Care Plan discussed with:    Comments   Patient x    Family      RN x    Care Manager     Consultant                        Multidiciplinary team rounds were held today with , nursing, pharmacist and clinical coordinator. Patient's plan of care was discussed; medications were reviewed and discharge planning was addressed. ________________________________________________________________________  Total NON critical care TIME:   35   Minutes    Total CRITICAL CARE TIME Spent:   Minutes non procedure based      Comments   >50% of visit spent in counseling and coordination of care x     This includes time during multidisciplinary rounds if indicated above   ________________________________________________________________________  Blu Martinez MD     Procedures: see electronic medical records for all procedures/Xrays and details which were not copied into this note but were reviewed prior to creation of Plan. LABS:  I reviewed today's most current labs and imaging studies.   Pertinent labs include:  Recent Labs      09/21/17   0256 09/20/17   0400  09/19/17   0250   WBC  6.2  6.0  5.2   HGB  7.6*  7.3*  8.1*   HCT  22.6*  21.9*  24.3*   PLT  150  151  177     Recent Labs      09/21/17   0256  09/20/17   0400  09/19/17   0250   NA  143  139  139   K  3.7  3.9  4.0   CL  105  104  104   CO2  25  25  24   GLU  87  86  132*   BUN  37*  46*  54*   CREA  1.47*  1.90*  2.50*   CA  7.6*  7.7*  7.6*   MG   --    --   1.6   PHOS  2.3*  2.7  4.2   ALB  1.9*  1.8*  2.0*

## 2017-09-21 NOTE — PROGRESS NOTES
End of Shift Nursing Note    Bedside shift change report given to Juan Daniel Weber (oncoming nurse) by Sanaz Farias (offgoing nurse). Report included the following information SBAR, Kardex, ED Summary, OR Summary, Procedure Summary, Intake/Output and MAR. Zone Phone:       Significant changes during shift:    none   Non-emergent issues for physician to address:   none     Number times ambulated in hallway past shift: 0      Number of times OOB to chair past shift: 0    POD #: 6     Vital Signs:    Temp: 98.4 °F (36.9 °C)     Pulse (Heart Rate): 91     BP: 134/76     Resp Rate: 16     O2 Sat (%): 95 %    Lines & Drains:     Urinary Catheter? no   Placement Date: na   Medical Necessity: na  Central Line? No   Placement Date: na   Medical Necessity: na  PICC Line? No   Placement Date: na   Medical Necessity: na    NG tube [] in [] removed [] not applicable   Drains [] in [] removed [] not applicable     Skin Integrity:      Wounds: yes   Dressings Present: yes    Wound Concerns: no      GI:    Current diet:  DIET CLEAR LIQUID    Nausea: na  Vomiting: NO  Bowel Sounds: YES  Flatus: YES  Last Bowel Movement: today   Appearance: loose with solids    Respiratory:  Supplemental O2: No      Device: na   via room air Liters/min     Incentive Spirometer: YES  Volume:   Coughing and Deep Breathing: NO  Oral Care: YES  Understanding (patient/family education): YES   Getting out of bed: YES  Head of bed elevation: YES    Patient Safety:    Falls Score: 3  Mobility Score: 3  Bed Alarm On? No  Sitter? No      Opportunity for questions and clarification was given to oncoming nurse. Patient bed is in low position, side rails are up x 3, door & observation blinds open as needed, call bell within reach and patient not in distress.     Lam Webster RN

## 2017-09-21 NOTE — PROGRESS NOTES
CM was informed that pt has been accepted to [facility]. CM will update MD and pt, to make her aware of her d/c needs.     Danielle Recio MSW CM  [phone]

## 2017-09-22 NOTE — PROGRESS NOTES
Problem: Mobility Impaired (Adult and Pediatric)  Goal: *Acute Goals and Plan of Care (Insert Text)  Physical Therapy Goals  Initiated 9/16/2017 ; Goals remain appropriate as of 9/22/17:  1. Patient will move from supine to sit and sit to supine , scoot up and down and roll side to side in bed with independence within 7 day(s). 2. Patient will transfer from bed to chair and chair to bed with modified independence using the least restrictive device within 7 day(s). 3. Patient will perform sit to stand with independence within 7 day(s). 4. Patient will ambulate with modified independence for 300 feet with the least restrictive device within 7 day(s). 5. Patient will ascend/descend 4 stairs with dual handrail(s) with supervision/set-up within 7 day(s). PHYSICAL THERAPY REEVALUATION  Patient: Lincoln Sosa (14 y.o. female)  Date: 9/22/2017  Primary Diagnosis: Metastatic Breast Ca  Metastatic breast cancer ([facility] [address]  SMALL BOWEL OBSTRUCTION  Procedure(s) (LRB):  LYSIS OF ADHESIONS LAPAROSCOPIC CONVERT TO OPEN (N/A) 7 Days Post-Op   Precautions:   Fall      ASSESSMENT :  Based on the objective data described below, the patient presents with overall remaining generalized weakness and decreased activity tolerance with low Hgb. Today and plans for transfusion per nursing, nursing clears for gait at this time. Pt sitting up in chair before and after tx session. Pt able to amb. In hallway and to/from bathroom with additional time required to accomplish, one standing rest break in hallway. Continue to follow. Patient will benefit from skilled intervention to address the above impairments.   Patients rehabilitation potential is considered to be Good  Factors which may influence rehabilitation potential include:   [ ]           None noted  [ ]           Mental ability/status  [ ]           Medical condition  [ ]           Home/family situation and support systems  [ ]           Safety awareness  [X]           Pain tolerance/management  [ ]           Other:        PLAN :  Recommendations and Planned Interventions:  [X]             Bed Mobility Training             [ ]      Neuromuscular Re-Education  [X]             Transfer Training                   [ ]      Orthotic/Prosthetic Training  [X]             Gait Training                         [ ]      Modalities  [X]             Therapeutic Exercises           [ ]      Edema Management/Control  [X]             Therapeutic Activities            [X]      Patient and Family Training/Education  [ ]             Other (comment):  Frequency/Duration: Patient will be followed by physical therapy 5 times a week to address goals. Discharge Recommendations: Home Health  Further Equipment Recommendations for Discharge: rolling walker       SUBJECTIVE:   Patient stated Let me just try to ease this pain first.      OBJECTIVE DATA SUMMARY:       Past Medical History:   Diagnosis Date    Arrhythmia       bradycardia in 30's /pacemaker inserted    Arthritis       RT KNEE    Asthma Dx age 75    Bradycardia 2009     Cardiology saw her during hospital stay; Now off coreg;  Sees Dr. Patrick Lundberg    Breast cancer [facility])       Rt mastectomy 2/19/15    CAD (coronary artery disease)       Dr Lorri Lewis Diabetes [facility])       Now seeing Dr. Segura Dense Diverticulitis      DJD (degenerative joint disease), lumbar      GERD (gastroesophageal reflux disease)      H. pylori infection 2008     Dr. Nataliya Garsia attack [facility]) April 2006    Hypercholesterolemia      Hypertension      Morbid obesity ([address]      Neuropathy, arm 2009     Right; Has had MRI and EMG at [address] [place] [address]    SEN (obstructive sleep apnea)       uses CPAP    Rectal bleeding 2009     Hospitalized;  Had colonoscopy and EGD (ok), gastric emptying study (normal), doppler mesenteric arteries (ok)    Sciatica       Has seen Dr. Juice Ferguson    Stroke [facility]) 2009 & 2012     no residual problems     Past Surgical History:   Procedure Laterality Date    HX APPENDECTOMY   1979    HX BREAST LUMPECTOMY   12/12/2013     RIGHT BREAST DUCTAL EXCISION performed by Yang Stephens MD at [facility] MAIN OR    HX CATARACT REMOVAL   2007    HX CHOLECYSTECTOMY   1979    HX COLONOSCOPY        HX HEART CATHETERIZATION         angioplasty    HX HYSTERECTOMY         fibroids    HX KNEE ARTHROSCOPY   1997     right    HX MASTECTOMY Right 2/19/2015     RIGHT BREAST MODIFIED RADICAL MASTECTOMY RIGHT SENTINEL NODE BIOPSY, RIGHT PORT A CATH INSERTION performed by Charly Colin MD at [facility] AMBULATORY OR    HX PACEMAKER        HX SHOULDER ARTHROSCOPY   2004     left     Hospital course since last seen and reason for reevaluation: increased pain (abdominal) ; Hgb. Low today  Critical Behavior:  Neurologic State: Alert, Appropriate for age  Orientation Level: Appropriate for age, Oriented X4  Cognition: Appropriate decision making, Appropriate for age attention/concentration, Appropriate safety awareness, Follows commands  Safety/Judgement: Awareness of environment, Fall prevention, Insight into deficits  Skin:  Intact  Strength:    Strength: Generally decreased, functional                       Tone & Sensation:   Tone: Normal                              Range Of Motion:  AROM: Generally decreased, functional                       Coordination:        Functional Mobility:  Bed Mobility:              Transfers:  Sit to Stand: Stand-by asssistance; Additional time  Stand to Sit: Contact guard assistance                    Balance:   Sitting: Intact; Without support  Standing: Intact; With support  Standing - Static: Good;Constant support (RW)  Standing - Dynamic : Fair (RW)  Ambulation/Gait Training:  Distance (ft): 125 Feet (ft)  Assistive Device: Gait belt;Walker, rolling  Ambulation - Level of Assistance: Contact guard assistance; Additional time        Gait Abnormalities: Antalgic;Decreased step clearance (decreased heel strike)        Base of Support: Widened     Speed/Domonique: Pace decreased (<100 feet/min)  Step Length: Left shortened;Right shortened              Functional Measure:  Tinetti test:      Sitting Balance: 1  Arises: 1  Attempts to Rise: 2  Immediate Standing Balance: 1  Standing Balance: 1  Nudged: 2  Eyes Closed: 1  Turn 360 Degrees - Continuous/Discontinuous: 0  Turn 360 Degrees - Steady/Unsteady: 1  Sitting Down: 1  Balance Score: 11  Indication of Gait: 1  R Step Length/Height: 1  L Step Length/Height: 1  R Foot Clearance: 1  L Foot Clearance: 1  Step Symmetry: 1  Step Continuity: 1  Path: 1  Trunk: 0  Walking Time: 0  Gait Score: 8  Total Score: 19         Tinetti Test and G-code impairment scale:  Percentage of Impairment CH     0%    CI     1-19% CJ     20-39% CK     40-59% CL     60-79% CM     80-99% CN      100%   Tinetti  Score 0-28 28 23-27 17-22 12-16 6-11 1-5 0          Tinetti Tool Score Risk of Falls  <19 = High Fall Risk  19-24 = Moderate Fall Risk  25-28 = Low Fall Risk  Tinetti ME. Performance-Oriented Assessment of Mobility Problems in Elderly Patients. [facility] 66; [id number]. (Scoring Description: PT Bulletin Feb. 10, 1993)     Older adults: Ankur Fitzpatrick et al, 2009; n = 1000 [facility] elderly evaluated with ABC, ANGIE, ADL, and IADL)  · Mean ANGIE score for males aged 69-68 years = 26.21(3.40)  · Mean ANGIE score for females age 69-68 years = 25.16(4.30)  · Mean ANGIE score for males over 80 years = 23.29(6.02)  · Mean ANGIE score for females over 80 years = 17.20(8.32)      G codes: In compliance with CMSs Claims Based Outcome Reporting, the following G-code set was chosen for this patient based on their primary functional limitation being treated: The outcome measure chosen to determine the severity of the functional limitation was the Tinetti with a score of 19/28 which was correlated with the impairment scale.       · Mobility - Walking and Moving Around:               - CURRENT STATUS:    CJ - 20%-39% impaired, limited or restricted               - GOAL STATUS:           CI - 1%-19% impaired, limited or restricted               - D/C STATUS:                       ---------------To be determined---------------      Pain:  Pain Scale 1: Numeric (0 - 10)  Pain Intensity 1: 7  Pain Location 1: Abdomen  Pain Orientation 1: Lower  Pain Description 1: Cramping  Pain Intervention(s) 1: Medication (see MAR)  Activity Tolerance:   Fair:  Please refer to the flowsheet for vital signs taken during this treatment. After treatment:   [X]  Patient left in no apparent distress sitting up in chair  [ ]  Patient left in no apparent distress in bed  [X]  Call bell left within reach  [X]  Nursing notified  [ ]  Caregiver present  [ ]  Bed alarm activated      COMMUNICATION/EDUCATION:   The patients plan of care was discussed with: Registered Nurse and Rehabilitation Attendant. [X]  Fall prevention education was provided and the patient/caregiver indicated understanding. [X]  Patient/family have participated as able in goal setting and plan of care. [X]  Patient/family agree to work toward stated goals and plan of care. [ ]  Patient understands intent and goals of therapy, but is neutral about his/her participation. [ ]  Patient is unable to participate in goal setting and plan of care.      Thank you for this referral.  Lazarus Ropes, PT, DPT   Time Calculation: 23 mins

## 2017-09-22 NOTE — PROGRESS NOTES
End of Shift Nursing Note    Bedside shift change report given to [address] [place] [address] (oncoming nurse) by Sadia Voss (offgoing nurse). Report included the following information SBAR, Kardex, ED Summary, OR Summary, Procedure Summary, Intake/Output and MAR. Zone Phone:       Significant changes during shift:    none   Non-emergent issues for physician to address:   none     Number times ambulated in hallway past shift: 0      Number of times OOB to chair past shift: 1    POD #: 8     Vital Signs:    Temp: 97.8 °F (36.6 °C)     Pulse (Heart Rate): 72     BP: 138/68     Resp Rate: 16     O2 Sat (%): 100 %    Lines & Drains:     Urinary Catheter? No   Placement Date: na   Medical Necessity: na  Central Line? yes   Placement Date: 9/13/17   Medical Necessity: yes  PICC Line? No   Placement Date: na   Medical Necessity: na    NG tube [] in [] removed [x] not applicable   Drains [] in [] removed [x] not applicable     Skin Integrity:      Wounds: yes   Dressings Present: yes    Wound Concerns: no      GI:    Current diet:  DIET GI LITE (POST SURGICAL)    Nausea: YES  Vomiting: NO  Bowel Sounds: YES  Flatus: YES  Last Bowel Movement: today   Appearance: loose    Respiratory:  Supplemental O2: No      Device: na   via room air Liters/min     Incentive Spirometer: YES  Volume: 500  Coughing and Deep Breathing: NO  Oral Care: YES  Understanding (patient/family education): YES   Getting out of bed: YES  Head of bed elevation: YES    Patient Safety:    Falls Score: 3  Mobility Score: 3  Bed Alarm On? No  Sitter? No      Opportunity for questions and clarification was given to oncoming nurse. Patient bed is in low position, side rails are up x 3, door & observation blinds open as needed, call bell within reach and patient not in distress.     Karen Hernandez RN

## 2017-09-22 NOTE — PROGRESS NOTES
Nephrology Progress Note     Fernando Cochran     www. [email or web address]                  Phone - [phone]   Patient: Homa Winston   YOB: 1940    Date- 9/22/2017     CC: Follow up for ARF        Subjective: Interval History:   -  Cr. Stable  Nurse reports poor po intake  Patient c/o diarrhea  No vomiting  No sob  HB low. Assessment:   · ACUTE KIDNEY INJUEY -  secondary to multiple possibilities. · 1. Pre renal factors - poor po intake, HIGH NG tube out put - most LIKELY  · 2. Intra reanl factors - ATN due to FLEET enema      · Hypophosphatemia  · Non anion gap meta acidosis - improved  · CKD 3 baseline cr. 1.0-1.2  · H/o hyponatremia  · ILLEUS  -S/p lysis of adhesions on 9-15-17  · H/o PE  · Anemia   · Right breast cancer with bone mets     Plan:   Continue current care  She needs 2 unit prbc tx. We will get okay from Hematology  If poor po intake persist. We will start . 45 nacl at 75 ml/hr  Follow bmp      Care Plan discussed with:patient and nurse  Review of Systems: Pertinent items are noted in HPI. Objective:   Vitals:    09/21/17 1517 09/21/17 2011 09/22/17 0016 09/22/17 0745   BP: 111/55 121/63 138/68 120/74   Pulse: 78 99 72 88   Resp: 16 16 16 20   Temp: 98.4 °F (36.9 °C) 98.3 °F (36.8 °C) 97.8 °F (36.6 °C) 98.1 °F (36.7 °C)   SpO2: 98% 100% 100% 99%   Weight:   87 kg (191 lb 12.8 oz)    Height:         Last 3 Recorded Weights in this Encounter    09/19/17 0600 09/20/17 2000 09/22/17 0016   Weight: 90.7 kg (200 lb) 87.7 kg (193 lb 6.4 oz) 87 kg (191 lb 12.8 oz)       Intake/Output Summary (Last 24 hours) at 09/22/17 6013  Last data filed at 09/21/17 1702   Gross per 24 hour   Intake              480 ml   Output              600 ml   Net             -120 ml     Physical Exam:   General: NAD  Resp:  Clear lungs, no wheezing or rales. No accessory muscle use  CV:  Regular  rhythm,  no murmur or rub  GI:  Soft, Non distended, Non tender.   +Bowel sounds, Skin:  no rashes or ulcers. No jaundice  Neurologic:  Alert and oriented X 3. Non Focal  Trace edema +          Chart reviewed. Pertinent Notes reviewed. Medications list  reviewed       Data Review :  Recent Labs      09/22/17 0318 09/21/17   0256  09/20/17   0400   NA  141  143  139   K  3.8  3.7  3.9   CL  104  105  104   CO2  25  25  25   GLU  82  87  86   BUN  32*  37*  46*   CREA  1.48*  1.47*  1.90*   CA  7.4*  7.6*  7.7*   PHOS  2.5*  2.3*  2.7   ALB  1.8*  1.9*  1.8*     Recent Labs      09/22/17 0318 09/21/17   0256  09/20/17   0400   WBC  5.8  6.2  6.0   HGB  7.0*  7.6*  7.3*   HCT  21.5*  22.6*  21.9*   PLT  147*  150  151     Lab Results   Component Value Date/Time    Specimen Description: URINE 11/11/2010 03:00 PM    Specimen Description: URINE 07/03/2010 08:50 AM    Specimen Description: STOOL 05/31/2009 01:30 AM     Current Facility-Administered Medications   Medication    insulin lispro (HUMALOG) injection    isosorbide mononitrate ER (IMDUR) tablet 60 mg    amLODIPine (NORVASC) tablet 10 mg    atorvastatin (LIPITOR) tablet 80 mg    aspirin delayed-release tablet 81 mg    metoclopramide HCl (REGLAN) injection 5 mg    HYDROmorphone (PF) (DILAUDID) injection 0.5 mg    HYDROmorphone (PF) (DILAUDID) injection 0.2 mg    hydrALAZINE (APRESOLINE) 20 mg/mL injection 10 mg    ondansetron (ZOFRAN) injection 4 mg    SALINE PERIPHERAL FLUSH Q8H soln 5 mL    enoxaparin (LOVENOX) injection 30 mg    glucose chewable tablet 16 g    glucagon (GLUCAGEN) injection 1 mg    famotidine (PF) (PEPCID) injection 20 mg    dextrose 10% infusion 125-250 mL       Vinayak Campo MD  [facility]  [email or web address]  [address] [facility], [address]  Phone - [phone]         Fax - [phone]  [facility] Office  [address] [facility] [address]  Phone - [phone]        Fax - [phone]

## 2017-09-22 NOTE — PROGRESS NOTES
Subjective: Tolerating diet. Some diarrhea. Objective:    Blood pressure 120/74, pulse 88, temperature 98.1 °F (36.7 °C), resp. rate 20, height 5' 6\" (1.676 m), weight 87 kg (191 lb 12.8 oz), SpO2 99 %. Exam - alert, comfortable, CTAB, RRR, abd softly distended, NABS, approp TTP. Assessment:  Procedure(s):  LYSIS OF ADHESIONS LAPAROSCOPIC CONVERT TO OPEN 9/15/2017    Active Hospital Problems    Diagnosis Date Noted    Neoplastic malignant related fatigue 09/13/2017    Pain due to malignant neoplasm metastatic to bone (HCC) 41/65/0106    Cyclical vomiting with nausea 09/13/2017    Metastatic breast cancer ([facility] [address] 08/11/2017        Plan:  - GI diet. - getting transfused. - if no further diarrhea, can go to [facility] later today or tomorrow. - f/u with me next week.       Venecia Camargo MD

## 2017-09-22 NOTE — PROGRESS NOTES
CM spoke to pt's daughter ([phone]) in regards to SNF placement, upon d/c.  Pt's daughter reported that she and her sister has been unable to complete SNF visit, to observe facility, and was wondering if the pt could be \"held until [place]". CM informed pt that if pt is medically ready to be d/c then pt could not be held at [facility], and that pt will be billed additional days that she remains at hospital.  Pt's daughter reported that she will make attempts to visit SNF on Saturday. CM will update MD, and inform her of what has been mentioned. CM will leave FYI on pt's chart.   REKHA Rosenberg CM   [phone]

## 2017-09-22 NOTE — PROGRESS NOTES
Hospitalist Progress Note    NAME: Kate Tomas   :  1940   MRN:  [id number]     Interim Hospital Summary: 65 y.o. female whom presented on 2017 with      Assessment / Plan:  DM type 2  -diet started, BG remains ~94. Will continue with SSI for now. Hold home meds. Anticipation of adding back home meds once she starts eating better. Pt takes Lantus 18 units BID  -stable on SSI     HTN / CAD / PPM  Chronic diastolic chf EF 39-65%  -cont' norvasc, imdur, lipitor and ASA  -Nitroglycerin paste or hydralazine IV prn SBP >170    JULES, improving  Metabolic acidosis, resolved  -pre renal from third spacing and fluid loss via NG (now removed)    - cr improving, off IVF  -hold nephrotoxic agents  -nephro following    SBO  Abdominal pain  Hx diverticulitis s/p partial colectomy 2008  S/p open cholecystectomy  -s/p lysis of adhesions 9/15/2017  -NGT removed. Tolerated clears liquid diet,, diet advance per surgery    Right breast CA dx  with bone mets dx   Right arm lymphedema  -pT3 N3a M0 (Stage IIIC) infiltrating ductal carcinoma, s/p right mastectomy, XRT  -follows with Dr Srinivas Verdin     SEN  -on CPAP     Chronic microcytic anemia, transfuse prn.    -hgb 7, one unit prbc ordered. Per Dr Teodora Moore note, keep hgb >8    Hx right hemispheric CVA   Hx PE 2015  Chronic back pain due to bone mets  Obesity  Body mass index is 30.96 kg/(m^2). Code status: Full  Prophylaxis: Lovenox         Subjective:     Chief Complaint / Reason for Physician Visit  Follow up of abdominal pain, metastatic cancer and ileus  Chart reviewed in detail. Discussed with RN events overnight. No acute complaints. A bit confused this morning. No c/o nausea/vomiting.     Review of Systems:  Symptom Y/N Comments  Symptom Y/N Comments   Fever/Chills    Chest Pain     Poor Appetite    Edema     Cough    Abdominal Pain y     Sputum    Joint Pain     SOB/WELCH    Pruritis/Rash Nausea/vomit n   Tolerating PT/OT     Diarrhea    Tolerating Diet     Constipation    Other       Could NOT obtain due to:      PO intake:   No data found. Objective:     VITALS:   Last 24hrs VS reviewed since prior progress note. Most recent are:  Patient Vitals for the past 24 hrs:   Temp Pulse Resp BP SpO2   09/22/17 0745 98.1 °F (36.7 °C) 88 20 120/74 99 %   09/22/17 0016 97.8 °F (36.6 °C) 72 16 138/68 100 %   09/21/17 2011 98.3 °F (36.8 °C) 99 16 121/63 100 %   09/21/17 1517 98.4 °F (36.9 °C) 78 16 111/55 98 %       Intake/Output Summary (Last 24 hours) at 09/22/17 0934  Last data filed at 09/21/17 1702   Gross per 24 hour   Intake              480 ml   Output              600 ml   Net             -120 ml        PHYSICAL EXAM:  General: WD, WN. Alert, cooperative, no acute distress    EENT:  EOMI. Anicteric sclerae. Resp:  CTA bilaterally, no wheezing or rales. No accessory muscle use  CV:  Regular  rhythm,  No edema  GI:  slight distended, NT.  +BS  Neurologic:  Alert and oriented X 3, normal speech  Psych:   Fair insight. Not anxious nor agitated  Skin:  No rashes. No jaundice    Reviewed most current lab test results and cultures  YES  Reviewed most current radiology test results   YES  Review and summation of old records today    NO  Reviewed patient's current orders and MAR    YES  PMH/SH reviewed - no change compared to H&P  ________________________________________________________________________  Care Plan discussed with:    Comments   Patient x    Family      RN x    Care Manager     Consultant                        Multidiciplinary team rounds were held today with , nursing, pharmacist and clinical coordinator. Patient's plan of care was discussed; medications were reviewed and discharge planning was addressed.      ________________________________________________________________________  Total NON critical care TIME:   35   Minutes    Total CRITICAL CARE TIME Spent:   Minutes non procedure based      Comments   >50% of visit spent in counseling and coordination of care x     This includes time during multidisciplinary rounds if indicated above   ________________________________________________________________________  Omer Chawla MD     Procedures: see electronic medical records for all procedures/Xrays and details which were not copied into this note but were reviewed prior to creation of Plan. LABS:  I reviewed today's most current labs and imaging studies.   Pertinent labs include:  Recent Labs      09/22/17 0318 09/21/17 0256 09/20/17   0400   WBC  5.8  6.2  6.0   HGB  7.0*  7.6*  7.3*   HCT  21.5*  22.6*  21.9*   PLT  147*  150  151     Recent Labs      09/22/17 0318 09/21/17 0256  09/20/17   0400   NA  141  143  139   K  3.8  3.7  3.9   CL  104  105  104   CO2  25  25  25   GLU  82  87  86   BUN  32*  37*  46*   CREA  1.48*  1.47*  1.90*   CA  7.4*  7.6*  7.7*   PHOS  2.5*  2.3*  2.7   ALB  1.8*  1.9*  1.8*

## 2017-09-22 NOTE — DISCHARGE INSTRUCTIONS
Discharge Instructions:  Dr. Enrigue Acton    Call on next business day to arrange office appointment Monday or Tuesday of next week for staple removal -- [phone]. Activity:  Walk regularly. No lifting more than 10 -15 pounds for 6 weeks. Light aerobic activity is okay when you feel up to it. You may resume driving in three days unless still requiring narcotics for pain. Diet:  Boost or Ensure with all meals. And,   Low-Residue Diet:    When your bowels are inflamed, you need to limit bulky fiber (such as uncooked fruits and vegetables) in your diet until the the inflammation resolves. You should maintain a low-residue diet for one month. Suggestions:  -Choose white or refined grains, and avoid whole grains. That means eating white or refined cereals, breads, crackers, rice, or pasta.  -Peel the skin from fruits and vegetables before you eat or cook them. Avoid eating skins, seeds, and hulls. -You can eat frozen or canned fruit. Low-fiber fruits include applesauce, ripe bananas, and fruit juice without pulp.  -Eat low-fiber vegetables, which include well-cooked vegetables and vegetable juice.  -Drink or eat milk, yogurt, or other milk products, if you can digest dairy without too many problems.   -Eat well-cooked meat, such as chicken, turkey, fish, or lean meat. You also can eat eggs and tofu. Avoid these foods:  -Nuts, seeds, popcorn, granola, whole grains.    -Bran, brown or wild rice, oatmeal, corn, monica crackers, barley, and whole wheat and other whole grain breads.  -Cereals with more than 3 grams of fiber per serving.  -Fresh and dried fruits including raisins.    -Raw vegetables, and even some cooked vegetable including cabbage, broccoli, brussels sprouts, and cauliflower.  -Beans, lentils, and split peas. -Crunchy peanut butter. Wound Care: No swimming or baths for 2 weeks. You should shower daily to decrease risk of wound infection.    If you experience a lot of drainage, develop redness around the wound, or a fever over 101 F occurs please call the office. Medications:  See attached \"Medication Reconciliation. \"    Resume home medications as indicated on the Medical Reconciliation form.

## 2017-09-22 NOTE — PROGRESS NOTES
Palliative Medicine Consult  [place]wen: [phone] ([phone])    Patient Name: Choco Ferrari  YOB: 1940    Date of Initial Consult: 9/13/17  Reason for Consult: Uncontrolled abdominal pain, advanced care planning  Requesting Provider: Sreekanth Nelson NP   Primary Care Physician: Lizzy Chatterjee MD      SUMMARY:   Choco Ferrari is a 65 y.o. with a past history of DM, HTN arrhythmia, CAD, MI and strokes in 2009 and 2013,diverticulosis, SEN,  stage IIIC infiltrating ductal carcinoma of the right breast, s/p right mastectomy with axillary LN dissection, chemotherapy completed April 2015, XRT completed August 2015, bone marrow biopsy July 2017 showed bone marrow extensively involved by metastatic carcinoma, breast primary. She was admitted on 9/13/2017 from Dr. Karissa Quintana office with a diagnosis of metastatic breast cancer, anemia. Current medical issues leading to Palliative Medicine involvement include: symptom management. Pt was admitted for severe fatigue and uncontrolled nausea and vomiting. She was found to have a partial bowel obstruction, and on 9/15 she was taken to the OR for lysis of adhesions. The NGT was d/cynthia yesterday, developed severe nausea last evening, and vomiting today. Psychosocial: pt lives with her daughter and Katelyn Vasquez in [place], daughter drives her to appointments   PALLIATIVE DIAGNOSES:   1. Abdominal pain: surgery 9/15/17 for lysis of adhesions  2. Pain due to neoplasm (spine mets)  3. Fatigue   4. Nausea and vomiting  5. Lymphedema right arm  6. Bilateral LE edema  7. Care decisions  8. Acute post-op ileus. PLAN:   1. Transition patient from IV dilaudid to PO IR Morphine. Will start with low dose of 7.5 mg (1/2 tab) every 4 hours as needed for pain. Prior to admission, patient was on 15 mg of IR Morphine as needed for pain control.  Per her IV dilaudid use during this hospital admission, the conversion shows that a lower dose would be appropriate and beneficial to treat the patient's pain at this time. Will continue to reassess her pain needs. 2. PRN zofran for N/V. Will continue to encourage fluid intake as tolerating. 3. Initial consult note routed to primary continuity provider  4. Communicated plan of care with: Palliative IDT, RN        GOALS OF CARE / TREATMENT PREFERENCES:   [====Goals of Care====]  GOALS OF CARE:  Patient / health care proxy stated goals:     Pain management      TREATMENT PREFERENCES:   Code Status: Full Code    Advance Care Planning:  Advance Care Planning 9/13/2017   Patient's Healthcare Decision Maker is: Legal Next of Dimitry 68   Primary Decision Maker Name Sae Blanco    Primary Decision Maker Phone Number [phone]   Primary Decision Maker Relationship to Patient Adult child   Secondary Decision Maker Name -   Confirm Advance Directive None   Patient Would Like to Complete Advance Directive -   Does the patient have other document types -       Other:    The palliative care team has discussed with patient / health care proxy about goals of care / treatment preferences for patient.  [====Goals of Care====]         HISTORY:     History obtained from: chart, patient    CHIEF COMPLAINT: abdominal pain    HPI/SUBJECTIVE:    The patient is:   [x] Verbal and participatory  [] Non-participatory due to:     (pt is poor historian)  9/13:  Pt was directly admitted from Oncology office today for complaints of severe abdominal pain that has not responded to her current pain medications: Morphine ER 15mg bid, and Morphine IR 15mg q. 4 hours prn. Pt reports to me that she has been experiencing diffuse, crampy, constant abdominal pain for \"months\", however, today she started vomiting. Endorses constipation, but states BM daily, LBM was \"normal\" yesterday. Asking for a suppository. 9/14: pain and nausea better since NGT placed. Still having abdominal pain, but not as bad.    9/18:  Miserable, did not want to talk.      9/19: feeling much better today. Nausea and vomiting are resolved, taking small sips of water. Had a BM this am.  C/o trouble with pain medication, her back pain is usually high, 8-9/10 when she gets a dose of medication. The medication helps, just not very long. Back pain at this time is 7-8/10.    9/20: \"I'm hungry. \"  No nausea or vomiting, pain well controlled with scheduled doses, has not required any prn doses. BM today. 9/22: Patient appears to continue to be improving although she states she is \"worn out\". Per RN and patient, she is eating 50% of her meals but has increased her fluid intake. Had some nausea this afternoon requiring one PRN dose of IV zofran. No other N/V issues noted today. Patient had BM today that was more formed but remains loose per RN and patient. Per :   08/11/17  Morphine IR 15mg #60/10 day supply [facility]  08/11/17  Morphine ER 15mg  #60/30 day supply [facility]  08/02/17  Tramadol 50mg  #60/15 day supply [facility]  Pt has been getting different opioids from multiple providers in the past year for different pain.       Clinical Pain Assessment (nonverbal scale for severity on nonverbal patients):   [++++ Clinical Pain Assessment++++]  [++++Pain Severity++++]: Pain: 3  [++++Pain Character++++]: crampy  [++++Pain Duration++++]:  months  [++++Pain Effect++++]: nausea and vomiting  [++++Pain Factors++++]:   [++++Pain Frequency++++]: constant  [++++Pain Location++++]:   Diffuse abdomen  [++++ Clinical Pain Assessment++++]  Duration: for how long has pt been experiencing pain (e.g., 2 days, 1 month, years)  Frequency: how often pain is an issue (e.g., several times per day, once every few days, constant)     FUNCTIONAL ASSESSMENT:     Palliative Performance Scale (PPS):  PPS: 50       PSYCHOSOCIAL/SPIRITUAL SCREENING:     Advance Care Planning:  Advance Care Planning 9/13/2017   Patient's Healthcare Decision Maker is: Legal Next of [address]   Primary Decision Maker Name [facility]    Primary Decision Maker Phone Number [phone] Primary Decision Maker Relationship to Patient Adult child   Secondary Decision Maker Name -   Confirm Advance Directive None   Patient Would Like to Complete Advance Directive -   Does the patient have other document types -        Any spiritual / Nondenominational concerns:  [] Yes /  [x] No    Caregiver Burnout:  [] Yes /  [] No /  [x] No Caregiver Present      Anticipatory grief assessment:   [x] Normal  / [] Maladaptive       ESAS Anxiety: Anxiety: 0    ESAS Depression: Depression: 0        REVIEW OF SYSTEMS:     Positive and pertinent negative findings in ROS are noted above in HPI. The following systems were [x] reviewed / [] unable to be reviewed as noted in HPI  Other findings are noted below. Systems: constitutional, ears/nose/mouth/throat, respiratory, gastrointestinal, genitourinary, musculoskeletal, integumentary, neurologic, psychiatric, endocrine. Positive findings noted below. Modified ESAS Completed by: provider   Fatigue: 2 Drowsiness: 0   Depression: 0 Pain: 3   Anxiety: 0 Nausea: 0   Anorexia: 0 Dyspnea: 0     Constipation: No     Stool Occurrence(s): 1        PHYSICAL EXAM:     From RN flowsheet:  Wt Readings from Last 3 Encounters:   09/22/17 191 lb 12.8 oz (87 kg)   09/13/17 199 lb (90.3 kg)   09/06/17 194 lb 7 oz (88.2 kg)     Blood pressure 116/58, pulse 87, temperature 99.1 °F (37.3 °C), resp. rate 20, height 5' 6\" (1.676 m), weight 191 lb 12.8 oz (87 kg), SpO2 100 %.     Pain Scale 1: Numeric (0 - 10)  Pain Intensity 1: 0  Pain Onset 1: surgery  Pain Location 1: Abdomen  Pain Orientation 1: Lower  Pain Description 1: Cramping  Pain Intervention(s) 1: Medication (see MAR)  Last bowel movement, if known:     Constitutional:WD, WN,sitting up in bed, wearing compression sleeve right arm  Eyes: pupils equal, anicteric  ENMT: no nasal discharge, moist mucous membranes   Cardiovascular:  regular rhythm, distal pulses intact  Respiratory: breathing not labored, symmetric  Skin: warm, dry  Neurologic: following commands, moving all extremities  Psychiatric: full affect, no hallucinations          HISTORY:     Active Problems:    Metastatic breast cancer ([facility] [address] (8/11/2017)      Neoplastic malignant related fatigue (9/13/2017)      Pain due to malignant neoplasm metastatic to bone (HCC) (2/43/1920)      Cyclical vomiting with nausea (9/13/2017)      Past Medical History:   Diagnosis Date    Arrhythmia     bradycardia in 30's /pacemaker inserted    Arthritis     RT KNEE    Asthma Dx age 75    Bradycardia 2009    Cardiology saw her during hospital stay; Now off coreg;  Sees Dr. Fan Clarity [facility])     Rt mastectomy 2/19/15    CAD (coronary artery disease)     Dr Juan Antonio Wright Diabetes [facility])     Now seeing Dr. Maximiliano Moore Diverticulitis     DJD (degenerative joint disease), lumbar     GERD (gastroesophageal reflux disease)     H. pylori infection 2008    Dr. Paige Ansari attack [facility]) April 2006    Hypercholesterolemia     Hypertension     Morbid obesity ([facility] [address]     Neuropathy, arm 2009    Right; Has had MRI and EMG at [facility] [phone] SEN (obstructive sleep apnea)     uses CPAP    Rectal bleeding 2009    Hospitalized;  Had colonoscopy and EGD (ok), gastric emptying study (normal), doppler mesenteric arteries (ok)    Sciatica     Has seen Dr. Matt Gaucher    Stroke [facility]) 2009 & 2012    no residual problems      Past Surgical History:   Procedure Laterality Date    HX APPENDECTOMY  1979    HX BREAST LUMPECTOMY  12/12/2013    RIGHT BREAST DUCTAL EXCISION performed [facility]    HX CATARACT REMOVAL  2007    HX CHOLECYSTECTOMY  1979    HX COLONOSCOPY      HX HEART CATHETERIZATION      angioplasty    HX HYSTERECTOMY      fibroids    HX KNEE ARTHROSCOPY  1997    right    HX MASTECTOMY Right 2/19/2015    RIGHT BREAST MODIFIED RADICAL MASTECTOMY RIGHT SENTINEL NODE BIOPSY, RIGHT PORT A CATH INSERTION performed by Charly Spann MD at [facility] AM[facility] OR    HX PACEMAKER      HX SHOULDER ARTHROSCOPY  2004    left      Family History   Problem Relation Age of Onset    Stroke Father     Cancer Sister      breast cancer    Hypertension Sister     Cancer Mother      Unknown type    Cancer Brother      Colon    Hypertension Brother     Anesth Problems Neg Hx       History reviewed, no pertinent family history.   Social History   Substance Use Topics    Smoking status: Never Smoker    Smokeless tobacco: Never Used    Alcohol use No     Allergies   Allergen Reactions    Codeine Itching    Duratuss [Phenylephrine-Guaifenesin] Nausea and Vomiting    Lisinopril-Hydrochlorothiazide Itching    Motrin [Ibuprofen] Nausea and Vomiting     With 800mg    Tape [Adhesive] Other (comments)     TEARS HER SKIN      Current Facility-Administered Medications   Medication Dose Route Frequency    insulin lispro (HUMALOG) injection   SubCUTAneous AC&HS    0.9% sodium chloride infusion 250 mL  250 mL IntraVENous PRN    morphine IR (MS IR) tablet 7.5 mg  7.5 mg Oral Q4H PRN    isosorbide mononitrate ER (IMDUR) tablet 60 mg  60 mg Oral DAILY    amLODIPine (NORVASC) tablet 10 mg  10 mg Oral DAILY    atorvastatin (LIPITOR) tablet 80 mg  80 mg Oral QHS    aspirin delayed-release tablet 81 mg  81 mg Oral DAILY    metoclopramide HCl (REGLAN) injection 5 mg  5 mg IntraVENous Q6H PRN    hydrALAZINE (APRESOLINE) 20 mg/mL injection 10 mg  10 mg IntraVENous Q4H PRN    ondansetron (ZOFRAN) injection 4 mg  4 mg IntraVENous Q6H PRN    SALINE PERIPHERAL FLUSH Q8H soln 5 mL  5 mL InterCATHeter Q8H    enoxaparin (LOVENOX) injection 30 mg  30 mg SubCUTAneous DAILY    glucose chewable tablet 16 g  4 Tab Oral PRN    glucagon (GLUCAGEN) injection 1 mg  1 mg IntraMUSCular PRN    famotidine (PF) (PEPCID) injection 20 mg  20 mg IntraVENous DAILY    dextrose 10% infusion 125-250 mL  125-250 mL IntraVENous PRN          LAB AND IMAGING FINDINGS:     Lab Results   Component Value Date/Time    WBC 5.8 09/22/2017 03:18 AM    HGB 7.0 09/22/2017 03:18 AM    PLATELET 037 17/23/6360 03:18 AM     Lab Results   Component Value Date/Time    Sodium 141 09/22/2017 03:18 AM    Potassium 3.8 09/22/2017 03:18 AM    Chloride 104 09/22/2017 03:18 AM    CO2 25 09/22/2017 03:18 AM    BUN 32 09/22/2017 03:18 AM    Creatinine 1.48 09/22/2017 03:18 AM    Calcium 7.4 09/22/2017 03:18 AM    Magnesium 1.6 09/19/2017 02:50 AM    Phosphorus 2.5 09/22/2017 03:18 AM      Lab Results   Component Value Date/Time    AST (SGOT) 17 09/15/2017 05:58 AM    Alk. phosphatase 438 09/15/2017 05:58 AM    Protein, total 7.1 09/15/2017 05:58 AM    Albumin 1.8 09/22/2017 03:18 AM    Globulin 4.4 09/15/2017 05:58 AM     Lab Results   Component Value Date/Time    INR 1.1 06/10/2017 02:56 PM    Prothrombin time 10.8 06/10/2017 02:56 PM    aPTT 33.2 07/03/2010 09:00 AM      Lab Results   Component Value Date/Time    Iron 42 05/11/2015 05:00 AM    TIBC 215 05/11/2015 05:00 AM    Iron % saturation 20 05/11/2015 05:00 AM    Ferritin 417 05/11/2015 05:00 AM      No results found for: PH, PCO2, PO2  No components found for: [place]   Lab Results   Component Value Date/Time    CK 70 09/15/2017 02:45 PM    CK - MB 2.3 01/26/2017 01:16 PM                Total time: 30 min  Counseling / coordination time, spent as noted above: 25 min  > 50% counseling / coordination?: yes    Prolonged service was provided for  []30 min   []75 min in face to face time in the presence of the patient, spent as noted above. Time Start:   Time End:   Note: this can only be billed with 83971 (initial) or 68021 (follow up). If multiple start / stop times, list each separately.

## 2017-09-22 NOTE — ROUTINE PROCESS
End of Shift Nursing Note    Bedside shift change report given to Marko Graham RN (oncoming nurse) by Be Macedo (offgoing nurse). Report included the following information SBAR, Kardex, Procedure Summary, Intake/Output and MAR. Zone Phone:       Significant changes during shift:       Non-emergent issues for physician to address:        Number times ambulated in hallway past shift: 0      Number of times OOB to chair past shift: 2    POD #:      Vital Signs:    Temp: 99.1 °F (37.3 °C)     Pulse (Heart Rate): 87     BP: 116/58     Resp Rate: 20     O2 Sat (%): 100 %    Lines & Drains:     Urinary Catheter? No   Placement Date:    Medical Necessity:   Central Line? Yes   Placement Date: 09/13/17   Medical Necessity long term  PICC Line? No   Placement Date:    Medical Necessity:     NG tube [] in [] removed [x] not applicable   Drains [] in [] removed [x] not applicable     Skin Integrity:      Wounds: yes   Dressings Present: yes    Wound Concerns: no      GI:    Current diet:  DIET GI LITE (POST SURGICAL)    Nausea: YES  Vomiting: NO  Bowel Sounds: YES  Flatus: YES  Last Bowel Movement: today   Appearance: brown, loose    Respiratory:  Supplemental O2: No      Device:    via  Liters/min     Incentive Spirometer: YES Needs encouragement Volume:   Coughing and Deep Breathing: YES  Oral Care: YES  Understanding (patient/family education): YES   Getting out of bed: YES  Head of bed elevation: YES    Patient Safety:    Falls Score: 3  Mobility Score: 2  Bed Alarm On? No  Sitter? No      Opportunity for questions and clarification was given to oncoming nurse. Patient bed is in lowest position, side rails are up x 2, door & observation blinds open as needed, call bell within reach and patient not in distress.     Bacilio Hallmark

## 2017-09-23 NOTE — PROGRESS NOTES
Admit Date: 2017    POD 8 Days Post-Op    Procedure:  Procedure(s):  LYSIS OF ADHESIONS LAPAROSCOPIC CONVERT TO OPEN    Subjective:     Patient delvis gi lite diet 1/2 portions.  + BMs. States she has abdominal pain. Objective:     Blood pressure 138/82, pulse 85, temperature 98.8 °F (37.1 °C), resp. rate 17, height 5' 6\" (1.676 m), weight 191 lb 12.8 oz (87 kg), SpO2 97 %. Temp (24hrs), Av.9 °F (37.2 °C), Min:98.4 °F (36.9 °C), Max:99.6 °F (37.6 °C)      Physical Exam:  GENERAL: alert, cooperative, no distress, appears stated age, LUNG: clear to auscultation bilaterally, HEART: regular rate and rhythm, ABDOMEN: soft, non-tender. Bowel sounds normal.wound c/d/i, EXTREMITIES:  extremities normal, atraumatic, no cyanosis or edema    Labs:   Recent Results (from the past 24 hour(s))   GLUCOSE, POC    Collection Time: 17 12:29 PM   Result Value Ref Range    Glucose (POC) 131 (H) 65 - 100 mg/dL    Performed by Hamzah Amor (PCT)    GLUCOSE, POC    Collection Time: 17  9:20 PM   Result Value Ref Range    Glucose (POC) 150 (H) 65 - 100 mg/dL    Performed by Lew Miles (PCT)    CBC WITH AUTOMATED DIFF    Collection Time: 17  4:00 AM   Result Value Ref Range    WBC 6.2 3.6 - 11.0 K/uL    RBC 3.00 (L) 3.80 - 5.20 M/uL    HGB 7.9 (L) 11.5 - 16.0 g/dL    HCT 23.5 (L) 35.0 - 47.0 %    MCV 78.3 (L) 80.0 - 99.0 FL    MCH 26.3 26.0 - 34.0 PG    MCHC 33.6 30.0 - 36.5 g/dL    RDW 18.9 (H) 11.5 - 14.5 %    PLATELET 832 (L) 109 - 400 K/uL    NEUTROPHILS 79 %    LYMPHOCYTES 16 %    MONOCYTES 5 %    EOSINOPHILS 0 %    BASOPHILS 0 %    ABS. NEUTROPHILS 4.9 K/UL    ABS. LYMPHOCYTES 1.0 K/UL    ABS. MONOCYTES 0.3 K/UL    ABS. EOSINOPHILS 0.0 K/UL    ABS.  BASOPHILS 0.0 K/UL    DF MANUAL      RBC COMMENTS ANISOCYTOSIS  1+       GLUCOSE, POC    Collection Time: 17  7:38 AM   Result Value Ref Range    Glucose (POC) 129 (H) 65 - 100 mg/dL    Performed by [address] [place] [address], [address] Review images and reports reviewed    Assessment:     Active Problems:    Metastatic breast cancer ([facility] [address] (8/11/2017)      Neoplastic malignant related fatigue (9/13/2017)      Pain due to malignant neoplasm metastatic to bone (HCC) (8/35/2507)      Cyclical vomiting with nausea (9/13/2017)        Plan/Recommendations/Medical Decision Making:     Continue present treatment   Pt accepted at [facility]. Insistent she needs to stay until Monday or Tuesday and then states she has too much abdominal pain to be d/c'd. Discussed with pt that she meets discharge criteria to SNF now and she is incurring greater expense by refusing discharge. Pt and family insistent she should stay longer. Vivien Toro.  Aurelio Traylor MD, [facility]

## 2017-09-23 NOTE — PROGRESS NOTES
Hospitalist Progress Note    NAME: Tino Wallace   :  1940   MRN:  [id number]     Interim Hospital Summary: 65 y.o. female whom presented on 2017 with      Assessment / Plan:  DM type 2  -pt normally on Lantus 18 units at home. She is tolerating diet, eating ~50% of her diet. However BG remains ~120-150. Will continue to hold home lantus and adjust it prn.    -SSI for now to reduce risk of hypoglycemia with lantus. Once her PO intake improves, can slowly add back home lantus at lower dose. HTN / CAD / PPM  Chronic diastolic chf EF 09-99%  -cont' norvasc, imdur, lipitor and ASA  -Nitroglycerin paste or hydralazine IV prn SBP >170    JULES, improving  Metabolic acidosis, resolved  -pre renal from third spacing and fluid loss via NG (now removed)    - cr improving, off IVF  -hold nephrotoxic agents  -nephro following    SBO  Abdominal pain  Hx diverticulitis s/p partial colectomy 2008  S/p open cholecystectomy  -s/p lysis of adhesions 9/15/2017  -NGT removed. Tolerated Gi lite diet,,diet advance per surgery    Right breast CA dx  with bone mets dx   Right arm lymphedema  -pT3 N3a M0 (Stage IIIC) infiltrating ductal carcinoma, s/p right mastectomy, XRT  -follows with Dr Melvi Duval     SEN  -on CPAP     Chronic microcytic anemia, transfuse prn.    -hgb 7.9 after 1 unit prbc. Per Dr Erika Posada note, keep hgb >8    Hx right hemispheric CVA   Hx PE   Chronic back pain due to bone mets  Obesity  Body mass index is 30.96 kg/(m^2). Code status: Full  Prophylaxis: Lovenox         Subjective:     Chief Complaint / Reason for Physician Visit  Follow up of abdominal pain, metastatic cancer and ileus  Chart reviewed in detail. Discussed with RN events overnight. Pt had some nausea this morning, but denies vomiting.       Review of Systems:  Symptom Y/N Comments  Symptom Y/N Comments   Fever/Chills n   Chest Pain     Poor Appetite    Edema Cough    Abdominal Pain y     Sputum    Joint Pain     SOB/WELCH    Pruritis/Rash     Nausea/vomit y   Tolerating PT/OT     Diarrhea    Tolerating Diet     Constipation    Other       Could NOT obtain due to:      PO intake:   Patient Vitals for the past 72 hrs:   % Diet Eaten   09/22/17 1453 50 %   09/22/17 1029 50 %     Objective:     VITALS:   Last 24hrs VS reviewed since prior progress note. Most recent are:  Patient Vitals for the past 24 hrs:   Temp Pulse Resp BP SpO2   09/23/17 0320 99.1 °F (37.3 °C) (!) 56 18 136/54 100 %   09/22/17 2012 99.6 °F (37.6 °C) (!) 58 18 110/55 98 %   09/22/17 1526 99.1 °F (37.3 °C) 87 20 116/58 100 %   09/22/17 1509 98.6 °F (37 °C) 72 20 115/55 100 %   09/22/17 1427 98.4 °F (36.9 °C) 61 16 128/54 95 %   09/22/17 1230 - 62 - - -   09/22/17 1227 98.5 °F (36.9 °C) (!) 53 17 148/58 95 %       Intake/Output Summary (Last 24 hours) at 09/23/17 0929  Last data filed at 09/22/17 1728   Gross per 24 hour   Intake              900 ml   Output                0 ml   Net              900 ml        PHYSICAL EXAM:  General: WD, WN. Alert, cooperative, no acute distress    EENT:  EOMI. Anicteric sclerae. Resp:  CTA bilaterally, no wheezing or rales. No accessory muscle use  CV:  Regular  rhythm,  mild edema  GI:  slight distended, NT.  +BS  Neurologic:  Alert and oriented X 3, normal speech  Psych:   Fair insight. Not anxious nor agitated  Skin:  No rashes.   No jaundice    Reviewed most current lab test results and cultures  YES  Reviewed most current radiology test results   YES  Review and summation of old records today    NO  Reviewed patient's current orders and MAR    YES  PMH/SH reviewed - no change compared to H&P  ________________________________________________________________________  Care Plan discussed with:    Comments   Patient x    Family      RN x    Care Manager     Consultant                        Multidiciplinary team rounds were held today with , nursing, pharmacist and clinical coordinator. Patient's plan of care was discussed; medications were reviewed and discharge planning was addressed. ________________________________________________________________________  Total NON critical care TIME:   35   Minutes    Total CRITICAL CARE TIME Spent:   Minutes non procedure based      Comments   >50% of visit spent in counseling and coordination of care x     This includes time during multidisciplinary rounds if indicated above   ________________________________________________________________________  Robyn Mcmahan MD     Procedures: see electronic medical records for all procedures/Xrays and details which were not copied into this note but were reviewed prior to creation of Plan. LABS:  I reviewed today's most current labs and imaging studies.   Pertinent labs include:  Recent Labs      09/23/17   0400  09/22/17 0318  09/21/17   0256   WBC  6.2  5.8  6.2   HGB  7.9*  7.0*  7.6*   HCT  23.5*  21.5*  22.6*   PLT  135*  147*  150     Recent Labs      09/22/17   0318  09/21/17   0256   NA  141  143   K  3.8  3.7   CL  104  105   CO2  25  25   GLU  82  87   BUN  32*  37*   CREA  1.48*  1.47*   CA  7.4*  7.6*   PHOS  2.5*  2.3*   ALB  1.8*  1.9*

## 2017-09-23 NOTE — PROGRESS NOTES
End of Shift Nursing Note    Bedside shift change report given to malka ng (oncoming nurse) by Leela Allen (offgoing nurse). Report included the following information SBAR and Kardex. Zone Phone:   [phone]    Significant changes during shift:    C/o nausea and burning in upper ABD, walked in halls, up to chair most of day, poor po intake   Non-emergent issues for physician to address:        Number times ambulated in hallway past shift: 1      Number of times OOB to chair past shift: 3    POD #: 8     Vital Signs:    Temp: 98.2 °F (36.8 °C)     Pulse (Heart Rate): 75     BP: 126/68     Resp Rate: 16     O2 Sat (%): 100 %    Lines & Drains:      Central Line? Yes   Placement Date: changed needle 9/23   Medical Necessity: limited PIV      NG tube [] in [] removed [x] not applicable   Drains [] in [] removed [x] not applicable     Skin Integrity:      Wounds: yes   Dressings Present: yes    Wound Concerns: no      GI:    Current diet:  DIET GI LITE (POST SURGICAL)    Nausea: YES  Vomiting: NO  Bowel Sounds: YES  Flatus: YES  Last Bowel Movement: yesterday   Appearance: loose    Respiratory:  Incentive Spirometer: YES  Volume: 750  Coughing and Deep Breathing: YES  Oral Care: YES  Understanding (patient/family education): YES   Getting out of bed: YES  Head of bed elevation: YES    Patient Safety:    Falls Score: 2  Mobility Score: 1  Bed Alarm On? Not applicable  Sitter? Not applicable      Opportunity for questions and clarification was given to oncoming nurse. Patient bed is in low position, side rails are up x 2, door & observation blinds open as needed, call bell within reach and patient not in distress.     Chencho Green RN

## 2017-09-24 NOTE — PROGRESS NOTES
End of Shift Nursing Note    Bedside shift change report given to malka ng (oncoming nurse) by Mignon Rick (offgoing nurse). Report included the following information SBAR and Kardex. Zone Phone:   [phone]    Significant changes during shift:    Nausea this PM and vomiting x2 small amount bile   Non-emergent issues for physician to address:        Number times ambulated in hallway past shift: 1      Number of times OOB to chair past shift: 3    POD #:      Vital Signs:    Temp: 97.9 °F (36.6 °C)     Pulse (Heart Rate): 98     BP: 121/78     Resp Rate: 21     O2 Sat (%): 100 %    Lines & Drains:     Central Line? Yes   Placement Date:    Medical Necessity: limited PIV      NG tube [] in [] removed [x] not applicable   Drains [] in [] removed [x] not applicable     Skin Integrity:      Wounds: no   Dressings Present: no    Wound Concerns: no      GI:    Current diet:  DIET GI LITE (POST SURGICAL)    Nausea: YES  Vomiting: YES  Bowel Sounds: YES  Flatus: YES  Last Bowel Movement: today   Appearance:       Patient Safety:    Falls Score: 2  Mobility Score: 1  Bed Alarm On? Not applicable  Sitter? Not applicable      Opportunity for questions and clarification was given to oncoming nurse. Patient bed is in low position, side rails are up x 2, door & observation blinds open as needed, call bell within reach and patient not in distress.     Tao Denney RN

## 2017-09-24 NOTE — PROGRESS NOTES
Nutrition Assessment:    INTERVENTIONS/RECOMMENDATIONS:   Meals/Snacks: General/healthful diet:  Continue GI Lite Diet as tolerated. Enc po. ASSESSMENT:   9/24:  Chart reviewed; s/p lysis of adhesions lap convert to open. Pt has been accepted to [facility] but pt and family members do not feel pt is ready for discharge. Tolerating GI Lite Diet. Labs stable. 9/19:  Chart reviewed, medically noted for Metastatic breast cancer, small bowel obstruction - s/p lysis of adhesions laproscopic 9/15, now with ileus causing nausea, and PMH shown below. Poor PO intake for >5 days. Dr. Ama Brandon noting possible TPN tomorrow if ileus does not resolve. TPN recs shown above    Diet Order: Other (comment) (sips of clears)  % Eaten:  Patient Vitals for the past 72 hrs:   % Diet Eaten   09/24/17 1302 25 %   09/23/17 1842 50 %   09/23/17 1230 30 %   09/23/17 0955 50 %   09/22/17 1453 50 %   09/22/17 1029 50 %     Pertinent Medications: [x] Reviewed []Other:  Pertinent Labs: [x]Reviewed  []Other:  Food Allergies: [x]None []Other:     Last BM: 9/22   [x]Active     []Hyperactive  []Hypoactive       [] Absent  BS  Skin:    [] Intact   [x] Incision  [] Breakdown   []Edema   []Other:    Anthropometrics: Height: 5' 6\" (167.6 cm) Weight: 87 kg (191 lb 12.8 oz)    IBW (%IBW):   ( ) UBW (%UBW):   (  %)    BMI: Body mass index is 30.96 kg/(m^2). This BMI is indicative of:  []Underweight   []Normal   [x]Overweight   [] Obesity   [] Extreme Obesity (BMI>40)  Last Weight Metrics:  Weight Loss Metrics 9/22/2017 9/13/2017 9/13/2017 9/13/2017 9/6/2017 9/1/2017 8/30/2017   Today's Wt 191 lb 12.8 oz - 199 lb - 194 lb 7 oz - 188 lb 6.4 oz   BMI - 30.96 kg/m2 - 32.12 kg/m2 - 30.52 kg/m2 30.33 kg/m2       Estimated Nutrition Needs (Based on): 1970 Kcals/day (BMR: 1405 x 1.4 d/t met CA) , 115 g (1.3 g/kg) Protein  Carbohydrate:  At Least 130 g/day  Fluids: 2000 mL/day    NUTRITION DIAGNOSES:   Problem:  Inadequate protein-energy intake Etiology: related to illeus      Signs/Symptoms: as evidenced by minimal PO intake >5 days      NUTRITION INTERVENTIONS:  Meals/Snacks: General/healthful diet Enteral/Parenteral Nutrition: Initiate parenteral nutrition                GOAL:   initiate nutrition in 2-4 days    NUTRITION MONITORING AND EVALUATION      Food/Nutrient Intake Outcomes:  Total energy intake  Physical Signs/Symptoms Outcomes: Weight/weight change, Electrolyte and renal profile, GI profile, Glucose profile, GI    Previous Goal Met:   [] Met              [x] Progressing Towards Goal              [] Not Progressing Towards Goal   Previous Recommendations:   [x] Implemented          [] Not Implemented          [] Not Applicable    LEARNING NEEDS (Diet, Food/Nutrient-Drug Interaction):    [x] None Identified   [] Identified and Education Provided/Documented   [] Identified and Pt declined/was not appropriate     Cultural, Christian, OR Ethnic Dietary Needs:    [x] None Identified   [] Identified and Addressed     [x] Interdisciplinary Care Plan Reviewed/Documented    [x] Discharge Planning:  Continue GI Lite as tolerated   [] Participated in Interdisciplinary Rounds    NUTRITION RISK:    [] High              [x] Moderate           []  Low  []  Minimal/Uncompromised      Lynda Harvey, [address]  Pager [phone]  Weekend Pager [phone]

## 2017-09-24 NOTE — PROGRESS NOTES
1030 - Patient declined offer to ambulate in hallway. Encouraged mobility. Will offer ambulation later.

## 2017-09-24 NOTE — PROGRESS NOTES
Hospitalist Progress Note    NAME: Homa Winston   :  1940   MRN:  [id number]     Interim Hospital Summary: 65 y.o. female whom presented on 2017 with      Assessment / Plan:  DM type 2  -pt normally on Lantus 18 units at home. She has very poor appetite  -BG ~120  -hold home lantus and adjust it prn.    -SSI for now to reduce risk of hypoglycemia with lantus. Once her PO intake improves, can slowly add back home lantus at lower dose.  -resume her home dose megestrol    HTN / CAD / PPM  Chronic diastolic chf EF 26-76%  -cont' norvasc, imdur, lipitor and ASA  -Nitroglycerin paste or hydralazine IV prn SBP >170    JULES, improving  Metabolic acidosis, resolved  -pre renal from third spacing and fluid loss via NG (now removed)    - cr improving, off IVF  -hold nephrotoxic agents  -nephro following    SBO  Abdominal pain  Hx diverticulitis s/p partial colectomy 2008  S/p open cholecystectomy  -s/p lysis of adhesions 9/15/2017  -NGT removed. Tolerating diet    Right breast CA dx  with bone mets dx   Right arm lymphedema  -pT3 N3a M0 (Stage IIIC) infiltrating ductal carcinoma, s/p right mastectomy, XRT  -follows with Dr Sky Mary     SEN  -on CPAP     Chronic microcytic anemia, transfuse prn.    -hgb 7.9 after 1 unit prbc. Per Dr Luther Elaine note, keep hgb >8    Hx right hemispheric CVA   Hx PE   Chronic back pain due to bone mets  Obesity  Body mass index is 30.96 kg/(m^2). Code status: Full  Prophylaxis: Lovenox         Subjective:     Chief Complaint / Reason for Physician Visit  Follow up of abdominal pain, metastatic cancer and ileus  Chart reviewed in detail. Discussed with RN events overnight. Poor appetite, requesting not to be discharge until Monday or Tuesday until her family can arrange things.     Review of Systems:  Symptom Y/N Comments  Symptom Y/N Comments   Fever/Chills n   Chest Pain     Poor Appetite y   Edema Cough    Abdominal Pain y     Sputum    Joint Pain     SOB/WELCH    Pruritis/Rash     Nausea/vomit y   Tolerating PT/OT     Diarrhea    Tolerating Diet y    Constipation    Other       Could NOT obtain due to:      PO intake:   Patient Vitals for the past 72 hrs:   % Diet Eaten   09/23/17 1842 50 %   09/23/17 1230 30 %   09/23/17 0955 50 %   09/22/17 1453 50 %   09/22/17 1029 50 %     Objective:     VITALS:   Last 24hrs VS reviewed since prior progress note. Most recent are:  Patient Vitals for the past 24 hrs:   Temp Pulse Resp BP SpO2   09/24/17 0742 98.2 °F (36.8 °C) 91 18 121/81 97 %   09/24/17 0410 98.3 °F (36.8 °C) 90 18 130/62 98 %   09/23/17 2348 98.7 °F (37.1 °C) 76 18 126/68 100 %   09/23/17 2041 98.4 °F (36.9 °C) 71 16 113/77 98 %   09/23/17 1453 98.2 °F (36.8 °C) 75 16 126/68 100 %   09/23/17 1133 98.6 °F (37 °C) 82 17 127/72 99 %       Intake/Output Summary (Last 24 hours) at 09/24/17 0953  Last data filed at 09/24/17 0410   Gross per 24 hour   Intake             1230 ml   Output                0 ml   Net             1230 ml        PHYSICAL EXAM:  General: WD, WN. Alert, cooperative, no acute distress    EENT:  EOMI. Anicteric sclerae. Resp:  CTA bilaterally, no wheezing or rales. No accessory xmuscle use  CV:  Regular  rhythm,  mild edema  GI:  slight distended, NT.  +BS  Neurologic:  Alert and oriented X 3, normal speech  Psych:   Fair insight. Not anxious nor agitated  Skin:  No rashes.   No jaundice    Reviewed most current lab test results and cultures  YES  Reviewed most current radiology test results   YES  Review and summation of old records today    NO  Reviewed patient's current orders and MAR    YES  PMH/SH reviewed - no change compared to H&P  ________________________________________________________________________  Care Plan discussed with:    Comments   Patient x    Family      RN x    Care Manager     Consultant  x Dr Liliane Richards team rounds were held today with , nursing, pharmacist and clinical coordinator. Patient's plan of care was discussed; medications were reviewed and discharge planning was addressed. ________________________________________________________________________  Total NON critical care TIME:   35   Minutes    Total CRITICAL CARE TIME Spent:   Minutes non procedure based      Comments   >50% of visit spent in counseling and coordination of care x     This includes time during multidisciplinary rounds if indicated above   ________________________________________________________________________  Charla Machado MD     Procedures: see electronic medical records for all procedures/Xrays and details which were not copied into this note but were reviewed prior to creation of Plan. LABS:  I reviewed today's most current labs and imaging studies.   Pertinent labs include:  Recent Labs      09/23/17   0400  09/22/17 0318   WBC  6.2  5.8   HGB  7.9*  7.0*   HCT  23.5*  21.5*   PLT  135*  147*     Recent Labs      09/22/17 0318   NA  141   K  3.8   CL  104   CO2  25   GLU  82   BUN  32*   CREA  1.48*   CA  7.4*   PHOS  2.5*   ALB  1.8*

## 2017-09-24 NOTE — PROGRESS NOTES
Admit Date: 2017    POD 9 Days Post-Op    Procedure:  Procedure(s):  LYSIS OF ADHESIONS LAPAROSCOPIC CONVERT TO OPEN    Subjective:     Patient delvis gi lite diet 1/2 portions.  + BMs. States she has abdominal pain. Had loose stools last evening. Objective:     Blood pressure 121/81, pulse 91, temperature 98.2 °F (36.8 °C), resp. rate 18, height 5' 6\" (1.676 m), weight 191 lb 12.8 oz (87 kg), SpO2 97 %. Temp (24hrs), Av.4 °F (36.9 °C), Min:98.2 °F (36.8 °C), Max:98.7 °F (37.1 °C)      Physical Exam:  GENERAL: alert, cooperative, no distress, appears stated age, LUNG: clear to auscultation bilaterally, HEART: regular rate and rhythm, ABDOMEN: soft, non-tender. Bowel sounds normal.wound c/d/i, EXTREMITIES:  extremities normal, atraumatic, no cyanosis or edema    Labs:   Recent Results (from the past 24 hour(s))   GLUCOSE, POC    Collection Time: 17 12:19 PM   Result Value Ref Range    Glucose (POC) 146 (H) 65 - 100 mg/dL    Performed by Jj Russell, POC    Collection Time: 17  4:27 PM   Result Value Ref Range    Glucose (POC) 128 (H) 65 - 100 mg/dL    Performed by Tamala Ormond)    GLUCOSE, POC    Collection Time: 17  8:53 PM   Result Value Ref Range    Glucose (POC) 116 (H) 65 - 100 mg/dL    Performed by Elena Moreau    GLUCOSE, POC    Collection Time: 17  7:27 AM   Result Value Ref Range    Glucose (POC) 113 (H) 65 - 100 mg/dL    Performed by Bulmaro [place] ([facility])        Data Review images and reports reviewed    Assessment:     Active Problems:    Metastatic breast cancer ([facility] [address] (2017)      Neoplastic malignant related fatigue (2017)      Pain due to malignant neoplasm metastatic to bone ([facility] [address] (3688)      Cyclical vomiting with nausea (2017)        Plan/Recommendations/Medical Decision Making:     Continue present treatment   Pt accepted at [facility].   Insistent she needs to stay until Monday or Tuesday and then states she has too much abdominal pain to be d/c'd. Discussed with pt that she meets discharge criteria to SNF now and she is incurring greater expense by refusing discharge. Pt and family insistent she should stay longer. Valeria Whitlock.  Patricia Cotto MD, [facility]

## 2017-09-24 NOTE — PROGRESS NOTES
End of Shift Nursing Note    Bedside shift change report given to Al (oncoming nurse) by Jean Marie Kenyon RN (offgoing nurse). Report included the following information SBAR, Kardex, Intake/Output and MAR. Zone Phone:       Significant changes during shift:  Patient had multiple watery stools this shift, very foul smelling. Called Dr. Dinesh Terrell who is on call for Dr. Annette Jama, obtained  An order to send stool specimen for c-diff, specimen collected and sent. Patient placed on contact isolation. Non-emergent issues for physician to address:   None     Number times ambulated in hallway past shift: 0      Number of times OOB to chair past shift: 0    POD #:      Vital Signs:    Temp: 98.4 °F (36.9 °C)     Pulse (Heart Rate): 71     BP: 113/77     Resp Rate: 16     O2 Sat (%): 98 %    Lines & Drains:     Urinary Catheter? No   Placement Date:    Medical Necessity:   Central Line? No   Placement Date:    Medical Necessity:   PICC Line? No   Placement Date:    Medical Necessity:     NG tube [] in [] removed [] not applicable   Drains [] in [] removed [] not applicable     Skin Integrity:      Wounds: Yes  Dressings Present: Yes  Wound Concerns: no      GI:    Current diet:  DIET GI LITE (POST SURGICAL)    Nausea: Yes  Vomiting: NO  Bowel Sounds: YES  Flatus: YES  Last Bowel Movement: yesterday   Appearance: Unobserved    Respiratory:  Supplemental O2: No   Device:    via  Liters/min     Incentive Spirometer: YES  Volume:   Coughing and Deep Breathing: YES  Oral Care: YES  Understanding (patient/family education): YES   Getting out of bed: YES  Head of bed elevation: YES    Patient Safety:    Falls Score: 2  Mobility Score: 2  Bed Alarm On? No  Sitter? No      Opportunity for questions and clarification was given to oncoming nurse. Patient bed is in low position, side rails are up x 1, door & observation blinds open as needed, call bell within reach and patient not in distress.     Estee Vital

## 2017-09-25 NOTE — PROGRESS NOTES
End of Shift Nursing Note    Bedside shift change report given to [facility] RN(oncoming nurse) by NATHAN Farr(offgoing nurse). Report included the following information SBAR and Kardex. Zone Phone:   [phone]    Significant changes during shift:  Watery stools   Non-emergent issues for physician to address:   none     Number times ambulated in hallway past shift: 1      Number of times OOB to chair past shift: 3    POD #:      Vital Signs:    Temp: 98.6 °F (37 °C)     Pulse (Heart Rate): 82     BP: 112/62     Resp Rate: 18     O2 Sat (%): 100 %    Lines & Drains:     Central Line? Yes   Placement Date:    Medical Necessity: limited PIV      NG tube [] in [] removed [x] not applicable   Drains [] in [] removed [x] not applicable     Skin Integrity:      Wounds: no   Dressings Present: no    Wound Concerns: no      GI:    Current diet:  DIET GI LITE (POST SURGICAL)    Nausea: YES  Vomiting: YES  Bowel Sounds: YES  Flatus: YES  Last Bowel Movement: today   Appearance:       Patient Safety:    Falls Score: 2  Mobility Score: 1  Bed Alarm On? Not applicable  Sitter? Not applicable      Opportunity for questions and clarification was given to oncoming nurse. Patient bed is in low position, side rails are up x 2, door & observation blinds open as needed, call bell within reach and patient not in distress.     Ba Lazo RN

## 2017-09-25 NOTE — PROGRESS NOTES
Subjective:    Some nausea. Diarrhea has let up some. Objective:    Blood pressure 121/58, pulse 72, temperature 98.2 °F (36.8 °C), resp. rate 18, height 5' 6\" (1.676 m), weight 87 kg (191 lb 12.8 oz), SpO2 100 %. Exam - alert, comfortable, CTAB, RRR, abd softly distended, hypoBS, approp TTP. Assessment:  Procedure(s):  LYSIS OF ADHESIONS LAPAROSCOPIC CONVERT TO OPEN 9/15/2017    Active Hospital Problems    Diagnosis Date Noted    Neoplastic malignant related fatigue 09/13/2017    Pain due to malignant neoplasm metastatic to bone (HCC) 08/25/5896    Cyclical vomiting with nausea 09/13/2017    Metastatic breast cancer ([facility] [address] 08/11/2017      Resolving ileus. Diarrhea not c/w SBO. Plan:  - back to clears until nausea resolved. - hopefully back to GI lite tomorrow. - then d/c to SNF within 24hrs of that.       Tammy Valdez MD

## 2017-09-25 NOTE — PROGRESS NOTES
Kurt  [id number]   :1940   -                  Cr. Stable  Phos improved to 2.5  Nothing much to add  We will sign off. Call us if needed. Results for Marianna Eid (MRN [id number]) as of 2017 14:39   Ref. Range 2017 03:05 2017 02:50 2017 04:00 2017 02:56 2017 03:18 2017 09:27 2017 09:27   Sodium Latest Ref Range: 136 - 145 mmol/L 141 139 139 143 141 134 (L) 136   Potassium Latest Ref Range: 3.5 - 5.1 mmol/L 4.0 4.0 3.9 3.7 3.8 3.8 3.9   Chloride Latest Ref Range: 97 - 108 mmol/L 107 104 104 105 104 100 101   CO2 Latest Ref Range: 21 - 32 mmol/L 22 24 25 25 25 24 22   Anion gap Latest Ref Range: 5 - 15 mmol/L 12 11 10 13 12 10 13   Glucose Latest Ref Range: 65 - 100 mg/dL 103 (H) 132 (H) 86 87 82 110 (H) 109 (H)   BUN Latest Ref Range: 6 - 20 MG/DL 43 (H) 54 (H) 46 (H) 37 (H) 32 (H) 23 (H) 23 (H)   Creatinine Latest Ref Range: 0.55 - 1.02 MG/DL 2.29 (H) 2.50 (H) 1.90 (H) 1.47 (H) 1.48 (H) 1.52 (H) 1.47 (H)   BUN/Creatinine ratio Latest Ref Range: 12 - 20   19 22 (H) 24 (H) 25 (H) 22 (H) 15 16   Calcium Latest Ref Range: 8.5 - 10.1 MG/DL 7.4 (L) 7.6 (L) 7.7 (L) 7.6 (L) 7.4 (L) 7.4 (L) 7.4 (L)   Phosphorus Latest Ref Range: 2.6 - 4.7 MG/DL 4.4 4.2 2.7 2.3 (L) 2.5 (L)  2.5 (L)   Magnesium Latest Ref Range: 1.6 - 2.4 mg/dL  1.6              Elena Alvarez MD  [facility]  [facility]  [facility], [facility], [address] [place]  Phone - [phone]         Fax - [phone] [facility]  [address] [place] [address]  Phone - [phone]        Fax - [phone]     [email or web address]

## 2017-09-25 NOTE — PROGRESS NOTES
Have not seen patient yet. She was off floor for x-rays. Films not read yet, but look c/w ileus as there is air in the small bowel AND colon. Though reading will likely say \"SBO\" due to SB dilatation. Will back of to sips. AMBULATION IS ONLY TREATMENT FOR ILEUS.

## 2017-09-25 NOTE — PROGRESS NOTES
Music Therapy Assessment    Melvi Pleitez [id number]  [id number]    1940  65 y.o.  female    Patient Telephone Number: [phone] (home)   Alevism Affiliation: Xenia Ja   Language: English   Extended Emergency Contact Information  Primary Emergency Contact: [address] Phone: [phone]  Mobile Phone: [phone]  Relation: Daughter  Secondary Emergency Contact: [address] Phone: [phone]  Relation: Daughter   Patient Active Problem List    Diagnosis Date Noted    Neoplastic malignant related fatigue 09/13/2017    Pain due to malignant neoplasm metastatic to bone ([address] 03/88/5942    Cyclical vomiting with nausea 09/13/2017    Anemia 08/31/2017    Metastatic breast cancer ([address] 08/11/2017    Transient ischemic attack involving right internal carotid artery 05/31/2017    Stenosis of both internal carotid arteries 05/31/2017    Left-sided weakness 05/29/2017    LLQ pain 05/29/2017    Pacemaker 67/94/4236    Diastolic CHF, chronic ([address] 05/29/2017    Type 2 diabetes mellitus with diabetic polyneuropathy, with long-term current use of insulin ([address] 12/21/2016    S/P right mastectomy 12/05/2016    Lymphedema of upper extremity following lymphadenectomy 12/18/2015    Multinodular goiter (nontoxic) 12/15/2015    Malignant neoplasm of upper-inner quadrant of right female breast ([address] 10/07/2015    Vitamin D deficiency 08/19/2015    Essential hypertension 08/07/2015    Pulmonary emboli ([address] 05/10/2015    Constipated 04/08/2015    Nausea & vomiting 03/26/2015    Pain 03/18/2015    Abdominal pain 02/20/2015    Breast cancer, right breast ([address] 11/26/2014    Hypercholesterolemia     Stroke ([address]     DJD (degenerative joint disease), lumbar     GERD (gastroesophageal reflux disease)     SEN (obstructive sleep apnea)     Sciatica     Neuropathy, arm     CAD (coronary artery disease)         Date: 9/25/2017 Mental Status:   [x] Alert [  ] Ana Jasso [  ]  Confused  [  ] Minimally responsive    Communication Status: [  ] Impaired Speech [  ] Nonverbal     Physical Status:   [  ] Oxygen in use  [  ] Hard of Hearing [  ] Vision Impaired  [  ] Ambulatory  [  ] Ambulatory with assistance [  ] Non-ambulatory -N/A    Music Preferences, Background: The patient said she likes all music and didn't name specific genres other than [employer] songs. Clinical Problem addressed: Alleviate feelings of nausea, spiritual support. Goal(s) met in session:  Physical/Pain management (Scale of 1-10):    Pre-session rating: Pt denied pain. Post-session rating: Pt denied pain. [  ] Increased relaxation   [  ] Regulated breathing patterns  [  ] Decreased muscle tension   [  ] Minimized physical distress     Emotional/Psychological:  [x] Increased self-expression   [  ] Decreased aggressive behavior   [  ] Decreased sadness   [  ] Discussed healthy coping skills     [x] Improved mood    [  ] Decreased withdrawn behavior     Social:  [  ] Decreased feelings of isolation/loneliness [x] Positive social interaction   [  ] Provided support and/or comfort for family/friends    Spiritual:  [x] Spiritual support    [  ] Expressed peace  [x] Expressed etienne    [  ] Discussed beliefs    Techniques Utilized (Check all that apply):   [  ] Procedural support MT [x] Music for relaxation [x] Patient preferred music  [  ] Kina analysis  [x] Song choice  [  ] Music for validation  [  ] Entrainment  [  ] Progressive muscle relax. [  ] Guided visualization  [  ] Sindy Thorne  [  ] Patient instrument playing [  ] Chidi Estrella writing  [x] Sing along   [  ] Ivan Arevalo  [  ] Sensory stimulation  [x] Active Listening  [x] Music for spiritual support [  ] Making of CDs as gifts    Session Observations:  Referral from Ada Tapia Nurse Navigator. The patient (pt) was observed to be sitting in a chair and she reported feeling nauseated.  The pt was receptive to music to take her mind off her nausea. She requested Dee Brain. The MT sang and played this with behaviewr. The pt increased self-expression in response to the music, as evidenced by (AEB) singing along. She expressed enjoyment in the music and chose to hear [employer]. The MT sang and played these with guitar. The pt's affect brightened and she increased relaxation and self-expression in response to the music AEB smiling, decreased facial muscle tension, singing along, and moving her foot to the beat of the songs. She prayed over her body, asking the [address] for healing following the music. She expressed gratitude for the session and said \"I have to tell my daughters about this. \" Will follow as able.     Biju Gan MT-BC (Music Therapist-Board Certified)  Spiritual Care Department  Referral-based service

## 2017-09-25 NOTE — PROGRESS NOTES
Hospitalist Progress Note    NAME: Yulia Khanna   :  1940   MRN:  [id number]     Interim Hospital Summary: 65 y.o. female whom presented on 2017 with      Assessment / Plan:  DM type 2  -pt normally on Lantus 18 units at home. She has very poor appetite  -BG ~120  -hold home lantus and adjust it prn.    -SSI for now to reduce risk of hypoglycemia with lantus. Once her PO intake improves, can slowly add back home lantus at lower dose.  -resume her home dose megestrol    Hypophosphatemia  -replace phos    Diarrhea  -no indication for abx for now as pt is non-toxic appearing, vitals stable with neg cdiff  -checking stool cx    HTN / CAD / PPM  Chronic diastolic chf EF 97-88%  -cont' norvasc, imdur, lipitor and ASA  -Nitroglycerin paste or hydralazine IV prn SBP >170    JULES, improving  Metabolic acidosis, resolved  -pre renal from third spacing and fluid loss via NG (now removed)    - cr improving, off IVF  -hold nephrotoxic agents  -nephro following    SBO  Abdominal pain  Hx diverticulitis s/p partial colectomy 2008  S/p open cholecystectomy  -s/p lysis of adhesions 9/15/2017  -NGT removed. Tolerating diet    Right breast CA dx  with bone mets dx   Right arm lymphedema  -pT3 N3a M0 (Stage IIIC) infiltrating ductal carcinoma, s/p right mastectomy, XRT  -follows with Dr Niraj Cohen     SEN  -on CPAP     Chronic microcytic anemia, transfuse prn.    -s/p 1 unit prbc. Per Dr Emir Valladares note, keep hgb >8    Hx right hemispheric CVA   Hx PE   Chronic back pain due to bone mets  Obesity  Body mass index is 30.96 kg/(m^2). Code status: Full  Prophylaxis: Lovenox         Subjective:     Chief Complaint / Reason for Physician Visit  Follow up of abdominal pain, metastatic cancer and ileus  Chart reviewed in detail. Discussed with RN events overnight. Pt had multiple episodes of loose/diarrhea overnight. Still not eating much.   Complains of abd pain that remains unchanged. Review of Systems:  Symptom Y/N Comments  Symptom Y/N Comments   Fever/Chills n   Chest Pain n    Poor Appetite y   Edema     Cough    Abdominal Pain y     Sputum    Joint Pain     SOB/WELCH    Pruritis/Rash     Nausea/vomit y   Tolerating PT/OT     Diarrhea y   Tolerating Diet     Constipation    Other       Could NOT obtain due to:      PO intake:   Patient Vitals for the past 72 hrs:   % Diet Eaten   09/25/17 0830 75 %   09/24/17 1849 30 %   09/24/17 1302 25 %   09/23/17 1842 50 %   09/23/17 1230 30 %   09/23/17 0955 50 %   09/22/17 1453 50 %     Objective:     VITALS:   Last 24hrs VS reviewed since prior progress note. Most recent are:  Patient Vitals for the past 24 hrs:   Temp Pulse Resp BP SpO2   09/25/17 1310 98.2 °F (36.8 °C) 72 18 121/58 100 %   09/25/17 0912 98.6 °F (37 °C) 82 18 112/62 -   09/24/17 2344 98.7 °F (37.1 °C) 82 18 135/75 100 %   09/24/17 1959 97.9 °F (36.6 °C) 84 18 127/74 100 %   09/24/17 1529 97.9 °F (36.6 °C) 98 21 121/78 100 %       Intake/Output Summary (Last 24 hours) at 09/25/17 1415  Last data filed at 09/25/17 0830   Gross per 24 hour   Intake              480 ml   Output                0 ml   Net              480 ml        PHYSICAL EXAM:  General: WD, WN. Alert, cooperative, no acute distress    EENT:  EOMI. Anicteric sclerae. Resp:  CTA bilaterally, no wheezing or rales. No accessory xmuscle use  CV:  Regular  rhythm,  mild edema  GI:  slight distended, NT.  +BS  Neurologic:  Alert and oriented X 3, normal speech  Psych:   not anxious nor agitated  Skin:  No rashes.   No jaundice    Reviewed most current lab test results and cultures  YES  Reviewed most current radiology test results   YES  Review and summation of old records today    NO  Reviewed patient's current orders and MAR    YES  PMH/SH reviewed - no change compared to H&P  ________________________________________________________________________  Care Plan discussed with:    Comments Patient x    Family      RN x    Care Manager     Consultant                        Multidiciplinary team rounds were held today with , nursing, pharmacist and clinical coordinator. Patient's plan of care was discussed; medications were reviewed and discharge planning was addressed. ________________________________________________________________________  Total NON critical care TIME:   35   Minutes    Total CRITICAL CARE TIME Spent:   Minutes non procedure based      Comments   >50% of visit spent in counseling and coordination of care x     This includes time during multidisciplinary rounds if indicated above   ________________________________________________________________________  Citlalli Allen MD     Procedures: see electronic medical records for all procedures/Xrays and details which were not copied into this note but were reviewed prior to creation of Plan. LABS:  I reviewed today's most current labs and imaging studies.   Pertinent labs include:  Recent Labs      09/25/17 0927 09/23/17   0400   WBC  5.5  6.2   HGB  8.1*  7.9*   HCT  24.3*  23.5*   PLT  148*  135*     Recent Labs      09/25/17 0927   NA  136  134*   K  3.9  3.8   CL  101  100   CO2  22  24   GLU  109*  110*   BUN  23*  23*   CREA  1.47*  1.52*   CA  7.4*  7.4*   PHOS  2.5*   ALB  2.1*

## 2017-09-25 NOTE — CONSULTS
Palliative Medicine Consult  Owen: [phone] ([phone])    Patient Name: Natan Cox  YOB: 1940    Date of Initial Consult: 9/13/17  Reason for Consult: Uncontrolled abdominal pain, advanced care planning  Requesting Provider: Leatha Huston NP   Primary Care Physician: Kelsea Snowden MD      SUMMARY:   Natan Cox is a 65 y.o. with a past history of DM, HTN arrhythmia, CAD, MI and strokes in 2009 and 2013,diverticulosis, SEN,  stage IIIC infiltrating ductal carcinoma of the right breast, s/p right mastectomy with axillary LN dissection, chemotherapy completed April 2015, XRT completed August 2015, bone marrow biopsy July 2017 showed bone marrow extensively involved by metastatic carcinoma, breast primary. She was admitted on 9/13/2017 from Dr. Inez Heredia office with a diagnosis of metastatic breast cancer, anemia. Current medical issues leading to Palliative Medicine involvement include: symptom management. Pt was admitted for severe fatigue and uncontrolled nausea and vomiting. She was found to have a partial bowel obstruction, and on 9/15 she was taken to the OR for lysis of adhesions. The NGT was d/cynthia yesterday, developed severe nausea last evening, and vomiting today. Psychosocial: pt lives with her daughter and Kim Chase in [place], daughter drives her to appointments   PALLIATIVE DIAGNOSES:   1. Abdominal pain: post op lysis of adhesions   2. Pain due to neoplasm (spine mets)  3. Fatigue   4. Nausea and vomiting  5. Lymphedema right arm  6. Bilateral LE edema       PLAN:   1. Pain  1. Continue Morphine IR 7.5mg PO Q4hr PRN pain. Patient encouraged to ask for medication when pain increased. Discussed with RN. Patient thought she had just taken morphine with morning meds (no). To get dose now, reassess. 2. Multiple loose BMs over the weekend, seems to be improving.   Patient tolerated GI light breakfast.  3. Communicated plan of care with: Palliative IDT, RN GOALS OF CARE / TREATMENT PREFERENCES:   [====Goals of Care====]  GOALS OF CARE:  Patient / health care proxy stated goals:     Pain management      TREATMENT PREFERENCES:   Code Status: Full Code    Advance Care Planning:  Advance Care Planning 9/13/2017   Patient's Healthcare Decision Maker is: Legal Next of Dimitry 68   Primary Decision Maker Name Rachel    Primary Decision Maker Phone Number [phone]   Primary Decision Maker Relationship to Patient Adult child   Secondary Decision Maker Name -   Confirm Advance Directive None   Patient Would Like to Complete Advance Directive -   Does the patient have other document types -       Other:    The palliative care team has discussed with patient / health care proxy about goals of care / treatment preferences for patient.  [====Goals of Care====]         HISTORY:     History obtained from: chart, patient    CHIEF COMPLAINT: abdominal pain    HPI/SUBJECTIVE:    The patient is:   9[x] Verbal and participatory  [] Non-participatory due to:     (pt is poor historian)  9/13:  Pt was directly admitted from Oncology office today for complaints of severe abdominal pain that has not responded to her current pain medications: Morphine ER 15mg bid, and Morphine IR 15mg q. 4 hours prn. Pt reports to me that she has been experiencing diffuse, crampy, constant abdominal pain for \"months\", however, today she started vomiting. Endorses constipation, but states BM daily, LBM was \"normal\" yesterday. Asking for a suppository. 9/14: pain and nausea better since NGT placed. Still having abdominal pain, but not as bad.    9/18:  Miserable, did not want to talk. 9/19: feeling much better today. Nausea and vomiting are resolved, taking small sips of water. Had a BM this am.  C/o trouble with pain medication, her back pain is usually high, 8-9/10 when she gets a dose of medication. The medication helps, just not very long. Back pain at this time is 7-8/10.       Per : 08/11/17  Morphine IR 15mg #60/10 day supply [facility]  08/11/17  Morphine ER 15mg  #60/30 day supply [facility]  08/02/17  Tramadol 50mg  #60/15 day supply [facility]  Pt has been getting different opioids from multiple providers in the past year for different pain. Clinical Pain Assessment (nonverbal scale for severity on nonverbal patients):   [++++ Clinical Pain Assessment++++]  [++++Pain Severity++++]: Pain: 9  [++++Pain Character++++]: crampy  [++++Pain Duration++++]:  months  [++++Pain Effect++++]: nausea and vomiting  [++++Pain Factors++++]:   [++++Pain Frequency++++]: constant  [++++Pain Location++++]:   Diffuse abdomen  [++++ Clinical Pain Assessment++++]  Duration: for how long has pt been experiencing pain (e.g., 2 days, 1 month, years)  Frequency: how often pain is an issue (e.g., several times per day, once every few days, constant)     FUNCTIONAL ASSESSMENT:     Palliative Performance Scale (PPS):  PPS: 50       PSYCHOSOCIAL/SPIRITUAL SCREENING:     Advance Care Planning:  Advance Care Planning 9/13/2017   Patient's Healthcare Decision Maker is: Legal Next melissa Moraes 68   Primary Decision Maker Name Rachel    Primary Decision Maker Phone Number [phone]   Primary Decision Maker Relationship to Patient Adult child   Secondary Decision Maker Name -   Confirm Advance Directive None   Patient Would Like to Complete Advance Directive -   Does the patient have other document types -        Any spiritual / Holiness concerns:  [] Yes /  [x] No    Caregiver Burnout:  [] Yes /  [] No /  [x] No Caregiver Present      Anticipatory grief assessment:   [x] Normal  / [] Maladaptive       ESAS Anxiety: Anxiety: 0    ESAS Depression: Depression: 0        REVIEW OF SYSTEMS:     Positive and pertinent negative findings in ROS are noted above in HPI. The following systems were [x] reviewed / [] unable to be reviewed as noted in HPI  Other findings are noted below.   Systems: constitutional, ears/nose/mouth/throat, respiratory, gastrointestinal, genitourinary, musculoskeletal, integumentary, neurologic, psychiatric, endocrine. Positive findings noted below. Modified ESAS Completed by: provider   Fatigue: 2 Drowsiness: 0   Depression: 0 Pain: 9   Anxiety: 0 Nausea: 0   Anorexia: 0 Dyspnea: 0     Constipation: No     Stool Occurrence(s): 1        PHYSICAL EXAM:     From RN flowsheet:  Wt Readings from Last 3 Encounters:   09/22/17 191 lb 12.8 oz (87 kg)   09/13/17 199 lb (90.3 kg)   09/06/17 194 lb 7 oz (88.2 kg)     Blood pressure 112/62, pulse 82, temperature 98.6 °F (37 °C), resp. rate 18, height 5' 6\" (1.676 m), weight 191 lb 12.8 oz (87 kg), SpO2 100 %.     Pain Scale 1: Numeric (0 - 10)  Pain Intensity 1: 0  Pain Onset 1: surgery  Pain Location 1: Abdomen  Pain Orientation 1: Mid  Pain Description 1: Aching  Pain Intervention(s) 1: Declines  Last bowel movement, if known:     Constitutional:WD, WN,sitting up in bed  Eyes: pupils equal, anicteric  ENMT: no nasal discharge, moist mucous membranes   Cardiovascular: tachy, regular rhythm, distal pulses intact  Respiratory: breathing not labored, symmetric  Skin: warm, dry  Neurologic: following commands, moving all extremities  Psychiatric: full affect, no hallucinations          HISTORY:     Active Problems:    Metastatic breast cancer (HCC) (8/11/2017)      Neoplastic malignant related fatigue (9/13/2017)      Pain due to malignant neoplasm metastatic to bone (HCC) (7/52/5757)      Cyclical vomiting with nausea (9/13/2017)      Past Medical History:   Diagnosis Date    Arrhythmia     bradycardia in 30's /pacemaker inserted    Arthritis     RT KNEE    Asthma Dx age 75    Bradycardia 2009    Cardiology saw her during hospital stay; Now off coreg;  Sees Dr. Nando Rae    Breast cancer [facility])     Rt mastectomy 2/19/15    CAD (coronary artery disease)     Dr Enid Cherry    Diabetes [facility])     Now seeing Dr. Kimberley Lackey Diverticulitis     DJD (degenerative joint disease), lumbar  GERD (gastroesophageal reflux disease)     H. pylori infection 2008    Dr. Siena Carey attack [facility]) April 2006    Hypercholesterolemia     Hypertension     Morbid obesity ([address]     Neuropathy, arm 2009    Right; [facility] [phone] SEN (obstructive sleep apnea)     uses CPAP    Rectal bleeding 2009    Hospitalized; Had colonoscopy and EGD (ok), gastric emptying study (normal), doppler mesenteric arteries (ok)    Sciatica     Has seen Dr. Chen Benito    Stroke [facility]) 2009 & 2012    no residual problems      Past Surgical History:   Procedure Laterality Date    HX APPENDECTOMY  1979    HX BREAST LUMPECTOMY  12/12/2013    RIGHT BREAST DUCTAL EXCISION performed by Vicenta Sebastian MD at [facility] MAIN OR    HX CATARACT REMOVAL  2007    HX CHOLECYSTECTOMY  1979    HX COLONOSCOPY      HX HEART CATHETERIZATION      angioplasty    HX HYSTERECTOMY      fibroids    HX KNEE ARTHROSCOPY  1997    right    HX MASTECTOMY Right 2/19/2015    RIGHT BREAST MODIFIED RADICAL MASTECTOMY RIGHT SENTINEL NODE BIOPSY, RIGHT PORT A CATH INSERTION performed by Alivia Chaudhary MD at [facility] AMBULATORY OR    HX PACEMAKER      HX SHOULDER ARTHROSCOPY  2004    left      Family History   Problem Relation Age of Onset    Stroke Father     Cancer Sister      breast cancer    Hypertension Sister     Cancer Mother      Unknown type    Cancer Brother      Colon    Hypertension Brother     Anesth Problems Neg Hx       History reviewed, no pertinent family history.   Social History   Substance Use Topics    Smoking status: Never Smoker    Smokeless tobacco: Never Used    Alcohol use No     Allergies   Allergen Reactions    Codeine Itching    Duratuss [Phenylephrine-Guaifenesin] Nausea and Vomiting    Lisinopril-Hydrochlorothiazide Itching    Motrin [Ibuprofen] Nausea and Vomiting     With 800mg    Tape [Adhesive] Other (comments)     TEARS HER SKIN      Current Facility-Administered Medications Medication Dose Route Frequency    megestrol (MEGACE) 400 mg/10 mL (10 mL) oral suspension 400 mg  400 mg Oral DAILY    famotidine (PEPCID) tablet 20 mg  20 mg Oral DAILY    insulin lispro (HUMALOG) injection   SubCUTAneous AC&HS    0.9% sodium chloride infusion 250 mL  250 mL IntraVENous PRN    morphine IR (MS IR) tablet 7.5 mg  7.5 mg Oral Q4H PRN    isosorbide mononitrate ER (IMDUR) tablet 60 mg  60 mg Oral DAILY    amLODIPine (NORVASC) tablet 10 mg  10 mg Oral DAILY    atorvastatin (LIPITOR) tablet 80 mg  80 mg Oral QHS    aspirin delayed-release tablet 81 mg  81 mg Oral DAILY    metoclopramide HCl (REGLAN) injection 5 mg  5 mg IntraVENous Q6H PRN    hydrALAZINE (APRESOLINE) 20 mg/mL injection 10 mg  10 mg IntraVENous Q4H PRN    ondansetron (ZOFRAN) injection 4 mg  4 mg IntraVENous Q6H PRN    SALINE PERIPHERAL FLUSH Q8H soln 5 mL  5 mL InterCATHeter Q8H    enoxaparin (LOVENOX) injection 30 mg  30 mg SubCUTAneous DAILY    glucose chewable tablet 16 g  4 Tab Oral PRN    glucagon (GLUCAGEN) injection 1 mg  1 mg IntraMUSCular PRN    dextrose 10% infusion 125-250 mL  125-250 mL IntraVENous PRN          LAB AND IMAGING FINDINGS:     Lab Results   Component Value Date/Time    WBC 5.5 09/25/2017 09:27 AM    HGB 8.1 09/25/2017 09:27 AM    PLATELET 213 20/54/8626 09:27 AM     Lab Results   Component Value Date/Time    Sodium 141 09/22/2017 03:18 AM    Potassium 3.8 09/22/2017 03:18 AM    Chloride 104 09/22/2017 03:18 AM    CO2 25 09/22/2017 03:18 AM    BUN 32 09/22/2017 03:18 AM    Creatinine 1.48 09/22/2017 03:18 AM    Calcium 7.4 09/22/2017 03:18 AM    Magnesium 1.6 09/19/2017 02:50 AM    Phosphorus 2.5 09/22/2017 03:18 AM      Lab Results   Component Value Date/Time    AST (SGOT) 17 09/15/2017 05:58 AM    Alk.  phosphatase 438 09/15/2017 05:58 AM    Protein, total 7.1 09/15/2017 05:58 AM    Albumin 1.8 09/22/2017 03:18 AM    Globulin 4.4 09/15/2017 05:58 AM     Lab Results   Component Value Date/Time INR 1.1 06/10/2017 02:56 PM    Prothrombin time 10.8 06/10/2017 02:56 PM    aPTT 33.2 07/03/2010 09:00 AM      Lab Results   Component Value Date/Time    Iron 42 05/11/2015 05:00 AM    TIBC 215 05/11/2015 05:00 AM    Iron % saturation 20 05/11/2015 05:00 AM    Ferritin 417 05/11/2015 05:00 AM      No results found for: PH, PCO2, PO2  No components found for: [place]   Lab Results   Component Value Date/Time    CK 70 09/15/2017 02:45 PM    CK - MB 2.3 01/26/2017 01:16 PM                Total time:   Counseling / coordination time, spent as noted above:   > 50% counseling / coordination?:     Prolonged service was provided for  []30 min   []75 min in face to face time in the presence of the patient, spent as noted above. Time Start:   Time End:   Note: this can only be billed with 55872 (initial) or 89066 (follow up). If multiple start / stop times, list each separately.

## 2017-09-25 NOTE — PROGRESS NOTES
Problem: Mobility Impaired (Adult and Pediatric)  Goal: *Acute Goals and Plan of Care (Insert Text)  Physical Therapy Goals  Initiated 9/16/2017 ; Goals remain appropriate as of 9/22/17:  1. Patient will move from supine to sit and sit to supine , scoot up and down and roll side to side in bed with independence within 7 day(s). 2. Patient will transfer from bed to chair and chair to bed with modified independence using the least restrictive device within 7 day(s). 3. Patient will perform sit to stand with independence within 7 day(s). 4. Patient will ambulate with modified independence for 300 feet with the least restrictive device within 7 day(s). 5. Patient will ascend/descend 4 stairs with dual handrail(s) with supervision/set-up within 7 day(s). PHYSICAL THERAPY TREATMENT  Patient: Melvi Pleitez (65 y.o. female)  Date: 9/25/2017  Diagnosis: Metastatic Breast Ca  Metastatic breast cancer ([facility] [address]  SMALL BOWEL OBSTRUCTION <principal problem not specified>  Procedure(s) (LRB):  LYSIS OF ADHESIONS LAPAROSCOPIC CONVERT TO OPEN (N/A) 10 Days Post-Op  Precautions: Fall  Chart, physical therapy assessment, plan of care and goals were reviewed. ASSESSMENT:  Cleared with nurse to mobilize. Pt received up in chair. Pt reported feeling nauseous  Pt performed x2 sit to stand at [facility]. Pt ambulated 175 ft with RW at [facility]. Pt requring cueing to stand up right and to walk within walker. Pt requiring standing rest breaks x2 throughout walk. [facility] to increase endurance and strength. Progression toward goals:  [X]      Improving appropriately and progressing toward goals  [ ]      Improving slowly and progressing toward goals  [ ]      Not making progress toward goals and plan of care will be adjusted       PLAN:  Patient continues to benefit from skilled intervention to address the above impairments. Continue treatment per established plan of care.   Discharge Recommendations:  Home Health  Further Equipment Recommendations for Discharge:  Rolling Walker       SUBJECTIVE:   Patient stated I'm feeling nauseous .       OBJECTIVE DATA SUMMARY:   Critical Behavior:  Neurologic State: Alert, Appropriate for age  Orientation Level: Appropriate for age  Cognition: Appropriate for age attention/concentration  Safety/Judgement: Awareness of environment, Fall prevention, Insight into deficits  Functional Mobility Training:                                   Transfers:  Sit to Stand: Stand-by asssistance                                Balance:  Sitting: Intact  Standing: Intact  Standing - Static: Good  Standing - Dynamic : Good  Ambulation/Gait Training:  Distance (ft): 175 Feet (ft)  Assistive Device: Walker, rolling;Gait belt  Ambulation - Level of Assistance: Contact guard assistance                 Base of Support: Widened     Speed/Domonique: Pace decreased (<100 feet/min)  Step Length: Right shortened;Left shortened                                                    Pain:  Pain Scale 1: Numeric (0 - 10)  Pain Intensity 1: 0              Activity Tolerance:   WNL, pt able to ambulate further today  Please refer to the flowsheet for vital signs taken during this treatment.   After treatment:   [X] Patient left in no apparent distress sitting up in chair  [ ] Patient left in no apparent distress in bed  [X] Call bell left within reach  [X] Nursing notified  [ ] Caregiver present  [ ] Bed alarm activated      COMMUNICATION/COLLABORATION:   The patients plan of care was discussed with: Registered Nurse     Isabel Neves   Time Calculation: 23 mins

## 2017-09-26 NOTE — PROGRESS NOTES
Palliative Medicine Consult  [place]: [phone] ([phone])    Patient Name: Yulia Khanna  YOB: 1940    Date of Initial Consult: 9/13/17  Reason for Consult: Uncontrolled abdominal pain, advanced care planning  Requesting Provider: Guadelupe Runner, NP   Primary Care Physician: Kisha Jaramillo MD      SUMMARY:   Yulia Khanna is a 65 y.o. with a past history of DM, HTN arrhythmia, CAD, MI and strokes in 2009 and 2013,diverticulosis, SEN,  stage IIIC infiltrating ductal carcinoma of the right breast, s/p right mastectomy with axillary LN dissection, chemotherapy completed April 2015, XRT completed August 2015, bone marrow biopsy July 2017 showed bone marrow extensively involved by metastatic carcinoma, breast primary. She was admitted on 9/13/2017 from Dr. Emir Valladares office with a diagnosis of metastatic breast cancer, anemia. Current medical issues leading to Palliative Medicine involvement include: symptom management. Pt was admitted for severe fatigue and uncontrolled nausea and vomiting. She was found to have a partial bowel obstruction, and on 9/15 she was taken to the OR for lysis of adhesions. Initial post-op pt had significant nausea, however, on 8/19/17 n/v seemed to be resolved. C/o \"some nausea\" on 9/22, at which time the IV dilaudid was changed to PO morphine in anticipation of discharge. The following day the nausea returned, along with abdominal pain and loose stools. Abdominal XR c/w post-op ileus. Psychosocial: pt lives with her daughter and Jace Duenas in [place], daughter drives her to appointments   PALLIATIVE DIAGNOSES:   1. Abdominal pain: surgery 9/15/17 for lysis of adhesions  2. Pain due to neoplasm (spine mets)  3. Fatigue   4. Nausea and vomiting  5. Lymphedema right arm  6. Bilateral LE edema  7. Care decisions  8. Acute post-op ileus. PLAN:   1.  It is possible that the nausea may be caused by the morphine, therefore, will put pt back on low dose IV dilaudid (0.2mg q. 4 hours prn) for now. 2. Dexamethasone 4mg IV x 1 dose for nausea. 3. Discussed with Dr. Jessica Burroughs. 4. Initial consult note routed to primary continuity provider  5. Communicated plan of care with: Palliative IDT, RN      GOALS OF CARE / TREATMENT PREFERENCES:   [====Goals of Care====]  GOALS OF CARE:  Patient / health care proxy stated goals:     Pain management      TREATMENT PREFERENCES:   Code Status: Full Code    Advance Care Planning:  Advance Care Planning 9/13/2017   Patient's Healthcare Decision Maker is: Legal Next of Dimitry 68   Primary Decision Maker Name Yenny Dunlap    Primary Decision Maker Phone Number [phone]   Primary Decision Maker Relationship to Patient Adult child   Secondary Decision Maker Name -   Confirm Advance Directive None   Patient Would Like to Complete Advance Directive -   Does the patient have other document types -       Other:    The palliative care team has discussed with patient / health care proxy about goals of care / treatment preferences for patient.  [====Goals of Care====]         HISTORY:     History obtained from: chart, patient    CHIEF COMPLAINT: abdominal pain    HPI/SUBJECTIVE:    The patient is:   [x] Verbal and participatory  [] Non-participatory due to:     (pt is poor historian)  9/13:  Pt was directly admitted from Oncology office today for complaints of severe abdominal pain that has not responded to her current pain medications: Morphine ER 15mg bid, and Morphine IR 15mg q. 4 hours prn. Pt reports to me that she has been experiencing diffuse, crampy, constant abdominal pain for \"months\", however, today she started vomiting. Endorses constipation, but states BM daily, LBM was \"normal\" yesterday. Asking for a suppository. 9/14: pain and nausea better since NGT placed. Still having abdominal pain, but not as bad.    9/18:  Miserable, did not want to talk. 9/19: feeling much better today.   Nausea and vomiting are resolved, taking small sips of water. Had a BM this am.  C/o trouble with pain medication, her back pain is usually high, 8-9/10 when she gets a dose of medication. The medication helps, just not very long. Back pain at this time is 7-8/10.    9/20: \"I'm hungry. \"  No nausea or vomiting, pain well controlled with scheduled doses, has not required any prn doses. BM today. 9/26: nausea is 10/10. Pain is well-controlled; expressed concern about becoming addicted. Reports loose stools.        Clinical Pain Assessment (nonverbal scale for severity on nonverbal patients):   [++++ Clinical Pain Assessment++++]  [++++Pain Severity++++]: Pain: 2  [++++Pain Character++++]: crampy  [++++Pain Duration++++]:  months  [++++Pain Effect++++]: nausea and vomiting  [++++Pain Factors++++]:   [++++Pain Frequency++++]: constant  [++++Pain Location++++]:   Diffuse abdomen  [++++ Clinical Pain Assessment++++]  Duration: for how long has pt been experiencing pain (e.g., 2 days, 1 month, years)  Frequency: how often pain is an issue (e.g., several times per day, once every few days, constant)     FUNCTIONAL ASSESSMENT:     Palliative Performance Scale (PPS):  PPS: 30       PSYCHOSOCIAL/SPIRITUAL SCREENING:     Advance Care Planning:  Advance Care Planning 9/13/2017   Patient's Healthcare Decision Maker is: Legal Next melissa Moraes 68   Primary Decision Maker Name Rachel    Primary Decision Maker Phone Number [phone]   Primary Decision Maker Relationship to Patient Adult child   Secondary Decision Maker Name -   Confirm Advance Directive None   Patient Would Like to Complete Advance Directive -   Does the patient have other document types -        Any spiritual / Restoration concerns:  [] Yes /  [x] No    Caregiver Burnout:  [] Yes /  [] No /  [x] No Caregiver Present      Anticipatory grief assessment:   [x] Normal  / [] Maladaptive       ESAS Anxiety: Anxiety: 0    ESAS Depression: Depression: 3        REVIEW OF SYSTEMS:     Positive and pertinent negative findings in ROS are noted above in HPI. The following systems were [x] reviewed / [] unable to be reviewed as noted in HPI  Other findings are noted below. Systems: constitutional, ears/nose/mouth/throat, respiratory, gastrointestinal, genitourinary, musculoskeletal, integumentary, neurologic, psychiatric, endocrine. Positive findings noted below. Modified ESAS Completed by: provider   Fatigue: 5 Drowsiness: 0   Depression: 3 Pain: 2   Anxiety: 0 Nausea: 10   Anorexia: 0 Dyspnea: 0     Constipation: No     Stool Occurrence(s): 1        PHYSICAL EXAM:     From RN flowsheet:  Wt Readings from Last 3 Encounters:   09/26/17 191 lb 12.8 oz (87 kg)   09/13/17 199 lb (90.3 kg)   09/06/17 194 lb 7 oz (88.2 kg)     Blood pressure 119/63, pulse 78, temperature 98 °F (36.7 °C), resp. rate 16, height 5' 6\" (1.676 m), weight 191 lb 12.8 oz (87 kg), SpO2 98 %.     Pain Scale 1: Numeric (0 - 10)  Pain Intensity 1: 0  Pain Onset 1: surgery  Pain Location 1: Abdomen  Pain Orientation 1: Medial  Pain Description 1: Aching  Pain Intervention(s) 1: Medication (see MAR)  Last bowel movement, if known:     Constitutional:WD, WN,sitting in chair at bedside, fatigued,  wearing compression sleeve right arm  Eyes: pupils equal, anicteric  ENMT: no nasal discharge, moist mucous membranes   Cardiovascular:  regular rhythm, distal pulses intact  Respiratory: breathing not labored, symmetric  Skin: warm, dry  Neurologic: following commands, moving all extremities  Psychiatric: full affect, no hallucinations          HISTORY:     Active Problems:    Metastatic breast cancer (HCC) (8/11/2017)      Neoplastic malignant related fatigue (9/13/2017)      Pain due to malignant neoplasm metastatic to bone (HCC) (2/61/6686)      Cyclical vomiting with nausea (9/13/2017)      Past Medical History:   Diagnosis Date    Arrhythmia     bradycardia in 30's /pacemaker inserted    Arthritis     RT KNEE    Asthma Dx age 75    Bradycardia 2009    Cardiology saw her during hospital stay; Now off coreg;  Sees Dr. Anny Philip [facility])     Rt mastectomy 2/19/15    CAD (coronary artery disease)     Dr Robel Narvaez Diabetes [facility])     Now seeing Dr. Juana Parson Diverticulitis     DJD (degenerative joint disease), lumbar     GERD (gastroesophageal reflux disease)     H. pylori infection 2008    Dr. Ruy Gee attack [facility]) April 2006    Hypercholesterolemia     Hypertension     Morbid obesity ([address]     Neuropathy, arm 2009    Right; Has had MRI and EMG at [facility] [phone] SEN (obstructive sleep apnea)     uses CPAP    Rectal bleeding 2009    Hospitalized; Had colonoscopy and EGD (ok), gastric emptying study (normal), doppler mesenteric arteries (ok)    Sciatica     Has seen Dr. Noemí Perez    Stroke [facility]) 2009 & 2012    no residual problems      Past Surgical History:   Procedure Laterality Date    HX APPENDECTOMY  1979    HX BREAST LUMPECTOMY  12/12/2013    RIGHT BREAST DUCTAL EXCISION performed by Shelly Chang MD at [facility] MAIN OR    HX CATARACT REMOVAL  2007    HX CHOLECYSTECTOMY  1979    HX COLONOSCOPY      HX HEART CATHETERIZATION      angioplasty    HX HYSTERECTOMY      fibroids    HX KNEE ARTHROSCOPY  1997    right    HX MASTECTOMY Right 2/19/2015    RIGHT BREAST MODIFIED RADICAL MASTECTOMY RIGHT SENTINEL NODE BIOPSY, RIGHT PORT A CATH INSERTION performed by Debbie Lugo MD at [facility] AMBULATORY OR    HX PACEMAKER      HX SHOULDER ARTHROSCOPY  2004    left      Family History   Problem Relation Age of Onset    Stroke Father     Cancer Sister      breast cancer    Hypertension Sister     Cancer Mother      Unknown type    Cancer Brother      Colon    Hypertension Brother     Anesth Problems Neg Hx       History reviewed, no pertinent family history.   Social History   Substance Use Topics    Smoking status: Never Smoker    Smokeless tobacco: Never Used    Alcohol use No     Allergies   Allergen Reactions    Codeine Itching    Duratuss [Phenylephrine-Guaifenesin] Nausea and Vomiting    Lisinopril-Hydrochlorothiazide Itching    Motrin [Ibuprofen] Nausea and Vomiting     With 800mg    Tape [Adhesive] Other (comments)     TEARS HER SKIN      Current Facility-Administered Medications   Medication Dose Route Frequency    diphenoxylate-atropine (LOMOTIL) tablet 1 Tab  1 Tab Oral QID PRN    HYDROmorphone (PF) (DILAUDID) injection 0.2 mg  0.2 mg IntraVENous Q4H PRN    dexamethasone (DECADRON) 4 mg/mL injection 4 mg  4 mg IntraVENous ONCE    metoclopramide HCl (REGLAN) tablet 10 mg  10 mg Oral AC&HS    megestrol (MEGACE) 400 mg/10 mL (10 mL) oral suspension 400 mg  400 mg Oral DAILY    famotidine (PEPCID) tablet 20 mg  20 mg Oral DAILY    insulin lispro (HUMALOG) injection   SubCUTAneous AC&HS    0.9% sodium chloride infusion 250 mL  250 mL IntraVENous PRN    isosorbide mononitrate ER (IMDUR) tablet 60 mg  60 mg Oral DAILY    amLODIPine (NORVASC) tablet 10 mg  10 mg Oral DAILY    atorvastatin (LIPITOR) tablet 80 mg  80 mg Oral QHS    aspirin delayed-release tablet 81 mg  81 mg Oral DAILY    hydrALAZINE (APRESOLINE) 20 mg/mL injection 10 mg  10 mg IntraVENous Q4H PRN    ondansetron (ZOFRAN) injection 4 mg  4 mg IntraVENous Q6H PRN    SALINE PERIPHERAL FLUSH Q8H soln 5 mL  5 mL InterCATHeter Q8H    enoxaparin (LOVENOX) injection 30 mg  30 mg SubCUTAneous DAILY    glucose chewable tablet 16 g  4 Tab Oral PRN    glucagon (GLUCAGEN) injection 1 mg  1 mg IntraMUSCular PRN    dextrose 10% infusion 125-250 mL  125-250 mL IntraVENous PRN          LAB AND IMAGING FINDINGS:     Lab Results   Component Value Date/Time    WBC 5.5 09/25/2017 09:27 AM    HGB 8.1 09/25/2017 09:27 AM    PLATELET 961 23/92/3420 09:27 AM     Lab Results   Component Value Date/Time    Sodium 136 09/25/2017 09:27 AM    Sodium 134 09/25/2017 09:27 AM    Potassium 3.9 09/25/2017 09:27 AM    Potassium 3.8 09/25/2017 09:27 AM    Chloride 101 09/25/2017 09:27 AM    Chloride 100 09/25/2017 09:27 AM    CO2 22 09/25/2017 09:27 AM    CO2 24 09/25/2017 09:27 AM    BUN 23 09/25/2017 09:27 AM    BUN 23 09/25/2017 09:27 AM    Creatinine 1.47 09/25/2017 09:27 AM    Creatinine 1.52 09/25/2017 09:27 AM    Calcium 7.4 09/25/2017 09:27 AM    Calcium 7.4 09/25/2017 09:27 AM    Magnesium 1.6 09/19/2017 02:50 AM    Phosphorus 2.5 09/25/2017 09:27 AM      Lab Results   Component Value Date/Time    AST (SGOT) 17 09/15/2017 05:58 AM    Alk. phosphatase 438 09/15/2017 05:58 AM    Protein, total 7.1 09/15/2017 05:58 AM    Albumin 2.1 09/25/2017 09:27 AM    Globulin 4.4 09/15/2017 05:58 AM     Lab Results   Component Value Date/Time    INR 1.1 06/10/2017 02:56 PM    Prothrombin time 10.8 06/10/2017 02:56 PM    aPTT 33.2 07/03/2010 09:00 AM      Lab Results   Component Value Date/Time    Iron 42 05/11/2015 05:00 AM    TIBC 215 05/11/2015 05:00 AM    Iron % saturation 20 05/11/2015 05:00 AM    Ferritin 417 05/11/2015 05:00 AM      No results found for: PH, PCO2, PO2  No components found for: [place]   Lab Results   Component Value Date/Time    CK 70 09/15/2017 02:45 PM    CK - MB 2.3 01/26/2017 01:16 PM                Total time: 30 min  Counseling / coordination time, spent as noted above: 25 min  > 50% counseling / coordination?: yes    Prolonged service was provided for  []30 min   []75 min in face to face time in the presence of the patient, spent as noted above. Time Start:   Time End:   Note: this can only be billed with 05836 (initial) or 37787 (follow up). If multiple start / stop times, list each separately.

## 2017-09-26 NOTE — PROGRESS NOTES
Hospitalist Progress Note    NAME: Jossy Musa   :  1940   MRN:  [id number]     Interim Hospital Summary: 65 y.o. female whom presented on 2017 with      Assessment / Plan:  DM type 2  -pt normally on Lantus 18 units at home, hold home lantus and adjust it prn.    -SSI for now to reduce risk of hypoglycemia with lantus. Once her PO intake improves, -cont' home megestrol     Hypophosphatemia  -replace phos    Diarrhea  -cdiff neg, neg campylobacter, pending stool cx  -pt is nontoxic appearing, no leukocytosis  -will start lomotil    HTN / CAD / PPM  Chronic diastolic chf EF 75-05%  -cont' norvasc, imdur, lipitor and ASA  -Nitroglycerin paste or hydralazine IV prn SBP >170    JULES, improving  Metabolic acidosis, resolved  -pre renal from third spacing and fluid loss via NG (now removed)    - cr stable, off IVF  -hold nephrotoxic agents  -nephro following    SBO  Abdominal pain  Hx diverticulitis s/p partial colectomy 2008  S/p open cholecystectomy  -s/p lysis of adhesions 9/15/2017  -NGT removed. Tolerating diet    Right breast CA dx  with bone mets dx   Right arm lymphedema  -pT3 N3a M0 (Stage IIIC) infiltrating ductal carcinoma, s/p right mastectomy, XRT  -follows with Dr Cory Mcdonald     SEN  -on CPAP     Chronic microcytic anemia, transfuse prn.    -s/p 1 unit prbc. Per Dr Issac Barrios note, keep hgb >8    Hx right hemispheric CVA   Hx PE   Chronic back pain due to bone mets  Obesity  Body mass index is 30.96 kg/(m^2). Code status: Full  Prophylaxis: Lovenox         Subjective:     Chief Complaint / Reason for Physician Visit  Follow up of abdominal pain, metastatic cancer and ileus  Chart reviewed in detail. Discussed with RN events overnight. Pt is still having loose stool.   No other complaints other than poor appetite    Review of Systems:  Symptom Y/N Comments  Symptom Y/N Comments   Fever/Chills n   Chest Pain n    Poor Appetite y   Edema     Cough    Abdominal Pain n     Sputum    Joint Pain     SOB/WELCH    Pruritis/Rash     Nausea/vomit n   Tolerating PT/OT     Diarrhea y   Tolerating Diet     Constipation    Other       Could NOT obtain due to:      PO intake:   Patient Vitals for the past 72 hrs:   % Diet Eaten   09/25/17 1744 100 %   09/25/17 1300 100 %   09/25/17 0830 75 %   09/24/17 1849 30 %   09/24/17 1302 25 %   09/23/17 1842 50 %   09/23/17 1230 30 %   09/23/17 0955 50 %     Objective:     VITALS:   Last 24hrs VS reviewed since prior progress note. Most recent are:  Patient Vitals for the past 24 hrs:   Temp Pulse Resp BP SpO2   09/26/17 0232 98.5 °F (36.9 °C) 73 16 150/83 98 %   09/25/17 2030 97.9 °F (36.6 °C) 82 16 120/67 100 %   09/25/17 1704 97.9 °F (36.6 °C) 86 17 123/63 100 %   09/25/17 1310 98.2 °F (36.8 °C) 72 18 121/58 100 %   09/25/17 0912 98.6 °F (37 °C) 82 18 112/62 -       Intake/Output Summary (Last 24 hours) at 09/26/17 0848  Last data filed at 09/26/17 0659   Gross per 24 hour   Intake              440 ml   Output                0 ml   Net              440 ml        PHYSICAL EXAM:  General: WD, WN. Alert, cooperative, no acute distress    EENT:  EOMI. Anicteric sclerae. Resp:  CTA bilaterally, no wheezing or rales. No accessory xmuscle use  CV:  Regular  rhythm,  mild edema  GI:  slight distended, NT.  +BS  Neurologic:  Alert and oriented X 3, normal speech  Psych:   not anxious nor agitated  Skin:  No rashes.   No jaundice    Reviewed most current lab test results and cultures  YES  Reviewed most current radiology test results   YES  Review and summation of old records today    NO  Reviewed patient's current orders and MAR    YES  PMH/SH reviewed - no change compared to H&P  ________________________________________________________________________  Care Plan discussed with:    Comments   Patient x    Family      RN x    Care Manager     Consultant                        Multidiciplinary team rounds were held today with , nursing, pharmacist and clinical coordinator. Patient's plan of care was discussed; medications were reviewed and discharge planning was addressed. ________________________________________________________________________  Total NON critical care TIME:   35   Minutes    Total CRITICAL CARE TIME Spent:   Minutes non procedure based      Comments   >50% of visit spent in counseling and coordination of care x     This includes time during multidisciplinary rounds if indicated above   ________________________________________________________________________  Maddie Brown MD     Procedures: see electronic medical records for all procedures/Xrays and details which were not copied into this note but were reviewed prior to creation of Plan. LABS:  I reviewed today's most current labs and imaging studies.   Pertinent labs include:  Recent Labs      09/25/17 0927   WBC  5.5   HGB  8.1*   HCT  24.3*   PLT  148*     Recent Labs      09/25/17 0927   NA  136  134*   K  3.9  3.8   CL  101  100   CO2  22  24   GLU  109*  110*   BUN  23*  23*   CREA  1.47*  1.52*   CA  7.4*  7.4*   PHOS  2.5*   ALB  2.1*

## 2017-09-26 NOTE — PROGRESS NOTES
PALLIATIVE MEDICINE        Pt reports nausea is gone. Last dose of Morphine was 0500, also received Dexamethasone at noon. She is c/o pain as she is waiting for pharmacy to deliver dilaudid.     -continue IV dilaudid tonight. Will trial oxycodone tomorrow am if no nausea.

## 2017-09-26 NOTE — PROGRESS NOTES
End of Shift Nursing Note    Bedside shift change report given to Asha (oncoming nurse) by Petty Mayo RN (offgoing nurse). Report included the following information SBAR, Kardex, Intake/Output and MAR. Zone Phone:       Significant changes during shift:  Patient had only two BMs this shift. Non-emergent issues for physician to address:   None     Number times ambulated in hallway past shift: 0      Number of times OOB to chair past shift: 0    POD #:      Vital Signs:    Temp: 98.5 °F (36.9 °C)     Pulse (Heart Rate): 73     BP: 150/83     Resp Rate: 16     O2 Sat (%): 98 %    Lines & Drains:     Urinary Catheter? No   Placement Date:    Medical Necessity:   Central Line? No   Placement Date:    Medical Necessity:   PICC Line? No   Placement Date:    Medical Necessity:     NG tube [] in [] removed [] not applicable   Drains [] in [] removed [] not applicable     Skin Integrity:      Wounds: Yes  Dressings Present: Yes  Wound Concerns: no      GI:    Current diet:  DIET CLEAR LIQUID    Nausea: Yes  Vomiting: NO  Bowel Sounds: YES  Flatus: YES  Last Bowel Movement: yesterday   Appearance: Unobserved    Respiratory:  Supplemental O2: No   Device:    via  Liters/min     Incentive Spirometer: YES  Volume:   Coughing and Deep Breathing: YES  Oral Care: YES  Understanding (patient/family education): YES   Getting out of bed: YES  Head of bed elevation: YES    Patient Safety:    Falls Score: 2  Mobility Score: 2  Bed Alarm On? No  Sitter? No      Opportunity for questions and clarification was given to oncoming nurse. Patient bed is in low position, side rails are up x 1, door & observation blinds open as needed, call bell within reach and patient not in distress.     Cheryle Brook

## 2017-09-26 NOTE — PROGRESS NOTES
End of Shift Nursing Note    Bedside shift change report given to Ramona Paredes (oncoming nurse) by Shawn Bynum RN (offgoing nurse). Report included the following information SBAR. [facility] Phone:   [phone]    Significant changes during shift:    Give diarrhea medication around the clock per DR Vicenta Harper issues for physician to address:        Number times ambulated in hallway past shift: 1      Number of times OOB to chair past shift: all day    POD #:      Vital Signs:    Temp: 98.4 °F (36.9 °C)     Pulse (Heart Rate): 74     BP: 130/63     Resp Rate: 18     O2 Sat (%): 100 %    Lines & Drains:     Urinary Catheter? No   Placement Date:    Medical Necessity:   Central Line? No   Placement Date:    Medical Necessity:   PICC Line? No   Placement Date:    Medical Necessity:     NG tube [] in [] removed [] not applicable   Drains [] in [] removed [] not applicable     Skin Integrity:      Wounds: yes   Dressings Present: yes    Wound Concerns: no      GI:    Current diet:  DIET CLEAR LIQUID    Nausea: NO  Vomiting: NO  Bowel Sounds: YES  Flatus: YES  Last Bowel Movement: today   Appearance:     Respiratory:  Supplemental O2: No      Device:    via  Liters/min     Incentive Spirometer: YES  Volume:   Coughing and Deep Breathing: YES  Oral Care: YES  Understanding (patient/family education): YES   Getting out of bed: YES  Head of bed elevation: YES    Patient Safety:    Falls Score: 1  Mobility Score: 1  Bed Alarm On? No  Sitter? No      Opportunity for questions and clarification was given to oncoming nurse. Patient bed is in lowest position, side rails are up x 2, door & observation blinds open as needed, call bell within reach and patient not in distress.     Christi Mcallister RN

## 2017-09-26 NOTE — PROGRESS NOTES
Problem: Mobility Impaired (Adult and Pediatric)  Goal: *Acute Goals and Plan of Care (Insert Text)  Physical Therapy Goals  Initiated 9/16/2017 ; Goals remain appropriate as of 9/22/17:  1. Patient will move from supine to sit and sit to supine , scoot up and down and roll side to side in bed with independence within 7 day(s). 2. Patient will transfer from bed to chair and chair to bed with modified independence using the least restrictive device within 7 day(s). 3. Patient will perform sit to stand with independence within 7 day(s). 4. Patient will ambulate with modified independence for 300 feet with the least restrictive device within 7 day(s). 5. Patient will ascend/descend 4 stairs with dual handrail(s) with supervision/set-up within 7 day(s). PHYSICAL THERAPY TREATMENT  Patient: Vilma Rosa (38 y.o. female)  Date: 9/26/2017  Diagnosis: Metastatic Breast Ca  Metastatic breast cancer ([facility] [address]  SMALL BOWEL OBSTRUCTION <principal problem not specified>  Procedure(s) (LRB):  LYSIS OF ADHESIONS LAPAROSCOPIC CONVERT TO OPEN (N/A) 11 Days Post-Op  Precautions: Fall  Chart, physical therapy assessment, plan of care and goals were reviewed. ASSESSMENT:  Cleared by nurse to mobilize. Pt received up in chair. Pt performed sit to stand at SBA although with wide ALDA. Pt ambulated 250 ft with RW at [facility]. Pt with multiple standing rest breaks during. Pt educated on pursed lip breathing throughout activity due to dyspnea when resting. Pt able to ambulate further with PLB before requring standing rest break. Pt ambulated with increased posture today. Pts o2 stats at 97%. Pt will benefit from [facility] to improve endurance and strength.    Progression toward goals:  [X]      Improving appropriately and progressing toward goals  [ ]      Improving slowly and progressing toward goals  [ ]      Not making progress toward goals and plan of care will be adjusted       PLAN:  Patient continues to benefit from skilled intervention to address the above impairments. Continue treatment per established plan of care. Discharge Recommendations:  Home Health  Further Equipment Recommendations for Discharge:  rolling walker       SUBJECTIVE:   Patient stated Kathe Manners I'm ready to walk.       OBJECTIVE DATA SUMMARY:   Critical Behavior:  Neurologic State: Alert, Appropriate for age  Orientation Level: Oriented X4  Cognition: Appropriate decision making, Appropriate for age attention/concentration, Appropriate safety awareness  Safety/Judgement: Awareness of environment, Fall prevention, Insight into deficits  Functional Mobility Training:                                   Transfers:  Sit to Stand: Stand-by asssistance  Stand to Sit: Supervision                             Balance:  Sitting: Intact  Standing: Intact  Standing - Static: Good  Standing - Dynamic : Good  Ambulation/Gait Training:  Distance (ft): 200 Feet (ft)  Assistive Device: Walker, rolling;Gait belt  Ambulation - Level of Assistance: Contact guard assistance;Stand-by asssistance        Gait Abnormalities: Decreased step clearance        Base of Support: Widened     Speed/Domonique: Pace decreased (<100 feet/min)  Step Length: Right shortened;Left shortened                                                       Pain:  Pain Scale 1: Numeric (0 - 10)  Pain Intensity 1: 0              Activity Tolerance:   Pt with increased ambulation distance today. Please refer to the flowsheet for vital signs taken during this treatment.   After treatment:   [X] Patient left in no apparent distress sitting up in chair  [ ] Patient left in no apparent distress in bed  [X] Call bell left within reach  [X] Nursing notified  [ ] Caregiver present  [ ] Bed alarm activated      COMMUNICATION/COLLABORATION:   The patients plan of care was discussed with: Registered Nurse     Funmilayo Bernal   Time Calculation: 28 mins

## 2017-09-26 NOTE — PROGRESS NOTES
Subjective:    Some nausea. No vomiting today. Diarrhea decreased. Objective:    Blood pressure 130/63, pulse 74, temperature 98.4 °F (36.9 °C), resp. rate 18, height 5' 6\" (1.676 m), weight 87 kg (191 lb 12.8 oz), SpO2 100 %. Exam - alert, comfortable, CTAB, RRR, abd softly distended, hypoBS, approp TTP. Assessment:  Procedure(s):  LYSIS OF ADHESIONS LAPAROSCOPIC CONVERT TO OPEN 9/15/2017    Active Hospital Problems    Diagnosis Date Noted    Neoplastic malignant related fatigue 09/13/2017    Pain due to malignant neoplasm metastatic to bone (HCC) 30/32/4942    Cyclical vomiting with nausea 09/13/2017    Metastatic breast cancer ([facility] [address] 08/11/2017      Resolving ileus. Diarrhea not c/w SBO. Plan:  - trial GI lite diet again. - then d/c to SNF once tolerating.   - AMBULATION IS ONLY TREATMENT FOR ILEUS.       Carla Bradford MD

## 2017-09-27 PROBLEM — R60.0 LOCALIZED EDEMA: Status: ACTIVE | Noted: 2017-01-01

## 2017-09-27 PROBLEM — E46 PROTEIN CALORIE MALNUTRITION (HCC): Status: ACTIVE | Noted: 2017-01-01

## 2017-09-27 NOTE — PROGRESS NOTES
Hospitalist Progress Note    NAME: Gal Hatfield   :  1940   MRN:  [id number]     Interim Hospital Summary: 65 y.o. female whom presented on 2017 with      Assessment / Plan:  DM type 2  -pt normally on Lantus 18 units at home, hold home lantus and adjust it prn.    -SSI for now to reduce risk of hypoglycemia with lantus. Once her PO intake improves, can start lantus at lower dose depending on glucose level. Recommend documentating home glucose and bring to PCP for adjust insulin regimen based on her log   -cont' home megestrol     Hypophosphatemia  -replace phos    Diarrhea  -cdiff neg, neg campylobacter, pending stool cx  -pt is nontoxic appearing, no leukocytosis  -will start lomotil    HTN / CAD / PPM  Chronic diastolic chf EF 60-81%  -cont' norvasc, imdur, lipitor and ASA  -Nitroglycerin paste or hydralazine IV prn SBP >170    JULES, improving  Metabolic acidosis, resolved  -pre renal from third spacing and fluid loss via NG (now removed)    - cr stable, off IVF  -hold nephrotoxic agents  -nephro following    SBO  Abdominal pain  Hx diverticulitis s/p partial colectomy 2008  S/p open cholecystectomy  -s/p lysis of adhesions 9/15/2017  -NGT removed. Tolerating diet    Right breast CA dx  with bone mets dx   Right arm lymphedema  -pT3 N3a M0 (Stage IIIC) infiltrating ductal carcinoma, s/p right mastectomy, XRT  -follows with Dr Rosa Willis     SEN  -on CPAP     Chronic microcytic anemia, transfuse prn.    -s/p 1 unit prbc. Per Dr Socorro Strickland note, keep hgb >8    Hx right hemispheric CVA   Hx PE   Chronic back pain due to bone mets  Obesity  Body mass index is 30.96 kg/(m^2). Thank you for the consult, will sign off. Please call for questions.     Code status: Full  Prophylaxis: Lovenox         Subjective:     Chief Complaint / Reason for Physician Visit  Follow up of abdominal pain, metastatic cancer and ileus  Chart reviewed in detail. Discussed with RN events overnight. Pt is still having loose stool. Review of Systems:  Symptom Y/N Comments  Symptom Y/N Comments   Fever/Chills n   Chest Pain n    Poor Appetite y   Edema     Cough    Abdominal Pain n     Sputum    Joint Pain     SOB/WELCH    Pruritis/Rash     Nausea/vomit n   Tolerating PT/OT     Diarrhea y   Tolerating Diet     Constipation    Other       Could NOT obtain due to:      PO intake:   Patient Vitals for the past 72 hrs:   % Diet Eaten   09/26/17 1345 100 %   09/26/17 1039 50 %   09/25/17 1744 100 %   09/25/17 1300 100 %   09/25/17 0830 75 %   09/24/17 1849 30 %   09/24/17 1302 25 %     Objective:     VITALS:   Last 24hrs VS reviewed since prior progress note. Most recent are:  Patient Vitals for the past 24 hrs:   Temp Pulse Resp BP SpO2   09/27/17 0914 - 77 16 118/47 97 %   09/26/17 2030 98.2 °F (36.8 °C) 76 16 130/66 98 %   09/26/17 1532 98.4 °F (36.9 °C) 74 18 130/63 100 %   09/26/17 1039 98 °F (36.7 °C) 78 16 119/63 98 %       Intake/Output Summary (Last 24 hours) at 09/27/17 1002  Last data filed at 09/26/17 1345   Gross per 24 hour   Intake              600 ml   Output                0 ml   Net              600 ml        PHYSICAL EXAM:  General: WD, WN. Alert, cooperative, no acute distress    EENT:  EOMI. Anicteric sclerae. Resp:  CTA bilaterally, no wheezing or rales. No accessory xmuscle use  CV:  Regular  rhythm,  mild edema  GI:  slight distended, NT.  +BS  Neurologic:  Alert and oriented X 3, normal speech  Psych:   not anxious nor agitated  Skin:  No rashes.   No jaundice    Reviewed most current lab test results and cultures  YES  Reviewed most current radiology test results   YES  Review and summation of old records today    NO  Reviewed patient's current orders and MAR    YES  PMH/SH reviewed - no change compared to H&P  ________________________________________________________________________  Care Plan discussed with:    Comments   Patient x    Family RN x    Care Manager     Consultant                        Multidiciplinary team rounds were held today with , nursing, pharmacist and clinical coordinator. Patient's plan of care was discussed; medications were reviewed and discharge planning was addressed. ________________________________________________________________________  Total NON critical care TIME:   35   Minutes    Total CRITICAL CARE TIME Spent:   Minutes non procedure based      Comments   >50% of visit spent in counseling and coordination of care x     This includes time during multidisciplinary rounds if indicated above   ________________________________________________________________________  July Martin MD     Procedures: see electronic medical records for all procedures/Xrays and details which were not copied into this note but were reviewed prior to creation of Plan. LABS:  I reviewed today's most current labs and imaging studies.   Pertinent labs include:  Recent Labs      09/25/17 0927   WBC  5.5   HGB  8.1*   HCT  24.3*   PLT  148*     Recent Labs      09/27/17   0636  09/25/17 0927   NA  135*  136  134*   K  4.3  3.9  3.8   CL  103  101  100   CO2  21  22  24   GLU  102*  109*  110*   BUN  21*  23*  23*   CREA  1.41*  1.47*  1.52*   CA  7.3*  7.4*  7.4*   MG  1.3*   --    PHOS  2.9  2.5*   ALB  1.8*  2.1*

## 2017-09-27 NOTE — DISCHARGE SUMMARY
Physician Discharge Summary     Patient ID:  Melvi Pleitez  [id number]  66 y.o.  1940    Admit Date:  9/13/2017    Discharge Date:   9/27/2017    Admission Diagnoses:  Metastatic Breast Ca; Metastatic breast cancer (HCC);SMALL B*    Discharge Diagnoses: Active Problems:    Metastatic breast cancer ([facility] [address] (8/11/2017)      Neoplastic malignant related fatigue (9/13/2017)      Pain due to malignant neoplasm metastatic to bone (HCC) (8/54/8102)      Cyclical vomiting with nausea (9/13/2017)      Localized edema (9/27/2017)      Protein calorie malnutrition ([facility] [address] (9/27/2017)         Surgical Procedure:  Procedure(s):  LYSIS OF ADHESIONS LAPAROSCOPIC CONVERT TO OPEN      Admission Condition:  Poor    Discharge Condition:  Good    Hospital Problems:    Active Hospital Problems    Diagnosis Date Noted    Localized edema 09/27/2017    Protein calorie malnutrition ([facility] [address] 09/27/2017    Neoplastic malignant related fatigue 09/13/2017    Pain due to malignant neoplasm metastatic to bone (HCC) 68/24/4588    Cyclical vomiting with nausea 09/13/2017    Metastatic breast cancer ([facility] [address] 08/11/2017       Hospital Course:   Admitted by Dr. Peoples Both for n/v. Thought to be related to metastatic breast cancer. Found on CT to be from SBO. Underwent lysis of adhesions and after ileus resolved began with frequent BM's. Nausea resolved. Diarrhea, then stopped after Reglan stopped. Now tolerating GI lite diet. Consults:  Hospitalist    Disposition:  SNF    Physical Exam:  Abdomen soft. Bowel sounds normal.  No masses, no organomegaly. Incision CDI. NABS.     Visit Vitals    /59 (BP 1 Location: Left arm, BP Patient Position: At rest;Sitting)    Pulse 73    Temp 98 °F (36.7 °C)    Resp 17    Ht 5' 6\" (1.676 m)    Wt 87 kg (191 lb 12.8 oz)    SpO2 96%    BMI 30.96 kg/m2       Discharge medications:    Current Discharge Medication List      START taking these medications    Details   HYDROcodone-acetaminophen (NORCO) 5-325 mg per tablet Take 1-2 Tabs by mouth every four (4) hours as needed for Pain. Max Daily Amount: 12 Tabs. Qty: 30 Tab, Refills: 0         CONTINUE these medications which have NOT CHANGED    Details   insulin syringe-needle U-100 (BD INSULIN SYRINGE ULTRA-FINE) 1/2 mL 31 gauge x 15/64\" syrg 1 Syringe by SubCUTAneous route two (2) times a day. Qty: 100 Pen Needle, Refills: 5      VITAMIN D2 50,000 unit capsule take 1 capsule by mouth every 30 DAYS  Qty: 3 Cap, Refills: 2    Associated Diagnoses: Vitamin D deficiency      hydrOXYzine HCl (ATARAX) 25 mg tablet Take 25 mg by mouth every eight (8) hours as needed for Itching. morphine IR (MS IR) 15 mg tablet Take 1 Tab by mouth every four (4) hours as needed for Pain. Max Daily Amount: 90 mg.  Qty: 60 Tab, Refills: 0    Associated Diagnoses: Metastatic breast cancer (HCC)      morphine CR (MS CONTIN) 15 mg CR tablet Take 1 Tab by mouth every twelve (12) hours. Max Daily Amount: 30 mg.  Qty: 60 Tab, Refills: 0    Associated Diagnoses: Metastatic breast cancer (HCC)      megestrol (MEGACE) 400 mg/10 mL (10 mL) suspension Take 10 mL by mouth daily. Qty: 300 mL, Refills: 2    Associated Diagnoses: Metastatic breast cancer ([address] Decreased appetite      diclofenac (VOLTAREN) 1 % gel Apply 2 g to affected area four (4) times daily as needed (Pain). Comments: Applied to bilateral knees      dicyclomine (BENTYL) 20 mg tablet Take 1 Tab by mouth every six (6) hours as needed (abdominal cramps) for up to 20 doses. Qty: 20 Tab, Refills: 0      ondansetron hcl (ZOFRAN, AS HYDROCHLORIDE,) 4 mg tablet Take 1 Tab by mouth every eight (8) hours as needed for Nausea. Qty: 20 Tab, Refills: 0      atorvastatin (LIPITOR) 80 mg tablet Take 1 Tab by mouth daily. Indications: hypercholesterolemia  Qty: 30 Tab, Refills: 0      aspirin (ASPIRIN) 325 mg tablet Take 1 Tab by mouth daily.   Qty: 30 Tab, Refills: 0      ipratropium (ATROVENT) 0.06 % nasal spray instill 2 sprays into each nostril three times a day - IF NEEDED FOR RUNNING NOSE  Refills: 0      glipiZIDE (GLUCOTROL) 5 mg tablet Take 0.5 Tabs by mouth two (2) times a day. Take 1/2 every day with breakfast. If your blood sugar is above 200 take a whole tablet. Qty: 30 Tab, Refills: 3      isosorbide mononitrate ER (IMDUR) 60 mg CR tablet Take  by mouth every morning. Refills: 0      Blood Sugar Diagnostic, Disc (ASCENSIA BREEZE 2) strp Check your blood sugar twice daily. E11.42  Qty: 100 Strip, Refills: 6      ondansetron (ZOFRAN ODT) 4 mg disintegrating tablet Take 1 Tab by mouth every eight (8) hours as needed for Nausea. Qty: 60 Tab, Refills: 2    Associated Diagnoses: Nausea and vomiting, unspecified intactability, vomiting of unspecified type      albuterol (PROVENTIL VENTOLIN) 2.5 mg /3 mL (0.083 %) nebulizer solution 3 mL by Nebulization route every four (4) hours as needed for Wheezing. Qty: 24 Each, Refills: 0      MICROLET LANCET misc Refills: 0      benazepril (LOTENSIN) 40 mg tablet Take 40 mg by mouth every morning. omeprazole (PRILOSEC) 20 mg capsule Take 40 mg by mouth two (2) times a day. nitroglycerin (NITROSTAT) 0.4 mg SL tablet by SubLINGual route every five (5) minutes as needed for Chest Pain. amlodipine (NORVASC) 10 mg tablet TAKE 1 TABLET BY MOUTH ONCE DAILY  Qty: 30 Tab, Refills: 10         STOP taking these medications       insulin glargine (LANTUS) 100 unit/mL injection Comments:   Reason for Stopping:         polyethylene glycol (MIRALAX) 17 gram/dose powder Comments:   Reason for Stopping: Follow-up: next week in my office for staple removal.      See discharge instructions for further details.      Signed:  Trinna Dancer, MD  9/27/2017  2:18 PM

## 2017-09-27 NOTE — PROGRESS NOTES
Palliative Medicine Consult  [place]wen: [phone] ([phone])    Patient Name: Celine Osborne  YOB: 1940    Date of Initial Consult: 9/13/17  Reason for Consult: Uncontrolled abdominal pain, advanced care planning  Requesting Provider: April Yost NP   Primary Care Physician: Nasrin Ahumada MD      SUMMARY:   Celine Osborne is a 65 y.o. with a past history of DM, HTN arrhythmia, CAD, MI and strokes in 2009 and 2013,diverticulosis, SEN,  stage IIIC infiltrating ductal carcinoma of the right breast, s/p right mastectomy with axillary LN dissection, chemotherapy completed April 2015, XRT completed August 2015, bone marrow biopsy July 2017 showed bone marrow extensively involved by metastatic carcinoma, breast primary. She was admitted on 9/13/2017 from Dr. Nitin Lemos office with a diagnosis of metastatic breast cancer, anemia. Current medical issues leading to Palliative Medicine involvement include: symptom management. Pt was admitted for severe fatigue and uncontrolled nausea and vomiting. She was found to have a partial bowel obstruction, and on 9/15 she was taken to the OR for lysis of adhesions. Initial post-op pt had significant nausea, however, on 8/19/17 n/v seemed to be resolved. C/o \"some nausea\" on 9/22, at which time the IV dilaudid was changed to PO morphine in anticipation of discharge. The following day the nausea returned, along with abdominal pain and loose stools. Abdominal XR c/w post-op ileus. Psychosocial: pt lives with her daughter and Pita Hernandez in [place], daughter drives her to appointments   PALLIATIVE DIAGNOSES:   1. Abdominal pain: surgery 9/15/17 for lysis of adhesions  2. Pain due to neoplasm (spine mets)  3. Fatigue   4. Nausea and vomiting  5. Lymphedema right arm  6. Bilateral LE edema  7. Care decisions  8. Acute post-op ileus. 9. Protein/calorie malnutrition     PLAN:   1.  PAIN: chronic abdominal and back pain for over 1 year (note on 10/13/16, during which time she was c/o abd pain oncology note reports \"no evidence of metastatic disease). Prior to this admission, pt was referred to the palliative medicine outpt clinic and on Aug 16, 2017 the clinic reached out to pt to schedule an appt; pt at that time stated she needed to discuss with her daughter, who she relies on for transportation, however, she never called the clinic back.  shows that pt had been getting prescriptions from multiple providers for pain over the past year, with the last being morphine IR 15mg and morphine ER 15mg, both for #60 tabs filled on 8/11/17. Pt reports that she could not take the morphine ER because it made her too sleepy. 1. D/c IV dilaudid. 2. Will avoid morphine as this may have contributed to the nausea. 3. Pt's opioid use has been minimal over the past 24 hours (0.2mg IV Dilaudid), therefore, will start with Norco 5/325mg 1 tab q. 4 hours prn pain. 4. Per my discussion with oncology, will encourage daughter to schedule appt with oncology and palliative on same day to minimize inconvenience. 2. BILATERAL LE EDEMA:  Pt reports bumex was d/cynthia on 8/23/17 due to low BP and never restarted. 1. Counseled pt that edema may be 2/2 fluid overload from all of the IVF she's received. Instructed pt to discuss with palliative medicine when she sees them in clinic. 3. PROTEIN/CALORIE MALNUTRITION: may need nutritional supplement. Albumin is low. 4. Initial consult note routed to primary continuity provider  5.  Communicated plan of care with: Palliative ZAYRA RN      GOALS OF CARE / TREATMENT PREFERENCES:   [====Goals of Care====]  GOALS OF CARE:  Patient / health care proxy stated goals:     Pain management      TREATMENT PREFERENCES:   Code Status: Full Code    Advance Care Planning:  Advance Care Planning 9/13/2017   Patient's Healthcare Decision Maker is: Legal Next of Dimitry Cancino   Primary Decision Maker Name [facility]    Primary Decision Maker Phone Number [phone] Primary Decision Maker Relationship to Patient Adult child   Secondary Decision Maker Name -   Confirm Advance Directive None   Patient Would Like to Complete Advance Directive -   Does the patient have other document types -       Other:    The palliative care team has discussed with patient / health care proxy about goals of care / treatment preferences for patient.  [====Goals of Care====]         HISTORY:     History obtained from: chart, patient    CHIEF COMPLAINT: abdominal pain    HPI/SUBJECTIVE:    The patient is:   [x] Verbal and participatory  [] Non-participatory due to:     (pt is poor historian)  9/13:  Pt was directly admitted from Oncology office today for complaints of severe abdominal pain that has not responded to her current pain medications: Morphine ER 15mg bid, and Morphine IR 15mg q. 4 hours prn. Pt reports to me that she has been experiencing diffuse, crampy, constant abdominal pain for \"months\", however, today she started vomiting. Endorses constipation, but states BM daily, LBM was \"normal\" yesterday. Asking for a suppository. 9/14: pain and nausea better since NGT placed. Still having abdominal pain, but not as bad.    9/18:  Miserable, did not want to talk. 9/19: feeling much better today. Nausea and vomiting are resolved, taking small sips of water. Had a BM this am.  C/o trouble with pain medication, her back pain is usually high, 8-9/10 when she gets a dose of medication. The medication helps, just not very long. Back pain at this time is 7-8/10.    9/20: \"I'm hungry. \"  No nausea or vomiting, pain well controlled with scheduled doses, has not required any prn doses. BM today. 9/26: nausea is 10/10. Pain is well-controlled; expressed concern about becoming addicted. Reports loose stools. 9/27: no nausea. Pain is under control; c/o RLQ muscle pain. Ate and tolerated breakfast, is hungry. Passing gas and loose stool.      Clinical Pain Assessment (nonverbal scale for severity on nonverbal patients):   [++++ Clinical Pain Assessment++++]  [++++Pain Severity++++]: Pain: 0  [++++Pain Character++++]: crampy  [++++Pain Duration++++]:  months  [++++Pain Effect++++]: nausea and vomiting  [++++Pain Factors++++]:   [++++Pain Frequency++++]: constant  [++++Pain Location++++]:   Diffuse abdomen  [++++ Clinical Pain Assessment++++]  Duration: for how long has pt been experiencing pain (e.g., 2 days, 1 month, years)  Frequency: how often pain is an issue (e.g., several times per day, once every few days, constant)     FUNCTIONAL ASSESSMENT:     Palliative Performance Scale (PPS):  PPS: 40       PSYCHOSOCIAL/SPIRITUAL SCREENING:     Advance Care Planning:  Advance Care Planning 9/13/2017   Patient's Healthcare Decision Maker is: Legal Next Chad Cancino   Primary Decision Maker Name [facility]    Primary Decision Maker Phone Number [phone]   Primary Decision Maker Relationship to Patient Adult child   Secondary Decision Maker Name -   Confirm Advance Directive None   Patient Would Like to Complete Advance Directive -   Does the patient have other document types -        Any spiritual / Gnosticist concerns:  [] Yes /  [x] No    Caregiver Burnout:  [] Yes /  [] No /  [x] No Caregiver Present      Anticipatory grief assessment:   [x] Normal  / [] Maladaptive       ESAS Anxiety: Anxiety: 0    ESAS Depression: Depression: 2        REVIEW OF SYSTEMS:     Positive and pertinent negative findings in ROS are noted above in HPI. The following systems were [x] reviewed / [] unable to be reviewed as noted in HPI  Other findings are noted below. Systems: constitutional, ears/nose/mouth/throat, respiratory, gastrointestinal, genitourinary, musculoskeletal, integumentary, neurologic, psychiatric, endocrine. Positive findings noted below.   Modified ESAS Completed by: provider   Fatigue: 4 Drowsiness: 0   Depression: 2 Pain: 0   Anxiety: 0 Nausea: 0   Anorexia: 0 Dyspnea: 0     Constipation: No     Stool Occurrence(s): 1        PHYSICAL EXAM:     From RN flowsheet:  Wt Readings from Last 3 Encounters:   09/27/17 191 lb 12.8 oz (87 kg)   09/13/17 199 lb (90.3 kg)   09/06/17 194 lb 7 oz (88.2 kg)     Blood pressure 118/47, pulse 77, temperature 98.3 °F (36.8 °C), resp. rate 16, height 5' 6\" (1.676 m), weight 191 lb 12.8 oz (87 kg), SpO2 97 %.     Pain Scale 1: Numeric (0 - 10)  Pain Intensity 1: 0  Pain Onset 1: surgery  Pain Location 1: Abdomen  Pain Orientation 1: Medial  Pain Description 1: Aching  Pain Intervention(s) 1: Medication (see MAR)  Last bowel movement, if known:     Constitutional:WD, WN,sitting in chair at bedside, fatigued   Eyes: pupils equal, anicteric  ENMT: no nasal discharge, moist mucous membranes   Cardiovascular:  regular rhythm, distal pulses intact  Respiratory: breathing not labored, symmetric  Skin: warm, dry, 3+ BLE edema  Neurologic: following commands, moving all extremities  Psychiatric: full affect, no hallucinations          HISTORY:     Active Problems:    Metastatic breast cancer (HCC) (8/11/2017)      Neoplastic malignant related fatigue (9/13/2017)      Pain due to malignant neoplasm metastatic to bone (HCC) (3/39/1742)      Cyclical vomiting with nausea (9/13/2017)      Past Medical History:   Diagnosis Date    Arrhythmia     bradycardia in 30's /pacemaker inserted    Arthritis     RT KNEE    Asthma Dx age 75    Bradycardia 2009    Cardiology saw her during hospital stay; Now off coreg;  Sees Dr. Ginna Dotson    Breast cancer [facility])     Rt mastectomy 2/19/15    CAD (coronary artery disease)     Dr Zachary Miner    Diabetes [facility])     Now seeing Dr. Genesis Eisenberg Diverticulitis     DJD (degenerative joint disease), lumbar     GERD (gastroesophageal reflux disease)     H. pylori infection 2008    Dr. Juliette Gutierrez    Heart attack [facility]) April 2006    Hypercholesterolemia     Hypertension     Morbid obesity ([facility] [address]     Neuropathy, arm 2009    Right; Has had MRI and EMG at [facility]    SEN (obstructive sleep apnea)     uses CPAP    Rectal bleeding 2009    Hospitalized; Had colonoscopy and EGD (ok), gastric emptying study (normal), doppler mesenteric arteries (ok)    Sciatica     Has seen Dr. Tawnya Zepeda    Stroke [facility]) 2009 & 2012    no residual problems      Past Surgical History:   Procedure Laterality Date    HX APPENDECTOMY  1979    HX BREAST LUMPECTOMY  12/12/2013    RIGHT BREAST DUCTAL EXCISION performed by Ilya Carlson MD at [facility] MAIN OR    HX CATARACT REMOVAL  2007    HX CHOLECYSTECTOMY  1979    HX COLONOSCOPY      HX HEART CATHETERIZATION      angioplasty    HX HYSTERECTOMY      fibroids    HX KNEE ARTHROSCOPY  1997    right    HX MASTECTOMY Right 2/19/2015    RIGHT BREAST MODIFIED RADICAL MASTECTOMY RIGHT SENTINEL NODE BIOPSY, RIGHT PORT A CATH INSERTION performed by Karime Gomez MD at [facility] AMBUL[facility] OR    HX PACEMAKER      HX SHOULDER ARTHROSCOPY  2004    left      Family History   Problem Relation Age of Onset    Stroke Father     Cancer Sister      breast cancer    Hypertension Sister     Cancer Mother      Unknown type    Cancer Brother      Colon    Hypertension Brother     Anesth Problems Neg Hx       History reviewed, no pertinent family history.   Social History   Substance Use Topics    Smoking status: Never Smoker    Smokeless tobacco: Never Used    Alcohol use No     Allergies   Allergen Reactions    Codeine Itching    Duratuss [Phenylephrine-Guaifenesin] Nausea and Vomiting    Lisinopril-Hydrochlorothiazide Itching    Motrin [Ibuprofen] Nausea and Vomiting     With 800mg    Tape [Adhesive] Other (comments)     TEARS HER SKIN      Current Facility-Administered Medications   Medication Dose Route Frequency    ondansetron (ZOFRAN) injection 4 mg  4 mg IntraVENous Q6H PRN    HYDROcodone-acetaminophen (NORCO) 5-325 mg per tablet 1 Tab  1 Tab Oral Q4H PRN    diphenoxylate-atropine (LOMOTIL) tablet 1 Tab  1 Tab Oral QID PRN    loperamide (IMODIUM) capsule 4 mg  4 mg Oral Q4H PRN    lactobac ac& pc-s.therm-b.anim (JEREMIAH Q/RISAQUAD)  1 Cap Oral DAILY    megestrol (MEGACE) 400 mg/10 mL (10 mL) oral suspension 400 mg  400 mg Oral DAILY    famotidine (PEPCID) tablet 20 mg  20 mg Oral DAILY    insulin lispro (HUMALOG) injection   SubCUTAneous AC&HS    isosorbide mononitrate ER (IMDUR) tablet 60 mg  60 mg Oral DAILY    amLODIPine (NORVASC) tablet 10 mg  10 mg Oral DAILY    atorvastatin (LIPITOR) tablet 80 mg  80 mg Oral QHS    aspirin delayed-release tablet 81 mg  81 mg Oral DAILY    hydrALAZINE (APRESOLINE) 20 mg/mL injection 10 mg  10 mg IntraVENous Q4H PRN    SALINE PERIPHERAL FLUSH Q8H soln 5 mL  5 mL InterCATHeter Q8H    enoxaparin (LOVENOX) injection 30 mg  30 mg SubCUTAneous DAILY    glucose chewable tablet 16 g  4 Tab Oral PRN    glucagon (GLUCAGEN) injection 1 mg  1 mg IntraMUSCular PRN    dextrose 10% infusion 125-250 mL  125-250 mL IntraVENous PRN          LAB AND IMAGING FINDINGS:     Lab Results   Component Value Date/Time    WBC 5.5 09/25/2017 09:27 AM    HGB 8.1 09/25/2017 09:27 AM    PLATELET 902 65/62/4810 09:27 AM     Lab Results   Component Value Date/Time    Sodium 135 09/27/2017 06:36 AM    Potassium 4.3 09/27/2017 06:36 AM    Chloride 103 09/27/2017 06:36 AM    CO2 21 09/27/2017 06:36 AM    BUN 21 09/27/2017 06:36 AM    Creatinine 1.41 09/27/2017 06:36 AM    Calcium 7.3 09/27/2017 06:36 AM    Magnesium 1.3 09/27/2017 06:36 AM    Phosphorus 2.9 09/27/2017 06:36 AM      Lab Results   Component Value Date/Time    AST (SGOT) 17 09/15/2017 05:58 AM    Alk.  phosphatase 438 09/15/2017 05:58 AM    Protein, total 7.1 09/15/2017 05:58 AM    Albumin 1.8 09/27/2017 06:36 AM    Globulin 4.4 09/15/2017 05:58 AM     Lab Results   Component Value Date/Time    INR 1.1 06/10/2017 02:56 PM    Prothrombin time 10.8 06/10/2017 02:56 PM    aPTT 33.2 07/03/2010 09:00 AM      Lab Results   Component Value Date/Time    Iron 42 05/11/2015 05:00 AM    TIBC 215 05/11/2015 05:00 AM    Iron % saturation 20 05/11/2015 05:00 AM    Ferritin 417 05/11/2015 05:00 AM      No results found for: PH, PCO2, PO2  No components found for: [place]   Lab Results   Component Value Date/Time    CK 70 09/15/2017 02:45 PM    CK - MB 2.3 01/26/2017 01:16 PM                Total time: 30 min  Counseling / coordination time, spent as noted above: 25 min  > 50% counseling / coordination?: yes    Prolonged service was provided for  []30 min   []75 min in face to face time in the presence of the patient, spent as noted above. Time Start:   Time End:   Note: this can only be billed with 38338 (initial) or 47794 (follow up). If multiple start / stop times, list each separately.

## 2017-09-27 NOTE — PROGRESS NOTES
CM completed d/c assessment with pt. Pt will be transported, via [facility] at 5:30. Pt will be d/c to SNF: [facility]. Pt aware that her nurse will review d/c plans. Pt aware of 2nd  Medicare letter (signed) placed in chart. CM informed nurse of call report to SNF. Care Management Interventions  PCP Verified by CM:  Yes  Mode of Transport at Discharge: BLS ([facility] at 5:30)  Transition of Care Consult (CM Consult): SNF [facility] and rehab)  Partner SNF: Yes  Discharge Durable Medical Equipment: No  Physical Therapy Consult: Yes  Occupational Therapy Consult: Yes  Speech Therapy Consult: No  Current Support Network: Lives Alone, Own Home  Confirm Follow Up Transport: Family  Plan discussed with Pt/Family/Caregiver: Yes  Freedom of Choice Offered: Yes  Discharge Location  Discharge Placement: Skilled nursing facility    REKHA Isabel   [phone]

## 2017-09-27 NOTE — PROGRESS NOTES
Problem: Mobility Impaired (Adult and Pediatric)  Goal: *Acute Goals and Plan of Care (Insert Text)  Physical Therapy Goals  Initiated 9/16/2017 ; Goals remain appropriate as of 9/22/17:  1. Patient will move from supine to sit and sit to supine , scoot up and down and roll side to side in bed with independence within 7 day(s). 2. Patient will transfer from bed to chair and chair to bed with modified independence using the least restrictive device within 7 day(s). 3. Patient will perform sit to stand with independence within 7 day(s). 4. Patient will ambulate with modified independence for 300 feet with the least restrictive device within 7 day(s). 5. Patient will ascend/descend 4 stairs with dual handrail(s) with supervision/set-up within 7 day(s). PHYSICAL THERAPY TREATMENT  Patient: Serenity Bunn (74 y.o. female)  Date: 9/27/2017  Diagnosis: Metastatic Breast Ca  Metastatic breast cancer ([facility] [address]  SMALL BOWEL OBSTRUCTION <principal problem not specified>  Procedure(s) (LRB):  LYSIS OF ADHESIONS LAPAROSCOPIC CONVERT TO OPEN (N/A) 12 Days Post-Op  Precautions: Fall  Chart, physical therapy assessment, plan of care and goals were reviewed. ASSESSMENT:  Pt cleared to mobilize from nursing . Pt received up in chair. Pt reported feeing tired due to not sleeping well because of diarrhea. Pt performed sit to stand at SBA. Pt ambulated 150ft and 90ft with RW at CGA/SBA. Pt wit improved endurance throughout ambulation taking less standing rest breaks throughout. Pt will still benefit from SNF to help increase endurance and strength. Progression toward goals:  [X]      Improving appropriately and progressing toward goals  [ ]      Improving slowly and progressing toward goals  [ ]      Not making progress toward goals and plan of care will be adjusted       PLAN:  Patient continues to benefit from skilled intervention to address the above impairments.   Continue treatment per established plan of care.  Discharge Recommendations:  Tenzin Diaz  Further Equipment Recommendations for Discharge:  none       SUBJECTIVE:   Patient stated Mirella Hester I'm not feeing great today.       OBJECTIVE DATA SUMMARY:   Critical Behavior:  Neurologic State: Alert  Orientation Level: Oriented X4  Cognition: Appropriate decision making  Safety/Judgement: Awareness of environment, Fall prevention, Insight into deficits  Functional Mobility Training:  Bed Mobility:                                Transfers:  Sit to Stand: Stand-by asssistance (with wide ALDA)                                Balance:  Sitting: Intact  Standing: Intact  Standing - Static: Good  Standing - Dynamic : Good  Ambulation/Gait Training:  Distance (ft): 240 Feet (ft)  Assistive Device: Walker; Walker, rolling  Ambulation - Level of Assistance: Contact guard assistance;Stand-by asssistance        Gait Abnormalities: Decreased step clearance        Base of Support: Widened     Speed/Domonique: Pace decreased (<100 feet/min)  Step Length: Right shortened;Left shortened                                                       Pain:  Pain Scale 1: Numeric (0 - 10)  Pain Intensity 1: 0              Activity Tolerance:   WNL pt with less standing rest breaks during ambulation. Please refer to the flowsheet for vital signs taken during this treatment.   After treatment:   [X] Patient left in no apparent distress sitting up in chair  [ ] Patient left in no apparent distress in bed  [X] Call bell left within reach  [X] Nursing notified  [ ] Caregiver present  [ ] Bed alarm activated      COMMUNICATION/COLLABORATION:   The patients plan of care was discussed with: Registered Nurse     Gerardo Escobra   Time Calculation: 24 mins

## 2017-09-27 NOTE — ROUTINE PROCESS
Report called to [address] at [facility]. Pt discharged. Transportation with [facility] Script for norco given to [facility] to give to [facility].

## 2017-09-27 NOTE — PROGRESS NOTES
PALLIATIVE MEDICINE      Notified by nurse navigator that pt's discharge instructions show pt is to start Jonathan Amabile and continue morphine IR and morphine ER. Modified discharge instructions to discontinue morphine IR and Morphine ER, notified RN of change so she could reprint discharge instructions provided to pt.

## 2017-09-27 NOTE — PROGRESS NOTES
General Surgery End of Shift Nursing Note    Bedside shift change report given to [facility] (oncoming nurse) by Annmarie Vieyra RN (offgoing nurse). Report included the following information SBAR, Kardex, Intake/Output, MAR and Recent Results. Shift worked:   7p-7a   Significant changes during shift:  3 BMs between 1903-4892. Lomotil and Imodium 2000 and 0000. No further BMs since 0000. Non-emergent issues for physician to address:   None     Number times ambulated in hallway past shift: 0    Number of times OOB to chair past shift: Multiple    Pain Management:  Current medication:   Patient states pain is manageable on current pain medication: YES    GI:    Current diet:  DIET GI LITE (POST SURGICAL)    Passing flatus: YES  Last Bowel Movement: yesterday   Appearance: loose    Respiratory:    Incentive Spirometer at bedside: YES  Patient instructed on use: YES    Patient Safety:    Falls Score: 1  Bed Alarm On? No  Sitter?  No    Cris Gutiérrez

## 2017-10-05 NOTE — PROGRESS NOTES
Community Care Team Documentation for Patient in [facility]  Initial Follow Up       Patient was admitted to [facility] from 9/13 to 9/27. Patient was discharged to [facility], on 9/27 (date). See previous [place] Care Team documentation under Patient Outreach. Hospital Discharge diagnosis:  Metastatic breast cancer     RRAT score:      Advance Medical Directive on file in EMR? Not on file    Total Hospitalizations/ED visits last 6 months? IP - 2; ED - 2    PCP : Efrain Loza MD    Per Zhane Holley at [facility], PT and OT following. PT: amb with FWW Mod I, progressing to cane. Focusing on activity tolerance. OT: Indep with UB, Sup LB. Nursing: Atorvastatin Calcium and Omeprazole discontinued 10/4 due to refusals, Bumex ordered 9/29 for edema. Is reporting pain, avg 7/10 relief with meds. Lantus was to be stopped per hospital discharge paperwork. Appointments: 10/12 Dr. Nancie Burroughs at 9:45am, Dr. Nikki Meyer 1:00pm DC: 10/19 home with daughter and grandson. SNF Attending:  Dr. Giovana Hassan    Medications were not reconciled and general patient assessment was not completed during this skilled nursing facility outreach.      EDEL RayW

## 2017-10-12 NOTE — PROGRESS NOTES
[facility] Note:  Arrived - 6722     Visit Vitals    /61 (BP 1 Location: Left arm, BP Patient Position: Sitting)    Pulse 93    Temp 97.9 °F (36.6 °C)    Resp 18    SpO2 97%       Assessment - completed, pt had surgery for SBO and just had the staples removed today. 2+ edema noted to lower extremities bilaterally, weakness and fatigue reported. Port accessed, CBC with diff and CMP drawn. Flushed per protocol w/o difficulty. Hudson needle removed. Labs pending at time of note. See [facility] for results. 1420 - Tolerated well. Pt denies any acute problems/changes. Discharged from [facility] ambulatory. No distress. No further appointments scheduled at this time.

## 2017-10-12 NOTE — MR AVS SNAPSHOT
Visit Information Date & Time Provider Department Dept. Phone Encounter #  
 10/12/2017 10:20 AM Salo Gonzalez MD [facility] [id number] Your Appointments 10/12/2017  1:00 PM  
ESTABLISHED PATIENT with Hannah Spencer MD  
[address] [facility]) Appt Note: onc f/u  
 [address] [facility] [address] [id number]  
[phone]  
  
   
 [address]  
  
    
 11/24/2017 11:30 AM  
Follow Up with Andria Ceron MD  
[facility] [address] [place] [address]) Appt Note: 3 MONTH F/U  
 [address] [facility] [address] [phone] [address]  
  
    
 12/5/2017 10:30 AM  
Follow Up with Annamarie Mcburney, NP [address] at [facility]) Appt Note: 6 month follow up Cp$0 6/6/17 tt  
 [address]  
  
   
 [place] [address] Upcoming Health Maintenance Date Due DTaP/Tdap/Td series (1 - Tdap) 1/15/1960 ZOSTER VACCINE AGE 60> 11/15/1998 OSTEOPOROSIS SCREENING (DEXA) 1/15/2004 MEDICARE YEARLY EXAM 1/15/2004 Pneumococcal 65+ High/Highest Risk (2 of 2 - PCV13) 11/1/2016 EYE EXAM RETINAL OR DILATED Q1 6/24/2017 HEMOGLOBIN A1C Q6M 2/18/2018 LIPID PANEL Q1 5/30/2018 GLAUCOMA SCREENING Q2Y 6/24/2018 FOOT EXAM Q1 8/18/2018 MICROALBUMIN Q1 8/18/2018 Allergies as of 10/12/2017  Review Complete On: 10/12/2017 By: Salo Gonzalez MD  
  
 Severity Noted Reaction Type Reactions Codeine  10/16/2009    Itching Duratuss [Phenylephrine-guaifenesin]  10/16/2009    Nausea and Vomiting Lisinopril-hydrochlorothiazide  10/16/2009    Itching Motrin [Ibuprofen]  10/16/2009    Nausea and Vomiting With 800mg Tape [Adhesive]  12/10/2014    Other (comments) TEARS HER SKIN Current Immunizations  Reviewed on 9/6/2017 Name Date Influenza Vaccine 9/1/2017, 11/1/2015 12:00 AM, 9/1/2014, 9/8/2011 12:00 AM  
 Pneumococcal Polysaccharide (PPSV-23) 11/1/2015 12:00 AM  
  
 Not reviewed this visit You Were Diagnosed With   
  
 Codes Comments Postoperative examination    -  Primary ICD-10-CM: R63 ICD-9-CM: V67.00 Vitals BP Pulse Resp Height(growth percentile) Weight(growth percentile) SpO2  
 125/84 68 20 5' 6\" (1.676 m) 191 lb (86.6 kg) 99% BMI OB Status Smoking Status 30.83 kg/m2 Hysterectomy Never Smoker BMI and BSA Data Body Mass Index Body Surface Area  
 30.83 kg/m 2 2.01 m 2 Preferred Pharmacy Pharmacy Name Phone [phone] [facility], [address] [place] [address] Your Updated Medication List  
  
   
This list is accurate as of: 10/12/17 12:22 PM.  Always use your most recent med list.  
  
  
  
  
 albuterol 2.5 mg /3 mL (0.083 %) nebulizer solution Commonly known as:  PROVENTIL VENTOLIN  
3 mL by Nebulization route every four (4) hours as needed for Wheezing. amLODIPine 10 mg tablet Commonly known as:  Hamida Ada TAKE 1 TABLET BY MOUTH ONCE DAILY  
  
 aspirin 325 mg tablet Commonly known as:  ASPIRIN Take 1 Tab by mouth daily. atorvastatin 80 mg tablet Commonly known as:  LIPITOR Take 1 Tab by mouth daily. Indications: hypercholesterolemia  
  
 benazepril 40 mg tablet Commonly known as:  LOTENSIN Take 40 mg by mouth every morning. Blood Sugar Diagnostic, Disc Strp Commonly known as:  Ascensia Breeze 2 Check your blood sugar twice daily. E11.42  
  
 diclofenac 1 % Gel Commonly known as:  VOLTAREN Apply 2 g to affected area four (4) times daily as needed (Pain). dicyclomine 20 mg tablet Commonly known as:  BENTYL Take 1 Tab by mouth every six (6) hours as needed (abdominal cramps) for up to 20 doses. glipiZIDE 5 mg tablet Commonly known as:  Tylerelder Henderson Take 0.5 Tabs by mouth two (2) times a day. Take 1/2 every day with breakfast. If your blood sugar is above 200 take a whole tablet. HYDROcodone-acetaminophen 5-325 mg per tablet Commonly known as:  Rodriguez Minor Take 1-2 Tabs by mouth every four (4) hours as needed for Pain. Max Daily Amount: 12 Tabs. hydrOXYzine HCl 25 mg tablet Commonly known as:  ATARAX Take 25 mg by mouth every eight (8) hours as needed for Itching. insulin syringe-needle U-100 1/2 mL 31 gauge x 15/64\" Syrg Commonly known as:  BD INSULIN SYRINGE ULTRA-FINE  
1 Syringe by SubCUTAneous route two (2) times a day. ipratropium 0.06 % nasal spray Commonly known as:  ATROVENT  
instill 2 sprays into each nostril three times a day - IF NEEDED FOR RUNNING NOSE  
  
 isosorbide mononitrate ER 60 mg CR tablet Commonly known as:  IMDUR Take  by mouth every morning. L. acidoph & paracasei- S therm- Bifido 8 billion cell Cap cap Commonly known as:  JEREMIAH-Q/RISAQUAD Take 1 Cap by mouth daily. megestrol 400 mg/10 mL (10 mL) suspension Commonly known as:  MEGACE Take 10 mL by mouth daily. [facility] Generic drug:  Lancets NITROSTAT 0.4 mg SL tablet Generic drug:  nitroglycerin  
by SubLINGual route every five (5) minutes as needed for Chest Pain. omeprazole 20 mg capsule Commonly known as:  PRILOSEC Take 40 mg by mouth two (2) times a day. ondansetron 4 mg disintegrating tablet Commonly known as:  ZOFRAN ODT Take 1 Tab by mouth every eight (8) hours as needed for Nausea. ondansetron hcl 4 mg tablet Commonly known as:  ZOFRAN (AS HYDROCHLORIDE) Take 1 Tab by mouth every eight (8) hours as needed for Nausea. VITAMIN D2 50,000 unit capsule Generic drug:  ergocalciferol take 1 capsule by mouth every 30 DAYS To-Do List   
 10/12/2017  1:30 PM  
  Appointment with Fabiano Nicholas at [facility] ([phone]) Introducing [facility] [place] SERVICES! [facility] introduces [employer] patient portal. Now you can access parts of your medical record, email your doctor's office, and request medication refills online. 1. In your internet browser, go to [email or web address] 2. Click on the First Time User? Click Here link in the Sign In box. You will see the New Member Sign Up page. 3. Enter your [employer] Access Code exactly as it appears below. You will not need to use this code after youve completed the sign-up process. If you do not sign up before the expiration date, you must request a new code. · [employer] Access Code: [email or web address] Expires: 11/1/2017  3:52 PM 
 
4. Enter the last four digits of your Social Security Number (xxxx) and Date of Birth (mm/dd/yyyy) as indicated and click Submit. You will be taken to the next sign-up page. 5. Create a [employer] ID. This will be your [employer] login ID and cannot be changed, so think of one that is secure and easy to remember. 6. Create a [employer] password. You can change your password at any time. 7. Enter your Password Reset Question and Answer. This can be used at a later time if you forget your password. 8. Enter your e-mail address. You will receive e-mail notification when new information is available in [address] 9. Click Sign Up. You can now view and download portions of your medical record. 10. Click the Download Summary menu link to download a portable copy of your medical information. If you have questions, please visit the Frequently Asked Questions section of the [employer] website. Remember, [employer] is NOT to be used for urgent needs. For medical emergencies, dial 911. Now available from your iPhone and Android! Please provide this summary of care documentation to your next provider. Your primary care clinician is listed as Conrado Husain. If you have any questions after today's visit, please call [phone].

## 2017-10-12 NOTE — PROGRESS NOTES
Follow up Note        Patient: Brittany Montalvo MRN: [id number]  SSN: [id number]    YOB: 1940  Age: 65 y.o. Sex: female        Diagnosis:     1. Right breast carcinoma:  pT3 N3a M0 (Stage IIIC) infiltrating ductal carcinoma, Tumor size 7.5 cm, LN 17/19 +ve with extracapsular extension in 2 nodes, grade 3, ki 67 35%, ER -ve, IN -ve, Her 2 -ve      Treatment:     1. Right mastectomy with axillary LN dissection on 01/19/2015  2. Adjuvant chemotherapy   Taxotere, Cytoxan, s/p 3 Cycles completed 4/29/15  3. Palliative chemotherapy   Abraxane + Pembrolizumab      Subjective:      Brittany Montalvo is a 65 y.o. female with a diagnosis of locally advanced right sided breast cancer. She underwent a right mastectomy with axillary LN dissection on 01/19/2015. She received three cycles of adjuvant chemotherapy with TC and stopped therapy due to side effects. She has completed radiation on 08/13/2015. She has developed progressive anemia. She feels fatigued. She also has ongoing back pain. . A bone marrow biopsy confirmed the diagnosis of metastatic TNBC. She was admitted to the [address] for uncontrolled pain and fatigue. She received RBC transfusion was discharged from the hospital. She is now receiving Abraxane and Keytruda. Her last treatment was 8/30/2017. She was hospitalized for a SBO and underwent lysis of adhesions on 9/15/17. She currently is in [facility] for rehab. She says her pain is not controlled there because they are not refilling her medication. He is participating in PT and OT, but admits she remains weak. She is scheduled for discharge from rehab on the 19th. She is accompanied by her grandson and daughter.      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 Carmelo Orourke    Breast cancer [facility])     Rt mastectomy 2/19/15    CAD (coronary artery disease)     Dr Emma Hernandez Diabetes [facility])     Now seeing Dr. Medardo Coats Diverticulitis     DJD (degenerative joint disease), lumbar     GERD (gastroesophageal reflux disease)     H. pylori infection 2008    Dr. Bry Gurrola attack April 2006    Hypercholesterolemia     Hypertension     Morbid obesity ([address]     Neuropathy, arm 2009    Right; [facility] [phone] SEN (obstructive sleep apnea)     uses CPAP    Rectal bleeding 2009    Hospitalized;  Had colonoscopy and EGD (ok), gastric emptying study (normal), doppler mesenteric arteries (ok)    Sciatica     Has seen Dr. Rk Faulkner    Stroke [facility]) 2009 & 2012    no residual problems     Past Surgical History:   Procedure Laterality Date    HX APPENDECTOMY  1979    HX BREAST LUMPECTOMY  12/12/2013    RIGHT BREAST DUCTAL EXCISION performed by Honey Quinn MD at [facility] MAIN OR    HX CATARACT REMOVAL  2007    HX CHOLECYSTECTOMY  1979    HX COLONOSCOPY      HX HEART CATHETERIZATION      angioplasty    HX HYSTERECTOMY      fibroids    HX KNEE ARTHROSCOPY  1997    right    HX MASTECTOMY Right 2/19/2015    RIGHT BREAST MODIFIED RADICAL MASTECTOMY RIGHT SENTINEL NODE BIOPSY, RIGHT PORT A CATH INSERTION performed by Michelle Mendoza MD at [facility] AMBULATORY OR    HX PACEMAKER      HX SHOULDER ARTHROSCOPY  2004    left      Family History   Problem Relation Age of Onset    Stroke Father     Cancer Sister      breast cancer    Hypertension Sister     Cancer Mother Unknown type    Cancer Brother      Colon    Hypertension Brother     Anesth Problems Neg Hx      Social History   Substance Use Topics    Smoking status: Never Smoker    Smokeless tobacco: Never Used    Alcohol use No      Prior to Admission medications    Medication Sig Start Date End Date Taking? Authorizing Provider   SHEYLA acidkillian & paracasei- S therm- Bifido (JEREMIAH-Q/RISAQUAD) 8 billion cell cap cap Take 1 Cap by mouth daily. 9/27/17  Yes Lizette Cunningham MD   HYDROcodone-acetaminophen [facility]) 5-325 mg per tablet Take 1-2 Tabs by mouth every four (4) hours as needed for Pain. Max Daily Amount: 12 Tabs. 9/22/17  Yes Lizette Cunningham MD   insulin syringe-needle U-100 (BD INSULIN SYRINGE ULTRA-FINE) 1/2 mL 31 gauge x 15/64\" syrg 1 Syringe by SubCUTAneous route two (2) times a day. 8/18/17  Yes Dorian Maldonado MD   VITAMIN D2 50,000 unit capsule take 1 capsule by mouth every 30 DAYS 8/16/17  Yes Ady Roche NP   hydrOXYzine HCl (ATARAX) 25 mg tablet Take 25 mg by mouth every eight (8) hours as needed for Itching. Yes Historical Provider   megestrol (MEGACE) 400 mg/10 mL (10 mL) suspension Take 10 mL by mouth daily. 8/11/17  Yes Ady Roche NP   diclofenac (VOLTAREN) 1 % gel Apply 2 g to affected area four (4) times daily as needed (Pain). Yes Historical Provider   dicyclomine (BENTYL) 20 mg tablet Take 1 Tab by mouth every six (6) hours as needed (abdominal cramps) for up to 20 doses. 6/10/17  Yes Nirmala Petit MD   ondansetron hcl (ZOFRAN, AS HYDROCHLORIDE,) 4 mg tablet Take 1 Tab by mouth every eight (8) hours as needed for Nausea. 6/10/17  Yes Nirmala Petit MD   atorvastatin (LIPITOR) 80 mg tablet Take 1 Tab by mouth daily. Indications: hypercholesterolemia 5/31/17  Yes Kenna Ca MD   aspirin (ASPIRIN) 325 mg tablet Take 1 Tab by mouth daily.  5/31/17  Yes Kenna Ca MD   ipratropium (ATROVENT) 0.06 % nasal spray instill 2 sprays into each nostril three times a day - IF NEEDED FOR RUNNING NOSE 3/15/17  Yes Historical Provider   glipiZIDE (GLUCOTROL) 5 mg tablet Take 0.5 Tabs by mouth two (2) times a day. Take 1/2 every day with breakfast. If your blood sugar is above 200 take a whole tablet. 5/5/17  Yes Charlotte Montalvo MD   isosorbide mononitrate ER (IMDUR) 60 mg CR tablet Take  by mouth every morning. 6/6/16  Yes Historical Provider   Blood Sugar Diagnostic, Disc (ASCENSIA BREEZE 2) strp Check your blood sugar twice daily. E11.42 6/20/16  Yes Charlotte Montalvo MD   ondansetron (ZOFRAN ODT) 4 mg disintegrating tablet Take 1 Tab by mouth every eight (8) hours as needed for Nausea. 6/1/16  Yes Zack De La Cruz MD   albuterol (PROVENTIL VENTOLIN) 2.5 mg /3 mL (0.083 %) nebulizer solution 3 mL by Nebulization route every four (4) hours as needed for Wheezing. 9/20/15  Yes Shira Willson, DO   MICROLET LANCET misc  12/16/14  Yes Historical Provider   benazepril (LOTENSIN) 40 mg tablet Take 40 mg by mouth every morning. Yes Historical Provider   omeprazole (PRILOSEC) 20 mg capsule Take 40 mg by mouth two (2) times a day. Yes Todd Andersen MD   nitroglycerin (NITROSTAT) 0.4 mg SL tablet by SubLINGual route every five (5) minutes as needed for Chest Pain. Yes Todd Andersen MD   amlodipine (NORVASC) 10 mg tablet TAKE 1 TABLET BY MOUTH ONCE DAILY 4/9/11  Yes Kellie Beavers MD          Allergies   Allergen Reactions    Codeine Itching    Duratuss [Phenylephrine-Guaifenesin] Nausea and Vomiting    Lisinopril-Hydrochlorothiazide Itching    Motrin [Ibuprofen] Nausea and Vomiting     With 800mg    Tape [Adhesive] Other (comments)     TEARS HER SKIN         Objective:     Vitals:    10/12/17 1315   BP: 105/69   Pulse: 89   Temp: 97.9 °F (36.6 °C)   SpO2: 97%   Weight: 188 lb (85.3 kg)   Height: 5' 6\" (1.676 m)        Physical Exam:    GENERAL: alert, cooperative, obese  EYE: negative  LYMPHATIC: negative  THROAT & NECK: normal and no erythema or exudates noted.    LUNG: clear to auscultation bilaterally  HEART: regular rate and rhythm  ABDOMEN: soft, non-tender  EXTREMITIES: right arm edema - has sleeve in place  SKIN: negative  NEUROLOGIC: negative        CT Results (most recent):    Results from Hospital Encounter encounter on 09/13/17   CT ABD PELV WO CONT   Narrative CT ABDOMEN AND PELVIS WITHOUT CONTRAST. 9/15/2017 12:44 PM     INDICATION: Abdominal pain, vomiting. Ileus versus small bowel obstruction. Metastatic breast carcinoma. Cyclical vomiting with nausea. COMPARISON: 7/2/2017. TECHNIQUE: CT of the abdomen and pelvis was performed after the administration  of oral contrast. Evaluation of the solid organs is less sensitive without IV  contrast. CT dose reduction was achieved through use of a standardized protocol  tailored for this examination and automatic exposure control for dose  modulation. FINDINGS:  Abdomen: Post right mastectomy. Aside from minimal basilar atelectasis, the lung  bases are clear. The heart size is normal. A pacemaker is in place. Mild aortic  valvular calcifications and mild coronary artery calcifications. An NG tube is  in appropriate position the stomach. Mild intra and extrahepatic biliary ductal  dilation is likely physiologic post cholecystectomy. Incidental note is made of  a small right upper pole renal cyst. The unenhanced liver, pancreas, spleen,  adrenals, and kidneys are otherwise normal.    Pelvis: The small bowel is diffusely dilated, with an abrupt transition point in  the right mid abdomen ileum on images 2-52 and 601-62. No pneumatosis,  mesenteric venous gas, or free air. The distal ileum is decompressed. The  patient is post right hemicolectomy and ileocolonic anastomosis. Sigmoid  diverticulosis is moderate. The colon is largely decompressed. There is trace  free fluid in the deep pelvis. No abdominopelvic lymphadenopathy. Post  hysterectomy.          Impression IMPRESSION: Small bowel obstruction, with mid ileal transition point in the  right midabdomen. The findings were called to Dr. Joaquin Mancera on 9/15/2017 2:46 PM by Dr. Nugent Moh. [phone]          Lab Results   Component Value Date/Time    WBC 4.5 10/12/2017 02:10 PM    Hemoglobin (POC) 9.2 09/20/2015 05:25 PM    HGB 6.8 10/12/2017 02:10 PM    Hematocrit (POC) 27 09/20/2015 05:25 PM    HCT 20.9 10/12/2017 02:10 PM    PLATELET 042 88/06/0081 02:10 PM    MCV 79.5 10/12/2017 02:10 PM       Lab Results   Component Value Date/Time    Sodium 137 10/12/2017 02:10 PM    Potassium 3.6 10/12/2017 02:10 PM    Chloride 105 10/12/2017 02:10 PM    CO2 23 10/12/2017 02:10 PM    Anion gap 9 10/12/2017 02:10 PM    Glucose 158 10/12/2017 02:10 PM    BUN 16 10/12/2017 02:10 PM    Creatinine 1.57 10/12/2017 02:10 PM    BUN/Creatinine ratio 10 10/12/2017 02:10 PM    GFR est AA 39 10/12/2017 02:10 PM    GFR est non-AA 32 10/12/2017 02:10 PM    Calcium 8.1 10/12/2017 02:10 PM    Bilirubin, total 0.4 10/12/2017 02:10 PM    AST (SGOT) 152 10/12/2017 02:10 PM    Alk. phosphatase 201 10/12/2017 02:10 PM    Protein, total 6.6 10/12/2017 02:10 PM    Albumin 2.2 10/12/2017 02:10 PM    Globulin 4.4 10/12/2017 02:10 PM    A-G Ratio 0.5 10/12/2017 02:10 PM    ALT (SGPT) 17 10/12/2017 02:10 PM         Assessment: 1. Metastatic breast carcinoma - dx 7/17/2017     Bone marrow involvement  Diffuse skeletal metastasis    ECOG PS 2  Intent of treatment - palliative    Receiving palliative chemotherapy   Abraxane + Keytruda     Received two week of therapy. She got admitted to the hospital with bowel obstruction and had to undergo laparotomy and lysis of adhesions. Ms. Lizzette Segovia is currently at [facility] and is scheduled to be discharged on 10/19. She continues to remain weak and is not able to undergo any treatment at this time. We discussed Hospice and Ms. Lizzette Segovia would like to wait to see if she can make any improvement prior to transitioning. Symptom management form reviewed with patient.       2. Right breast carcinoma: dx 11/17/2014  pT3 N3a M0 (Stage IIIC) infiltrating ductal carcinoma, Tumor size 7.5 cm, LN 17/19 +ve with extracapsular extension in 2 nodes, grade 3, ki 67 35%, ER -ve, CT -ve, Her 2 -ve    ECOG PS 0  Intent of therapy - curative       S/P right sided mastectomy with axillary LN dissection  Received adjuvant chemotherapy   Taxotere, Cytoxan, s/p 3 Cycles    Therapy was discontinued due to severe side effects. Completed radiation to the chest wall and axilla  Lymphedema - followed by lymphedema clinic at [facility]      3. Abdominal pain    > s/p lysis of adhesions - sutures removed today      4. Anemia     From bone marrow involvement  Labs today      Plan:       > Lab draw today  > Refill pain medication Morphine x 7 days for [employer] and then for 30 days after she is discharged next week. > She will continue with the discussion regarding hospice with her family. Follow up in 2 weeks        I saw the patient with Haile Trinh NP. Signed by: Latrice Cooper MD                     October 12, 2017           CC. Naman Rodriguez MD  CC.  Mindy Oropeza MD

## 2017-10-12 NOTE — PROGRESS NOTES
Pt is a 65 yr old Pt here for routine follow up for metastatic breast cancer, she is currently at [facility], she is unhappy with the care there. Pt's pain is not under control. Pt reports eating well, no complaints of N/V/D.

## 2017-10-12 NOTE — PROGRESS NOTES
Lets wait to hear from her to see whether she wants to pursue Hospice. Anemia. No transfusion if she is going to Hospice.

## 2017-10-12 NOTE — MR AVS SNAPSHOT
Visit Information Date & Time Provider Department Dept. Phone Encounter #  
 10/12/2017  1:00 PM [facility] [phone] [id number] Your Appointments 11/24/2017 11:30 AM  
Follow Up with Soumya Titus MD  
[facility]) Appt Note: 3 MONTH F/U  
 [address] [facility] [phone] [id number] [address] [place] [address] Road  
  
    
 12/5/2017 10:30 AM  
Follow Up with Josafat Valdez NP [address] at [facility]) Appt Note: 6 month follow up Cp$0 6/6/17 tt  
 [address] Angel [address] [facility] [address]  
  
   
 [place] Upcoming Health Maintenance Date Due DTaP/Tdap/Td series (1 - Tdap) 1/15/1960 ZOSTER VACCINE AGE 60> 11/15/1998 OSTEOPOROSIS SCREENING (DEXA) 1/15/2004 MEDICARE YEARLY EXAM 1/15/2004 Pneumococcal 65+ High/Highest Risk (2 of 2 - PCV13) 11/1/2016 EYE EXAM RETINAL OR DILATED Q1 6/24/2017 HEMOGLOBIN A1C Q6M 2/18/2018 LIPID PANEL Q1 5/30/2018 GLAUCOMA SCREENING Q2Y 6/24/2018 FOOT EXAM Q1 8/18/2018 MICROALBUMIN Q1 8/18/2018 Allergies as of 10/12/2017  Review Complete On: 10/12/2017 By: Apple King RN Severity Noted Reaction Type Reactions Codeine  10/16/2009    Itching Duratuss [Phenylephrine-guaifenesin]  10/16/2009    Nausea and Vomiting Lisinopril-hydrochlorothiazide  10/16/2009    Itching Motrin [Ibuprofen]  10/16/2009    Nausea and Vomiting With 800mg Tape [Adhesive]  12/10/2014    Other (comments) TEARS HER SKIN Current Immunizations  Reviewed on 9/6/2017 Name Date  Influenza Vaccine 9/1/2017, 11/1/2015 12:00 AM, 9/1/2014, 9/8/2011 12:00 AM  
 Pneumococcal Polysaccharide (PPSV-23) 11/1/2015 12:00 AM  
  
 Not reviewed this visit You Were Diagnosed With   
  
 Codes Comments Metastatic breast cancer ([facility] [address]    -  Primary ICD-10-CM: N70.504 ICD-9-CM: 174.9 Cancer related pain     ICD-10-CM: G89.3 ICD-9-CM: 338. 3 Vitals BP Pulse Temp Height(growth percentile) Weight(growth percentile) SpO2  
 105/69 89 97.9 °F (36.6 °C) 5' 6\" (1.676 m) 188 lb (85.3 kg) 97% BMI OB Status Smoking Status 30.34 kg/m2 Hysterectomy Never Smoker BMI and BSA Data Body Mass Index Body Surface Area  
 30.34 kg/m 2 1.99 m 2 Preferred Pharmacy Pharmacy Name Phone [facility], [address] [place] [address] Your Updated Medication List  
  
   
This list is accurate as of: 10/12/17  1:46 PM.  Always use your most recent med list.  
  
  
  
  
 albuterol 2.5 mg /3 mL (0.083 %) nebulizer solution Commonly known as:  PROVENTIL VENTOLIN  
3 mL by Nebulization route every four (4) hours as needed for Wheezing. amLODIPine 10 mg tablet Commonly known as:  Cordova Mullet TAKE 1 TABLET BY MOUTH ONCE DAILY  
  
 aspirin 325 mg tablet Commonly known as:  ASPIRIN Take 1 Tab by mouth daily. atorvastatin 80 mg tablet Commonly known as:  LIPITOR Take 1 Tab by mouth daily. Indications: hypercholesterolemia  
  
 benazepril 40 mg tablet Commonly known as:  LOTENSIN Take 40 mg by mouth every morning. Blood Sugar Diagnostic, Disc Strp Commonly known as:  Ascensia Breeze 2 Check your blood sugar twice daily. E11.42  
  
 diclofenac 1 % Gel Commonly known as:  VOLTAREN Apply 2 g to affected area four (4) times daily as needed (Pain). dicyclomine 20 mg tablet Commonly known as:  BENTYL Take 1 Tab by mouth every six (6) hours as needed (abdominal cramps) for up to 20 doses. glipiZIDE 5 mg tablet Commonly known as:  Marcella Dayhojessica Take 0.5 Tabs by mouth two (2) times a day.  Take 1/2 every day with breakfast. If your blood sugar is above 200 take a whole tablet. hydrOXYzine HCl 25 mg tablet Commonly known as:  ATARAX Take 25 mg by mouth every eight (8) hours as needed for Itching. insulin syringe-needle U-100 1/2 mL 31 gauge x 15/64\" Syrg Commonly known as:  BD INSULIN SYRINGE ULTRA-FINE  
1 Syringe by SubCUTAneous route two (2) times a day. ipratropium 0.06 % nasal spray Commonly known as:  ATROVENT  
instill 2 sprays into each nostril three times a day - IF NEEDED FOR RUNNING NOSE  
  
 isosorbide mononitrate ER 60 mg CR tablet Commonly known as:  IMDUR Take  by mouth every morning. L. acidoph & paracasei- S therm- Bifido 8 billion cell Cap cap Commonly known as:  JEREMIAH-Q/RISAQUAD Take 1 Cap by mouth daily. megestrol 400 mg/10 mL (10 mL) suspension Commonly known as:  MEGACE Take 10 mL by mouth daily. [facility] [place] Generic drug:  Lancets * morphine CR 15 mg CR tablet Commonly known as:  MS CONTIN Take 1 Tab by mouth every twelve (12) hours. Max Daily Amount: 30 mg.  
  
 * morphine IR 15 mg tablet Commonly known as:  MS IR Take 1 Tab by mouth every four (4) hours as needed for Pain for up to 7 days. Max Daily Amount: 90 mg. NITROSTAT 0.4 mg SL tablet Generic drug:  nitroglycerin  
by SubLINGual route every five (5) minutes as needed for Chest Pain. omeprazole 20 mg capsule Commonly known as:  PRILOSEC Take 40 mg by mouth two (2) times a day. ondansetron 4 mg disintegrating tablet Commonly known as:  ZOFRAN ODT Take 1 Tab by mouth every eight (8) hours as needed for Nausea. ondansetron hcl 4 mg tablet Commonly known as:  ZOFRAN (AS HYDROCHLORIDE) Take 1 Tab by mouth every eight (8) hours as needed for Nausea. VITAMIN D2 50,000 unit capsule Generic drug:  ergocalciferol  
take 1 capsule by mouth every 30 DAYS * Notice:   This list has 2 medication(s) that are the same as other medications prescribed for you. Read the directions carefully, and ask your doctor or other care provider to review them with you. Prescriptions Printed Refills  
 morphine CR (MS CONTIN) 15 mg CR tablet 0 Sig: Take 1 Tab by mouth every twelve (12) hours. Max Daily Amount: 30 mg.  
 Class: Print Route: Oral  
 morphine IR (MS IR) 15 mg tablet 0 Sig: Take 1 Tab by mouth every four (4) hours as needed for Pain for up to 7 days. Max Daily Amount: 90 mg.  
 Class: Print Route: Oral  
  
Patient Instructions Please give Ms. Tiffany Barkley Morphine CR 15 mg twice a day. For break through pain please give her Morphine 15 mg every [id number] prn as needed for pain. Introducing [facility] SERVICES! Marissa Whitley introduces [employer] patient portal. Now you can access parts of your medical record, email your doctor's office, and request medication refills online. 1. In your internet browser, go to [email or web address] 2. Click on the First Time User? Click Here link in the Sign In box. You will see the New Member Sign Up page. 3. Enter your [employer] Access Code exactly as it appears below. You will not need to use this code after youve completed the sign-up process. If you do not sign up before the expiration date, you must request a new code. · [employer] Access Code: [email or web address] Expires: 11/1/2017  3:52 PM 
 
4. Enter the last four digits of your Social Security Number (xxxx) and Date of Birth (mm/dd/yyyy) as indicated and click Submit. You will be taken to the next sign-up page. 5. Create a [employer] ID. This will be your [employer] login ID and cannot be changed, so think of one that is secure and easy to remember. 6. Create a [employer] password. You can change your password at any time. 7. Enter your Password Reset Question and Answer. This can be used at a later time if you forget your password. 8. Enter your e-mail address.  You will receive e-mail notification when new information is available in [employer]. 9. Click Sign Up. You can now view and download portions of your medical record. 10. Click the Download Summary menu link to download a portable copy of your medical information. If you have questions, please visit the Frequently Asked Questions section of the [employer] website. Remember, [employer] is NOT to be used for urgent needs. For medical emergencies, dial 911. Now available from your iPhone and Android! Please provide this summary of care documentation to your next provider. Your primary care clinician is listed as Marybeth Mariee. If you have any questions after today's visit, please call [phone].

## 2017-10-12 NOTE — PATIENT INSTRUCTIONS
Please give Ms. Catalina Sheridan Morphine CR 15 mg twice a day. For break through pain please give her Morphine 15 mg every [id number] prn as needed for pain.

## 2017-10-16 NOTE — PROGRESS NOTES
To: Tilmon Galeazzi, MD  From: Finn Oh MD    Thank you for referring Eloy Blue. Encounter Date: 10/12/2017    Subjective:      Carmelo Mendosa is a 65 y.o. female presents for postop care following lysis of adhesions for SBO. Has no complaints today. Doing well with PT. Eating a regular diet without difficulty. Bowel movements are regular. Objective:     General:  alert, cooperative, no distress, appears stated age   Abdomen: soft, bowel sounds active, non-tender   Incision(s):   healing well, no drainage, no erythema, no hernia, no seroma, no swelling, no dehiscence, incision well approximated. Assessment:     Status post above. Doing well postoperatively. Plan:     Patient understands no further follow-up required. Knows to call for any concerns.       Finn Oh MD

## 2017-10-18 NOTE — PROGRESS NOTES
Community Care Team Documentation for Patient in [facility]  Subsequent Follow up     Patient remains at [facility] ([facility]). See previous [facility] Team notes. PCP : Bella Martinez MD    Per Lisa Menard at [facility], Independent with mobility. Ambulating unlimited distances with RW. Supervision with stairs. Plan to discharge home today 10/18 home with family assistance. No HH recommended by therapy. Pt's family was trained and home exercise program given to pt. Pt's dtr to schedule PCP FU. Medications were not reconciled and general patient assessment was not completed during this skilled nursing facility outreach.      EMMIE Figueroa

## 2017-10-26 NOTE — TELEPHONE ENCOUNTER
Spoke with Yeny Lopez (on HIPPA). She states that patient's BS dropped to 32 and was taken to [facility]. She is presently admitted. She had been at [facility]. She came home 1 week ago. Daughter is unsure exactly how much insulin she has been taking, as was staying with someone else when she left rehab. Advised to call here with her new insulin and medication regimen from [facility] when she gets home. Advised to have her take her BS at 3-4 times per day. (prior to each meal and at bedtime). Daughter expressed understanding.

## 2017-10-26 NOTE — TELEPHONE ENCOUNTER
Patients daughter Kimberly Romo is requesting a call back regarding her moms insulin. She stated that she had an episode to where her sugars dropped and they would like to make sure that the patient is taking the correct amount. She can be reached at [phone].

## 2017-10-30 PROBLEM — N17.9 ACUTE KIDNEY INJURY (HCC): Status: ACTIVE | Noted: 2017-01-01

## 2017-10-30 NOTE — CONSULTS
Consult    Patient: Addison Maldonado MRN: [id number]  SSN: [id number]    YOB: 1940  Age: 65 y.o. Sex: female      Subjective:      Addison Maldonado is a 65 y.o. female who is being seen for Diabetes. Ms Umberto Gastelum is well known to me. She was recently admitted to [facility] for hypoglycemia, with BG in the 30's. Pt the notes from the D/C summary and the history of her daughter, she was dehydrated and they gave her IVF, stopped her Lantus 18 units BID and Glipizide 2.5mg PRN with breakfast and discharged her home on NPH 6 units BID. Her daughter notes that she was discharged home on 10/28/17 and her BGs have been erratic in the 130-300's, but no low BGs. Pt presented to the ED with \"bone pains\". Dr. Cale Quesada saw her in the ER and his plan is to admitted for IVF, because her Cr. Was 2.68 and to consult palliative for comfort care. Ms Umberto Gastelum notes that her appetite has been good and she is eating regularly. She denies CP, SOB, N/V, abdominal pain. Past Medical History:   Diagnosis Date    Arrhythmia     bradycardia in 30's /pacemaker inserted    Arthritis     RT KNEE    Asthma Dx age 75    Bradycardia 2009    Cardiology saw her during hospital stay; Now off coreg;  Sees Dr. Zachary Gann    Breast cancer [facility])     Rt mastectomy 2/19/15    CAD (coronary artery disease)     Dr Aj Service Diabetes [facility])     Now seeing Dr. Delane Skiff Diverticulitis     DJD (degenerative joint disease), lumbar     GERD (gastroesophageal reflux disease)     H. pylori infection 2008    Dr. Burciaga Ser attack April 2006    Hypercholesterolemia     Hypertension     Morbid obesity ([facility] [address]     Neuropathy, arm 2009    Right; Has had MRI and EMG at [phone] SEN (obstructive sleep apnea)     uses CPAP    Rectal bleeding 2009    Hospitalized;  Had colonoscopy and EGD (ok), gastric emptying study (normal), doppler mesenteric arteries (ok)    Sciatica     Has seen Dr. Dionicio Flanagan    Stroke [facility]) 2009 & 2012    no residual problems     Past Surgical History:   Procedure Laterality Date    HX APPENDECTOMY  1979    HX BREAST LUMPECTOMY  12/12/2013    RIGHT BREAST DUCTAL EXCISION performed by Smita Morales MD at [facility] MAIN OR    HX CATARACT REMOVAL  2007    HX CHOLECYSTECTOMY  1979    HX COLONOSCOPY      HX HEART CATHETERIZATION      angioplasty    HX HYSTERECTOMY      fibroids    HX KNEE ARTHROSCOPY  1997    right    HX MASTECTOMY Right 2/19/2015    RIGHT BREAST MODIFIED RADICAL MASTECTOMY RIGHT SENTINEL NODE BIOPSY, RIGHT PORT A CATH INSERTION performed by Reyes Salvador MD at [facility] AMBULATORY OR    HX PACEMAKER      HX SHOULDER ARTHROSCOPY  2004    left      Family History   Problem Relation Age of Onset    Stroke Father     Cancer Sister      breast cancer    Hypertension Sister     Cancer Mother      Unknown type    Cancer Brother      Colon    Hypertension Brother     Anesth Problems Neg Hx      Social History   Substance Use Topics    Smoking status: Never Smoker    Smokeless tobacco: Never Used    Alcohol use No      Current Facility-Administered Medications   Medication Dose Route Frequency Provider Last Rate Last Dose    morphine injection 4 mg  4 mg IntraVENous Michelle Harvey MD        0.9% sodium chloride infusion 250 mL  250 mL IntraVENous PRN Adrian Villegas MD         Current Outpatient Prescriptions   Medication Sig Dispense Refill    ASCENSIA BREEZE 2 strp CHECK BLOOD GLUCOSE TWICE DAILY [address]    morphine CR (MS CONTIN) 15 mg CR tablet Take 1 Tab by mouth every twelve (12) hours. Max Daily Amount: 30 mg. 14 Tab 0    morphine CR (MS CONTIN) 15 mg CR tablet Take 1 Tab by mouth every twelve (12) hours. Max Daily Amount: 30 mg. 60 Tab 0    morphine IR (MS IR) 15 mg tablet Take 1 Tab by mouth every four (4) hours as needed for Pain.  Max Daily Amount: 90 mg. 120 Tab 0    L. acidoph & paracasei- S therm- Bifido (JEREMIAH-Q/RISAQUAD) 8 billion cell cap cap Take 1 Cap by mouth daily. 30 Cap 0    insulin syringe-needle U-100 (BD INSULIN SYRINGE ULTRA-FINE) 1/2 mL 31 gauge x 15/64\" syrg 1 Syringe by SubCUTAneous route two (2) times a day. 100 Pen Needle 5    VITAMIN D2 50,000 unit capsule take 1 capsule by mouth every 30 DAYS 3 Cap 2    hydrOXYzine HCl (ATARAX) 25 mg tablet Take 25 mg by mouth every eight (8) hours as needed for Itching.  megestrol (MEGACE) 400 mg/10 mL (10 mL) suspension Take 10 mL by mouth daily. 300 mL 2    diclofenac (VOLTAREN) 1 % gel Apply 2 g to affected area four (4) times daily as needed (Pain).  dicyclomine (BENTYL) 20 mg tablet Take 1 Tab by mouth every six (6) hours as needed (abdominal cramps) for up to 20 doses. 20 Tab 0    ondansetron hcl (ZOFRAN, AS HYDROCHLORIDE,) 4 mg tablet Take 1 Tab by mouth every eight (8) hours as needed for Nausea. 20 Tab 0    atorvastatin (LIPITOR) 80 mg tablet Take 1 Tab by mouth daily. Indications: hypercholesterolemia 30 Tab 0    aspirin (ASPIRIN) 325 mg tablet Take 1 Tab by mouth daily. 30 Tab 0    ipratropium (ATROVENT) 0.06 % nasal spray instill 2 sprays into each nostril three times a day - IF NEEDED FOR RUNNING NOSE  0    glipiZIDE (GLUCOTROL) 5 mg tablet Take 0.5 Tabs by mouth two (2) times a day. Take 1/2 every day with breakfast. If your blood sugar is above 200 take a whole tablet. 30 Tab 3    isosorbide mononitrate ER (IMDUR) 60 mg CR tablet Take  by mouth every morning. 0    ondansetron (ZOFRAN ODT) 4 mg disintegrating tablet Take 1 Tab by mouth every eight (8) hours as needed for Nausea. 60 Tab 2    albuterol (PROVENTIL VENTOLIN) 2.5 mg /3 mL (0.083 %) nebulizer solution 3 mL by Nebulization route every four (4) hours as needed for Wheezing. 24 Each 0    MICROLET LANCET misc   0    benazepril (LOTENSIN) 40 mg tablet Take 40 mg by mouth every morning.  omeprazole (PRILOSEC) 20 mg capsule Take 40 mg by mouth two (2) times a day.       nitroglycerin (NITROSTAT) 0.4 mg SL tablet by SubLINGual route every five (5) minutes as needed for Chest Pain.  amlodipine (NORVASC) 10 mg tablet TAKE 1 TABLET BY MOUTH ONCE DAILY 30 Tab 10        Allergies   Allergen Reactions    Codeine Itching    Duratuss [Phenylephrine-Guaifenesin] Nausea and Vomiting    Lisinopril-Hydrochlorothiazide Itching    Motrin [Ibuprofen] Nausea and Vomiting     With 800mg    Tape [Adhesive] Other (comments)     TEARS HER SKIN       Review of Systems:  A comprehensive review of systems was negative except for that written in the History of Present Illness. Objective:     Vitals:    10/30/17 1430 10/30/17 1445 10/30/17 1530 10/30/17 1545   BP: 108/55 120/61 127/54 118/60   Pulse: 68 69 79 71   Resp: 16 13     Temp:       SpO2: 98% 99% 98% 99%   Weight:       Height:            Physical Exam:  Exam deferred    Recent Results (from the past 24 hour(s))   CBC WITH AUTOMATED DIFF    Collection Time: 10/30/17  2:42 PM   Result Value Ref Range    WBC 5.3 3.6 - 11.0 K/uL    RBC 2.52 (L) 3.80 - 5.20 M/uL    HGB 6.8 (L) 11.5 - 16.0 g/dL    HCT 20.3 (L) 35.0 - 47.0 %    MCV 80.6 80.0 - 99.0 FL    MCH 27.0 26.0 - 34.0 PG    MCHC 33.5 30.0 - 36.5 g/dL    RDW 17.2 (H) 11.5 - 14.5 %    PLATELET 766 (L) 027 - 400 K/uL    NEUTROPHILS 73 %    BAND NEUTROPHILS 3 %    LYMPHOCYTES 13 %    MONOCYTES 10 %    EOSINOPHILS 0 %    BASOPHILS 0 %    METAMYELOCYTES 1 %    ABS. NEUTROPHILS 4.0 K/UL    ABS. LYMPHOCYTES 0.7 K/UL    ABS. MONOCYTES 0.5 K/UL    ABS. EOSINOPHILS 0.0 K/UL    ABS.  BASOPHILS 0.0 K/UL    RBC COMMENTS POLYCHROMASIA  PRESENT        RBC COMMENTS ROULEAUX  PRESENT        DF MANUAL     METABOLIC PANEL, COMPREHENSIVE    Collection Time: 10/30/17  2:42 PM   Result Value Ref Range    Sodium 129 (L) 136 - 145 mmol/L    Potassium 5.0 3.5 - 5.1 mmol/L    Chloride 96 (L) 97 - 108 mmol/L    CO2 20 (L) 21 - 32 mmol/L    Anion gap 13 5 - 15 mmol/L    Glucose 136 (H) 65 - 100 mg/dL    BUN 52 (H) 6 - 20 MG/DL    Creatinine 2.68 (H) 0.55 - 1.02 MG/DL    BUN/Creatinine ratio 19 12 - 20      GFR est AA 21 (L) >60 ml/min/1.73m2    GFR est non-AA 17 (L) >60 ml/min/1.73m2    Calcium 7.5 (L) 8.5 - 10.1 MG/DL    Bilirubin, total 0.6 0.2 - 1.0 MG/DL    ALT (SGPT) 25 12 - 78 U/L    AST (SGOT) 217 (H) 15 - 37 U/L    Alk.  phosphatase 293 (H) 45 - 117 U/L    Protein, total 6.5 6.4 - 8.2 g/dL    Albumin 1.9 (L) 3.5 - 5.0 g/dL    Globulin 4.6 (H) 2.0 - 4.0 g/dL    A-G Ratio 0.4 (L) 1.1 - 2.2     CK W/ CKMB & INDEX    Collection Time: 10/30/17  2:42 PM   Result Value Ref Range    CK 50 26 - 192 U/L    CK - MB 1.1 <3.6 NG/ML    CK-MB Index 2.2 0 - 2.5     MAGNESIUM    Collection Time: 10/30/17  2:42 PM   Result Value Ref Range    Magnesium 1.9 1.6 - 2.4 mg/dL   LIPASE    Collection Time: 10/30/17  2:42 PM   Result Value Ref Range    Lipase 59 (L) 73 - 393 U/L   TROPONIN I    Collection Time: 10/30/17  2:42 PM   Result Value Ref Range    Troponin-I, Qt. <0.04 <0.05 ng/mL   LACTIC ACID    Collection Time: 10/30/17  2:42 PM   Result Value Ref Range    Lactic acid 2.3 (HH) 0.4 - 2.0 MMOL/L   URINALYSIS W/ REFLEX CULTURE    Collection Time: 10/30/17  3:32 PM   Result Value Ref Range    Color DARK YELLOW      Appearance CLOUDY (A) CLEAR      Specific gravity 1.019 1.003 - 1.030      pH (UA) 5.0 5.0 - 8.0      Protein NEGATIVE  NEG mg/dL    Glucose NEGATIVE  NEG mg/dL    Ketone NEGATIVE  NEG mg/dL    Blood NEGATIVE  NEG      Urobilinogen 1.0 0.2 - 1.0 EU/dL    Nitrites NEGATIVE  NEG      Leukocyte Esterase NEGATIVE  NEG      WBC 0-4 0 - 4 /hpf    RBC 0-5 0 - 5 /hpf    Epithelial cells FEW FEW /lpf    Bacteria NEGATIVE  NEG /hpf    UA:UC IF INDICATED CULTURE NOT INDICATED BY UA RESULT CNI      Amorphous Crystals 2+ (A) NEG    Hyaline cast 2-5 0 - 5 /lpf    Other: STARCH CRYSTALS PRESENT    BILIRUBIN, CONFIRM    Collection Time: 10/30/17  3:32 PM   Result Value Ref Range    Bilirubin UA, confirm NEGATIVE  NEG       Records from recent hospitalization at [facility] reviewed. Assessment:     Hospital Problems  Date Reviewed: 10/16/2017    None          Plan:     1) DM > Will start pt on NPH 6 units in the AM and 6 units HS, plus a correction scale Humalog. Based on her PO intake and BGs over the next 24 hours I will adjust her regimen to help control her BGs, while minimizing her risks for hypoglycemia. I spent 50 minutes on her case and > 50% of the time was spent reviewing the chart and coordinating her care with Dr. Hans Eng and the nurse caring for her.         Signed By: Marlys Espinoza MD     October 30, 2017

## 2017-10-30 NOTE — ROUTINE PROCESS
TRANSFER - OUT REPORT:    Verbal report given to [facility] RN (name) on Benancio Dance  being transferred to Ortho (unit) for routine progression of care       Report consisted of patients Situation, Background, Assessment and   Recommendations(SBAR). Information from the following report(s) SBAR and Kardex was reviewed with the receiving nurse. Lines:   Venous Access Device Port 12/30/16 Upper chest (subclavicular area, right (Active)   Central Line Being Utilized Yes 10/30/2017  3:06 PM   Site Assessment Clean, dry, & intact 10/30/2017  3:06 PM   Date of Last Dressing Change 10/30/17 10/30/2017  3:06 PM   Dressing Status Clean, dry, & intact 10/30/2017  3:06 PM   Dressing Type Transparent 10/30/2017  3:06 PM        Opportunity for questions and clarification was provided.       Patient transported with:   Registered Nurse

## 2017-10-30 NOTE — PROGRESS NOTES
Pharmacy  Enoxaparin (Lovenox®) Monitoring/Dosing      Indication: DVT prophylaxis     Current Dose: Enoxaparin 30 mg subcutaneously every 24 hours    Creatinine Clearance (mL/min): 19    Current Weight: 85.3 kg    Labs:  Recent Labs      10/30/17   1442   CREA  2.68*   HGB  6.8*   PLT  123*     Wt Readings from Last 1 Encounters:   10/30/17 85.3 kg (188 lb)         Impression/Plan: will switch to heparin 5000 units sq q 8 hr for crcl < 30. If renal function improves, can be switched back to enoxaparin     Thanks,  Aidee Aguirre, PHARMD      [email or web address]%20Protocol. pdf

## 2017-10-30 NOTE — IP AVS SNAPSHOT
[address] [facility] 
[phone] Patient: Torie Bentley MRN: [id number] ROGER:[birth year] About your hospitalization You were admitted on:  October 30, 2017 You last received care in the:  [facility] 3 ORTHOPEDICS You were discharged on:  November 1, 2017 Why you were hospitalized Your primary diagnosis was:  Not on File Your diagnoses also included:  Acute Kidney Injury (Hcc), Type 2 Diabetes Mellitus With Diabetic Polyneuropathy, With Long-Term Current Use Of Insulin (Hcc), Malignant Neoplasm Of Upper-Inner Quadrant Of Right Female Breast (Hcc), Protein Calorie Malnutrition (Hcc) Things You Need To Do (next 8 weeks) Follow up with Ivan Jorge MD  
As needed Phone:  [phone] Where:  [facility] [place] [id number] Thursday Nov 02, 2017 ESTABLISHED PATIENT with Shayan Sorto MD at 11:15 AM  
Where: [facility] Oncology at [address] [place] [address] ([address]) Friday Nov 24, 2017 Follow Up with Sudha Solitario MD at 11:30 AM  
Where:  [facility] [address] [place] [address]) Tuesday Dec 05, 2017 Follow Up with Michele Rider NP at 10:30 AM  
Where: [address] at [address]) Discharge Orders None A check vivian indicates which time of day the medication should be taken. My Medications STOP taking these medications   
 amLODIPine 10 mg tablet Commonly known as:  NORVASC  
   
  
 aspirin 325 mg tablet Commonly known as:  ASPIRIN  
   
  
 benazepril 40 mg tablet Commonly known as:  LOTENSIN  
   
  
 bumetanide 1 mg tablet Commonly known as:  BUMEX  
   
  
 glipiZIDE 5 mg tablet Commonly known as:  Marcelladaylin Tsang HumuLIN N 100 unit/mL injection Generic drug:  insulin NPH  
   
  
 isosorbide mononitrate ER 60 mg CR tablet Commonly known as:  IMDUR  
   
  
 NITROSTAT 0.4 mg SL tablet Generic drug:  nitroglycerin  
   
  
 omeprazole 20 mg capsule Commonly known as:  PRILOSEC  
   
  
 VITAMIN B-12 500 mcg tablet Generic drug:  cyanocobalamin VITAMIN D2 50,000 unit capsule Generic drug:  ergocalciferol TAKE these medications as instructed Instructions Each Dose to Equal  
 Morning Noon Evening Bedtime  
 acetaminophen 500 mg tablet Commonly known as:  TYLENOL Your last dose was: Your next dose is: Take 1,000 mg by mouth every six (6) hours as needed for Pain. 1000 mg  
    
   
   
   
  
 * albuterol 90 mcg/actuation inhaler Commonly known as:  PROVENTIL HFA, VENTOLIN HFA, PROAIR HFA Your last dose was: Your next dose is: Take 2 Puffs by inhalation every four (4) hours as needed for Wheezing or Shortness of Breath. 2 Puff * albuterol 2.5 mg /3 mL (0.083 %) nebulizer solution Commonly known as:  PROVENTIL VENTOLIN Your last dose was: Your next dose is:    
   
   
 3 mL by Nebulization route every four (4) hours as needed for Wheezing. 2.5 mg  
    
   
   
   
  
 diclofenac 1 % Gel Commonly known as:  VOLTAREN Your last dose was: Your next dose is:    
   
   
 Apply 2 g to affected area four (4) times daily as needed (Pain). 2 g  
    
   
   
   
  
 hydrOXYzine HCl 25 mg tablet Commonly known as:  ATARAX Your last dose was: Your next dose is: Take 25 mg by mouth every eight (8) hours as needed for Itching. 25 mg  
    
   
   
   
  
 megestrol 400 mg/10 mL (10 mL) suspension Commonly known as:  MEGACE Your last dose was: Your next dose is: Take 10 mL by mouth daily. 400 mg  
    
   
   
   
  
 * morphine CR 15 mg CR tablet Commonly known as:  MS CONTIN Your last dose was: Your next dose is: Take 1 Tab by mouth every twelve (12) hours. Max Daily Amount: 30 mg.  
 15 mg  
    
   
   
   
  
 * morphine IR 15 mg tablet Commonly known as:  MS IR Your last dose was: Your next dose is: Take 1 Tab by mouth every four (4) hours as needed for Pain. Max Daily Amount: 90 mg.  
 15 mg  
    
   
   
   
  
 ondansetron hcl 4 mg tablet Commonly known as:  ZOFRAN (AS HYDROCHLORIDE) Your last dose was: Your next dose is: Take 1 Tab by mouth every eight (8) hours as needed for Nausea. 4 mg  
    
   
   
   
  
 polyethylene glycol 17 gram/dose powder Commonly known as:  Terrance Deck Your last dose was: Your next dose is: Take 17 g by mouth daily as needed (constipation). 17 g * Notice: This list has 4 medication(s) that are the same as other medications prescribed for you. Read the directions carefully, and ask your doctor or other care provider to review them with you. Where to Get Your Medications Information on where to get these meds will be given to you by the nurse or doctor. ! Ask your nurse or doctor about these medications  
  morphine CR 15 mg CR tablet  
 morphine IR 15 mg tablet Discharge Instructions HOSPITALIST DISCHARGE INSTRUCTIONS 
 
NAME: Sis Carlson :  1940 MRN:  [id number] Date/Time:  2017 11:36 AM 
 
ADMIT DATE: 10/30/2017 DISCHARGE DATE: 2017 DISCHARGE DIAGNOSIS: 
Metastatic breast cancer metastatic to bone POA - No therapy recommended by oncology, Home hospice opted by family Severe Bone pain POA - comfort measures/care only now at home Acute Renal failure POA - Cr still ~ 2.5 today with IVF, minimal PO intake noted, Comfort feeding going home on Hospice today Stage 3 chronic kidney disease POA creatinine 1.4-1.5 at baseline Orthostatic Hypotension POA- cont bed rest/ no activity will be tolerable it seems, Hospice at home for stage 4 cancer now Anemia POA suspect chronic disease/malignancy- s/p 1 unit PRBC this admission in ER Essential HTN POA 
DM type 2 POA - hypoglycemic this admission, taken off all insulin & DM meds now as pt going on Home hospice & has unreliable PO intake H/o SBO Hyponatremia POA- improved with hydration Active Problems: 
  Malignant neoplasm of upper-inner quadrant of right female breast ([facility] (10/7/2015) Type 2 diabetes mellitus with diabetic polyneuropathy, with long-term current use of insulin ([facility] (12/21/2016) Protein calorie malnutrition ([facility] (9/27/2017) Acute kidney injury ([facility] (10/30/2017) MEDICATIONS: 
As per medication reconciliation  list 
· It is important that you take the medication exactly as they are prescribed. · Keep your medication in the bottles provided by the pharmacist and keep a list of the medication names, dosages, and times to be taken in your wallet. · Do not take other medications without consulting your doctor. Pain Management: per above medications What to do at [facility] Recommended diet:  Comfort feeding Recommended activity: bed rest as pt orthostatic If you have questions regarding the hospital related prescriptions or hospital related issues please call Kamar Zaidi at . Follow Up: 
Dr. Murtaza Damico MD for further Hospice orders as needed Information obtained by : 
I understand that if any problems occur once I am at home I am to contact my physician. I understand and acknowledge receipt of the instructions indicated above. Physician's or R.N.'s Signature                                                                  Date/Time Patient or Representative Signature                                                          Date/Time EvntLive Announcement We are excited to announce that we are making your provider's discharge notes available to you in EvntLive. You will see these notes when they are completed and signed by the physician that discharged you from your recent hospital stay. If you have any questions or concerns about any information you see in EvntLive, please call the Health Information Department where you were seen or reach out to your Primary Care Provider for more information about your plan of care. Introducing [facility]! [employer] introduces EvntLive patient portal. Now you can access parts of your medical record, email your doctor's office, and request medication refills online. 1. In your internet browser, go to [email or web address] 2. Click on the First Time User? Click Here link in the Sign In box. You will see the New Member Sign Up page. 3. Enter your EvntLive Access Code exactly as it appears below. You will not need to use this code after youve completed the sign-up process. If you do not sign up before the expiration date, you must request a new code. · EvntLive Access Code: [email or web address] Expires: 1/30/2018  4:13 PM 
 
4. Enter the last four digits of your Social Security Number (xxxx) and Date of Birth (mm/dd/yyyy) as indicated and click Submit. You will be taken to the next sign-up page. 5. Create a EvntLive ID. This will be your EvntLive login ID and cannot be changed, so think of one that is secure and easy to remember. 6. Create a EvntLive password. You can change your password at any time. 7. Enter your Password Reset Question and Answer. This can be used at a later time if you forget your password. 8. Enter your e-mail address. You will receive e-mail notification when new information is available in [address] 9. Click Sign Up. You can now view and download portions of your medical record. 10. Click the Download Summary menu link to download a portable copy of your medical information. If you have questions, please visit the Frequently Asked Questions section of the [employer] website. Remember, [employer] is NOT to be used for urgent needs. For medical emergencies, dial 911. Now available from your iPhone and Android! Providers Seen During Your Hospitalization Provider Specialty Primary office phone Alexandrea Boothe MD Emergency Medicine [phone] Kishor Espinosa MD Hospitalist [phone] Jean Pierre Daigle MD Internal Medicine [phone] Miguel Angel Hilario MD Internal Medicine [phone] Your Primary Care Physician (PCP) Primary Care Physician Office Phone Office Fax [facility] [phone] You are allergic to the following Allergen Reactions Codeine Itching Duratuss (Phenylephrine-Guaifenesin) Nausea and Vomiting Lisinopril-Hydrochlorothiazide Itching Motrin (Ibuprofen) Nausea and Vomiting With 800mg Tape (Adhesive) Other (comments) TEARS HER SKIN Recent Documentation Height Weight BMI OB Status Smoking Status 1.803 m 85.3 kg 26.22 kg/m2 Hysterectomy Never Smoker Emergency Contacts Name Discharge Info Relation Home Work Mobile DanielsDeedee DISCHARGE CAREGIVER [3] Daughter [21] [phone] Brian Felipe DISCHARGE CAREGIVER [3] Daughter [21] [phone] Alexys Arellano     [phone] Patient Belongings The following personal items are in your possession at time of discharge: 
  Dental Appliances: None  Visual Aid: Glasses      Home Medications: None   Jewelry: None  Clothing: None    Other Valuables: None Please provide this summary of care documentation to your next provider. Signatures-by signing, you are acknowledging that this After Visit Summary has been reviewed with you and you have received a copy. Patient Signature:  ____________________________________________________________ Date:  ____________________________________________________________  
  
Earnstine Dawit Provider Signature:  ____________________________________________________________ Date:  ____________________________________________________________

## 2017-10-30 NOTE — CONSULTS
Oncology Inpatient Consult Note        Patient: Christi Hensley MRN: [id number]  SSN: [id number]    YOB: 1940  Age: 65 y.o. Sex: female        Reason for consultation:     Metastatic breast cancer      Subjective:      Christi Hensley is a 65 y.o. female who we were asked to see with a diagnosis of metastatic breast cancer. She underwent a right mastectomy with axillary LN dissection on 01/19/2015. She received adjuvant chemotherapy and completed radiation. She has developed progressive anemia. She feels fatigued. She also has ongoing back pain. A bone marrow biopsy confirmed the diagnosis of metastatic TNBC. She is now receiving Abraxane and Keytruda. Ms. Tiffany Barkley presented to the ED with generalized weakness and pain. Her daughters are with her at the bedside.      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 Dania Vuong    Breast cancer [facility])     Rt mastectomy 2/19/15    CAD (coronary artery disease)     Dr Juan Deleon Diabetes [facility])     Now seeing Dr. Benoit Martines Diverticulitis     DJD (degenerative joint disease), lumbar     GERD (gastroesophageal reflux disease)     H. pylori infection 2008    Dr. [place] attack April 2006    Hypercholesterolemia     Hypertension     Morbid obesity ([address]     Neuropathy, arm 2009    Right; Has had MRI and EMG at [facility]    SEN (obstructive sleep apnea) uses CPAP    Rectal bleeding 2009    Hospitalized; Had colonoscopy and EGD (ok), gastric emptying study (normal), doppler mesenteric arteries (ok)    Sciatica     Has seen Dr. Woods Fairly    Stroke [facility]) 2009 & 2012    no residual problems     Past Surgical History:   Procedure Laterality Date    HX APPENDECTOMY  1979    HX BREAST LUMPECTOMY  12/12/2013    RIGHT BREAST DUCTAL EXCISION performed by Anderson Jarerll MD at [facility] MAIN OR    HX CATARACT REMOVAL  2007    HX CHOLECYSTECTOMY  1979    HX COLONOSCOPY      HX HEART CATHETERIZATION      angioplasty    HX HYSTERECTOMY      fibroids    HX KNEE ARTHROSCOPY  1997    right    HX MASTECTOMY Right 2/19/2015    RIGHT BREAST MODIFIED RADICAL MASTECTOMY RIGHT SENTINEL NODE BIOPSY, RIGHT PORT A CATH INSERTION performed by Imelda Abad MD at [facility] AMBULATORY OR    HX PACEMAKER      HX SHOULDER ARTHROSCOPY  2004    left      Family History   Problem Relation Age of Onset    Stroke Father     Cancer Sister      breast cancer    Hypertension Sister     Cancer Mother      Unknown type    Cancer Brother      Colon    Hypertension Brother     Anesth Problems Neg Hx      Social History   Substance Use Topics    Smoking status: Never Smoker    Smokeless tobacco: Never Used    Alcohol use No      Prior to Admission medications    Medication Sig Start Date End Date Taking? Authorizing Provider   ASCENSIA BREEZE 2 strp CHECK BLOOD GLUCOSE TWICE DAILY 10/30/17   Carlitos Singh MD   morphine CR (MS CONTIN) 15 mg CR tablet Take 1 Tab by mouth every twelve (12) hours. Max Daily Amount: 30 mg. 10/12/17   Sea Cast NP   morphine CR (MS CONTIN) 15 mg CR tablet Take 1 Tab by mouth every twelve (12) hours. Max Daily Amount: 30 mg. 10/12/17   Sea Cast NP   morphine IR (MS IR) 15 mg tablet Take 1 Tab by mouth every four (4) hours as needed for Pain.  Max Daily Amount: 90 mg. 10/12/17   JAMIN Radford (JEREMIAH-Q/RISAQUAD) 8 billion cell cap cap Take 1 Cap by mouth daily. 9/27/17   Winifred Cuevas MD   insulin syringe-needle U-100 (BD INSULIN SYRINGE ULTRA-FINE) 1/2 mL 31 gauge x 15/64\" syrg 1 Syringe by SubCUTAneous route two (2) times a day. 8/18/17   Ritika Joshi MD   VITAMIN D2 50,000 unit capsule take 1 capsule by mouth every 30 DAYS 8/16/17   Terese Hollingsworth NP   hydrOXYzine HCl (ATARAX) 25 mg tablet Take 25 mg by mouth every eight (8) hours as needed for Itching. Historical Provider   megestrol (MEGACE) 400 mg/10 mL (10 mL) suspension Take 10 mL by mouth daily. 8/11/17   Terese Hollingsworth NP   diclofenac (VOLTAREN) 1 % gel Apply 2 g to affected area four (4) times daily as needed (Pain). Historical Provider   dicyclomine (BENTYL) 20 mg tablet Take 1 Tab by mouth every six (6) hours as needed (abdominal cramps) for up to 20 doses. 6/10/17   Chitra Hector MD   ondansetron hcl (ZOFRAN, AS HYDROCHLORIDE,) 4 mg tablet Take 1 Tab by mouth every eight (8) hours as needed for Nausea. 6/10/17   Chitra Hector MD   atorvastatin (LIPITOR) 80 mg tablet Take 1 Tab by mouth daily. Indications: hypercholesterolemia 5/31/17   Gabriel Coelho MD   aspirin (ASPIRIN) 325 mg tablet Take 1 Tab by mouth daily. 5/31/17   Gabriel Coelho MD   ipratropium (ATROVENT) 0.06 % nasal spray instill 2 sprays into each nostril three times a day - IF NEEDED FOR RUNNING NOSE 3/15/17   Historical Provider   glipiZIDE (GLUCOTROL) 5 mg tablet Take 0.5 Tabs by mouth two (2) times a day. Take 1/2 every day with breakfast. If your blood sugar is above 200 take a whole tablet. 5/5/17   Ritika Joshi MD   isosorbide mononitrate ER (IMDUR) 60 mg CR tablet Take  by mouth every morning. 6/6/16   Historical Provider   ondansetron (ZOFRAN ODT) 4 mg disintegrating tablet Take 1 Tab by mouth every eight (8) hours as needed for Nausea.  6/1/16   Gamaliel Hay MD   albuterol (PROVENTIL VENTOLIN) 2.5 mg /3 mL (0.083 %) nebulizer solution 3 mL by Nebulization route every four (4) hours as needed for Wheezing. 9/20/15   Nelida Rao DO   MICROLET LANCET misc  12/16/14   Historical Provider   benazepril (LOTENSIN) 40 mg tablet Take 40 mg by mouth every morning. Historical Provider   omeprazole (PRILOSEC) 20 mg capsule Take 40 mg by mouth two (2) times a day. Todd Andersen MD   nitroglycerin (NITROSTAT) 0.4 mg SL tablet by SubLINGual route every five (5) minutes as needed for Chest Pain. Todd Andersen MD   amlodipine (NORVASC) 10 mg tablet TAKE 1 TABLET BY MOUTH ONCE DAILY 4/9/11   Vicenta Camarillo MD          Allergies   Allergen Reactions    Codeine Itching    Duratuss [Phenylephrine-Guaifenesin] Nausea and Vomiting    Lisinopril-Hydrochlorothiazide Itching    Motrin [Ibuprofen] Nausea and Vomiting     With 800mg    Tape [Adhesive] Other (comments)     TEARS HER SKIN         Objective:     Vitals:    10/30/17 1430 10/30/17 1445 10/30/17 1530 10/30/17 1545   BP: 108/55 120/61 127/54 118/60   Pulse: 68 69 79 71   Resp: 16 13     Temp:       SpO2: 98% 99% 98% 99%   Weight:       Height:            Physical Exam:    GENERAL: alert, cooperative, obese  EYE: negative  LYMPHATIC: negative  THROAT & NECK: normal and no erythema or exudates noted. LUNG: clear to auscultation bilaterally  HEART: regular rate and rhythm  ABDOMEN: soft, non-tender  EXTREMITIES: right arm edema - has sleeve in place  SKIN: negative  NEUROLOGIC: negative        CT Results (most recent):    Results from Hospital Encounter encounter on 10/30/17   CT ABD PELV WO CONT   Narrative EXAM:  CT ABD PELV WO CONT    INDICATION: recent SBO, now with ab pain and nausea    COMPARISON: September 26, 2017 and September 15, 2017    CONTRAST:  None. TECHNIQUE:   Thin axial images were obtained through the abdomen and pelvis. Coronal and  sagittal reconstructions were generated. Oral contrast was not administered.  CT  dose reduction was achieved through use of a standardized protocol tailored for  this examination and automatic exposure control for dose modulation. The absence of intravenous contrast material reduces the sensitivity for  evaluation of the solid parenchymal organs of the abdomen. FINDINGS:   LUNG BASES: Clear. INCIDENTALLY IMAGED HEART AND MEDIASTINUM: Heart is enlarged  LIVER: No mass or biliary dilatation. GALLBLADDER: Surgically absent  SPLEEN: No mass. PANCREAS: No mass or ductal dilatation. ADRENALS: Unremarkable. KIDNEYS/URETERS: Round hypodensity right kidney indeterminate but measures fluid  density. No hydronephrosis or stone  STOMACH: Unremarkable. SMALL BOWEL: Distal small bowel has been resected as has portions of the right  colon. No dilated loops of small bowel  COLON: Patient is post right colectomy. There is stool throughout the colon. There is diverticulosis of the sigmoid colon  APPENDIX: Surgically absent  PERITONEUM: No ascites or pneumoperitoneum. RETROPERITONEUM: No lymphadenopathy or aortic aneurysm. Aorta is ectatic and  atherosclerotic  REPRODUCTIVE ORGANS: Surgically absent  URINARY BLADDER: No mass or calculus. BONES: There are patchy areas of bony sclerosis throughout all visualized bony  elements. This is not significantly changed in the interval.  ADDITIONAL COMMENTS: N/A         Impression IMPRESSION:    1. Postoperative changes to the distal small bowel and right colon. No recurrent  obstruction  2. Patchy areas of bony sclerosis throughout the visualized bony elements  unchanged.  Findings concerning for bony metastatic disease          Lab Results   Component Value Date/Time    WBC 5.3 10/30/2017 02:42 PM    Hemoglobin (POC) 9.2 09/20/2015 05:25 PM    HGB 6.8 10/30/2017 02:42 PM    Hematocrit (POC) 27 09/20/2015 05:25 PM    HCT 20.3 10/30/2017 02:42 PM    PLATELET 555 04/05/0337 02:42 PM    MCV 80.6 10/30/2017 02:42 PM       Lab Results   Component Value Date/Time    Sodium 129 10/30/2017 02:42 PM    Potassium 5.0 10/30/2017 02:42 PM    Chloride 96 10/30/2017 02:42 PM    CO2 20 10/30/2017 02:42 PM    Anion gap 13 10/30/2017 02:42 PM    Glucose 136 10/30/2017 02:42 PM    BUN 52 10/30/2017 02:42 PM    Creatinine 2.68 10/30/2017 02:42 PM    BUN/Creatinine ratio 19 10/30/2017 02:42 PM    GFR est AA 21 10/30/2017 02:42 PM    GFR est non-AA 17 10/30/2017 02:42 PM    Calcium 7.5 10/30/2017 02:42 PM    Bilirubin, total 0.6 10/30/2017 02:42 PM    AST (SGOT) 217 10/30/2017 02:42 PM    Alk. phosphatase 293 10/30/2017 02:42 PM    Protein, total 6.5 10/30/2017 02:42 PM    Albumin 1.9 10/30/2017 02:42 PM    Globulin 4.6 10/30/2017 02:42 PM    A-G Ratio 0.4 10/30/2017 02:42 PM    ALT (SGPT) 25 10/30/2017 02:42 PM         Assessment: 1. Metastatic breast carcinoma - dx 7/17/2017     Bone marrow involvement  Diffuse skeletal metastasis    ECOG PS 2  Intent of treatment - palliative    Receiving palliative chemotherapy   Abraxane + Keytruda - s/p 1 cycle    No longer a candidate for systemic chemotherapy. Discussed transitioning to comfort care and hospice with the patient and family members. 2. Right breast carcinoma: dx 11/17/2014  pT3 N3a M0 (Stage IIIC) infiltrating ductal carcinoma, Tumor size 7.5 cm, LN 17/19 +ve with extracapsular extension in 2 nodes, grade 3, ki 67 35%, ER -ve, [place] -ve, Her 2 -ve    ECOG PS 0  Intent of therapy - curative       S/P right sided mastectomy with axillary LN dissection  Received adjuvant chemotherapy   Taxotere, Cytoxan, s/p 3 Cycles    Therapy was discontinued due to severe side effects. Completed radiation to the chest wall and axilla  Lymphedema - followed by lymphedema clinic at [facility]      3. Abdominal pain    > s/p lysis of adhesions      4.  Anemia     From bone marrow involvement  Transfuse 1 unit PRBCs      Plan:       > No longer a candidate for systemic therapy  > Transfuse 1 unit PRBCs  > Recommended referral to hospice      Signed by: Rinku Osorio NP                     October 30, 2017           Attending Physician Note:     I have reviewed the history, physical examination, assessment and the plan of the care of the patient. I saw the patient with Briscoe Boast and I participated in the examination and critical decision making. I agree with the note as stated above. Ms. Alisha Chappell is a elderly women with metastatic triple negative breast carcinoma. Her performance status is poor. He will referred to Hospice for end of life care.        Signed by: Christelle Bonilla MD                     October 31, 2017

## 2017-10-30 NOTE — ED PROVIDER NOTES
[facility].  EMERGENCY DEPARTMENT HISTORY AND PHYSICAL EXAM       Date of Service: 10/30/2017   Patient Name: Kacy Dumont   YOB: 1940  Medical Record Number: [id number]    History of Presenting Illness     Chief Complaint   Patient presents with    Abdominal Pain     pt arrived Via EMS for Abd pain from recent bowel surgery. akins hx of DM , HTN, paced, hx of Right Breast CA with masectomy. History Provided By:  patient and family    Additional History:   Kacy Dumont is a 65 y.o. female with PMhx significant for DM, HTN, CVA, CAD (s/p MI), asthma, and breast CA with bone mets who presents via EMS to the ED for evaluation of increased abdominal pain. Per daughter, pt has a hx of stage 4 breast CA with bone mets for which she is not undergoing any form of treatment and is not in hospice. She states that she attempted to get the patient up today to bath and dress her, however, she states that she was unsuccessful as the patient was complaining of too much pain. She states that she called the pt's oncologist, Dr. America Alexandra, who referred the pt to the ED for admission. She states pt was last at her baseline status yesterday. She states that the pt was last given Morphine and Tylenol yesterday with some relief of pain. Pt's last BM was yesterday. She notes additional symptom of chills in the pt. Pt only had 1 round of chemotherapy which occurred on 9/13/17; no hx of radiation therapy reported. On exam, pt reports symptoms of diffuse abdominal pain, chills, dry cough, and nausea since last night. Pain is worsened with palpation of the area. She denies any relief with use of pain medication (Morphine, Tylenol). She denies vomiting, fever, or CP. Of note, chart review shows pt was admitted 9/13/17 for SBO with lysis of adhesions (laproscopic converted to open).      Social Hx: - Tobacco, - EtOH, - Illicit Drugs    History of present illness limited secondary to pt's status in the ED. Pt is also a poor historian. Primary Care Provider: Myriam Nguyen MD   Specialist: Heme/Onc- Dr. Brandi May    Past History     Past Medical History:   Past Medical History:   Diagnosis Date    Arrhythmia     bradycardia in 29's /pacemaker inserted    Arthritis     RT KNEE    Asthma Dx age 75    Bradycardia 2009    Cardiology saw her during hospital stay; Now off coreg;  Sees Dr. Dania Vuong    Breast cancer [facility])     Rt mastectomy 2/19/15    CAD (coronary artery disease)     Dr Juan Deleon Diabetes [facility])     Now seeing Dr. Benoit Martines Diverticulitis     DJD (degenerative joint disease), lumbar     GERD (gastroesophageal reflux disease)     H. pylori infection 2008    Dr. [place] attack April 2006    Hypercholesterolemia     Hypertension     Morbid obesity ([address]     Neuropathy, arm 2009    Right; Has had MRI and EMG at [facility] [id number] SEN (obstructive sleep apnea)     uses CPAP    Rectal bleeding 2009    Hospitalized;  Had colonoscopy and EGD (ok), gastric emptying study (normal), doppler mesenteric arteries (ok)    Sciatica     Has seen Dr. Newt Gowers    Stroke [facility]) 2009 & 2012    no residual problems        Past Surgical History:   Past Surgical History:   Procedure Laterality Date    HX APPENDECTOMY  1979    HX BREAST LUMPECTOMY  12/12/2013    RIGHT BREAST DUCTAL EXCISION performed by Enrico Martin MD at [facility] MAIN OR    HX CATARACT REMOVAL  2007    HX CHOLECYSTECTOMY  1979    HX COLONOSCOPY      HX HEART CATHETERIZATION      angioplasty    HX HYSTERECTOMY      fibroids    HX KNEE ARTHROSCOPY  1997    right    HX MASTECTOMY Right 2/19/2015    RIGHT BREAST MODIFIED RADICAL MASTECTOMY RIGHT SENTINEL NODE BIOPSY, RIGHT PORT A CATH INSERTION performed by 81Raul Billy MD at [facility] AMBULATORY OR    HX PACEMAKER      HX SHOULDER ARTHROSCOPY  2004    left        Family History:   Family History   Problem Relation Age of Onset    Stroke Father     Cancer Sister      breast cancer    Hypertension Sister     Cancer Mother      Unknown type    Cancer Brother      Colon    Hypertension Brother     Anesth Problems Neg Hx         Social History:   Social History   Substance Use Topics    Smoking status: Never Smoker    Smokeless tobacco: Never Used    Alcohol use No        Allergies: Allergies   Allergen Reactions    Codeine Itching    Duratuss [Phenylephrine-Guaifenesin] Nausea and Vomiting    Lisinopril-Hydrochlorothiazide Itching    Motrin [Ibuprofen] Nausea and Vomiting     With 800mg    Tape [Adhesive] Other (comments)     TEARS HER SKIN         Review of Systems   Review of Systems   Constitutional: Positive for chills. Negative for fatigue and fever. HENT: Negative for congestion, rhinorrhea and sore throat. Eyes: Negative for pain, discharge and visual disturbance. Respiratory: Positive for cough. Negative for chest tightness, shortness of breath and wheezing. Cardiovascular: Negative for chest pain, palpitations and leg swelling. Gastrointestinal: Positive for abdominal pain and nausea. Negative for constipation, diarrhea and vomiting. Genitourinary: Negative for dysuria, frequency and hematuria. Musculoskeletal: Negative for arthralgias, back pain and myalgias. Skin: Negative for rash. Neurological: Negative for dizziness, weakness, light-headedness and headaches. Psychiatric/Behavioral: Negative. Physical Exam  Physical Exam   Constitutional: She is oriented to person, place, and time. She appears well-developed and well-nourished. No distress. HENT:   Head: Normocephalic and atraumatic. Mouth/Throat: Mucous membranes are dry. Eyes: EOM are normal. Right eye exhibits no discharge. Left eye exhibits no discharge. No scleral icterus. Neck: Normal range of motion. Neck supple. No tracheal deviation present.    Cardiovascular: Normal rate, regular rhythm, normal heart sounds and intact distal pulses. Exam reveals no gallop and no friction rub. No murmur heard. Pulmonary/Chest: Effort normal and breath sounds normal. No respiratory distress. She has no wheezes. She has no rales. Abdominal: Soft. She exhibits no distension. There is tenderness (Diffuse, mild). There is no rebound and no guarding. Abdomen with multiple well healed incisions. Musculoskeletal: Normal range of motion. She exhibits no edema. Lymphadenopathy:     She has no cervical adenopathy. Neurological: She is alert and oriented to person, place, and time. No focal neuro deficits   Skin: Skin is warm and dry. No rash noted. Psychiatric: She has a normal mood and affect. Nursing note and vitals reviewed. Medical Decision Making   I am the first provider for this patient. I reviewed the vital signs, available nursing notes, past medical history, past surgical history, family history and social history. Old Medical Records: Old medical records. Provider Notes:   Differential includes dehydration, JULES/ARF, electrolyte abnormality, UTI, anemia, obstruction, ileus, metastatic cancer, pneumonia    CRITICAL CARE NOTE:  IMPENDING DETERIORATION -Cardiovascular  ASSOCIATED RISK FACTORS - anemia  MANAGEMENT- Bedside Assessment and Supervision of Care  INTERPRETATION -  Blood pressure, labs, vital signs  INTERVENTIONS - blood transfusion  CASE REVIEW - Hospitalist, Medical Sub-Specialist, Nursing and Family  TREATMENT RESPONSE -Unchanged   PERFORMED BY - Self  NOTES:  I have spent 40 minutes of critical care time involved in lab review, consultations with specialist, family decision- making, bedside attention and documentation. During this entire length of time I was immediately available to the patient. Sarah Boles MD.    ED Course:  2:01 PM   Initial assessment performed.  The patients presenting problems have been discussed, and they are in agreement with the care plan formulated and outlined with them. I have encouraged them to ask questions as they arise throughout their visit. Progress Notes:   CONSULT NOTE:  2:38 PM  Je Wiley MD spoke with Dr. Caleb Rodriguez,  Specialty: Heme/Onc  Discussed pt's hx, disposition, and available diagnostic and imaging results. Reviewed care plans. Consultant will see and evaluate pt in the ED. PROGRESS NOTE:  4:23 PM  Return page to Dr. Hans Eng. He recommends transfusing 1 unit of blood and hospitalist admission with plans for pt to go to hospice. CONSULT NOTE:   5:42 PM  Je Wiley MD spoke with Dr. Rk Angeles,   Specialty: Hospitalist  Discussed pt's hx, disposition, and available diagnostic and imaging results. Reviewed care plans. Consultant will evaluate pt for admission. Diagnostic Study Results   Labs -  Recent Results (from the past 12 hour(s))   CBC WITH AUTOMATED DIFF    Collection Time: 10/30/17  2:42 PM   Result Value Ref Range    WBC 5.3 3.6 - 11.0 K/uL    RBC 2.52 (L) 3.80 - 5.20 M/uL    HGB 6.8 (L) 11.5 - 16.0 g/dL    HCT 20.3 (L) 35.0 - 47.0 %    MCV 80.6 80.0 - 99.0 FL    MCH 27.0 26.0 - 34.0 PG    MCHC 33.5 30.0 - 36.5 g/dL    RDW 17.2 (H) 11.5 - 14.5 %    PLATELET 411 (L) 884 - 400 K/uL    NEUTROPHILS 73 %    BAND NEUTROPHILS 3 %    LYMPHOCYTES 13 %    MONOCYTES 10 %    EOSINOPHILS 0 %    BASOPHILS 0 %    METAMYELOCYTES 1 %    ABS. NEUTROPHILS 4.0 K/UL    ABS. LYMPHOCYTES 0.7 K/UL    ABS. MONOCYTES 0.5 K/UL    ABS. EOSINOPHILS 0.0 K/UL    ABS.  BASOPHILS 0.0 K/UL    RBC COMMENTS POLYCHROMASIA  PRESENT        RBC COMMENTS ROULEAUX  PRESENT        DF MANUAL     METABOLIC PANEL, COMPREHENSIVE    Collection Time: 10/30/17  2:42 PM   Result Value Ref Range    Sodium 129 (L) 136 - 145 mmol/L    Potassium 5.0 3.5 - 5.1 mmol/L    Chloride 96 (L) 97 - 108 mmol/L    CO2 20 (L) 21 - 32 mmol/L    Anion gap 13 5 - 15 mmol/L    Glucose 136 (H) 65 - 100 mg/dL    BUN 52 (H) 6 - 20 MG/DL    Creatinine 2.68 (H) 0.55 - 1.02 MG/DL    BUN/Creatinine ratio 19 12 - 20      GFR est AA 21 (L) >60 ml/min/1.73m2    GFR est non-AA 17 (L) >60 ml/min/1.73m2    Calcium 7.5 (L) 8.5 - 10.1 MG/DL    Bilirubin, total 0.6 0.2 - 1.0 MG/DL    ALT (SGPT) 25 12 - 78 U/L    AST (SGOT) 217 (H) 15 - 37 U/L    Alk.  phosphatase 293 (H) 45 - 117 U/L    Protein, total 6.5 6.4 - 8.2 g/dL    Albumin 1.9 (L) 3.5 - 5.0 g/dL    Globulin 4.6 (H) 2.0 - 4.0 g/dL    A-G Ratio 0.4 (L) 1.1 - 2.2     CK W/ CKMB & INDEX    Collection Time: 10/30/17  2:42 PM   Result Value Ref Range    CK 50 26 - 192 U/L    CK - MB 1.1 <3.6 NG/ML    CK-MB Index 2.2 0 - 2.5     MAGNESIUM    Collection Time: 10/30/17  2:42 PM   Result Value Ref Range    Magnesium 1.9 1.6 - 2.4 mg/dL   LIPASE    Collection Time: 10/30/17  2:42 PM   Result Value Ref Range    Lipase 59 (L) 73 - 393 U/L   TROPONIN I    Collection Time: 10/30/17  2:42 PM   Result Value Ref Range    Troponin-I, Qt. <0.04 <0.05 ng/mL   LACTIC ACID    Collection Time: 10/30/17  2:42 PM   Result Value Ref Range    Lactic acid 2.3 (HH) 0.4 - 2.0 MMOL/L   URINALYSIS W/ REFLEX CULTURE    Collection Time: 10/30/17  3:32 PM   Result Value Ref Range    Color DARK YELLOW      Appearance CLOUDY (A) CLEAR      Specific gravity 1.019 1.003 - 1.030      pH (UA) 5.0 5.0 - 8.0      Protein NEGATIVE  NEG mg/dL    Glucose NEGATIVE  NEG mg/dL    Ketone NEGATIVE  NEG mg/dL    Blood NEGATIVE  NEG      Urobilinogen 1.0 0.2 - 1.0 EU/dL    Nitrites NEGATIVE  NEG      Leukocyte Esterase NEGATIVE  NEG      WBC 0-4 0 - 4 /hpf    RBC 0-5 0 - 5 /hpf    Epithelial cells FEW FEW /lpf    Bacteria NEGATIVE  NEG /hpf    UA:UC IF INDICATED CULTURE NOT INDICATED BY UA RESULT CNI      Amorphous Crystals 2+ (A) NEG    Hyaline cast 2-5 0 - 5 /lpf    Other: STARCH CRYSTALS PRESENT    BILIRUBIN, CONFIRM    Collection Time: 10/30/17  3:32 PM   Result Value Ref Range    Bilirubin UA, confirm NEGATIVE  NEG     TYPE + CROSSMATCH    Collection Time: 10/30/17  4:55 PM   Result Value Ref Range    Crossmatch Expiration 11/02/2017     ABO/Rh(D) [employer] POSITIVE     Antibody screen NEG     Unit number [id number]     Blood component type RC LR AS1     Unit division 00     Status of unit ALLOCATED     Crossmatch result Compatible    GLUCOSE, POC    Collection Time: 10/30/17  5:14 PM   Result Value Ref Range    Glucose (POC) 95 65 - 100 mg/dL    Performed by Beryl Lucas (VANDANA)        Radiologic Studies -  The following have been ordered and reviewed:  CT Results  (Last 48 hours)               10/30/17 1512  CT ABD PELV WO CONT Final result    Impression:  IMPRESSION:       1. Postoperative changes to the distal small bowel and right colon. No recurrent   obstruction   2. Patchy areas of bony sclerosis throughout the visualized bony elements   unchanged. Findings concerning for bony metastatic disease           Narrative:  EXAM:  CT ABD PELV WO CONT       INDICATION: recent SBO, now with ab pain and nausea       COMPARISON: September 26, 2017 and September 15, 2017       CONTRAST:  None. TECHNIQUE:    Thin axial images were obtained through the abdomen and pelvis. Coronal and   sagittal reconstructions were generated. Oral contrast was not administered. CT   dose reduction was achieved through use of a standardized protocol tailored for   this examination and automatic exposure control for dose modulation. The absence of intravenous contrast material reduces the sensitivity for   evaluation of the solid parenchymal organs of the abdomen. FINDINGS:    LUNG BASES: Clear. INCIDENTALLY IMAGED HEART AND MEDIASTINUM: Heart is enlarged   LIVER: No mass or biliary dilatation. GALLBLADDER: Surgically absent   SPLEEN: No mass. PANCREAS: No mass or ductal dilatation. ADRENALS: Unremarkable. KIDNEYS/URETERS: Round hypodensity right kidney indeterminate but measures fluid   density. No hydronephrosis or stone   STOMACH: Unremarkable.    SMALL BOWEL: Distal small bowel has been resected as has portions of the right   colon. No dilated loops of small bowel   COLON: Patient is post right colectomy. There is stool throughout the colon. There is diverticulosis of the sigmoid colon   APPENDIX: Surgically absent   PERITONEUM: No ascites or pneumoperitoneum. RETROPERITONEUM: No lymphadenopathy or aortic aneurysm. Aorta is ectatic and   atherosclerotic   REPRODUCTIVE ORGANS: Surgically absent   URINARY BLADDER: No mass or calculus. BONES: There are patchy areas of bony sclerosis throughout all visualized bony   elements. This is not significantly changed in the interval.   ADDITIONAL COMMENTS: N/A               CXR Results  (Last 48 hours)               10/30/17 1526  XR CHEST PORT Final result    Impression:  Impression: No acute process. Narrative: Indication: Cough with history of metastatic breast cancer       Comparison: 6/10/2017       Portable exam of the chest obtained at 1508 demonstrates stable cardiomegaly. Pacer leads are unchanged in position. Port-A-Cath has been placed in the   interval with the tip projecting over the right atrium. There is no acute   process in the lung fields. Chronic rotator cuff tear is noted on the right. Surgical clips project over the right axilla. Vital Signs-Reviewed the patient's vital signs.    Patient Vitals for the past 12 hrs:   Temp Pulse Resp BP SpO2   10/30/17 1545 - 71 - 118/60 99 %   10/30/17 1530 - 79 - 127/54 98 %   10/30/17 1445 - 69 13 120/61 99 %   10/30/17 1430 - 68 16 108/55 98 %   10/30/17 1415 - 69 17 102/57 98 %   10/30/17 1400 - 71 18 112/54 99 %   10/30/17 1345 - 70 18 94/50 -   10/30/17 1330 - 72 18 104/52 -   10/30/17 1327 98.1 °F (36.7 °C) 73 16 - 94 %       Medications Given in the ED:  Medications   0.9% sodium chloride infusion 250 mL (not administered)   insulin NPH (NOVOLIN N, HUMULIN N) injection 6 Units (not administered)   insulin NPH (NOVOLIN N, HUMULIN N) injection 6 Units (not administered)   insulin lispro (HUMALOG) injection (not administered)   sodium chloride (NS) flush 5-10 mL (not administered)   sodium chloride (NS) flush 5-10 mL (not administered)   acetaminophen (TYLENOL) tablet 650 mg (not administered)   naloxone (NARCAN) injection 0.4 mg (not administered)   ondansetron (ZOFRAN) injection 4 mg (not administered)   bisacodyl (DULCOLAX) suppository 10 mg (not administered)   enoxaparin (LOVENOX) injection 30 mg (not administered)   0.9% sodium chloride infusion (not administered)   insulin lispro (HUMALOG) injection (not administered)   glucose chewable tablet 16 g (not administered)   dextrose (D50W) injection syrg 12.5-25 g (not administered)   glucagon (GLUCAGEN) injection 1 mg (not administered)   sodium chloride 0.9 % bolus infusion 1,000 mL (1,000 mL IntraVENous New Bag 10/30/17 1538)   morphine injection 4 mg (4 mg IntraVENous Given 10/30/17 1649)       EKG interpretation: (Preliminary) 1420  Rhythm: paced; and regular . Rate (approx.): 68; QRS interval: prolonged;   Written by Jeniffer Davila, ED Scribe, as dictated by Aristides Nichole MD    Diagnosis   Clinical Impression:   1. JULES (acute kidney injury) ([facility] [address]    2. Dehydration    3. Chronic anemia    4. Metastatic breast cancer ([facility] [address]         Plan:  1: Admit to Hospitalist    Disposition Note:  Admit Note:  5:42 PM  Patient is being admitted to the hospital by Dr. Suraj Pablo. The results of their tests and reasons for their admission have been discussed with them and/or available family. They convey agreement and understanding for the need to be admitted and for their admission diagnosis. Consultation has been made with the inpatient physician specialist for hospitalization. _______________________________   Attestations:      This note is prepared by Jeniffer Davila, acting as Scribe for Aristides Nichole MD.    Aristides Nichole MD: The scribe's documentation has been prepared under my direction and personally reviewed by me in its entirety.  I confirm that the note above accurately reflects all work, treatment, procedures, and medical decision making performed by me.  _______________________________

## 2017-10-30 NOTE — PROGRESS NOTES
Pharmacy Clarification of the Prior to Admission Medication Regimen Retrospective to the Admission Medication Reconciliation    The patient was not interviewed regarding clarification of the prior to admission medication regimen, due to AMS. Daughters were present in room and were able to clarify the patient's PTA medication list and last doses administered. Patient's daughter was questioned regarding the patient's use of any other inhalers, topical products, over the counter medications, herbal medications, vitamin products or ophthalmic/nasal/otic medication use. Information Obtained From: Patient's Daughters, Medication List, Prescription Bottles, RX Query    Recommendations/Findings: The following amendments were made to the patient's active medication list on file at [facility]:     1) Additions:    acetaminophen (TYLENOL) 500 mg tablet   cyanocobalamin (VITAMIN B-12) 500 mcg tablet   insulin NPH (HUMULIN N) 100 unit/mL injection   albuterol (PROVENTIL HFA, VENTOLIN HFA, PROAIR HFA) 90 mcg/actuation inhaler   bumetanide (BUMEX) 1 mg tablet   polyethylene glycol (MIRALAX) 17 gram/dose powder    2) Removals:    Atorvastatin 80 mg tab   Dicyclomine 20 mg tab   Ipratropium 0.06 % nasal spray   JEREMIAH-Q 8 billion cell cap   Ondansetron ODT 4 mg tab    3) Changes:   nitroglycerin (NITROSTAT) 0.4 mg SL tablet (Old regimen: by sublingual route every 5 minutes PRN /New regimen: 0.4 mg every 5 minutes PRN)   omeprazole (PRILOSEC) 20 mg capsule (Old regimen: 40 mg BID /New regimen: 20 mg daily)    4) Pertinent Pharmacy Findings:   Updated patients preferred outpatient pharmacy to: [facility]   nitroglycerin (NITROSTAT) 0.4 mg SL tablet: Patient's daughter stated that the patient has this agent at home, but is not currently taking.      PTA medication list was corrected to the following:     Prior to Admission Medications   Prescriptions Last Dose Informant Patient Reported? Taking? VITAMIN D2 50,000 unit capsule 10/19/2017 at Unknown time Other No Yes   Sig: take 1 capsule by mouth every 30 DAYS   acetaminophen (TYLENOL) 500 mg tablet 10/29/2017 at Unknown time Other Yes Yes   Sig: Take 1,000 mg by mouth every six (6) hours as needed for Pain. albuterol (PROVENTIL HFA, VENTOLIN HFA, PROAIR HFA) 90 mcg/actuation inhaler 10/28/2017 at Unknown time Child Yes Yes   Sig: Take 2 Puffs by inhalation every four (4) hours as needed for Wheezing or Shortness of Breath. albuterol (PROVENTIL VENTOLIN) 2.5 mg /3 mL (0.083 %) nebulizer solution 10/29/2017 at Unknown time Child No Yes   Sig: 3 mL by Nebulization route every four (4) hours as needed for Wheezing. amlodipine (NORVASC) 10 mg tablet 10/30/2017 at Unknown time Other No Yes   Sig: TAKE 1 TABLET BY MOUTH ONCE DAILY   aspirin (ASPIRIN) 325 mg tablet 10/30/2017 at Unknown time Other No Yes   Sig: Take 1 Tab by mouth daily. benazepril (LOTENSIN) 40 mg tablet 10/30/2017 at Unknown time Other Yes Yes   Sig: Take 40 mg by mouth every morning. bumetanide (BUMEX) 1 mg tablet 10/30/2017 at Unknown time Other Yes Yes   Sig: Take 1 mg by mouth daily. cyanocobalamin (VITAMIN B-12) 500 mcg tablet 10/23/2017 at Unknown time Other Yes Yes   Sig: Take 500 mcg by mouth daily. diclofenac (VOLTAREN) 1 % gel 10/30/2017 at Unknown time Child Yes Yes   Sig: Apply 2 g to affected area four (4) times daily as needed (Pain). glipiZIDE (GLUCOTROL) 5 mg tablet 10/30/2017 at Unknown time Other No Yes   Sig: Take 0.5 Tabs by mouth two (2) times a day. Take 1/2 every day with breakfast. If your blood sugar is above 200 take a whole tablet. hydrOXYzine HCl (ATARAX) 25 mg tablet 10/30/2017 at Unknown time Other Yes Yes   Sig: Take 25 mg by mouth every eight (8) hours as needed for Itching.    insulin NPH (HUMULIN N) 100 unit/mL injection 10/30/2017 at Unknown time Child Yes Yes   Si Units by SubCUTAneous route two (2) times a day. isosorbide mononitrate ER (IMDUR) 60 mg CR tablet 10/30/2017 at Unknown time Other Yes Yes   Sig: Take 60 mg by mouth daily. megestrol (MEGACE) 400 mg/10 mL (10 mL) suspension 10/30/2017 at Unknown time Child No Yes   Sig: Take 10 mL by mouth daily. morphine CR (MS CONTIN) 15 mg CR tablet 10/30/2017 at Unknown time Other No Yes   Sig: Take 1 Tab by mouth every twelve (12) hours. Max Daily Amount: 30 mg.   morphine IR (MS IR) 15 mg tablet 10/30/2017 at Unknown time Other No Yes   Sig: Take 1 Tab by mouth every four (4) hours as needed for Pain. Max Daily Amount: 90 mg.   nitroglycerin (NITROSTAT) 0.4 mg SL tablet Not Taking at Unknown time Child Yes No   Si.4 mg by SubLINGual route every five (5) minutes as needed for Chest Pain. omeprazole (PRILOSEC) 20 mg capsule 10/30/2017 at Unknown time Other Yes Yes   Sig: Take 20 mg by mouth daily. ondansetron hcl (ZOFRAN, AS HYDROCHLORIDE,) 4 mg tablet 10/29/2017 at Unknown time Other No Yes   Sig: Take 1 Tab by mouth every eight (8) hours as needed for Nausea. polyethylene glycol (MIRALAX) 17 gram/dose powder 10/24/2017 at Unknown time Child Yes Yes   Sig: Take 17 g by mouth daily as needed (constipation).       Facility-Administered Medications: None          Thank you,  Tanya Cabral Maxine  Medication History Pharmacy Technician

## 2017-10-30 NOTE — ED NOTES
Pt is poor historian. According to EMS pt is in Hospice and family is in route to ER and Is POA. Pt resting comfortably on stretcher call bed in reach.

## 2017-10-31 NOTE — PROGRESS NOTES
Bedside and Verbal shift change report given to [address] Barney (oncoming nurse) by Ben Martin (offgoing nurse). Report included the following information SBAR, Kardex, Procedure Summary, Intake/Output, MAR, Accordion, Recent Results and Med Rec Status.

## 2017-10-31 NOTE — H&P
Hospitalist Admission Note    NAME:  Merle Conway   :   1940   MRN:   [id number]     Date of admit: 10/30/2017    PCP: Efrain Loza MD    Assessment/Plan:     Acute kidney injury POA admit BUN/creat 52/2.68  Stage 3 chronic kidney disease POA creatinine 1.4-1.5  Suspect hypovolemia   No hydronephrosis on CT scan  Hold benazepril and bumex  Gentle IVF  Serial labs    Anemia HgB 6.8 POA suspect chronic disease/malignancy  No reported bleeding  Suspect related to malignancy and treatments, CKD  Check heme stool  Transfuse 1 unit(already ordered by ED)    Essential HTN POA  Received BP meds, BP borderline low in ED  Will continue norvasc and use PRN Hydralazine  Hold benazepril, bumex, imdur for now    DM type 2 POA  Recent admit for hypoglycemia at [facility], lantus and glipizide stopped  Appreciate Dr Aure Shoemaker input  Low dose NPH, SSI    Metastatic breast cancer metastatic to bone POA  Severe Bone pain POA  Seen by Dr Nikki Meyer, no further plans for Rx  Hospice recommended, he just discussed this with them  Family and Pt not clear about code status, we discussed DNR  Family said she had just signed a paper that she wants everything done  Ask palliative care to work with pt and family about this  Will ask hospice to see, family expecting this    History of SBO recent OR in summer 2017  No recurrence of CT scan    Hyponatremia Na 129 POA  IVF, recheck in Am    Code status: full code    DVT prophylaxis with lovenox    History     CHIEF COMPLAINT: brought in by family with severe pain with any movement    HISTORY OF PRESENT ILLNESS:  This is a 16-year-old [facility]   American female with known metastatic breast cancer to bone,   currently being followed by Dr. Nikki Meyer. She also has type 2 diabetes   mellitus, maintained on insulin and followed by Dr. Steph Joshi. She has   hypertension and stage III chronic kidney disease.  She was admitted   over the summer of  with a small-bowel obstruction and had   surgery with Dr. Mike Manning. She has obstructive sleep apnea and uses   CPAP. She has a history of bradycardia and a pacemaker is in place. She was recently admitted at [address] [place] [address] for hypoglycemia and   \"dehydration\". She was discontinued off her Lantus and glipizide and   eventually discharged on low-dose NPH twice a day. She was just   discharged from [facility] 2 days ago.   Cary Rodgers  She has been taking long-acting and short-acting morphine because of   bone pain and abdominal pain. Her daughter said that she basically   could not get up today because she was having so much pain. She   could not even get her up to bathe her and dress her. The pain, as   mentioned, was diffusely in her abdomen, as well as in multiple bones   including her back. The pain was not relieved by her usual pain   medications. She has had some intermittent nausea and vomiting, as   well as chills. She has had a mild dry cough but no shortness of   breath. She has not had any fevers.       The patient's family contacted the oncologist who referred her to the   emergency room. She met with the oncology service in the emergency   room. It was discussed that no further treatment is available for cancer,   and it was recommended that the patient consider hospice care. She   was found to be in acute on chronic kidney disease and also to be   anemic. A unit of blood was ordered by Hematology. We were called to   admit the patient because of the kidney failure, anemia, and metastatic   breast cancer. The family is aware of the hospice evaluation. There   was, however, confusion amongst the family about the code status. This will need to be discussed further, so a palliative care consult has   been obtained.        Past Medical History:   Diagnosis Date    Arrhythmia     bradycardia in 30's /pacemaker inserted    Arthritis     RT KNEE    Asthma Dx age 75    Bradycardia 2009    Cardiology saw her during hospital stay; Now off coreg;  Sees Dr. Rogelio Lanes cancer ([facility]     Rt mastectomy 2/19/15    CAD (coronary artery disease)     Dr Emma Hernandez Diabetes [facility])     Now seeing Dr. Medardo Coats Diverticulitis     DJD (degenerative joint disease), lumbar     GERD (gastroesophageal reflux disease)     H. pylori infection 2008    Dr. Bry Gurrola attack April 2006    Hypercholesterolemia     Hypertension     Morbid obesity ([facility]     Neuropathy, arm 2009    Right; Has had MRI and EMG at [facility] [id number] SEN (obstructive sleep apnea)     uses CPAP    Rectal bleeding 2009    Hospitalized;  Had colonoscopy and EGD (ok), gastric emptying study (normal), doppler mesenteric arteries (ok)    Sciatica     Has seen Dr. Rk Faulkner    Stroke [facility]) 2009 & 2012    no residual problems        Past Surgical History:   Procedure Laterality Date    HX APPENDECTOMY  1979    HX BREAST LUMPECTOMY  12/12/2013    RIGHT BREAST DUCTAL EXCISION performed by Honey Quinn MD at [facility] MAIN OR    HX CATARACT REMOVAL  2007    HX CHOLECYSTECTOMY  1979    HX COLONOSCOPY      HX HEART CATHETERIZATION      angioplasty    HX HYSTERECTOMY      fibroids    HX KNEE ARTHROSCOPY  1997    right    HX MASTECTOMY Right 2/19/2015    RIGHT BREAST MODIFIED RADICAL MASTECTOMY RIGHT SENTINEL NODE BIOPSY, RIGHT PORT A CATH INSERTION performed by Michelle Mendoza MD [facility]    HX PACEMAKER      HX SHOULDER ARTHROSCOPY  2004    left       Social History   Substance Use Topics    Smoking status: Never Smoker    Smokeless tobacco: Never Used    Alcohol use No        Family History   Problem Relation Age of Onset    Stroke Father     Cancer Sister      breast cancer    Hypertension Sister     Cancer Mother      Unknown type    Cancer Brother      Colon    Hypertension Brother     Anesth Problems Neg Hx         Allergies   Allergen Reactions    Codeine Itching    Duratuss [Phenylephrine-Guaifenesin] Nausea and Vomiting    Lisinopril-Hydrochlorothiazide Itching    Motrin [Ibuprofen] Nausea and Vomiting     With 800mg    Tape [Adhesive] Other (comments)     TEARS HER SKIN        Prior to Admission medications    Medication Sig Start Date End Date Taking? Authorizing Provider   acetaminophen (TYLENOL) 500 mg tablet Take 1,000 mg by mouth every six (6) hours as needed for Pain. Yes Historical Provider   cyanocobalamin (VITAMIN B-12) 500 mcg tablet Take 500 mcg by mouth daily. Yes Historical Provider   insulin NPH (HUMULIN N) 100 unit/mL injection 6 Units by SubCUTAneous route two (2) times a day. Yes Historical Provider   albuterol (PROVENTIL HFA, VENTOLIN HFA, PROAIR HFA) 90 mcg/actuation inhaler Take 2 Puffs by inhalation every four (4) hours as needed for Wheezing or Shortness of Breath. Yes Historical Provider   bumetanide (BUMEX) 1 mg tablet Take 1 mg by mouth daily. Yes Historical Provider   polyethylene glycol (MIRALAX) 17 gram/dose powder Take 17 g by mouth daily as needed (constipation). Yes Historical Provider   morphine CR (MS CONTIN) 15 mg CR tablet Take 1 Tab by mouth every twelve (12) hours. Max Daily Amount: 30 mg. 10/12/17  Yes Sarath Galvan NP   morphine IR (MS IR) 15 mg tablet Take 1 Tab by mouth every four (4) hours as needed for Pain. Max Daily Amount: 90 mg. 10/12/17  Yes Sarath Galvan NP   VITAMIN D2 50,000 unit capsule take 1 capsule by mouth every 30 DAYS 8/16/17  Yes Sarath Galvan NP   hydrOXYzine HCl (ATARAX) 25 mg tablet Take 25 mg by mouth every eight (8) hours as needed for Itching. Yes Historical Provider   megestrol (MEGACE) 400 mg/10 mL (10 mL) suspension Take 10 mL by mouth daily. 8/11/17  Yes Sarath Galvan NP   diclofenac (VOLTAREN) 1 % gel Apply 2 g to affected area four (4) times daily as needed (Pain). Yes Historical Provider   ondansetron hcl (ZOFRAN, AS HYDROCHLORIDE,) 4 mg tablet Take 1 Tab by mouth every eight (8) hours as needed for Nausea.  6/10/17  Yes Agnes Bradley MD   aspirin (ASPIRIN) 325 mg tablet Take 1 Tab by mouth daily. 5/31/17  Yes Monique Shetty MD   glipiZIDE (GLUCOTROL) 5 mg tablet Take 0.5 Tabs by mouth two (2) times a day. Take 1/2 every day with breakfast. If your blood sugar is above 200 take a whole tablet. 5/5/17  Yes Charlotte Montalvo MD   isosorbide mononitrate ER (IMDUR) 60 mg CR tablet Take 60 mg by mouth daily. 6/6/16  Yes Historical Provider   albuterol (PROVENTIL VENTOLIN) 2.5 mg /3 mL (0.083 %) nebulizer solution 3 mL by Nebulization route every four (4) hours as needed for Wheezing. 9/20/15  Yes Shira Willson DO   benazepril (LOTENSIN) 40 mg tablet Take 40 mg by mouth every morning. Yes Historical Provider   omeprazole (PRILOSEC) 20 mg capsule Take 20 mg by mouth daily. Yes Todd Andersen MD   amlodipine (NORVASC) 10 mg tablet TAKE 1 TABLET BY MOUTH ONCE DAILY 4/9/11  Yes Kellie Beavers MD   nitroglycerin (NITROSTAT) 0.4 mg SL tablet 0.4 mg by SubLINGual route every five (5) minutes as needed for Chest Pain.     Todd Andersen MD       Review of symptoms:     POSITIVE= Bold  Negative = not bold  General:  fever, chills, sweats, generalized weakness  Eyes:    blurred vision, eye pain, double vision  ENT:    [place]za, sore throat, trouble swallowing  Respiratory:   cough, sputum, SOB  Cardiology:   chest pain, orthopnea, PND, edema  Gastrointestinal:  abdominal pain , N/V, diarrhea, constipation, melena or BRBPR  Genitourinary:  Urgency, dysuria, hematuria  Muskuloskeletal :  Joint redness, swelling or acute joint pain, myalgias     Diffuse severe bone pain  Hematology:  easy bruising, nose or gum bleeding  Dermatological: rash, ulceration  Endocrine:   Polyuria or polydipsia, heat or hold intolerance  Neurological:  Headache, focal motor or sensory changes     Speech difficulties, memory loss  Psychological: depression, agitation      Objective:   VITALS:    Patient Vitals for the past 24 hrs:   Temp Pulse Resp BP SpO2   10/30/17 2014 98.5 °F (36.9 °C) 67 18 105/47 -   10/30/17 1911 98.7 °F (37.1 °C) - - - -   10/30/17 1908 - 69 18 - 98 %   10/30/17 1907 - 73 17 104/59 98 %   10/30/17 1900 - 78 - 119/74 97 %   10/30/17 1845 - 69 - - 97 %   10/30/17 1830 98.4 °F (36.9 °C) 73 20 115/57 97 %   10/30/17 1815 - 72 - - 96 %   10/30/17 1800 - 76 - 100/51 96 %   10/30/17 1745 - 76 - - 97 %   10/30/17 1730 - 78 - 110/57 98 %   10/30/17 1715 - 74 - 119/66 97 %   10/30/17 1700 - 74 - 125/60 98 %   10/30/17 1645 - 70 - 121/66 98 %   10/30/17 1630 - 78 - (!) 120/93 97 %   10/30/17 1615 - 70 20 116/57 98 %   10/30/17 1600 - 70 12 117/57 98 %   10/30/17 1545 - 71 - 118/60 99 %   10/30/17 1530 - 79 - 127/54 98 %   10/30/17 1445 - 69 13 120/61 99 %   10/30/17 1430 - 68 16 108/55 98 %   10/30/17 1415 - 69 17 102/57 98 %   10/30/17 1400 - 71 18 112/54 99 %   10/30/17 1345 - 70 18 94/50 -   10/30/17 1330 - 72 18 104/52 -   10/30/17 1327 98.1 °F (36.7 °C) 73 16 - 94 %     Temp (24hrs), Av.4 °F (36.9 °C), Min:98.1 °F (36.7 °C), Max:98.7 °F (37.1 °C)      O2 Device: Room air    PHYSICAL EXAM:   General:    Alert, cooperative in no distress     HEENT: Normocephalic, atraumatic    PERRL, EOMI  Sclera no icterus    Nasal mucosa without masses or discharge  Hearing intact to voice    Oropharynx without erythema or exudate  Pale MM  Neck:  No meningismus, trachea midline, no carotid bruits     Thyroid not enlarged, no nodules or tenderness  Lungs:   Clear to auscultation bilaterally. No wheezing or rales    No accessory muscle use or retractions. Heart:   Regular rate and rhythm,  no murmur or gallop. No LE edema  Abdomen:   Soft, mildly diffusely tender. Not distended. Bowel sounds normal.     No masses, No Hepatosplenomegaly, No Rebound or guarding  Lymph nodes: No cervical or inguinal ALEXYS  Musculoskeletal:  No Joint swelling, erythema, warmth.  No Cyanosis or clubbing  Skin:      No rashes    Not Jaundiced   No nodules or thickening    Capillary refill normal  Neurologic: Lethargic, but arousable, mildly confused, follows commands Cranial nerves 2 to 12 intact    Moves all limbs with symmetric weakness    LAB DATA REVIEWED:    Recent Results (from the past 12 hour(s))   CBC WITH AUTOMATED DIFF    Collection Time: 10/30/17  2:42 PM   Result Value Ref Range    WBC 5.3 3.6 - 11.0 K/uL    RBC 2.52 (L) 3.80 - 5.20 M/uL    HGB 6.8 (L) 11.5 - 16.0 g/dL    HCT 20.3 (L) 35.0 - 47.0 %    MCV 80.6 80.0 - 99.0 FL    MCH 27.0 26.0 - 34.0 PG    MCHC 33.5 30.0 - 36.5 g/dL    RDW 17.2 (H) 11.5 - 14.5 %    PLATELET 219 (L) 010 - 400 K/uL    NEUTROPHILS 73 %    BAND NEUTROPHILS 3 %    LYMPHOCYTES 13 %    MONOCYTES 10 %    EOSINOPHILS 0 %    BASOPHILS 0 %    METAMYELOCYTES 1 %    ABS. NEUTROPHILS 4.0 K/UL    ABS. LYMPHOCYTES 0.7 K/UL    ABS. MONOCYTES 0.5 K/UL    ABS. EOSINOPHILS 0.0 K/UL    ABS. BASOPHILS 0.0 K/UL    RBC COMMENTS POLYCHROMASIA  PRESENT        RBC COMMENTS ROULEAUX  PRESENT        DF MANUAL     METABOLIC PANEL, COMPREHENSIVE    Collection Time: 10/30/17  2:42 PM   Result Value Ref Range    Sodium 129 (L) 136 - 145 mmol/L    Potassium 5.0 3.5 - 5.1 mmol/L    Chloride 96 (L) 97 - 108 mmol/L    CO2 20 (L) 21 - 32 mmol/L    Anion gap 13 5 - 15 mmol/L    Glucose 136 (H) 65 - 100 mg/dL    BUN 52 (H) 6 - 20 MG/DL    Creatinine 2.68 (H) 0.55 - 1.02 MG/DL    BUN/Creatinine ratio 19 12 - 20      GFR est AA 21 (L) >60 ml/min/1.73m2    GFR est non-AA 17 (L) >60 ml/min/1.73m2    Calcium 7.5 (L) 8.5 - 10.1 MG/DL    Bilirubin, total 0.6 0.2 - 1.0 MG/DL    ALT (SGPT) 25 12 - 78 U/L    AST (SGOT) 217 (H) 15 - 37 U/L    Alk.  phosphatase 293 (H) 45 - 117 U/L    Protein, total 6.5 6.4 - 8.2 g/dL    Albumin 1.9 (L) 3.5 - 5.0 g/dL    Globulin 4.6 (H) 2.0 - 4.0 g/dL    A-G Ratio 0.4 (L) 1.1 - 2.2     CK W/ CKMB & INDEX    Collection Time: 10/30/17  2:42 PM   Result Value Ref Range    CK 50 26 - 192 U/L    CK - MB 1.1 <3.6 NG/ML    CK-MB Index 2.2 0 - 2.5     MAGNESIUM    Collection Time: 10/30/17  2:42 PM   Result Value Ref Range    Magnesium 1.9 1.6 - 2.4 mg/dL LIPASE    Collection Time: 10/30/17  2:42 PM   Result Value Ref Range    Lipase 59 (L) 73 - 393 U/L   TROPONIN I    Collection Time: 10/30/17  2:42 PM   Result Value Ref Range    Troponin-I, Qt. <0.04 <0.05 ng/mL   LACTIC ACID    Collection Time: 10/30/17  2:42 PM   Result Value Ref Range    Lactic acid 2.3 (HH) 0.4 - 2.0 MMOL/L   URINALYSIS W/ REFLEX CULTURE    Collection Time: 10/30/17  3:32 PM   Result Value Ref Range    Color DARK YELLOW      Appearance CLOUDY (A) CLEAR      Specific gravity 1.019 1.003 - 1.030      pH (UA) 5.0 5.0 - 8.0      Protein NEGATIVE  NEG mg/dL    Glucose NEGATIVE  NEG mg/dL    Ketone NEGATIVE  NEG mg/dL    Blood NEGATIVE  NEG      Urobilinogen 1.0 0.2 - 1.0 EU/dL    Nitrites NEGATIVE  NEG      Leukocyte Esterase NEGATIVE  NEG      WBC 0-4 0 - 4 /hpf    RBC 0-5 0 - 5 /hpf    Epithelial cells FEW FEW /lpf    Bacteria NEGATIVE  NEG /hpf    UA:UC IF INDICATED CULTURE NOT INDICATED BY UA RESULT CNI      Amorphous Crystals 2+ (A) NEG    Hyaline cast 2-5 0 - 5 /lpf    Other: STARCH CRYSTALS PRESENT    BILIRUBIN, CONFIRM    Collection Time: 10/30/17  3:32 PM   Result Value Ref Range    Bilirubin UA, confirm NEGATIVE  NEG     TYPE + CROSSMATCH    Collection Time: 10/30/17  4:55 PM   Result Value Ref Range    Crossmatch Expiration 11/02/2017     ABO/Rh(D) Naty Nations POSITIVE     Antibody screen NEG     Unit number [id number]     Blood component type RC LR AS1     Unit division 00     Status of unit ISSUED     Crossmatch result Compatible    GLUCOSE, POC    Collection Time: 10/30/17  5:14 PM   Result Value Ref Range    Glucose (POC) 95 65 - 100 mg/dL    Performed by Liz Lees (PCT)        EKG as read by me shows paced rhythm, rate 68    CXR read by radiology and reviewed by myself shows   Portable exam of the chest obtained at 1508 demonstrates stable cardiomegaly. Pacer leads are unchanged in position. Port-A-Cath has been placed in the  interval with the tip projecting over the right atrium. There is no acute  process in the lung fields. Chronic rotator cuff tear is noted on the right. Surgical clips project over the right axilla. Impression: No acute process. CT scan abdomen/pelvis FINDINGS:   LUNG BASES: Clear. INCIDENTALLY IMAGED HEART AND MEDIASTINUM: Heart is enlarged  LIVER: No mass or biliary dilatation. GALLBLADDER: Surgically absent  SPLEEN: No mass. PANCREAS: No mass or ductal dilatation. ADRENALS: Unremarkable. KIDNEYS/URETERS: Round hypodensity right kidney indeterminate but measures fluid  density. No hydronephrosis or stone  STOMACH: Unremarkable. SMALL BOWEL: Distal small bowel has been resected as has portions of the right  colon. No dilated loops of small bowel  COLON: Patient is post right colectomy. There is stool throughout the colon. There is diverticulosis of the sigmoid colon  APPENDIX: Surgically absent  PERITONEUM: No ascites or pneumoperitoneum. RETROPERITONEUM: No lymphadenopathy or aortic aneurysm. Aorta is ectatic and  atherosclerotic  REPRODUCTIVE ORGANS: Surgically absent  URINARY BLADDER: No mass or calculus. BONES: There are patchy areas of bony sclerosis throughout all visualized bony  elements. This is not significantly changed in the interval.  ADDITIONAL COMMENTS: N/A  IMPRESSION:  1. Postoperative changes to the distal small bowel and right colon. No recurrent  obstruction  2. Patchy areas of bony sclerosis throughout the visualized bony elements  unchanged. Findings concerning for bony metastatic disease       Inpatient is warranted for this patient because they presents with     I have a high level of concern for   I anticipate the stay in the hospital will span at least 2 midnights. My assessment of the clinical condition and my plan of care is as outlined above    I saw the patient personally, took a history and did a complete physical exam at the bedside.  I performed complex decision making in coming up with a diagnostic and treatment plan for the patient. I reviewed the patient's past medical records, current laboratory and radiology results, and actual Xray films/EKG. I have also discussed this case with the involved ED physician.     Care Plan discussed with:    Patient, Family, ED Doc    Risk of deterioration:  High    Total Time Coordinating Admission:   65  minutes    Total Critical Care Time:         Lizet Chavez MD

## 2017-10-31 NOTE — PROGRESS NOTES
Pt is a 66 y/o AAF admitted with acute kidney injury. Chart [place] [facility] 2 [place] agoPt resides with her daughter who provides most of her care in the home. WYATT met with pt daughters and other family members who had questions regarding hospice and Medicaid. SW also informed them if pt were to return home, family would have to provide care for her as Medicare does not provide nursing care. Family inquired about Medicaid. SW informed her [facility] could be contacted if needed. Pt daughters stated they would like [facility] hospice to provide hospice care. SW sent referral to [facility] for their review. SW will continue to follow and assist with additional discharge needs. Care Management Interventions  PCP Verified by CM:  Yes  Mode of Transport at Discharge: BLS (Pt will need a ambulance at discharge)  Transition of Care Consult (CM Consult): Funmi: Yes  Current Support Network: April Boateng Follow Up Transport: Family  Plan discussed with Pt/Family/Caregiver: Yes  Freedom of Choice Offered: Yes  Discharge Location  Discharge Placement: Home with hospice    REKHA Lazar  Ext [phone]

## 2017-10-31 NOTE — PROGRESS NOTES
Progress Note    Patient: Yisel Galvez MRN: [id number]  SSN: [id number]    YOB: 1940  Age: 65 y.o. Sex: female      Admit Date: 10/30/2017    LOS: 1 day     Subjective:     Over the last 24 hours pt has mostly slept. She has not been eating and has been taken off all of her chronic medications. The goals of care at this time are comfort. I spoke with her daughter who reported that she was mostly sleeping and had not eaten. Her BGs have not been elevated and she has not received any insulin for the last 24 hours. Objective:     Vitals:    10/31/17 1123 10/31/17 1153 10/31/17 1223 10/31/17 1250   BP: (!) 86/47 102/52 97/54 94/55   Pulse:       Resp:       Temp:       SpO2:       Weight:       Height:            Intake and Output:  Current Shift:    Last three shifts: 10/29 1901 - 10/31 0700  In: 120 [P.O.:120]  Out: -     Physical Exam:   GENERAL: sleeping comfortably    Lab/Data Review: All lab results for the last 24 hours reviewed. EXAM:  CT ABD PELV WO CONT     INDICATION: recent SBO, now with ab pain and nausea     COMPARISON: September 26, 2017 and September 15, 2017     CONTRAST:  None.     TECHNIQUE:   Thin axial images were obtained through the abdomen and pelvis. Coronal and  sagittal reconstructions were generated. Oral contrast was not administered. CT  dose reduction was achieved through use of a standardized protocol tailored for  this examination and automatic exposure control for dose modulation.      The absence of intravenous contrast material reduces the sensitivity for  evaluation of the solid parenchymal organs of the abdomen.      FINDINGS:   LUNG BASES: Clear. INCIDENTALLY IMAGED HEART AND MEDIASTINUM: Heart is enlarged  LIVER: No mass or biliary dilatation. GALLBLADDER: Surgically absent  SPLEEN: No mass. PANCREAS: No mass or ductal dilatation. ADRENALS: Unremarkable.   KIDNEYS/URETERS: Round hypodensity right kidney indeterminate but measures fluid  density. No hydronephrosis or stone  STOMACH: Unremarkable. SMALL BOWEL: Distal small bowel has been resected as has portions of the right  colon. No dilated loops of small bowel  COLON: Patient is post right colectomy. There is stool throughout the colon. There is diverticulosis of the sigmoid colon  APPENDIX: Surgically absent  PERITONEUM: No ascites or pneumoperitoneum. RETROPERITONEUM: No lymphadenopathy or aortic aneurysm. Aorta is ectatic and  atherosclerotic  REPRODUCTIVE ORGANS: Surgically absent  URINARY BLADDER: No mass or calculus. BONES: There are patchy areas of bony sclerosis throughout all visualized bony  elements. This is not significantly changed in the interval.  ADDITIONAL COMMENTS: N/A     IMPRESSION  IMPRESSION:     1. Postoperative changes to the distal small bowel and right colon. No recurrent  obstruction  2. Patchy areas of bony sclerosis throughout the visualized bony elements  unchanged. Findings concerning for bony metastatic disease    Assessment:     Active Problems:    Malignant neoplasm of upper-inner quadrant of right female breast ([address] (10/7/2015)      Type 2 diabetes mellitus with diabetic polyneuropathy, with long-term current use of insulin ([address] (12/21/2016)      Protein calorie malnutrition ([address] (9/27/2017)      Acute kidney injury ([address] (10/30/2017)        Plan:     1) DM > Her BGs are doing well and she has not received any insulin in 24 hours. I recommend continuing the correction humalog as needed and will hold the NPH at this time. Will continue to follow her BGs and adjust her insulin regimen as needed. I spent 15 minutes on her case and > 50% of the time was spent reviewing the chart and coordinating her care with her family.           Signed By: Wang Hidalgo MD     October 31, 2017

## 2017-10-31 NOTE — PROGRESS NOTES
Patient's blood pressure is still in the 90s/50s and is only responsive to sternal rubs. Dr Kathy Griffith made aware. Instructed to hold pain medication until patient is more responsive.  Oxygen maintaining in the high 90s

## 2017-10-31 NOTE — PROGRESS NOTES
Hospitalist Progress Note    NAME: Adonis Shone   :  1940   MRN:  [id number]       Assessment / Plan:  Acute Renal failure POA - Cr still 2.5 today with IVF, minimal Po intake noted  Stage 3 chronic kidney disease POA creatinine 1.4-1.5 at baseline  Orthostatic Hypotension noted today AM with PT  No hydronephrosis on CT scan    Cont to hold benazepril and bumex  Increase IVF to 125 ml/hr for now  Bed rest - doubt pt will be a skilled rehab candidate now-- Going for Hospice eval today as per Pt/family wishes- confirmed today AM     Anemia POA suspect chronic disease/malignancy- s/p 1 unit PRBC in ER- Hb 7.1 today AM  No reported bleeding  Suspect related to malignancy and treatments, CKD  Observe for now    Essential HTN POA- Orthostatic Hypotensive today AM  Received BP meds, BP borderline low in ED  DC  norvasc  IVF as above  use PRN Hydralazine - doubt will need it  Hold benazepril, bumex, imdur for now     DM type 2 POA - hypoglycemic this admission  Recent admit for hypoglycemia at [facility], lantus and glipizide stopped  Appreciate Dr Mattie Kunz input - will take the liberty to DC NPH all together today AM as pt is not eating much & at high risk for Hypoglycemia due to renal failure  SSI lispro if needed     Metastatic breast cancer metastatic to bone POA  Severe Bone pain POA  Seen by Dr Sammie Garza, no further plans for Rx  Hospice recommended- consulted on admission- awaited eval & plans - IP versus Home Hospice if pt stabilizes  Palliative care consulted for ? DNR/code status addressal - look into previous AMDs done & update to DNR as per family's wishes- Want to consider Hospice care at home if able     History of [facility] recent OR in summer 2017  No recurrence of CT scan     Hyponatremia Na 129 POA- improved slightly to 132 today AM  Cont IVF     Code status: full code for now till Hospice/palliative sees pt today     DVT prophylaxis with lovenox      Body mass index is 26.22 kg/(m^2).   Recommended Disposition: Home hospice versus IP hospice ? ? In 24- 48 hrs  CM consulted     Subjective:     Chief Complaint / Reason for Physician Visit : F/U Metastatic/end stage breast Ca, dehydration, renal failure,  \"I am in pain\". Discussed with RN events overnight. Review of Systems:  Symptom Y/N Comments  Symptom Y/N Comments   Fever/Chills    Chest Pain     Poor Appetite    Edema     Cough    Abdominal Pain     Sputum    Joint Pain     SOB/WELCH    Pruritis/Rash     Nausea/vomit    Tolerating PT/OT     Diarrhea    Tolerating Diet     Constipation    Other       Could NOT obtain due to: distress     Objective:     VITALS:   Last 24hrs VS reviewed since prior progress note.  Most recent are:  Patient Vitals for the past 24 hrs:   Temp Pulse Resp BP SpO2   10/31/17 1250 - - - 94/55 -   10/31/17 1223 - - - 97/54 -   10/31/17 1153 - - - 102/52 -   10/31/17 1123 - - - (!) 86/47 -   10/31/17 1100 - 62 - 100/45 -   10/31/17 1059 - 61 - 101/53 -   10/31/17 1052 - - - (!) 77/44 -   10/31/17 1049 - 60 - 92/51 100 %   10/31/17 0945 98.5 °F (36.9 °C) 60 16 106/49 96 %   10/31/17 0621 98.9 °F (37.2 °C) 62 18 132/49 97 %   10/30/17 2014 98.5 °F (36.9 °C) 67 18 105/47 -   10/30/17 1911 98.7 °F (37.1 °C) - - - -   10/30/17 1908 - 69 18 - 98 %   10/30/17 1907 - 73 17 104/59 98 %   10/30/17 1900 - 78 - 119/74 97 %   10/30/17 1845 - 69 - - 97 %   10/30/17 1830 98.4 °F (36.9 °C) 73 20 115/57 97 %   10/30/17 1815 - 72 - - 96 %   10/30/17 1800 - 76 - 100/51 96 %   10/30/17 1745 - 76 - - 97 %   10/30/17 1730 - 78 - 110/57 98 %   10/30/17 1715 - 74 - 119/66 97 %   10/30/17 1700 - 74 - 125/60 98 %   10/30/17 1645 - 70 - 121/66 98 %   10/30/17 1630 - 78 - (!) 120/93 97 %   10/30/17 1615 - 70 20 116/57 98 %   10/30/17 1600 - 70 12 117/57 98 %   10/30/17 1545 - 71 - 118/60 99 %   10/30/17 1530 - 79 - 127/54 98 %   10/30/17 1445 - 69 13 120/61 99 %   10/30/17 1430 - 68 16 108/55 98 %   10/30/17 1415 - 69 17 102/57 98 %   10/30/17 1400 - 71 18 112/54 99 %   10/30/17 1345 - 70 18 94/50 -   10/30/17 1330 - 72 18 104/52 -   10/30/17 1327 98.1 °F (36.7 °C) 73 16 - 94 %       Intake/Output Summary (Last 24 hours) at 10/31/17 1314  Last data filed at 10/30/17 2142   Gross per 24 hour   Intake              120 ml   Output                0 ml   Net              120 ml        PHYSICAL EXAM:  General: WD, WN. Alert but somnolent, cooperative, no acute distress,    EENT:  EOMI. Anicteric sclerae. MMdry +  Resp:  CTA bilaterally, no wheezing or rales. No accessory muscle use  CV:  Regular  rhythm,  No edema  GI:  Soft, Non distended, Non tender.  +Bowel sounds  Neurologic:  Alert and oriented X 3, normal speech,   Psych:   ? insight. Not anxious nor agitated  Skin:  No rashes. No jaundice    Reviewed most current lab test results and cultures  YES  Reviewed most current radiology test results   YES  Review and summation of old records today    NO  Reviewed patient's current orders and MAR    YES  PMH/ reviewed - no change compared to H&P  ________________________________________________________________________  Care Plan discussed with:    Comments   Patient x    Family  x Daughter at bedside   RN x    Care Manager x Zana   Consultant                        Multidiciplinary team rounds were held today with , nursing, pharmacist and clinical coordinator. Patient's plan of care was discussed; medications were reviewed and discharge planning was addressed.      ________________________________________________________________________  Total NON critical care TIME:  36   Minutes    Total CRITICAL CARE TIME Spent:   Minutes non procedure based      Comments   >50% of visit spent in counseling and coordination of care     ________________________________________________________________________  Marlee Conner MD     Procedures: see electronic medical records for all procedures/Xrays and details which were not copied into this note but were reviewed prior to creation of Plan. LABS:  I reviewed today's most current labs and imaging studies.   Pertinent labs include:  Recent Labs      10/31/17   0438  10/30/17   1442   WBC  4.5  5.3   HGB  7.1*  6.8*   HCT  20.9*  20.3*   PLT  95*  123*     Recent Labs      10/31/17   0438  10/30/17   1442   NA  132*  129*   K  4.9  5.0   CL  99  96*   CO2  22  20*   GLU  71  136*   BUN  57*  52*   CREA  2.57*  2.68*   CA  8.0*  7.5*   MG   --   1.9   ALB  1.7*  1.9*   TBILI  1.0  0.6   SGOT  156*  217*   ALT  18  25       Signed: Mark Mckay MD

## 2017-10-31 NOTE — PROGRESS NOTES
Bedside shift change report given to Zhang Azar RN (oncoming nurse) by Veronica Moore RN (offgoing nurse). Report included the following information SBAR, Kardex, Intake/Output, MAR and Recent Results.

## 2017-10-31 NOTE — PROGRESS NOTES
physical Therapy EVALUATION/DISCHARGE  Patient: Jh Sr (12 y.o. female)  Date: 10/31/2017  Primary Diagnosis: Acute kidney injury [facility])        Precautions: falls     ASSESSMENT :  Based on the objective data described below, the patient presents with impaired overall strength, mobility, balance, endurance and activity tolerance. Upon arrival patient sitting in bedside chair. Noted to be drowsy and minimally communicative. BP taken and patient hypotensive (92/51). BP continued to decrease (77/44) even with attempt at LE exercises. Patient requiring total A of 3 to perform stand pivot transfer to chair and return to supine. Patient remained hypotensive- nursing aware and present. Following evaluation, MD cancelled PT orders secondary to patient transitioning to hospice. If patient does not remain on Hospice, please re-consult PT secondary to generalized weakness and patient functioning significantly below her baseline function at present. PLAN :  Discharge Recommendations: possible transition to Hospice  Further Equipment Recommendations for Discharge: bedside commode, hospital bed and mechanical lift     SUBJECTIVE:   Patient stated I'm tired and dizzy.     OBJECTIVE DATA SUMMARY:   HISTORY:    Past Medical History:   Diagnosis Date    Arrhythmia     bradycardia in 29's /pacemaker inserted    Arthritis     RT KNEE    Asthma Dx age 75    Bradycardia 2009    Cardiology saw her during hospital stay; Now off coreg;  Sees Dr. Sami Villa    Breast cancer [facility])     Rt mastectomy 2/19/15    CAD (coronary artery disease)     Dr Clifford Manzanares Diabetes [facility])     Now seeing Dr. Contreras [place] Diverticulitis     DJD (degenerative joint disease), lumbar     GERD (gastroesophageal reflux disease)     H. pylori infection 2008    Dr. Roberta Luong attack April 2006    Hypercholesterolemia     Hypertension     Morbid obesity ([facility] [address]     Neuropathy, arm 2009    Right; Has had MRI and EMG at [facility]    SEN (obstructive sleep apnea)     uses CPAP    Rectal bleeding 2009    Hospitalized; Had colonoscopy and EGD (ok), gastric emptying study (normal), doppler mesenteric arteries (ok)    Sciatica     Has seen Dr. Devora Macedo    Stroke [facility]) 2009 & 2012    no residual problems     Past Surgical History:   Procedure Laterality Date    HX APPENDECTOMY  1979    HX BREAST LUMPECTOMY  12/12/2013    RIGHT BREAST DUCTAL EXCISION performed by Edwin Ralph [facility]    HX CATARACT REMOVAL  2007    HX CHOLECYSTECTOMY  1979    HX COLONOSCOPY      HX HEART CATHETERIZATION      angioplasty    HX HYSTERECTOMY      fibroids    HX KNEE ARTHROSCOPY  1997    right    HX MASTECTOMY Right 2/19/2015    RIGHT BREAST MODIFIED RADICAL MASTECTOMY RIGHT SENTINEL NODE BIOPSY, RIGHT PORT A CATH INSERTION performed by Lee Styles MD at [facility] AM[facility]ORY OR    HX PACEMAKER      HX SHOULDER ARTHROSCOPY  2004    left     Prior Level of Function/Home Situation: patient living at home with daughter and ambulating with RW; supervision for mobility; daughter reporting increased weakness for last several day  Personal factors and/or comorbidities impacting plan of care: CA    Home Situation  Home Environment: Private residence  # Steps to Enter: 5  Rails to Enter: Yes  Hand Rails : Bilateral  One/Two Story Residence: Two story, live on 1st floor  Living Alone: No  Support Systems: Family member(s)  Patient Expects to be Discharged to[de-identified] Private residence  Current DME Used/Available at Home: Cane, straight, Walker, rolling  Tub or Shower Type:  (sponge bathing)    EXAMINATION/PRESENTATION/DECISION MAKING:   Critical Behavior:  Neurologic State: Lethargic, Eyes open to stimulus, Eyes open to voice  Orientation Level: Oriented to person  Cognition: Follows commands     Hearing:   Auditory  Auditory Impairment: None    Range Of Motion:  AROM: Generally decreased, functional                       Strength:    Strength: Generally decreased, functional                    Tone & Sensation:   Tone: Normal                              Coordination:  Coordination: Grossly decreased, non-functional  Vision:   Acuity: Within Defined Limits (per report from MD)  Corrective Lenses: Reading glasses  Functional Mobility:  Bed Mobility:        Sit to Supine: Total assistance;Assist x2  Scooting: Total assistance  Transfers:  Sit to Stand: Total assistance (x3)  Stand to Sit: Total assistance (x3)        Bed to Chair: Total assistance (x3)              Balance:   Sitting: Impaired  Sitting - Static: Poor (constant support)  Sitting - Dynamic: Poor (constant support)  Standing: Impaired  Standing - Static: Constant support;Poor  Ambulation/Gait Training:      not able to assess today secondary to hypotension         Functional Measure:    Elder Mobility Scale    0/20         EMS and G-code impairment scale:  Percentage of impairment CH  0% CI  1-19% CJ  20-39% CK  40-59% CL  60-79% CM  80-99% CN  100%   EMS Score 0-20 20 17-19 13-16 9-12 5-8 1-4 0      Scores under 10  generally these patients are dependent in mobility maneuvers; require help with  basic ADL, such as transfers, toileting and dressing. Scores between 10  13  generally these patients are borderline in terms of safe mobility and  independence in ADL i.e. they require some help with some mobility maneuvers. Scores over 14  Generally these patients are able to perform mobility maneuvers alone and safely  and are independent in basic ADL. G codes: In compliance with CMSs Claims Based Outcome Reporting, the following G-code set was chosen for this patient based on their primary functional limitation being treated: The outcome measure chosen to determine the severity of the functional limitation was the Elderly mobilityscale with a score of 0/20 which was correlated with the impairment scale.     ? Mobility - Walking and Moving Around:     - CURRENT STATUS: CN - 100% impaired, limited or restricted    - GOAL STATUS: CN - 100% impaired, limited or restricted    - D/C STATUS:  CN - 100% impaired, limited or restricted           Pain:Pain Scale 1: Numeric (0 - 10)  Pain Intensity 1: 10     Activity Tolerance:   hypotensive  Please refer to the flowsheet for vital signs taken during this treatment. After treatment:   []   Patient left in no apparent distress sitting up in chair  [x]   Patient left in no apparent distress in bed  [x]   Call bell left within reach  [x]   Nursing notified  [x]   Caregiver present  []   Bed alarm activated    COMMUNICATION/EDUCATION:   Communication/Collaboration:  [x]   Fall prevention education was provided and the patient/caregiver indicated understanding. []   Patient/family have participated as able and agree with findings and recommendations. []   Patient is unable to participate in plan of care at this time.   Findings and recommendations were discussed with: Occupational Therapist and Registered Nurse    Thank you for this referral.  Michelle Metcalf, PT   Time Calculation: 26 mins

## 2017-10-31 NOTE — PROGRESS NOTES
Occupational Therapy EVALUATION    Occupational Therapy Goals  Initiated 10/31/2017  1. Patient will perform grooming in unsupported sitting x 3 mins with contact guard assist within 7 day(s). 2.  Patient will perform upper body dressing in unsupported sitting with contact guard assist within 7 day(s). 3.  Patient will perform toilet transfers with moderate assistance with appropriate RW within 7 day(s). 4.  Patient will perform all aspects of toileting with supervision/set-up within 7 day(s). Patient: Moris Sharpe (79 y.o. female)  Date: 10/31/2017  Primary Diagnosis: Acute kidney injury [facility])        Precautions: Fall, R UE limb alert    ASSESSMENT :  Based on the objective data described below, the patient presents with severe impairments in functional mobility, activity tolerance, and balance necessary in ADL tasks. She was admitted from home with eractic blood sugar with recent admission to [facility] for hypoglycemia. She has hx of metastatic br cancer with bone involvement. Received one unit of blood yesterday, hgb 7.1. Discussed patient with nursing who cleared for therapy. Patient received up in chair, drowsy, with poor ability to give history. Daughter present and supportive. Daughter reports patient lives with her, they have moved bed to first level of home and complete sponge baths. Up until 2 wks ago she was mod indep with ADL tasks at RW level. BP 92/51, completed brief BUE and BLE AROM with BP 77/44. Transfer back to chair however patient with poor command follow, needing total A x 3 without AD(nursing assisting with transfer). SBP up to 100 in supine. Left to rest with nursing present. Daughter reporting need for equipment at home including hospital bed, BSC, and ramp. Depending on functional state at dc she may also need a wheelchair. Unclear of prognosis at this time. Discussed situation with case mgmt.   Based off evaluation patient is not safe to dc home with family at this time since she needs extensive assistance with basic ADL tasks and has poor sitting tolerance in supported sitting. Possible palliative consult noted in chart. Patient will benefit from skilled intervention to address the above impairments. Patients rehabilitation potential is considered to be Guarded  Factors which may influence rehabilitation potential include:   []             None noted  [x]             Mental ability/status  [x]             Medical condition  []             Home/family situation and support systems  []             Safety awareness  []             Pain tolerance/management  []             Other:      PLAN :  Recommendations and Planned Interventions:  [x]               Self Care Training                  [x]        Therapeutic Activities  [x]               Functional Mobility Training    []        Cognitive Retraining  [x]               Therapeutic Exercises           [x]        Endurance Activities  [x]               Balance Training                   []        Neuromuscular Re-Education  []               Visual/Perceptual Training     [x]   Home Safety Training  [x]               Patient Education                 [x]        Family Training/Education  []               Other (comment):    Frequency/Duration: Patient will be followed by occupational therapy 3 times a week to address goals. Discharge Recommendations: To Be Determined  Further Equipment Recommendations for Discharge: TBD: hospital bed, wc, BSC, ramp? SUBJECTIVE:   Patient stated I am tired.     OBJECTIVE DATA SUMMARY:   HISTORY:   Past Medical History:   Diagnosis Date    Arrhythmia     bradycardia in 30's /pacemaker inserted    Arthritis     RT KNEE    Asthma Dx age 75    Bradycardia 2009    Cardiology saw her during hospital stay; Now off coreg;  Sees Dr. Uli Hartley    Breast cancer [facility])     Rt mastectomy 2/19/15    CAD (coronary artery disease)     Dr Jenn Herrera    Diabetes [facility])     Now seeing  Lauri    Diverticulitis     DJD (degenerative joint disease), lumbar     GERD (gastroesophageal reflux disease)     H. pylori infection 2008    Dr. Saniya Cordova attack April 2006    Hypercholesterolemia     Hypertension     Morbid obesity ([address]     Neuropathy, arm 2009    Right; Has had MRI and EMG at [facility] [phone] SEN (obstructive sleep apnea)     uses CPAP    Rectal bleeding 2009    Hospitalized; Had colonoscopy and EGD (ok), gastric emptying study (normal), doppler mesenteric arteries (ok)    Sciatica     Has seen Dr. Brandon Sierra    Stroke [facility]) 2009 & 2012    no residual problems     Past Surgical History:   Procedure Laterality Date    HX APPENDECTOMY  1979    HX BREAST LUMPECTOMY  12/12/2013    RIGHT BREAST DUCTAL EXCISION performed by Thuy Zamora MD at [facility] MAIN OR    HX CATARACT REMOVAL  2007    HX CHOLECYSTECTOMY  1979    HX COLONOSCOPY      HX HEART CATHETERIZATION      angioplasty    HX HYSTERECTOMY      fibroids    HX KNEE ARTHROSCOPY  1997    right    HX MASTECTOMY Right 2/19/2015    RIGHT BREAST MODIFIED RADICAL MASTECTOMY RIGHT SENTINEL NODE BIOPSY, RIGHT PORT A CATH INSERTION performed by Leeanne Jackson MD at [facility] AMBULATORY OR    HX PACEMAKER      HX SHOULDER ARTHROSCOPY  2004    left       Prior Level of Function/Home Situation: Home with daughter. Daughter reports patient was mod indep with ADL tasks at RW level up to about a week ago.    Expanded or extensive additional review of patient history:     Home Situation  Home Environment: Private residence  # Steps to Enter: 5  Rails to Enter: Yes  Hand Rails : Bilateral  One/Two Story Residence: Two story, live on 1st floor  Living Alone: No  Support Systems: Family member(s)  Patient Expects to be Discharged to[de-identified] Private residence  Current DME Used/Available at Home: Cane, straight, Walker, rolling  Tub or Shower Type:  (sponge bathing)    EXAMINATION OF PERFORMANCE DEFICITS:  Cognitive/Behavioral Status:  Neurologic State: Lethargic;Eyes open to stimulus; Eyes open to voice  Orientation Level: Oriented to person       Skin: uppers visible intact    Edema: wears sleeve on RUE    Hearing: Auditory  Auditory Impairment: None    Vision/Perceptual:      Acuity: Within Defined Limits (per report from MD)    Corrective Lenses: Reading glasses    Range of Motion:  BUE: mild impairment, functional       Strength:  BUE: mild impairment, functional           Coordination:     Fine Motor Skills-Upper: Comment (unable to full assess secondary to impaired command follow)    Gross Motor Skills-Upper: Comment (unable to assess secondary to impaired command follow)    Tone & Sensation:  Tone: BUE normal  Sensation: BUE intact       Balance:  Sitting: Impaired  Sitting - Static: Poor (constant support)  Sitting - Dynamic: Poor (constant support)  Standing: Impaired  Standing - Static: Constant support;Poor    Functional Mobility and Transfers for ADLs:  Bed Mobility:  Sit to Supine: Total assistance;Assist x2    Transfers:  Sit to Stand: Total assistance (x3)  Bed to Chair: Total assistance (x3)    ADL Assessment:  Feeding: Total assistance  Oral Facial Hygiene/Grooming: Total assistance  Bathing: Total assistance  Upper Body Dressing: Total assistance  Lower Body Dressing: Total assistance  Toileting: Total assistance         Functional Measure:  Barthel Index:    Bathin  Bladder: 0  Bowels: 0  Groomin  Dressin  Feedin  Mobility: 0  Stairs: 0  Toilet Use: 0  Transfer (Bed to Chair and Back): 0  Total: 0       Barthel and G-code impairment scale:  Percentage of impairment CH  0% CI  1-19% CJ  20-39% CK  40-59% CL  60-79% CM  80-99% CN  100%   Barthel Score 0-100 100 99-80 79-60 59-40 20-39 1-19   0   Barthel Score 0-20 20 17-19 13-16 9-12 5-8 1-4 0      The Barthel ADL Index: Guidelines  1. The index should be used as a record of what a patient does, not as a record of what a patient could do.   2. The main aim is to establish degree of independence from any help, physical or verbal, however minor and for whatever reason. 3. The need for supervision renders the patient not independent. 4. A patient's performance should be established using the best available evidence. Asking the patient, friends/relatives and nurses are the usual sources, but direct observation and common sense are also important. However direct testing is not needed. 5. Usually the patient's performance over the preceding 24-48 hours is important, but occasionally longer periods will be relevant. 6. Middle categories imply that the patient supplies over 50 per cent of the effort. 7. Use of aids to be independent is allowed. Rosa Woo., Barthel, DShaunW. (5203). Functional evaluation: the Barthel Index. [facility] (14)2. Lion Calderon luis e ORAL Gaytan, Andres Rose., [facility], [facility] (1999). Measuring the change indisability after inpatient rehabilitation; comparison of the responsiveness of the Barthel Index and Functional [place] Measure. Journal of Neurology, Neurosurgery, and Psychiatry, 66(4), 057-356. Remedios Vuong, N.J.A, ADONIS Quinones, & Lydia Rangel, MShaunA. (2004.) Assessment of post-stroke quality of life in cost-effectiveness studies: The usefulness of the Barthel Index and the EuroQoL-5D. Quality of Life Research, 13, 092-55         G codes: In compliance with CMSs Claims Based Outcome Reporting, the following G-code set was chosen for this patient based on their primary functional limitation being treated: The outcome measure chosen to determine the severity of the functional limitation was the Barthel Index with a score of 0/100 which was correlated with the impairment scale. ?  Self Care:     - CURRENT STATUS: CN - 100% impaired, limited or restricted    - GOAL STATUS: CM - 80%-99% impaired, limited or restricted    - D/C STATUS:  ---------------To be determined--------------- Occupational Therapy Evaluation Charge Determination   History Examination Decision-Making   MEDIUM Complexity : Expanded review of history including physical, cognitive and psychosocial  history  HIGH Complexity : 5 or more performance deficits relating to physical, cognitive , or psychosocial skils that result in activity limitations and / or participation restrictions HIGH Complexity : Patient presents with comorbidities that affect occupational performance. Signifigant modification of tasks or assistance (eg, physical or verbal) with assessment (s) is necessary to enable patient to complete evaluation       Based on the above components, the patient evaluation is determined to be of the following complexity level: HIGH   Pain:  Pain Scale 1: Numeric (0 - 10)  Pain Intensity 1: 10       Activity Tolerance:   See flowsheet. Low BP when received in chair. After treatment:   [] Patient left in no apparent distress sitting up in chair  [x] Patient left in no apparent distress in bed  [x] Call bell left within reach  [x] Nursing notified and present  [x] Caregiver present  [] Bed alarm activated    COMMUNICATION/EDUCATION:   The patients plan of care was discussed with: Physical Therapist, Registered Nurse,  and Patient's daughter  .  [] Home safety education was provided and the patient/caregiver indicated understanding. [] Patient/family have participated as able in goal setting and plan of care. [x] Family agree to work toward stated goals and plan of care. [] Patient understands intent and goals of therapy, but is neutral about his/her participation. [] Patient is unable to participate in goal setting and plan of care. This patients plan of care is appropriate for delegation to \[facility] [place] [facility]\"".     Thank you for this referral.  Gabrielle Root, OT  Time Calculation: 28 mins

## 2017-10-31 NOTE — PROGRESS NOTES
Received pt awake alert oriented x4. Family at bedside call bell in pt hand,pt is receiving blood transfusing at this time.

## 2017-10-31 NOTE — HOSPICE
[facility] RN note:  Consult noted. Reviewing chart. Will contact family shortly. 14:00----hospice chaplaelizabeth Mcnair and this RN In to meet with pt, daughters Jeffrey Kilpatrick and Christina Bustamante and about 5 other family members from [place]. Pt resting comfortably, seemingly unaware of environment until pt called out to daughter and sister. After acknowledging their presence pt returned to sleep. Lengthy conversation about hospice philosophy and services, DNR status, respite and inpt levels of care. After clarifying myths about hospice, family wanting hospice services at home upon discharge. Both daughters wanting pt to be comfortable and without resuscitation when pt's stops breathing and heart stops, \"when GOD decides to take her we just gotta let her go. \"  DDNR signed by daughter Audie Alvarez in full agreement. DME needs include bed, BSC, OBT. Will coordinate transfer home once discharge date determined. Should pt decline further, will evaluate for inpt hospice. Information packet provided with 24hr contact information. Palliative  Vimal Solitario informed of DNR, palliative MD to sign DNR. Palliative to follow for pain management in light of low blood pressure. Daughter stated that she would want pt medicated if in pain regardless of low blood pressure. Thank you for the opportunity to care for this pt and family. Please contact hospice at [phone] with any questions or concerns.

## 2017-10-31 NOTE — H&P
Mitul Chew, [address]   HISTORY AND PHYSICAL       Name:  Montse Holden   MR#:  [id number]   :  1940   Account #:  [de-identified]        Date of Adm:  10/30/2017

## 2017-10-31 NOTE — PROGRESS NOTES
Patient's blood pressure dropped to 70s/40s. Moved back to bed with physical therapy. Blood pressure raised to 86/47. Legs elevated, head of the bed flat.   Dr Mimi godinez, instructed to d/c physical therapy, blood pressure meds, and increase fluids to 125ml/hr

## 2017-11-01 NOTE — PROGRESS NOTES
Bedside and Verbal shift change report given to Graciela RN (oncoming nurse) by Peyton Witt RN (offgoing nurse). Report included the following information SBAR, Kardex, Procedure Summary, Intake/Output, MAR, Accordion, Recent Results, Med Rec Status, Cardiac Rhythm nsr and Alarm Parameters .

## 2017-11-01 NOTE — DISCHARGE SUMMARY
Hospitalist Discharge Summary     Patient ID:  Benancio Dance  [id number]  65 y.o.  1940    PCP on record: Calvin Carpio MD    Admit date: 10/30/2017  Discharge date and time: 11/1/2017      DISCHARGE DIAGNOSIS:  Metastatic breast cancer metastatic to bone POA - No therapy recommended by oncology, Home hospice opted by family  Severe Bone pain POA - comfort measures/care only now at home  Acute Renal failure POA - Cr still ~ 2.5 today with IVF, minimal PO intake noted, Comfort feeding going home on Hospice today  Stage 3 chronic kidney disease POA creatinine 1.4-1.5 at baseline  Orthostatic Hypotension POA- cont bed rest/ no activity will be tolerable it seems, Hospice at home for stage 4 cancer now  Anemia POA suspect chronic disease/malignancy- s/p 1 unit PRBC this admission in ER  Essential HTN POA  DM type 2 POA - hypoglycemic this admission, taken off all insulin & DM meds now as pt going on Home hospice & has unreliable PO intake  H/o SBO  Hyponatremia POA- improved with hydration    CONSULTATIONS:  None    Excerpted HPI from H&P of Shorty Chavez MD:  Tiburcio Wright   American female with known metastatic breast cancer to bone,   currently being followed by Dr. Lorena Christine. She also has type 2 diabetes   mellitus, maintained on insulin and followed by Dr. Bill Dawn. She has   hypertension and stage III chronic kidney disease. She was admitted   over the summer of 2017 with a small-bowel obstruction and had   surgery with Dr. Kinsey Reynolds. She has obstructive sleep apnea and uses   CPAP. She has a history of bradycardia and a pacemaker is in place. She was recently admitted at [facility] for hypoglycemia and   \"dehydration\". She was discontinued off her Lantus and glipizide and   eventually discharged on low-dose NPH twice a day. She was just   discharged from [facility] 2 days ago.   Lamont Cannon  She has been taking long-acting and short-acting morphine because of   bone pain and abdominal pain.  Her daughter said that she basically   could not get up today because she was having so much pain. She   could not even get her up to bathe her and dress her. The pain, as   mentioned, was diffusely in her abdomen, as well as in multiple bones   including her back. The pain was not relieved by her usual pain   medications. She has had some intermittent nausea and vomiting, as   well as chills. She has had a mild dry cough but no shortness of   breath. She has not had any fevers.       The patient's family contacted the oncologist who referred her to the   emergency room. She met with the oncology service in the emergency   room. It was discussed that no further treatment is available for cancer,   and it was recommended that the patient consider hospice care. She   was found to be in acute on chronic kidney disease and also to be   anemic. A unit of blood was ordered by Hematology. We were called to   admit the patient because of the kidney failure, anemia, and metastatic   breast cancer. The family is aware of the hospice evaluation. There   was, however, confusion amongst the family about the code status. This will need to be discussed further, so a palliative care consult has   been obtained. \"    ______________________________________________________________________  DISCHARGE SUMMARY/HOSPITAL COURSE:  for full details see H&P, daily progress notes, labs, consult notes.      Acute Renal failure POA - Cr still 2.5 today with IVF, minimal Po intake noted  Stage 3 chronic kidney disease POA creatinine 1.4-1.5 at baseline  Orthostatic Hypotension noted today AM with PT  No hydronephrosis on CT scan     Cont to hold benazepril and bumex  Increase IVF to 125 ml/hr for now  Bed rest - doubt pt will be a skilled rehab candidate now-- Going for Hospice eval today as per Pt/family wishes- confirmed today AM      Anemia POA suspect chronic disease/malignancy- s/p 1 unit PRBC in ER- Hb 7.1 today AM  No reported bleeding  Suspect related to malignancy and treatments, CKD  Observe for now     Essential HTN POA- Orthostatic Hypotensive today AM  Received BP meds, BP borderline low in ED  DC  norvasc  IVF as above  use PRN Hydralazine - doubt will need it  Hold benazepril, bumex, imdur for now      DM type 2 POA - hypoglycemic this admission  Recent admit for hypoglycemia at [facility], lantus and glipizide stopped  Appreciate Dr Rita Meraz input - will take the liberty to DC NPH all together today AM as pt is not eating much & at high risk for Hypoglycemia due to renal failure  SSI lispro if needed      Metastatic breast cancer metastatic to bone POA  Severe Bone pain POA  Seen by Dr Camryn Shaw, no further plans for Rx  Hospice recommended- consulted on admission- awaited eval & plans - IP versus Home Hospice if pt stabilizes  Palliative care consulted for ? DNR/code status addressal - look into previous AMDs done & update to DNR as per family's wishes- Want to consider Hospice care at home if able      History of SBO recent OR in summer 2017  No recurrence of CT scan      Hyponatremia Na 129 POA- improved slightly to 132 today AM  Cont IVF        _______________________________________________________________________  Patient seen and examined by me on discharge day. Pertinent Findings:  Gen:    Moderate distress from pain  Chest: Clear lungs  CVS:   Regular rhythm. No edema  Abd:  Soft, not distended, not tender  Neuro:  Alert, oriented x 3  _______________________________________________________________________  DISCHARGE MEDICATIONS:   Current Discharge Medication List      CONTINUE these medications which have CHANGED    Details   morphine CR (MS CONTIN) 15 mg CR tablet Take 1 Tab by mouth every twelve (12) hours. Max Daily Amount: 30 mg.  Qty: 14 Tab, Refills: 0    Associated Diagnoses: Metastatic breast cancer ([address]  Cancer related pain      morphine IR (MS IR) 15 mg tablet Take 1 Tab by mouth every four (4) hours as needed for Pain. Max Daily Amount: 90 mg.  Qty: 30 Tab, Refills: 0    Associated Diagnoses: Metastatic breast cancer ([address] Cancer related pain         CONTINUE these medications which have NOT CHANGED    Details   acetaminophen (TYLENOL) 500 mg tablet Take 1,000 mg by mouth every six (6) hours as needed for Pain. albuterol (PROVENTIL HFA, VENTOLIN HFA, PROAIR HFA) 90 mcg/actuation inhaler Take 2 Puffs by inhalation every four (4) hours as needed for Wheezing or Shortness of Breath.      polyethylene glycol (MIRALAX) 17 gram/dose powder Take 17 g by mouth daily as needed (constipation). hydrOXYzine HCl (ATARAX) 25 mg tablet Take 25 mg by mouth every eight (8) hours as needed for Itching. megestrol (MEGACE) 400 mg/10 mL (10 mL) suspension Take 10 mL by mouth daily. Qty: 300 mL, Refills: 2    Associated Diagnoses: Metastatic breast cancer [address] [address] Decreased appetite      diclofenac (VOLTAREN) 1 % gel Apply 2 g to affected area four (4) times daily as needed (Pain). Comments: Applied to bilateral knees      ondansetron hcl (ZOFRAN, AS HYDROCHLORIDE,) 4 mg tablet Take 1 Tab by mouth every eight (8) hours as needed for Nausea. Qty: 20 Tab, Refills: 0      albuterol (PROVENTIL VENTOLIN) 2.5 mg /3 mL (0.083 %) nebulizer solution 3 mL by Nebulization route every four (4) hours as needed for Wheezing.   Qty: 24 Each, Refills: 0         STOP taking these medications       cyanocobalamin (VITAMIN B-12) 500 mcg tablet Comments:   Reason for Stopping:         insulin NPH (HUMULIN N) 100 unit/mL injection Comments:   Reason for Stopping:         bumetanide (BUMEX) 1 mg tablet Comments:   Reason for Stopping:         VITAMIN D2 50,000 unit capsule Comments:   Reason for Stopping:         aspirin (ASPIRIN) 325 mg tablet Comments:   Reason for Stopping:         glipiZIDE (GLUCOTROL) 5 mg tablet Comments:   Reason for Stopping:         isosorbide mononitrate ER (IMDUR) 60 mg CR tablet Comments:   Reason for Stopping: benazepril (LOTENSIN) 40 mg tablet Comments:   Reason for Stopping:         omeprazole (PRILOSEC) 20 mg capsule Comments:   Reason for Stopping:         amlodipine (NORVASC) 10 mg tablet Comments:   Reason for Stopping:         nitroglycerin (NITROSTAT) 0.4 mg SL tablet Comments:   Reason for Stopping:               My Recommended Diet, Activity, Wound Care, and follow-up labs are listed in the patient's Discharge Insturctions which I have personally completed and reviewed.     _______________________________________________________________________  DISPOSITION:    Home with Family with Home Hospice: x   Home with HH/PT/OT/RN:    SNF/LTC:    THERESA:    OTHER:        Condition at Discharge:  Stable on comfort care only  _______________________________________________________________________  Follow up with:   PCP : Yue Caro MD  Follow-up Information     Follow up With Details Comments [id number]   [facility]  [phone]                Total time in minutes spent coordinating this discharge (includes going over instructions, follow-up, prescriptions, and preparing report for sign off to her PCP) :  26 minutes    Signed:  Rory Thompson MD

## 2017-11-01 NOTE — HOSPICE
[facility] RN note:  [facility] arrived to pt room. Pt is alert and responsive to family. Denies any pain but is scratching skin, daughter states that pain medicationmakes her itchy. Pt has benadryl and morphine at home which they will give pt upon arrival and maintain the MS Contin scheduled every 12 hrs. DME in place according to daughter Kasia Blanco. All are very anxious to get home! No questions or concerns at time of visit. Hospice to admit tomorrow morning between 10-10:30am.      [facility]  [phone]    Inpatient Nursing PRE Admission   Patient Name: Amarilis Duval  YOB: 1940  Age: 65 y.o.        Date of PLANNED Hospice Admission: 17  Hospice Attending: Dr Madeleine Lemus  Primary Care Physician: Savita Dillard MD     Home Hospice Zip DXUI:[id number] [address]  Expected  (if patient transferred to [facility]): TBD    ADVANCE CARE PLANNING    Code Status: DNR  Durable DNR: [x]  Yes  []  No  Advance Care Planning 10/31/2017   Patient's Healthcare Decision Maker is: Legal Next of Kin   Primary Decision Maker Name -   Primary Decision Maker Phone Number -   Primary Decision Maker Relationship to Patient -   Secondary Decision [address] Name -   Confirm Advance Directive None   Patient Would Like to Complete Advance Directive -   Does the patient have other document types -       Sabianist: Religious   Home: TBD    HOSPICE SUMMARY   ER Visits/ Hospitalizations in past year: 5 ED, 2 ED/ADM  L side weakness, 10.30 JULES    Pt has also been admitted to [address] [place] [address] Breast CA  Onset Date of Hospice Diagnosis: 10/30  Summary of Disease Progression Leading to Hospice Diagnosis: pt with widely metastatic breast CA in setting of renal failure, DM, hx of SBO    Co-Morbidities:   Patient Active Problem List   Diagnosis Code    Hypercholesterolemia E78.00    Stroke ([address] I63.9    DJD (degenerative joint disease), lumbar M47.816    GERD (gastroesophageal reflux disease) K21.9    SEN (obstructive sleep apnea) G47.33    Sciatica M54.30    Neuropathy, arm G56.90    CAD (coronary artery disease) I25.10    Breast cancer, right breast (HCC) C50.911    Abdominal pain R10.9    Pain R52    Nausea & vomiting R11.2    Constipated K59.00    Pulmonary emboli (HCC) I26.99    Essential hypertension I10    Vitamin D deficiency E55.9    Malignant neoplasm of upper-inner quadrant of right female breast (HCC) C50.211    Multinodular goiter (nontoxic) E04.2    Lymphedema of upper extremity following lymphadenectomy E89.89, I89.0    S/P right mastectomy Z90.11    Type 2 diabetes mellitus with diabetic polyneuropathy, with long-term current use of insulin (HCC) E11.42, Z79.4    Left-sided weakness R53.1    LLQ pain R10.32    Pacemaker O08.5    Diastolic CHF, chronic (HCC) I50.32    Transient ischemic attack involving right internal carotid artery G45.1    Stenosis of both internal carotid arteries I65.23    Metastatic breast cancer (HCC) C50.919    Anemia D64.9    Neoplastic malignant related fatigue R53.0    Pain due to malignant neoplasm metastatic to bone (HCC) X08.0, P91.49    Cyclical vomiting with nausea G43. A0    Localized edema R60.0    Protein calorie malnutrition (HCC) E46    Acute kidney injury ([facility] N17.9     Diagnoses RELATED to the terminal prognosis: Metastatic breast CA  Other Diagnoses: DM, JULES    Rationale for a prognosis of life expectancy of 6 months or less if the disease follows its normal course (Disease Specific History):     Addison Maldonado is a 65 y.o. who was admitted to [facility]. The patient's principle diagnosis of Metastatic breast CA has resulted in current hospitalization for JULES with generalized decline and severe hypotension. Functionally, the patient's Palliative Performance Scale has declined over a period of weeks and is estimated at 20%, pt is now bedbound and dependent for all ADL's. Objective information that support this patients limited prognosis includes: alb 1.7, Creatinie 2.57  CT 10/30  1. Postoperative changes to the distal small bowel and right colon. No recurrent  obstruction  2. Patchy areas of bony sclerosis throughout the visualized bony elements  unchanged. Findings concerning for bony metastatic disease     The patient/family chose comfort measures with the support of Hospice. Patient meets for routrine LOC as evidenced by prognosis of < 6mo    Verbal certification of terminal diagnosis with life expectancy of 6 months or less received from: TBD    Prognosis estimated based on 11/01/17 clinical assessment is:   [] Few to Many Hours  [] Hours to Days   [] Few to Many Days   [] Days to Weeks   [] Few to Many Weeks   [x] Weeks to Months   [] Few to Many Months    CLINICAL INFORMATION   Allergies: Allergies   Allergen Reactions    Codeine Itching    Duratuss [Phenylephrine-Guaifenesin] Nausea and Vomiting    Lisinopril-Hydrochlorothiazide Itching    Motrin [Ibuprofen] Nausea and Vomiting     With 800mg    Tape [Adhesive] Other (comments)     TEARS HER SKIN       Wt Readings from Last 3 Encounters:   10/30/17 85.3 kg (188 lb)   10/12/17 85.3 kg (188 lb)   10/12/17 86.6 kg (191 lb)     Ht Readings from Last 3 Encounters:   10/30/17 5' 11\" (1.803 m)   10/12/17 5' 6\" (1.676 m)   10/12/17 5' 6\" (1.676 m)     Body mass index is 26.22 kg/(m^2). Visit Vitals    /64 (BP 1 Location: Left arm, BP Patient Position: At rest)    Pulse 66    Temp 98.3 °F (36.8 °C)    Resp 18    Ht 5' 11\" (1.803 m)    Wt 85.3 kg (188 lb)    SpO2 95%    BMI 26.22 kg/m2       LAB VALUES  No results found for this visit on 10/30/17 (from the past 12 hour(s)). No results found for this visit on 10/30/17 (from the past 6 hour(s)).   Lab Results   Component Value Date/Time    Protein, total 6.1 10/31/2017 04:38 AM    Albumin 1.7 10/31/2017 04:38 AM       Currently this patient has:  [] Supplemental O2 [] Peripheral IV  [] PICC    [] PORT   [] Ascencio Catheter [] NG Tube   [] PEG Tube [] Ostomy    [] AICD: Has ICD been deactivated? [] Yes [] No:______    Progression to DEPENDENCE WITH ADLs (include time frame): pt is bedbound and dependent for all ADL's due to progressive decline  -x Dependent for bathing: personal hygiene and grooming   - xDependent for dressing: dressing and undressing   -x Dependent for transferring: movement and mobility   -x Dependent for toileting: continence-related tasks including control and hygiene   - xDependent for eating: preparing food and feeding    ASSESSMENT & PLAN     1. Symptom Issues Identified: pain, hypotension    2. Spiritual Issues Identified: TB assessed, strong etienne and family    3. Psych/ Social/ Emotional Issues Identified: Daughter Eleonora Eckert lives with pt and is PCG, dtr Felton Longoria is mpoa but both sister are in agreement with plan of care and mutually decided upon DDNR.     4.  Care Coordination:   Transfer to: home                  (home or [facility])    Report given to: intake      (Nurse and MD)    Transportation by: [facility]   Scheduled for 5:00pm     (time)    Medications Needs: decision made to D/C insulin per Dr Mikael Flores    DME: bed, obt, bsc through [employer]: TBD

## 2017-11-01 NOTE — PROGRESS NOTES
Progress Note    Patient: Sis Carlson MRN: [id number]  SSN: [id number]    YOB: 1940  Age: 65 y.o. Sex: female      Admit Date: 10/30/2017    LOS: 2 days     Subjective:     Pt's family met with SW and hospice. She will be discharged home today with home hospice. She has not been eating and her BGs have been running in a good range with no insulin. Pt is no awake and will mumble to stimulation. I spoke with pt's daughter and the RN to gather history this morning. Objective:     Vitals:    10/31/17 1701 10/31/17 2154 11/01/17 0200 11/01/17 0600   BP: 115/59 113/55 107/57 106/59   Pulse: 60 66 62 60   Resp: 18 18 18 18   Temp: 97.3 °F (36.3 °C) 97.6 °F (36.4 °C) 98.8 °F (37.1 °C) 98.7 °F (37.1 °C)   SpO2: 96% 96% 95% 96%   Weight:       Height:            Intake and Output:  Current Shift:    Last three shifts: 10/30 1901 - 11/01 0700  In: 240 [P.O.:240]  Out: -     Physical Exam:   GENERAL: Asleep, but appears comfortable    Lab/Data Review: All lab results for the last 24 hours reviewed. Assessment:     Active Problems:    Malignant neoplasm of upper-inner quadrant of right female breast ([facility] (10/7/2015)      Type 2 diabetes mellitus with diabetic polyneuropathy, with long-term current use of insulin ([facility] (12/21/2016)      Protein calorie malnutrition ([facility] (9/27/2017)      Acute kidney injury ([facility] (10/30/2017)        Plan:     1) DM > I spoke with pt's daughter and instructed her to stop the NPH insulin and all DM medications. I recommended they check a blood sugar once per day and call me in a couple of weeks and let me know how her sugars are doing off all DM medications. I spent 15 minutes on her case and > 50% of the time was spent reviewing the chart and coordinating her care with the nurse caring for her.         Signed By: Andria Ceron MD     November 1, 2017

## 2017-11-01 NOTE — HOSPICE
[facility] RN note: In to meet with pt and daughter ariana at bedside. Pt is much more alert and interactive than yesterday. No C/O pain but would grimace when HOB elevated. Confirmed plan with daughter for discharge today at 5:00pm with hospice admission in the morning between 10-10:30. CM to arrange transportation, 5 steps to pt home. DME (bed, OBT, BSC) ordered through [employer] for delivery between 1:00- 4:00 this afternoon. Family have chosen Dr Naman Go to follow as attending. Discussed plan with Dr William Allen and MOUNA James    Thank you for the opportunity to care for this pt and family. Please contact hospice at [phone] with any questions or concerns.

## 2017-11-01 NOTE — PROGRESS NOTES
Spiritual Care Partner Volunteer visited patient in Ortho on November 1, 2017.   Documented by:  SHASHANK Guerrier, [facility], Chaplain DARREN ESTRADA [facility] Paging Service  [phone])

## 2017-11-01 NOTE — PROGRESS NOTES
Right chest PAC access removed post flushing as directed by home hospice nurse. Patient tolerated well. 1720 ambulance at bedside to transport patient home with hospice. Daughters at bedside. Paperwork given to ambulance staff. 1740 patient discharge via stretcher without difficulty.

## 2017-11-01 NOTE — DISCHARGE INSTRUCTIONS
HOSPITALIST DISCHARGE INSTRUCTIONS    NAME: Torie Bentley   :  1940   MRN:  [id number]     Date/Time:  2017 11:36 AM    ADMIT DATE: 10/30/2017     DISCHARGE DATE: 2017     DISCHARGE DIAGNOSIS:  Metastatic breast cancer metastatic to bone POA - No therapy recommended by oncology, Home hospice opted by family  Severe Bone pain POA - comfort measures/care only now at home  Acute Renal failure POA - Cr still ~ 2.5 today with IVF, minimal PO intake noted, Comfort feeding going home on Hospice today  Stage 3 chronic kidney disease POA creatinine 1.4-1.5 at baseline  Orthostatic Hypotension POA- cont bed rest/ no activity will be tolerable it seems, Hospice at home for stage 4 cancer now  Anemia POA suspect chronic disease/malignancy- s/p 1 unit PRBC this admission in ER  Essential HTN POA  DM type 2 POA - hypoglycemic this admission, taken off all insulin & DM meds now as pt going on Home hospice & has unreliable PO intake  H/o SBO  Hyponatremia POA- improved with hydration    Active Problems:    Malignant neoplasm of upper-inner quadrant of right female breast ([facility] (10/7/2015)      Type 2 diabetes mellitus with diabetic polyneuropathy, with long-term current use of insulin ([facility] (2016)      Protein calorie malnutrition ([facility] (2017)      Acute kidney injury ([facility] (10/30/2017)         MEDICATIONS:  As per medication reconciliation  list  · It is important that you take the medication exactly as they are prescribed. · Keep your medication in the bottles provided by the pharmacist and keep a list of the medication names, dosages, and times to be taken in your wallet. · Do not take other medications without consulting your doctor.      Pain Management: per above medications    What to do at Home    Recommended diet:  Comfort feeding    Recommended activity: bed rest as pt orthostatic    If you have questions regarding the hospital related prescriptions or hospital related issues please call [facility] at . Follow Up:  Dr. Tilmon Galeazzi, MD for further Hospice orders as needed      Information obtained by :  I understand that if any problems occur once I am at home I am to contact my physician. I understand and acknowledge receipt of the instructions indicated above.                                                                                                                                            Physician's or R.N.'s Signature                                                                  Date/Time                                                                                                                                              Patient or Representative Signature                                                          Date/Time

## 2017-11-01 NOTE — PROGRESS NOTES
Per Qing with [facility], pt daughter ready for pt to be discharge today. Qing requested pt transport be set at 5:00PM.  Referral sent to [facility] for their review and they can transport at 5:00PM.  Pt daughter inquired about Medicaid. WYATT contacted [facility] and left a message and sent email. Email stated since pt has insurance she would need to contact the local . SW to inform the daughter of the info provided. Floor nurse informed of the transport time. PCS on pt bedside chart. Care Management Interventions  PCP Verified by CM:  Yes  Mode of Transport at Discharge: BLS (Pt will need a ambulance at discharge)  Transition of Care Consult (CM Consult): Funmi: Yes  Current Support Network: April S Cezar Boateng Follow Up Transport: Family  Plan discussed with Pt/Family/Caregiver: Yes  Freedom of Choice Offered: Yes  Discharge Location  Discharge Placement: Home with hospice    REKHA Jordan  Ext [phone]

## 2017-11-13 NOTE — TELEPHONE ENCOUNTER
Surya Ramos called to say that Ms. Martita Eason blood sugars have been over 300 for the past 4 days. She needs to know what to do to bring her blood sugars down? Rachel can be reached at:  [phone].

## 2017-11-13 NOTE — TELEPHONE ENCOUNTER
Since her blood sugars have been high lets have her take the Humalin N 6 units every morning and 4 units at bedtime. Call her next Monday to see how her sugars are doing with this regimen. How is Ms Jarrell Dalton feeling, how is her appetite?

## 2017-11-13 NOTE — TELEPHONE ENCOUNTER
Spoke with . Patient has been out of the hospital for a few weeks. She has been checking her blood sugar since 11/9/17. Did not check BS this am. Nurse was there bathing patient. Please see BS readings.

## 2017-11-14 NOTE — TELEPHONE ENCOUNTER
has been advised of the new regimen for Ms. Felipe's insulin. She read back the instructions for confirmation of understanding.

## 2017-12-06 NOTE — PROGRESS NOTES
1550  Pt arrived at the [facility]. Pt is lethargic. Pt does not answer questions appropriately. Lungs clear with wheezing. Pt is on RA. + bowel sounds. Last reported Bm 12-4. Pt has on a depend.  (this is a change). Pt has lymphedema in her R arm.  +1 in left hand and +2 in bilateral feet. Skin is intact. Pt has a R CPAC. Last flushed on 11-30.    1600  Pt complained of nausea, but did not vomit. Pt's clothes changed. Pt situated in bed and fel asleep. 1830  Pt complained of pain and is moaning out loud. Pt medicated with Lorazepam and Morphine. Scope Patch placed behind left ear. 1850  Pt resting. No co pain at this time. 1900  Report given. NAME OF PATIENT:  Dustynellie Childs    LEVEL OF CARE:  Respite    REASON FOR GIP:   n/a      *PATIENT REMAINS ELIGIBLE FOR GIP LEVEL OF CARE AS EVIDENCED BY: (MUST BE ADDRESSED OF PATIENT GIP)  n/a      REASON FOR RESPITE:  Caregiver breakdown and Caregiver cannot care for patient due to:  caregiver exhaustion    O2 SAFETY: Ra    FALL INTERVENTIONS PROVIDED:   Implemented/recommended use of fall risk identification flag to all team members, Implemented/recommended resources for alarm system (personal alarm, bed alarm, call bell, etc.)  and Implemented/recommended environmental changes (remove hazards, lower bed, improve lighting, etc.)    INTERDISPLINARY COMMUNICATION/COLLABORATION:  Physician, MSW, [place] and RN, CNA    NEW MEDICATION INITIATION DOCUMENTATION:  No new medications initiated on arrival.    Reason medication is being initiated: n/a    MD / Provider name consulted re: change in status / initiation of new medication: n/a    New Symptom(s):  n/a    New Order(s):  n/a    Name of the person notified of the changes:  n/a    Name of person being taught:  n/a    Instructions given:  n/a    Side Effects taught:  n/a    Response to teaching:  n/a      COMFORTABLE DYING MEASURE:  Is Patient/family satisfied with symptom level? yes    DISCHARGE PLAN:  Pt will return home and continue to be followed by Home Hospice at the end of her Respite stay.

## 2017-12-07 NOTE — H&P
[facility]  [phone]    Patient Name: Christi Hensley  YOB: 1940    Date of Provider Hospice Visit: 12/07/17    Level of Care:   [] General Inpatient (GIP)    [] Routine   [x] Respite    Location of Care:  [] [facility] [] [facility] [] [facility] [] [facility] [x] [facility]    Date of Original Hospice Admission: 11/2/17  Hospice Medical Director at time of admission: Dr Ritchie Felix Diagnosis: malignant neoplasm of the left breast with mets to bone  Diagnoses RELATED to the terminal prognosis: severe protein calorie malnutrition       HOSPICE SUMMARY       Christi Hensley is a 65y.o. year old who was admitted to [address] [place] [address]. The patient's principle diagnosis has resulted in progressive decline and weakness,   Pain, shortness of breath, altered mental status. Functionally, the patient's Karnofsky and/or Palliative Performance Scale has declined over a period of days and is estimated at 10%. The patient is dependent on the following ADLs: she is dependent for all ALDs. Objective information that support this patients limited prognosis includes:     CT SCAN 10/30/17  IMPRESSION:     1. Postoperative changes to the distal small bowel and right colon. No recurrent  obstruction  2. Patchy areas of bony sclerosis throughout the visualized bony elements  unchanged. Findings concerning for bony metastatic disease  10/31/17 albumin 1.7  The patient/family chose comfort measures with the support of Hospice. HOSPICE DIAGNOSES   Active Symptoms:  1. Altered mental status, minimally responsive  2. Shortness of breath  3. Progressive weakness and decline in functioning  4. Anorexia and cachexia  5. Ongoing pain issues have been well controlled     PLAN   1. Admit to respite level of care for caregiver breakdown  2. Comfort measures  3. Comfort meds, continue Roxanol SL and lorazepam SL  4. Haldol solution as needed    5.   and SW to support family needs  6. Disposition: home after respite    Prognosis estimated based on 12/07/17 clinical assessment is:   [x] Hours to Days    [] Days to Weeks    [] Other:    Communicated plan of care with: Hospice Case Manager; Hospice IDT; Care Team     GOALS OF CARE     Resuscitation Status: DNR  Durable DNR: [x] Yes [] No    Advance Care Planning 10/31/2017   Patient's Healthcare Decision Maker is: Legal Next of Kin   Primary Decision Maker Name -   Primary Decision Maker Phone Number -   Primary Decision Maker Relationship to Patient -   Secondary Decision Maker Name -   Confirm Advance Directive None   Patient Would Like to Complete Advance Directive -   Does the patient have other document types -        HISTORY     History obtained from: chart, SN    CHIEF COMPLAINT:    The patient is: minimally responsive  [] Verbal  [] Nonverbal  [] Unresponsive    HPI/SUBJECTIVE:  65year old female with hx of metastatic breast cancer and progressive decline       REVIEW OF SYSTEMS     The following systems were: [] reviewed  [x] unable to be reviewed    Positive ROS include:  Constitutional: fatigue, weakness, in pain, short of breath  Ears/nose/mouth/throat: increased airway secretions  Respiratory:shortness of breath, wheezing  Gastrointestinal:poor appetite, nausea, vomiting, abdominal pain, constipation, diarrhea  Musculoskeletal:pain, deformities, swelling legs  Neurologic:confusion, hallucinations, weakness  Psychiatric:anxiety, feeling depressed, poor sleep  Endocrine:     Adult Non-Verbal Pain Assessment Score:       Face  [x] 0   No particular expression or smile  [] 1   Occasional grimace, tearing, frowning, wrinkled forehead  [] 2   Frequent grimace, tearing, frowning, wrinkled forehead    Activity (movement)  [x] 0   Lying quietly, normal position  [] 1   Seeking attention through movement or slow, cautious movement  [] 2   Restless, excessive activity and/or withdrawal reflexes    Guarding  [x] 0   Lying quietly, no positioning of hands over areas of body  [] 1   Splinting areas of the body, tense  [] 2   Rigid, stiff    Physiology (vital signs)  [x] 0   Stable vital signs  [] 1   Change in any of the following: SBP > 20mm Hg; HR > 20/minute  [] 2   Change in any of the following: SBP > 30mm Hg; HR > 25/minute    Respiratory  [x] 0   Baseline RR/SpO2, compliant with ventilator  [] 1   RR > 10 above baseline, or 5% drop SpO2, mild asynchrony with ventilator  [] 2   RR > 20 above baseline, or 10% drop SpO2, asynchrony with ventilator     FUNCTIONAL ASSESSMENT     Palliative Performance Scale (PPS): 10%     PSYCHOSOCIAL/SPIRITUAL ASSESSMENT     Active Problems:    * No active hospital problems. *    Past Medical History:   Diagnosis Date    Arrhythmia     bradycardia in 30's /pacemaker inserted    Arthritis     RT KNEE    Asthma Dx age 75    Bradycardia 2009    Cardiology saw her during hospital stay; Now off coreg;  Sees Dr. Diana Malik    Breast cancer [facility])     Rt mastectomy 2/19/15    CAD (coronary artery disease)     Dr Blake العراقي Diabetes [facility])     Now seeing Dr. Jenn Dubon Diverticulitis     DJD (degenerative joint disease), lumbar     GERD (gastroesophageal reflux disease)     H. pylori infection 2008    Dr. Eri Kam attack April 2006    Hypercholesterolemia     Hypertension     Morbid obesity ([address]     Neuropathy, arm 2009    Right; Has had MRI and EMG at [facility] [phone] SEN (obstructive sleep apnea)     uses CPAP    Rectal bleeding 2009    Hospitalized;  Had colonoscopy and EGD (ok), gastric emptying study (normal), doppler mesenteric arteries (ok)    Sciatica     Has seen Dr. Gilmar Kuo    Stroke [facility]) 2009 & 2012    no residual problems      Past Surgical History:   Procedure Laterality Date    HX APPENDECTOMY  1979    HX BREAST LUMPECTOMY  12/12/2013    RIGHT BREAST DUCTAL EXCISION performed by Dm [facility]ti [facility] MAIN OR    HX CATARACT REMOVAL  2007    HX CHOLECYSTECTOMY  1979    HX COLONOSCOPY      HX HEART CATHETERIZATION      angioplasty    HX HYSTERECTOMY      fibroids    HX KNEE ARTHROSCOPY  1997    right    HX MASTECTOMY Right 2/19/2015    RIGHT BREAST MODIFIED RADICAL MASTECTOMY RIGHT SENTINEL NODE BIOPSY, RIGHT PORT A CATH INSERTION performed by Leeanne Jackson MD at hospitals AMBULATORY OR    HX PACEMAKER      HX SHOULDER ARTHROSCOPY  2004    left      Social History   Substance Use Topics    Smoking status: Never Smoker    Smokeless tobacco: Never Used    Alcohol use No     Family History   Problem Relation Age of Onset    Stroke Father     Cancer Sister      breast cancer    Hypertension Sister     Cancer Mother      Unknown type    Cancer Brother      Colon    Hypertension Brother     Anesth Problems Neg Hx       Allergies   Allergen Reactions    Codeine Itching    Duratuss [Phenylephrine-Guaifenesin] Nausea and Vomiting    Lisinopril-Hydrochlorothiazide Itching    Motrin [Ibuprofen] Nausea and Vomiting     With 800mg    Tape [Adhesive] Other (comments)     TEARS HER SKIN      Current Facility-Administered Medications   Medication Dose Route Frequency    omeprazole (PRILOSEC) capsule 20 mg (Patient Supplied)  20 mg Oral DAILY    albuterol (PROVENTIL VENTOLIN) nebulizer solution 2.5 mg  2.5 mg Nebulization Q4H PRN    diclofenac (VOLTAREN) 1 % topical gel 2 g (Patient Supplied)  2 g Topical Q4H PRN    LORazepam (INTENSOL) 2 mg/mL oral concentrate 0.5 mg  0.5 mg SubLINGual Q1H PRN    acetaminophen (TYLENOL) tablet 1,000 mg  1,000 mg Oral Q6H PRN    acetaminophen (TYLENOL) suppository 650 mg  650 mg Rectal Q4H PRN    albuterol (PROVENTIL HFA, VENTOLIN HFA, PROAIR HFA) inhaler 2 Puff  2 Puff Inhalation Q4H PRN    bisacodyl (DULCOLAX) suppository 10 mg  10 mg Rectal DAILY PRN    bumetanide (BUMEX) tablet 2 mg  2 mg Oral DAILY    haloperidol (HALDOL) 2 mg/mL oral solution 0.5 mg  0.5 mg Oral Q4H PRN    hydrOXYzine HCl (ATARAX) tablet 25 mg  25 mg Oral Q8H PRN    megestrol (MEGACE) 400 mg/10 mL (10 mL) oral suspension 400 mg  400 mg Oral DAILY    morphine (ROXANOL) 100 mg/5 mL (20 mg/mL) concentrated solution 10 mg  10 mg Oral Q1H PRN    morphine CR (MS CONTIN) tablet 30 mg  30 mg Oral Q12H    morphine IR (MS IR) tablet 15 mg  15 mg Oral Q4H PRN    ondansetron (ZOFRAN ODT) tablet 4 mg  4 mg Oral Q6H PRN    polyethylene glycol (MIRALAX) packet 17 g  17 g Oral DAILY    scopolamine (TRANSDERM-SCOP) 1.5 mg  1.5 mg TransDERmal Q72H    senna-docusate (PERICOLACE) 8.6-50 mg per tablet 2 Tab  2 Tab Oral BID PRN        PHYSICAL EXAM     Wt Readings from Last 3 Encounters:   11/02/17 85.3 kg (188 lb)   10/30/17 85.3 kg (188 lb)   10/12/17 85.3 kg (188 lb)       Visit Vitals    BP (!) 72/40 (BP 1 Location: Right arm)    Pulse 61    Temp 99.1 °F (37.3 °C)    Resp 9    SpO2 (!) 80%       Supplemental O2  [] Yes  [x] NO  Last bowel movement:     Currently this patient has:  [] Peripheral IV [] PICC  [] PORT [] ICD    [] Ascencio Catheter [] NG Tube   [] PEG Tube    [] Rectal Tube [] Drain  [] Other:     Constitutional: minimally resposnive  Eyes: pupils equal, anicteric  ENMT: no nasal discharge, moist mucous membranes  Cardiovascular: regular rhythm, distal pulses intact  Respiratory: breathing not labored, symmetric, very shallow  Gastrointestinal: soft non-tender, +bowel sounds  Musculoskeletal: no deformity, no tenderness to palpation  Skin: warm, dry  Neurologic:pt is/ not able to follow commands, pt is/ not moving all extremities  Psychiatric:calm      Pertinent Lab and or Imaging Tests:  Lab Results   Component Value Date/Time    Sodium 133 11/01/2017 02:54 AM    Potassium 5.1 11/01/2017 02:54 AM    Chloride 101 11/01/2017 02:54 AM    CO2 20 11/01/2017 02:54 AM    Anion gap 12 11/01/2017 02:54 AM    Glucose 107 11/01/2017 02:54 AM    BUN 56 11/01/2017 02:54 AM    Creatinine 2.48 11/01/2017 02:54 AM    BUN/Creatinine ratio 23 11/01/2017 02:54 AM    GFR est AA 23 11/01/2017 02:54 AM    GFR est non-AA 19 11/01/2017 02:54 AM    Calcium 8.0 11/01/2017 02:54 AM     Lab Results   Component Value Date/Time    Protein, total 6.1 10/31/2017 04:38 AM    Albumin 1.7 10/31/2017 04:38 AM           Total time: [address] [facility] Jayesh Posadas NP

## 2017-12-07 NOTE — PROGRESS NOTES
Problem: Falls - Risk of  Goal: *Absence of Falls  Document Iliana Fall Risk and appropriate interventions in the flowsheet.    Fall Risk Interventions:  Mobility Interventions: Bed/chair exit alarm    Mentation Interventions: Adequate sleep, hydration, pain control, Bed/chair exit alarm, Door open when patient unattended, Familiar objects from home, More frequent rounding, Reorient patient, Room close to nurse's station, Update white board    Medication Interventions: Bed/chair exit alarm    Elimination Interventions: Bed/chair exit alarm, Call light in reach

## 2017-12-07 NOTE — PROGRESS NOTES
0700 Report received from Rebekah Reyna, [address] [facility].  4629 Patient found lying in bed asleep. No signs nor symptoms of pain/discomfort noted. 0900 Patient difficult to arouse. Patient opened her eyes and swallowed megace with much cuing. Patient held prilosec in her mouth, and then spit it out and refused all other medications. Tressa Jeans, FNP, aware. 1015 [facility], JAMESP, aware that patient is difficult to arouse. 1020 .    1028 Yoon Parson, NATHANCM, contacted and given a clinical update. 1110 Spoke to patient's daughter, Kadie Roche, and gave her a clinical update. Made aware that the patient is lethargic, difficult to arouse, and refusing medications. [place] to Jennelle Cabot, made aware that the patient is lethargic, difficult to arouse, refusing medications. 1140 Patient grimacing and moaning during bath. PRN dose of morphine administered. 1200 Patient resting quietly. Pet therapy volunteer at the bedside holding the patient's hand. 56 Patient's daughters here to visit. 1505 Patient having 9-12 seconds of apnea. Patient's daughter, Isidro Tavarez, at the bedside. Teri made aware that the patient is having periods of apnea. Teri reported that the patient has sleep apnea. When patient's foot moved, patient grimaced and moaned loudly. This nurse reported that she was going to administer pain medication, but Teri refused stating that she would like her mother to not have medication until other family members arrive at 4 PM. This nurse explained that the patient was not lethargic because of the medication, but rather the disease. Educated on signs and symptoms of EOL. 36 Family calling out requesting that the patient is given food. This nurse explained that due to the patient's lethargy, it is unsafe to give her food due to the risk of aspiration. Patient's family verbalized understanding. 1830 Patient grimacing and moaning, patient given a PRN dose of morphine.  Patient talking, however statements not making sense/unclear. 1855 Patient asleep. 1900 Report given           NAME OF PATIENT:  Amarilis Duval    LEVEL OF CARE:  Respite       REASON FOR GIP:   NA    *PATIENT REMAINS ELIGIBLE FOR GIP LEVEL OF CARE AS EVIDENCED BY: (MUST BE ADDRESSED OF PATIENT GIP)      REASON FOR RESPITE:  Caregiver breakdown    O2 SAFETY:  NA    FALL INTERVENTIONS PROVIDED:   Implemented/recommended resources for alarm system (personal alarm, bed alarm, call bell, etc.)  and Implemented/recommended environmental changes (remove hazards, lower bed, improve lighting, etc.)    INTERDISPLINARY COMMUNICATION/COLLABORATION:  Physician, MSW, Pedro and RN, CNA    NEW MEDICATION INITIATION DOCUMENTATION:  NA    Reason medication is being initiated:  NA    MD / Provider name consulted re: change in status / initiation of new medication:  NA   New Symptom(s):  NA    New Order(s):  NA    Name of the person notified of the changes:  NA    Name of person being taught:  NA    Instructions given:  NA    Side Effects taught:  NA    Response to teaching:  NA      COMFORTABLE DYING MEASURE:  Is Patient/family satisfied with symptom level? NA    DISCHARGE PLAN:  Patient to discharge home to the care of her family upon completion of respite stay.

## 2017-12-07 NOTE — PROGRESS NOTES
Bedside and Verbal shift change report given to Judy Mayabenjy Julita4 (oncoming nurse) by Liza Purdy (offgoing nurse). Report included the following information SBAR, Kardex, Intake/Output and MAR. NAME OF PATIENT:  Benancio Dance    LEVEL OF CARE:  RESPITE    REASON FOR GIP:   NA    *PATIENT REMAINS ELIGIBLE FOR GIP LEVEL OF CARE AS EVIDENCED BY: (MUST BE ADDRESSED OF PATIENT GIP)      REASON FOR RESPITE:  Caregiver breakdown    O2 SAFETY:  NA    FALL INTERVENTIONS PROVIDED:   Implemented/recommended use of non-skid footwear, Implemented/recommended use of fall risk identification flag to all team members, Implemented/recommended assistive devices and encouraged their use, Implemented/recommended resources for alarm system (personal alarm, bed alarm, call bell, etc.) , Implemented/recommended environmental changes (remove hazards, lower bed, improve lighting, etc.) and Implemented/recommended increased supervision/assistance    INTERDISPLINARY COMMUNICATION/COLLABORATION:  Physician, MSW, [place] and RN, CNA    NEW MEDICATION INITIATION DOCUMENTATION:  NA    Reason medication is being initiated:  N/A    MD / Provider name consulted re: change in status / initiation of new medication:  N/A    New Symptom(s):  N/A    New Order(s):  N/A    Name of the person notified of the changes:  N/A    Name of person being taught:  N/A    Instructions given:  N/A    Side Effects taught:  N/A    Response to teaching:  N/A      COMFORTABLE DYING MEASURE:  Is Patient/family satisfied with symptom level?  yes    DISCHARGE PLAN:  Return home with family after Respite stay is complete followed by Hospice. 19:45 Patient lying in bed alert, answering some questions appropriately. No c/o pain or nausea. 00:15 Patient turned and repositioned for comfort, mouth care provided, appears comfortable. 03:00  Resting quietly, no signs of pain or discomfort noted.   06:00 Patient turned and repositioned for comfort, facial grimacing and moaning noted, medicated with prn Roxanol and  Lorazepam.

## 2017-12-07 NOTE — PROGRESS NOTES
Problem: Falls - Risk of  Goal: *Absence of Falls  Document Iliana Fall Risk and appropriate interventions in the flowsheet.   Outcome: Progressing Towards Goal  Fall Risk Interventions:

## 2017-12-08 NOTE — PROGRESS NOTES
Problem: Falls - Risk of  Goal: *Absence of Falls  Document Iliana Fall Risk and appropriate interventions in the flowsheet.    Outcome: Progressing Towards Goal  Fall Risk Interventions:  Mobility Interventions: Bed/chair exit alarm    Mentation Interventions: Bed/chair exit alarm, Door open when patient unattended    Medication Interventions: Bed/chair exit alarm    Elimination Interventions: Bed/chair exit alarm, Call light in reach    History of Falls Interventions: Bed/chair exit alarm, Room close to nurse's station

## 2017-12-08 NOTE — PROGRESS NOTES
0030: Pt moaning and yelling \"ouuu\", does not answer question about pain appropriately. Talks to herself, difficult to understand what she is saying, Grimaces on slightest touch to arm. Continuous to yell out as if in pain. Medicated with PRN Morphine 10 mg and PRN Haldol 0.5 mg. Assisted with drinking water, tolerated well.

## 2017-12-08 NOTE — PROGRESS NOTES
1900 [facility]. Pt is Respite level of care. Dx Breast Ca with Bone Mets  1930 Pt appears to be sleeping. 2120 Pt is arousable to name call. She opens eyes barely and attempts to talk. She grimaces and moans with repositioning. Speech is garbled. Repositioned to her right side. Roxanol 10mg given for signs of pain. 2245 Pt restless in bed, talking out, unable to understand most speech. She opens eyes and follows when her name is called. Does not states she has pain but grimaces and moans with movement of extremities. Roxanol 10mg given SL for signs of pain. Pt has still not voided. Phone calls to Valeria Chávez and Teri huertas at 2130 and 2230 to make them aware a moore cath is needed for possible retention. Messages left a both numbers to call  [facility].  2300 #16 Fr Moore placed without difficulty with 1000ml urine output immediately, clear yellow color. 0 Pt is still restless in bed and talking to people. No one is present in the room. Lorazepam given SL for agitation and restlessness. 0115 Pt continues to call out and yell with pain as she is restless in bed. Pt repositioned to her back. Lorazepam given SL for restlessness. 46 Daughter Beck Babcock returned call. Explained the reason for cathter placement to empty bladder of urine retained and pt was unable to void. Explained that it does not appear to cause her discomfort and that it was a relief. Daughter Beck Babcock states they will allow moore  for 24 hrs but if pt request it out they want it removed. 0200 Pt appears more comfortable on her back and has settled down and is now sleeping. 0330 Pt continues to sleep. 0530 Pt appears to be sleeping. Appears more comfortable in the supine position. Total urinary output from moore 1350ml  Required 3 doses of Roxanol and 2 doses Lorazepam tonight to get pt in comfort so that she could rest.   Haldol 1 dose given.     NAME OF PATIENT:  Christi Hensley    LEVEL OF CARE: Respite  REASON FOR GIP: na    *PATIENT REMAINS ELIGIBLE FOR GIP LEVEL OF CARE AS EVIDENCED BY: (MUST BE ADDRESSED OF PATIENT GIP)  na    REASON FOR RESPITE:  Caregiver breakdown    O2 SAFETY:  na    FALL INTERVENTIONS PROVIDED:   Implemented/recommended use of fall risk identification flag to all team members, Implemented/recommended resources for alarm system (personal alarm, bed alarm, call bell, etc.) , Implemented/recommended environmental changes (remove hazards, lower bed, improve lighting, etc.) and Implemented/recommended increased supervision/assistance    INTERDISPLINARY COMMUNICATION/COLLABORATION:  Physician, MSW, Pedro and RN, CNA    NEW MEDICATION INITIATION DOCUMENTATION:  Documentation completed in Clinical Note in [facility]    Reason medication is being initiated:  na    MD / Provider name consulted re: change in status / initiation of new medication:  na    New Symptom(s):  na    New Order(s):  na    Name of the person notified of the changes:  na    Name of person being taught:  na    Instructions given:  na    Side Effects taught:  na    Response to teaching:  na      COMFORTABLE DYING MEASURE:  Is Patient/family satisfied with symptom level? Yes    DISCHARGE PLAN:  Remain at [facility] for 5 days Respite stay .

## 2017-12-08 NOTE — PROGRESS NOTES
NAME OF PATIENT:  Brittany Montalvo    LEVEL OF CARE:  Respite    REASON FOR RESPITE:  Caregiver breakdown    O2 SAFETY:  R/A at this time    FALL INTERVENTIONS PROVIDED:   Implemented/recommended increased supervision/assistance    INTERDISPLINARY COMMUNICATION/COLLABORATION:  Physician, MSW, Pedro and RN, CNA    NEW MEDICATION INITIATION DOCUMENTATION:  N/A at this time    Reason medication is being initiated:  N/A    MD / Provider name consulted re: change in status / initiation of new medication:  N/A    New Symptom(s):  N/A    New Order(s):  N/A    Name of the person notified of the changes:  N/A    Name of person being taught:  N/A    Instructions given:  N/A    Side Effects taught:  N/A    Response to teaching:  N/A      COMFORTABLE DYING MEASURE:  Is Patient/family satisfied with symptom level? Family not present     DISCHARGE PLAN:  Home with hospice  0700  Report from [facility] Client grimacing in room and moaning  Moving extremities and shoulders about with appearance of pain. SL Morphine given. Took bumex crushed in applesauce without difficulty. Attempted to give prilosec and client chewed this(will not given Morphine CR as this should not be chewed)  Megace taken without problem but did cough loudly with an attempt to take sip of water via straw. No purposeful response at present to questions. HRR with pos murmur PPP  R arm with lymphedema. Lungs CTA respirations appear unlabored abdomen with normoactive BS soft nontender. 0830  Still moaning and grimacing from room-Lorazepam given SL at this time. Will continue to follow  0900 discussed with Ilan Muir NP  DCed CR morphine as client tried to chew prilosec. Added Morphine SL 15 mg every 4 hours scheduled and DCed Miralax.   Will continue to monitor  1052  Continuing to grimace and move about in bed as if uncomfortable  SL morphine given as per MARs  0955  Resting quietly with eyes close and even unlabored respirations  1113 Scheduled medication given as per [facility]  Family member in room. Plan for bed and bath soon. Will continue to monitor  1230  S/p bed and bath increase grimacing and groaning. Morphine PRN dose SL at present. Will continue to monitor  1313 Yelling out. Lorazepam SL given as per MARs will continue to monitor  1430  Client resting eyes closed with even unlabored respirations. Sisters in to visit with client  Will continue to monitor  1600  Turned to R side and moaned grimaced with interventions. Scheduled medication given Sl at this time  1711  Resting quietly respirations even unlabored.   Will continue to monitor

## 2017-12-09 NOTE — PROGRESS NOTES
Problem: Pressure Injury - Risk of  Goal: *Prevention of pressure ulcer  Outcome: Progressing Towards Goal  Assessment of skin at the beginning and throughout shift. Patient has no s/s of skin breakdown; skin is intact. Turning/repositioning and application of barrier cream used to prevent skin breakdown. Will continue to monitor.

## 2017-12-09 NOTE — PROGRESS NOTES
Problem: Pain  Goal: *Control of Pain  Outcome: Progressing Towards Goal  Assessment of pain at the beginning and throughout shift. Patient is receiving scheduled pain medication but has required prn pain medication several times this shift. At this time, patient is showing no s/s of pain. Will continue to monitor.

## 2017-12-09 NOTE — PROGRESS NOTES
NAME OF PATIENT:  Alfonzo Rockwell    LEVEL OF CARE:  Respite    REASON FOR RESPITE:  Caregiver breakdown    O2 SAFETY:  On R/A at present    FALL INTERVENTIONS PROVIDED:   Implemented/recommended resources for alarm system (personal alarm, bed alarm, call bell, etc.)  and Implemented/recommended increased supervision/assistance    INTERDISPLINARY COMMUNICATION/COLLABORATION:  Physician, MSW, Pedro and RN, CNA    NEW MEDICATION INITIATION DOCUMENTATION:  N/A at this time    Reason medication is being initiated:  N/A    MD / Provider name consulted re: change in status / initiation of new medication:  N/A    New Symptom(s):  N/A    New Order(s):  N/A    Name of the person notified of the changes:  N/A    Name of person being taught:  N/A    Instructions given:  N/A    Side Effects taught:  N/A    Response to teaching:  N/A      COMFORTABLE DYING MEASURE:  Is Patient/family satisfied with symptom level?  yes    DISCHARGE PLAN:  Home with hospice unless otherwise managed by   0700  Report from [facility] in room Moans and yells out with interventions. Sl Lorazepam given in addition to scheduled medication. Eyes closed grimaced forehead. Lungs CTA. Slight use of abdominal muscles to breathe. Abdomen soft non tender with normoactive BS. PPP  R arm lymphedema. Turned positioned on L side. Client did not verbally respond to questions or open eyes. Will continue to monitor  0948 Resting eyes closed without apparent distress  Breathing even unlabored. Did not arouse enough to take am meds or eat breakfast.  Will continue to monitor  1047 turned and positioned on L side s/p bed and bath. Premedicated with morphine 2nd to grimacing and moaning with interventions and for incident pain. Will continue to monitor  1130 Resting quietly without apparent distress. DTR and family members in to see her. 1220 Client even unlabored respirations family members in room.   All agree she looks comfortable at present  1259 Family in room concerned she is not responding. Talked about signs of pain and use of scheduled short acting medication as she can no longer take long acting morphine. They advise if client is grimacing and squirming about in bed sometime rubbing her back is helpful as frequently she is itching and not in pain. Request we not use too many PRN medications as client did not want to be sedated. Gone from my sight given to DTR to share and educated decrease responsiveness is part of the process she is going through frequently. Family did let me give her scheduled medication. Will continue to monitor  1500  Turned to R side client yells out no no and grimaces then returns to resting presentation. 1702  Family present. Medications as per MAR. Family member advises she emptied moore. Reports emptied about 1000cc. Mouth care provided and turned to R side further  1800 Resting quietly with even unlabored respirations. Family member in room.

## 2017-12-09 NOTE — PROGRESS NOTES
1900:  Verbal shift change report given to Natan Elliott RN (oncoming nurse) by Kara Fabry, RN (offgoing nurse). Report included the following information SBAR, Kardex, Intake/Output and MAR. 2015:  Assessed pt: eyes opened to voice; asked pt if she was in pain (visually in pain/grimacing/moaning), pt did not reply. LS clear/diminished, pulses palpable both UE & LE; skin intact and warm to touch; BS active. Administered scheduled roxanol 15mg/SL and will administer medication for agitation. 2025:  Administered prn haldol 0.5mg/SL for agitation (head moving back and forth with shoulders moving). Will continue to monitor. 2140:  Rounded; pt's eyes closed; calling out (unable to understand words); grimacing/movinng shoulders and arms. Administered prn roxanol 10mg/SL and lorazepam 0.5mg/SL. Sat pt up (HOB 45 degrees) gave pt 2 sips of water. Will continue to monitor. 2235:  Rounded; checked pt for fecal matter (rectal check), none in vault (last BM unknown); administered prn bisacodyl 10mg/suppository. Pt moaning/grimacing/moving arms and shoulders. Administered prn roxanol 10mg/SL and lorazepam 0.5mg/SL. Turned/repositioned to left side. 2330: Reassessed; pt sleeping comfortably. 0000:  Administered scheduled roxanol 15mg/SL. Patient mumbling/moving arms and shoulders. Administered prn lorazepam 0.5mg/SL. 0115:  Reassessed pain & agitation; pt sleeping with no s/s of pain nor agitation. 0245:  Rounded; pt sleeping, no s/s of distress. 0400:  Administered scheduled roxanol 15mg/SL. Patient is moaning/grimacing/moving head, shoulders, arms. Administered prn lorazepam 0.5mg/SL. Patient had BM, loose, water. Cleaned pt up and placed clean brief on her with assistance from [employer]; turned/repositioned to right side. 2618:  Rounded; pt moaning/grimacing/moving shoulders and arms. Administered prn roxanol 10mg/SL and lorazepam 0.5mg/SL.   0600:  Reassessed pain & agitation; pt resting comfortably with no s/s of pain nor agitation. NAME OF PATIENT:  Jh Sr    LEVEL OF CARE:  Respite    REASON FOR GIP:   n/a    *PATIENT REMAINS ELIGIBLE FOR GIP LEVEL OF CARE AS EVIDENCED BY: (MUST BE ADDRESSED OF PATIENT GIP)      REASON FOR RESPITE:  Caregiver breakdown    O2 SAFETY:  n/a    FALL INTERVENTIONS PROVIDED:   Implemented/recommended resources for alarm system (personal alarm, bed alarm, call bell, etc.) , Implemented/recommended environmental changes (remove hazards, lower bed, improve lighting, etc.) and Implemented/recommended increased supervision/assistance    INTERDISPLINARY COMMUNICATION/COLLABORATION:  Physician, MSW, Pedro and RN, CNA    NEW MEDICATION INITIATION DOCUMENTATION:  n/a    Reason medication is being initiated:  n/a    MD / Provider name consulted re: change in status / initiation of new medication:  n/a    New Symptom(s):  n/a    New Order(s):  n/a    Name of the person notified of the changes:  n/a    Name of person being taught:  n/a    Instructions given:  n/a    Side Effects taught:  n/a    Response to teaching:  n/a      COMFORTABLE DYING MEASURE:  Is Patient/family satisfied with symptom level?  yes    DISCHARGE PLAN:  After Respite stay, patient will go home and followed by [id number] [place] [address].

## 2017-12-10 NOTE — PROGRESS NOTES
0700 Received report from [facility]  utilizing SBAR, I/O and Kardex  Patient was admitted to the [facility] Routine Level of care   Hospice Diagnosis- Metastatic Breast Cancer  Other History includes- stroke, Gerd, SEN, CAD, Pulmonary embolism, HTN, R mastectomy, pacemaker  0830 Assessment completed   Neuro- Unresponsive  Muscular-moves all extremities   Resp-lungs sounds  diminished in bases, resp rate 12  Oxygen - room air sats 97%  Cardio-Heart regular, pulses palpable, skin warm and dry, cap refill less than 3 sec, distal extremities warm,   GI- Abd soft, nondistended, nontender, bowel sounds hypoactive, incontinent of bowels,   Diet - puree pt is not eating  Last BM - 12/9  -incontinent of bladder   Ascencio in place draining cl yellow urine  Skin/Wounds -   Edema- 1+ lower extremities  Access sites- right port unaccessed  Family- Dtr Jennifer    0900 Unable to take am meds pt is lethargic, non verbal, no purposeful movement  1405 [facility] called update given, reviewed her thought on not giving prn meds, \"even if she is in pain a little when you move her wait and see if it goes away first before medicating her\"  1300 Family at bedside, stating again she does not needs medicine. I assured tem I had not given any meds. 46 Another family member to desk asking for lip moisturizer, stated she wanted pt to have medication and she will talk to family. 1430 Pt premedicated prior to bath was grimacing prior to bath, did speak a few words to family but they clearly saw how much pain she is in now after missing two doses of scheduled meds. The family who earlier did not agree to scheduled meds stated that they do want scheduled meds. 1500 Total bed bath and linen change. 1600 Scheduled roxanol given tolerated well  1800 Pt grimacing and moaning medicated with morphine and ativan prn.  1900 Report to oncoming shift.       NAME OF PATIENT:  Sunday Sevilla     LEVEL OF CARE:  ROUTINE     REASON FOR GIP:   NA     *PATIENT REMAINS ELIGIBLE FOR GIP LEVEL OF CARE AS EVIDENCED BY: (MUST BE ADDRESSED OF PATIENT GIP)        REASON FOR RESPITE:  NA     O2 SAFETY:  NA     FALL INTERVENTIONS PROVIDED:   NA     INTERDISPLINARY COMMUNICATION/COLLABORATION:  Physician, MSW, Pedro and RN, CNA     NEW MEDICATION INITIATION DOCUMENTATION:  NA     Reason medication is being initiated:  NA     MD / Provider name consulted re: change in status / initiation of new medication:  NA     New Symptom(s):  NA     New Order(s):  NA     Name of the person notified of the changes:  NA     Name of person being taught:  NA     Instructions given:  NA     Side Effects taught:  NA     Response to teaching:  NA        COMFORTABLE DYING MEASURE:  Is Patient/family satisfied with symptom level?  yes     DISCHARGE PLAN:  Remain at [facility] until end of life.

## 2017-12-10 NOTE — PROGRESS NOTES
18: 08:Adm PRN morphine and ativan per MD orders for increase pain and restlessness, tolerated well.

## 2017-12-11 NOTE — PROGRESS NOTES
0700:  Verbal shift change report given to Julito Jameson RN (oncoming nurse) by Katalina Alberto RN (offgoing nurse). Report included the following information SBAR, Kardex, Intake/Output and MAR.   0755:  Administered scheduled roxanol 15mg/SL and assessed pt: eyes closed with moaning/grimacing/moving of head and shoulders; pulses UE & LE are palpable/weak; hands cool, feet warm; heart murmur with HS becoming irregular. Administered prn lorazepam 0.5mg/SL. Will continue to monitor. 0900:  Rounded:  Pt complaining of pain with movement of arms & shoulders. Administered prn roxanol 10mg/SL and lorazepam 0.5mg/SL. Will continue to monitor. 1000:  Spoke with Dyllan Ross NP and updated her on pt's condition. She plans to make adjustment to [facility]. 1030:  Pt's sister, Caryl Daigle, called for update and update was given. Sister understands that the pt is transitioning/declining and wants what's best for her sister. 1100:  Administered scheduled roxanol 20mg/SL. Patient unresponsive; no s/s of distress. 1215:  Administered scheduled roxanol 20mg/SL. Patient moving shoulders/head back and forth/grimacing. 1530:  Administered scheduled roxanol 20mg/SL. Patient unresponsive; no s/s of distress.       NAME OF PATIENT:  ySdney Carpenter    LEVEL OF CARE:  Respite    REASON FOR GIP:   n/a    *PATIENT REMAINS ELIGIBLE FOR GIP LEVEL OF CARE AS EVIDENCED BY: (MUST BE ADDRESSED OF PATIENT GIP)      REASON FOR RESPITE:  Caregiver breakdown    O2 SAFETY:  n/a    FALL INTERVENTIONS PROVIDED:   Implemented/recommended resources for alarm system (personal alarm, bed alarm, call bell, etc.) , Implemented/recommended environmental changes (remove hazards, lower bed, improve lighting, etc.) and Implemented/recommended increased supervision/assistance    INTERDISPLINARY COMMUNICATION/COLLABORATION:  Physician, MSW, Pedro and RN, CNA    NEW MEDICATION INITIATION DOCUMENTATION:  n/a    Reason medication is being initiated:  n/a    MD / Provider name consulted re: change in status / initiation of new medication:  n/a    New Symptom(s):  n/a    New Order(s):  n/a    Name of the person notified of the changes:  n/a    Name of person being taught:  n/a    Instructions given:  n/a    Side Effects taught:  n/a    Response to teaching:  n/a      COMFORTABLE DYING MEASURE:  Is Patient/family satisfied with symptom level?  yes    DISCHARGE PLAN:  Patient respite stay ends today. MSW was notified via email from previous shift to request further information on discharge plan.

## 2017-12-11 NOTE — PROGRESS NOTES
1900 Report received from Beckie Pappas [facility]. Pt is Respite level of care. Dx Metastatic Breast Cancer. 1930 Pt appears to be asleep. Resting with eyes closed. 2000 Phone call from pts sister Finn Hammond who lives in [place], [place] and states she is the MPOA. She states the daughters are not able to care for the pt at home as she has declined. The pts Respite stay will end tomorrow and she would like to be included in discharge plans. Email sent to REKHA Loo. 0000 Pt awake restless in bed, calling out. Has facial grimace, nodded yes to pain. Scheduled Roanol and prn Lorazepam given for discomfort. 0100 Pt is resting. Appears to be asleep.  0400 Scheduled meds given. Repositioned for comfort.  0600 Scheduled meds providing pain relief. Pt appears to be asleep.       NAME OF PATIENT:  Kacy Tim    LEVEL OF CARE: Respite    REASON FOR GIP:   na    *PATIENT REMAINS ELIGIBLE FOR GIP LEVEL OF CARE AS EVIDENCED BY: (MUST BE ADDRESSED OF PATIENT GIP)      REASON FOR RESPITE:  Caregiver breakdown    O2 SAFETY:  na    FALL INTERVENTIONS PROVIDED:   Implemented/recommended use of fall risk identification flag to all team members, Implemented/recommended resources for alarm system (personal alarm, bed alarm, call bell, etc.) , Implemented/recommended environmental changes (remove hazards, lower bed, improve lighting, etc.) and Implemented/recommended increased supervision/assistance    INTERDISPLINARY COMMUNICATION/COLLABORATION:  Physician, REKHA, Bowden and RN, CNA    NEW MEDICATION INITIATION DOCUMENTATION:  Documentation completed in Clinical Note in [facility]    Reason medication is being initiated:    na    MD / Provider name consulted re: change in status / initiation of new medication:  na    New Symptom(s):  na    New Order(s):  na  Name of the person notified of the changes:  na    Name of person being taught:  na    Instructions given: na    Side Effects taught:  na    Response to teaching:  na      COMFORTABLE DYING MEASURE:  Is Patient/family satisfied with symptom level? Yes    DISCHARGE PLAN:  Respite ends 12/11. Family need to discuss long term plan arrangements with MSW. They are not capable of caring for the pt as she has declined.

## 2017-12-22 NOTE — DISCHARGE SUMMARY
Hospice Discharge Summary    [address] [place] [address]  Good Help to Those in Need        Date of Admission: 12/6/2017  Date of Discharge: 12/11/2017    Myrna Hannah is a 65y.o. year old who was admitted to [address] [place] [address] at [facility] with a Hospice diagnosis of Breast Cancer. She was admitted for respite care. The patient was discharged to home for ongoing Hospice care.

## 2017-12-25 VITALS
HEART RATE: 100 BPM | OXYGEN SATURATION: 98 % | RESPIRATION RATE: 32 BRPM | DIASTOLIC BLOOD PRESSURE: 59 MMHG | SYSTOLIC BLOOD PRESSURE: 80 MMHG

## 2018-01-01 NOTE — TELEPHONE ENCOUNTER
Patients daughter Liza Hoang is requesting a call back regarding patients sugars. She stated that she was admitted to the hospital last Wednesday because her sugar had dropped to 32. She is also requesting a refill on patients test strips to be sent to [phone].
Spoke with SAL Rolon. She states that patient came home from [facility] on Saturday, 10/28/17. She states that this am her BS was 158. Patient developed \"bone pain\". She is presently in [address] [facility]. Patient's oncologist is being called to consult with patient. Patient has an appointment on Wednesday 11/1/17. If patient is admitted to hospital, daughter, SAL Gonzales, wonders if  would come to see patient. SAL Rolon is to call on Wednesday to let us know if patient was admitted.
negative

## 2020-08-17 NOTE — PROGRESS NOTES
1630:  Patient was picked up by [facility] and DNR, MAR, clothing, home medications, and tote bag. Report was given to Yelena Cotter, [facility] via phone and CG, Radha Gina (daughter) was updated on patient's condition and medication educations given. Pharmacy will be delivering medications to home and Yelena Cotter will be doing tuck-in visit. Received pharmacy request for refill of pregabalin.   Last appt 2/4/20  Upcoming appt, none scheduled.   PDMP on desk, please advise.     pregabalin (LYRICA) 50 MG capsule 90 capsule 2 6/2/2020     Sig - Route: Take 1 capsule by mouth 3 times daily. - Oral    Class: Normal

## 2023-03-04 NOTE — PERIOP NOTES
Converted from laparoscopic to open at 2142
Patient's daughter Naomi Xander: [phone]
Seprafilm adhesion barrier (ref [id number]) given to sterile field.  Expiry 2019-10-31 lot#[id number]
TRANSFER - OUT REPORT:    Verbal report given to [address] [place] [address] RN(name) on Genevive Coil  being transferred to [facility](unit) for routine progression of care       Report consisted of patients Situation, Background, Assessment and   Recommendations(SBAR). Information from the following report(s) OR Summary, Procedure Summary, Intake/Output and MAR was reviewed with the receiving nurse. Opportunity for questions and clarification was provided.       Patient transported with:   Monitor  O2 @ 2 liters  Registered Nurse
Never smoker

## 2024-02-14 NOTE — TELEPHONE ENCOUNTER
Call received for patient. HIPPA verified. patient stated she saw the doctor last week  And he D/C'd er Bumex d/t pt being dehydrated and he sent her to the hospital. Patient stated she had a bowel obstruction and had to have surgery on 9/15/17. Patient stated she calling because she wants the doctor to recorder her Bumex because she is having trouble getting in and out of bed, legs, feet and ankles are swelling. Patient currently at [address] [place] [address]. Patient stated she saw the doctor at [address] [place] [address] yesterday but she didn't mention the swelling because her sugar dropped and she was having a sugar attack at the time. Patient informed MD will be notified. MD notified. Call placed to pt's nurse at [facility]; spoke to ΣΑΡΑΝΤΙ. ΣΑΡΑΝΤΙ stated she will talk with there doctor. Writer asked ΣΑΡΑΝΤΙ let patient know she called. ΣΑΡΑΝΤΙ stated okay. [Chaperone Present] : A chaperone was present in the examining room during all aspects of the physical examination [FreeTextEntry1] : Maria Esther [Appropriately responsive] : appropriately responsive [Alert] : alert [No Acute Distress] : no acute distress [No Lymphadenopathy] : no lymphadenopathy [Regular Rate Rhythm] : regular rate rhythm [No Murmurs] : no murmurs [Clear to Auscultation B/L] : clear to auscultation bilaterally [Soft] : soft [Non-tender] : non-tender [Non-distended] : non-distended [No HSM] : No HSM [No Lesions] : no lesions [No Mass] : no mass [Oriented x3] : oriented x3 [Examination Of The Breasts] : a normal appearance [No Masses] : no breast masses were palpable [Labia Majora] : normal [Labia Minora] : normal [Polyp ___ cm] : [unfilled] ~[facility] polyp [Normal] : normal [Uterine Adnexae] : normal

## (undated) DEVICE — TOWEL SURG W17XL27IN STD BLU COT NONFENESTRATED PREWASHED

## (undated) DEVICE — SOLUTION IV 1000ML 0.9% SOD CHL

## (undated) DEVICE — STAPLER SKIN 35CT WD STRL DISP -- MULTIFIRE PREMIUM

## (undated) DEVICE — SUTURE MCRYL SZ 4-0 L27IN ABSRB UD L19MM PS-2 1/2 CIR PRIM Y426H

## (undated) DEVICE — INFECTION CONTROL KIT SYS

## (undated) DEVICE — SURGICAL PROCEDURE PACK BASIN MAJ SET CUST NO CAUT

## (undated) DEVICE — SUTURE PDS II SZ 1 L96IN ABSRB VLT XLH L70MM 1/2 CIR Z881G

## (undated) DEVICE — BULB SYRINGE, IRRIGATION WITH PROTECTIVE CAP, 60 CC, INDIVIDUALLY WRAPPED: Brand: DOVER

## (undated) DEVICE — SUTURE PERMAHAND SZ 3-0 L18IN NONABSORBABLE BLK L26MM SH C013D

## (undated) DEVICE — DBD-PACK,LAPAROTOMY,2 REINFORCED GOWNS: Brand: MEDLINE

## (undated) DEVICE — STERILE POLYISOPRENE POWDER-FREE SURGICAL GLOVES: Brand: PROTEXIS

## (undated) DEVICE — MARYLAND JAW LAPAROSCOPIC SEALER/DIVIDER: Brand: LIGASURE

## (undated) DEVICE — REM POLYHESIVE ADULT PATIENT RETURN ELECTRODE: Brand: VALLEYLAB

## (undated) DEVICE — GOWN,SIRUS,NONRNF,SETINSLV,2XL,18/CS: Brand: MEDLINE

## (undated) DEVICE — (D)SYR 10ML 1/5ML GRAD NSAF -- PKGING CHANGE USE ITEM 338027

## (undated) DEVICE — SUTURE VCRL SZ 3-0 L27IN ABSRB VLT SH L26MM 1/2 CIR J784G

## (undated) DEVICE — SURGICAL PROCEDURE PACK TISS 3X5 IN ABSORBABLE SEPRAFILM

## (undated) DEVICE — POOLE SUCTION INSTRUMENT WITH REMOVABLE SHEATH: Brand: POOLE

## (undated) DEVICE — (D)PREP SKN CHLRAPRP APPL 26ML -- CONVERT TO ITEM 371833

## (undated) DEVICE — SUTURE PERMAHAND SZ 2-0 L30IN NONABSORBABLE BLK SILK W/O A305H

## (undated) DEVICE — KENDALL SCD EXPRESS SLEEVES, KNEE LENGTH, MEDIUM: Brand: KENDALL SCD

## (undated) DEVICE — 3000CC GUARDIAN II: Brand: GUARDIAN

## (undated) DEVICE — DEVON™ KNEE AND BODY STRAP 60" X 3" (1.5 M X 7.6 CM): Brand: DEVON

## (undated) DEVICE — ROCKER SWITCH PENCIL BLADE ELECTRODE, HOLSTER: Brand: EDGE

## (undated) DEVICE — CATHETERIZATION TRAY FOL 14 FR URIMTR STATLOK SURSTP

## (undated) DEVICE — HANDLE LT SNAP ON ULT DURABLE LENS FOR TRUMPF ALC DISPOSABLE

## (undated) DEVICE — SPONGE LAP 18X18IN STRL -- 5/PK

## (undated) DEVICE — MEDI-VAC NON-CONDUCTIVE SUCTION TUBING: Brand: CARDINAL HEALTH

## (undated) DEVICE — NEEDLE HYPO 18GA L1.5IN PNK S STL HUB POLYPR SHLD REG BVL

## (undated) DEVICE — INSULATED BLADE ELECTRODE: Brand: EDGE